# Patient Record
Sex: FEMALE | Race: WHITE | NOT HISPANIC OR LATINO | URBAN - METROPOLITAN AREA
[De-identification: names, ages, dates, MRNs, and addresses within clinical notes are randomized per-mention and may not be internally consistent; named-entity substitution may affect disease eponyms.]

---

## 2018-01-07 ENCOUNTER — EMERGENCY (EMERGENCY)
Facility: HOSPITAL | Age: 43
LOS: 0 days | Discharge: HOME | End: 2018-01-07

## 2018-01-07 DIAGNOSIS — K08.89 OTHER SPECIFIED DISORDERS OF TEETH AND SUPPORTING STRUCTURES: ICD-10-CM

## 2018-01-07 DIAGNOSIS — F41.9 ANXIETY DISORDER, UNSPECIFIED: ICD-10-CM

## 2018-01-07 DIAGNOSIS — F32.9 MAJOR DEPRESSIVE DISORDER, SINGLE EPISODE, UNSPECIFIED: ICD-10-CM

## 2018-01-07 DIAGNOSIS — J18.9 PNEUMONIA, UNSPECIFIED ORGANISM: ICD-10-CM

## 2018-01-07 DIAGNOSIS — Z79.899 OTHER LONG TERM (CURRENT) DRUG THERAPY: ICD-10-CM

## 2018-01-07 DIAGNOSIS — K80.50 CALCULUS OF BILE DUCT WITHOUT CHOLANGITIS OR CHOLECYSTITIS WITHOUT OBSTRUCTION: ICD-10-CM

## 2018-01-07 DIAGNOSIS — K04.01 REVERSIBLE PULPITIS: ICD-10-CM

## 2018-06-26 ENCOUNTER — INPATIENT (INPATIENT)
Facility: HOSPITAL | Age: 43
LOS: 10 days | Discharge: HOME | End: 2018-07-07
Attending: INTERNAL MEDICINE | Admitting: INTERNAL MEDICINE
Payer: SUBSIDIZED

## 2018-06-26 VITALS
RESPIRATION RATE: 20 BRPM | HEART RATE: 82 BPM | SYSTOLIC BLOOD PRESSURE: 119 MMHG | TEMPERATURE: 98 F | HEIGHT: 65 IN | WEIGHT: 190.04 LBS | OXYGEN SATURATION: 98 % | DIASTOLIC BLOOD PRESSURE: 76 MMHG

## 2018-06-26 DIAGNOSIS — Z90.49 ACQUIRED ABSENCE OF OTHER SPECIFIED PARTS OF DIGESTIVE TRACT: Chronic | ICD-10-CM

## 2018-06-26 LAB
ALBUMIN SERPL ELPH-MCNC: 3.9 G/DL — SIGNIFICANT CHANGE UP (ref 3.5–5.2)
ALP SERPL-CCNC: 155 U/L — HIGH (ref 30–115)
ALT FLD-CCNC: 14 U/L — SIGNIFICANT CHANGE UP (ref 0–41)
ANION GAP SERPL CALC-SCNC: 15 MMOL/L — HIGH (ref 7–14)
APPEARANCE UR: CLEAR — SIGNIFICANT CHANGE UP
APTT BLD: 30.9 SEC — SIGNIFICANT CHANGE UP (ref 27–39.2)
AST SERPL-CCNC: 16 U/L — SIGNIFICANT CHANGE UP (ref 0–41)
BACTERIA # UR AUTO: (no result)
BASOPHILS # BLD AUTO: 0.03 K/UL — SIGNIFICANT CHANGE UP (ref 0–0.2)
BASOPHILS NFR BLD AUTO: 0.3 % — SIGNIFICANT CHANGE UP (ref 0–1)
BILIRUB SERPL-MCNC: <0.2 MG/DL — SIGNIFICANT CHANGE UP (ref 0.2–1.2)
BILIRUB UR-MCNC: NEGATIVE — SIGNIFICANT CHANGE UP
BUN SERPL-MCNC: 12 MG/DL — SIGNIFICANT CHANGE UP (ref 10–20)
CALCIUM SERPL-MCNC: 9.3 MG/DL — SIGNIFICANT CHANGE UP (ref 8.5–10.1)
CHLORIDE SERPL-SCNC: 96 MMOL/L — LOW (ref 98–110)
CO2 SERPL-SCNC: 26 MMOL/L — SIGNIFICANT CHANGE UP (ref 17–32)
COD CRY URNS QL: NEGATIVE — SIGNIFICANT CHANGE UP
COLOR SPEC: YELLOW — SIGNIFICANT CHANGE UP
CREAT SERPL-MCNC: 0.7 MG/DL — SIGNIFICANT CHANGE UP (ref 0.7–1.5)
DIFF PNL FLD: NEGATIVE — SIGNIFICANT CHANGE UP
EOSINOPHIL # BLD AUTO: 0.25 K/UL — SIGNIFICANT CHANGE UP (ref 0–0.7)
EOSINOPHIL NFR BLD AUTO: 2.6 % — SIGNIFICANT CHANGE UP (ref 0–8)
EPI CELLS # UR: (no result) /HPF
GLUCOSE SERPL-MCNC: 81 MG/DL — SIGNIFICANT CHANGE UP (ref 70–99)
GLUCOSE UR QL: NEGATIVE MG/DL — SIGNIFICANT CHANGE UP
GRAN CASTS # UR COMP ASSIST: NEGATIVE — SIGNIFICANT CHANGE UP
HCT VFR BLD CALC: 36.2 % — LOW (ref 37–47)
HGB BLD-MCNC: 11.5 G/DL — LOW (ref 12–16)
HYALINE CASTS # UR AUTO: NEGATIVE — SIGNIFICANT CHANGE UP
IMM GRANULOCYTES NFR BLD AUTO: 0.4 % — HIGH (ref 0.1–0.3)
INR BLD: 1 RATIO — SIGNIFICANT CHANGE UP (ref 0.65–1.3)
KETONES UR-MCNC: NEGATIVE — SIGNIFICANT CHANGE UP
LEUKOCYTE ESTERASE UR-ACNC: (no result)
LYMPHOCYTES # BLD AUTO: 3.66 K/UL — HIGH (ref 1.2–3.4)
LYMPHOCYTES # BLD AUTO: 38.4 % — SIGNIFICANT CHANGE UP (ref 20.5–51.1)
MCHC RBC-ENTMCNC: 25.9 PG — LOW (ref 27–31)
MCHC RBC-ENTMCNC: 31.8 G/DL — LOW (ref 32–37)
MCV RBC AUTO: 81.5 FL — SIGNIFICANT CHANGE UP (ref 81–99)
MONOCYTES # BLD AUTO: 0.67 K/UL — HIGH (ref 0.1–0.6)
MONOCYTES NFR BLD AUTO: 7 % — SIGNIFICANT CHANGE UP (ref 1.7–9.3)
NEUTROPHILS # BLD AUTO: 4.88 K/UL — SIGNIFICANT CHANGE UP (ref 1.4–6.5)
NEUTROPHILS NFR BLD AUTO: 51.3 % — SIGNIFICANT CHANGE UP (ref 42.2–75.2)
NITRITE UR-MCNC: NEGATIVE — SIGNIFICANT CHANGE UP
NRBC # BLD: 0 /100 WBCS — SIGNIFICANT CHANGE UP (ref 0–0)
PH UR: 6 — SIGNIFICANT CHANGE UP (ref 5–8)
PLATELET # BLD AUTO: 341 K/UL — SIGNIFICANT CHANGE UP (ref 130–400)
POTASSIUM SERPL-MCNC: 4.5 MMOL/L — SIGNIFICANT CHANGE UP (ref 3.5–5)
POTASSIUM SERPL-SCNC: 4.5 MMOL/L — SIGNIFICANT CHANGE UP (ref 3.5–5)
PROT SERPL-MCNC: 7.2 G/DL — SIGNIFICANT CHANGE UP (ref 6–8)
PROT UR-MCNC: NEGATIVE MG/DL — SIGNIFICANT CHANGE UP
PROTHROM AB SERPL-ACNC: 10.8 SEC — SIGNIFICANT CHANGE UP (ref 9.95–12.87)
RBC # BLD: 4.44 M/UL — SIGNIFICANT CHANGE UP (ref 4.2–5.4)
RBC # FLD: 13.5 % — SIGNIFICANT CHANGE UP (ref 11.5–14.5)
RBC CASTS # UR COMP ASSIST: NEGATIVE — SIGNIFICANT CHANGE UP
SODIUM SERPL-SCNC: 137 MMOL/L — SIGNIFICANT CHANGE UP (ref 135–146)
SP GR SPEC: <=1.005 — SIGNIFICANT CHANGE UP (ref 1.01–1.03)
TRI-PHOS CRY UR QL COMP ASSIST: NEGATIVE — SIGNIFICANT CHANGE UP
URATE CRY FLD QL MICRO: NEGATIVE — SIGNIFICANT CHANGE UP
UROBILINOGEN FLD QL: 0.2 MG/DL — SIGNIFICANT CHANGE UP (ref 0.2–0.2)
WBC # BLD: 9.53 K/UL — SIGNIFICANT CHANGE UP (ref 4.8–10.8)
WBC # FLD AUTO: 9.53 K/UL — SIGNIFICANT CHANGE UP (ref 4.8–10.8)
WBC UR QL: (no result) /HPF

## 2018-06-26 RX ORDER — ESCITALOPRAM OXALATE 10 MG/1
20 TABLET, FILM COATED ORAL AT BEDTIME
Qty: 0 | Refills: 0 | Status: DISCONTINUED | OUTPATIENT
Start: 2018-06-26 | End: 2018-06-27

## 2018-06-26 RX ORDER — ESCITALOPRAM OXALATE 10 MG/1
20 TABLET, FILM COATED ORAL ONCE
Qty: 0 | Refills: 0 | Status: COMPLETED | OUTPATIENT
Start: 2018-06-26 | End: 2018-06-26

## 2018-06-26 RX ORDER — ESCITALOPRAM OXALATE 10 MG/1
20 TABLET, FILM COATED ORAL DAILY
Qty: 0 | Refills: 0 | Status: DISCONTINUED | OUTPATIENT
Start: 2018-06-26 | End: 2018-06-26

## 2018-06-26 RX ORDER — BUPROPION HYDROCHLORIDE 150 MG/1
300 TABLET, EXTENDED RELEASE ORAL DAILY
Qty: 0 | Refills: 0 | Status: DISCONTINUED | OUTPATIENT
Start: 2018-06-26 | End: 2018-06-26

## 2018-06-26 RX ORDER — BUPROPION HYDROCHLORIDE 150 MG/1
300 TABLET, EXTENDED RELEASE ORAL ONCE
Qty: 0 | Refills: 0 | Status: COMPLETED | OUTPATIENT
Start: 2018-06-26 | End: 2018-06-26

## 2018-06-26 RX ORDER — BUPROPION HYDROCHLORIDE 150 MG/1
300 TABLET, EXTENDED RELEASE ORAL AT BEDTIME
Qty: 0 | Refills: 0 | Status: DISCONTINUED | OUTPATIENT
Start: 2018-06-26 | End: 2018-07-07

## 2018-06-26 RX ADMIN — ESCITALOPRAM OXALATE 20 MILLIGRAM(S): 10 TABLET, FILM COATED ORAL at 23:24

## 2018-06-26 RX ADMIN — BUPROPION HYDROCHLORIDE 300 MILLIGRAM(S): 150 TABLET, EXTENDED RELEASE ORAL at 23:25

## 2018-06-26 NOTE — ED PROVIDER NOTE - ATTENDING CONTRIBUTION TO CARE
I personally evaluated the patient. I reviewed the Resident’s or Physician Assistant’s note (as assigned above), and agree with the findings and plan except as documented in my note.  43y female former smoker with cough, PE as above, has central mass likely malignant with metastases, d/w Dr Galvan oncology, will admit [facility] for expedited malignancy wrokup

## 2018-06-26 NOTE — ED PROVIDER NOTE - OBJECTIVE STATEMENT
43 y f pmh anxiety, depression pw cough. Nonproductive cough for the past 3 months. Associated with nasal congestion. No exacerbating or alleviating factors. While coughing, pulled a muscle in the R inner thigh and up towards buttocks 2 days ago. Worsens with flexion of the hip, but still able to ambulate well. Takes allegra for allergies with no relief. Also c/o cloudy urine for the past 1 year. Denies dysuria, hematuria, flank pain, fever, chills, sore throat, neck pain, ear pain, leg swelling, leg erythema.

## 2018-06-26 NOTE — ED PROVIDER NOTE - PHYSICAL EXAMINATION
CONSTITUTIONAL: Well-developed; well-nourished; in no acute distress.   SKIN: warm, dry  HEAD: Normocephalic; atraumatic.  EYES: normal sclera and conjunctiva   ENT: No nasal discharge; airway clear. No tonsillar exudates, postnasal drip, erythema.   NECK: Supple; non tender.  CARD: S1, S2 normal; no murmurs, gallops, or rubs. Regular rate and rhythm.   RESP: No wheezes, rales or rhonchi.  ABD: soft ntnd; no cva ttp.   EXT: Normal ROM.  No clubbing, cyanosis or edema. Mild ttp over inner thigh. Able to ambulate with normal gait. No calf swelling or posterior calf ttp.   LYMPH: No acute cervical adenopathy.  NEURO: Alert, oriented, grossly unremarkable  PSYCH: Cooperative, appropriate.

## 2018-06-26 NOTE — ED ADULT TRIAGE NOTE - CHIEF COMPLAINT QUOTE
Patient states "chronic cough for a few months and last night deana I coughed I had a pinch in my right leg, and I couldn't walk".

## 2018-06-26 NOTE — ED PROVIDER NOTE - NS ED ROS FT
Eyes:  No visual changes, eye pain or discharge.  ENMT:  No hearing changes, pain, discharge or infections. No neck pain or stiffness.  Cardiac:  No chest pain, SOB or edema.   Respiratory:  Cough. No respiratory distress.   GI:  No nausea, vomiting, diarrhea or abdominal pain.  :  Cloudy urine. No dysuria, frequency or burning.  MS:  R inner thigh pain. No myalgia, muscle weakness, joint pain or back pain.  Neuro:  No headache or weakness.  No LOC.  Skin:  No skin rash.   Endocrine: No history of thyroid disease or diabetes.

## 2018-06-27 LAB
ALBUMIN SERPL ELPH-MCNC: 4 G/DL — SIGNIFICANT CHANGE UP (ref 3.5–5.2)
ALP SERPL-CCNC: 159 U/L — HIGH (ref 30–115)
ALT FLD-CCNC: 14 U/L — SIGNIFICANT CHANGE UP (ref 0–41)
ANION GAP SERPL CALC-SCNC: 17 MMOL/L — HIGH (ref 7–14)
AST SERPL-CCNC: 15 U/L — SIGNIFICANT CHANGE UP (ref 0–41)
BASOPHILS # BLD AUTO: 0.03 K/UL — SIGNIFICANT CHANGE UP (ref 0–0.2)
BASOPHILS NFR BLD AUTO: 0.4 % — SIGNIFICANT CHANGE UP (ref 0–1)
BILIRUB SERPL-MCNC: 0.2 MG/DL — SIGNIFICANT CHANGE UP (ref 0.2–1.2)
BUN SERPL-MCNC: 8 MG/DL — LOW (ref 10–20)
CALCIUM SERPL-MCNC: 9.4 MG/DL — SIGNIFICANT CHANGE UP (ref 8.5–10.1)
CHLORIDE SERPL-SCNC: 95 MMOL/L — LOW (ref 98–110)
CO2 SERPL-SCNC: 28 MMOL/L — SIGNIFICANT CHANGE UP (ref 17–32)
CREAT SERPL-MCNC: 0.6 MG/DL — LOW (ref 0.7–1.5)
EOSINOPHIL # BLD AUTO: 0.22 K/UL — SIGNIFICANT CHANGE UP (ref 0–0.7)
EOSINOPHIL NFR BLD AUTO: 2.7 % — SIGNIFICANT CHANGE UP (ref 0–8)
GGT SERPL-CCNC: 65 U/L — HIGH (ref 1–40)
GLUCOSE SERPL-MCNC: 82 MG/DL — SIGNIFICANT CHANGE UP (ref 70–99)
HCG UR QL: NEGATIVE — SIGNIFICANT CHANGE UP
HCT VFR BLD CALC: 37.8 % — SIGNIFICANT CHANGE UP (ref 37–47)
HGB BLD-MCNC: 11.9 G/DL — LOW (ref 12–16)
IMM GRANULOCYTES NFR BLD AUTO: 0.4 % — HIGH (ref 0.1–0.3)
LYMPHOCYTES # BLD AUTO: 2.44 K/UL — SIGNIFICANT CHANGE UP (ref 1.2–3.4)
LYMPHOCYTES # BLD AUTO: 30.2 % — SIGNIFICANT CHANGE UP (ref 20.5–51.1)
MAGNESIUM SERPL-MCNC: 2.4 MG/DL — SIGNIFICANT CHANGE UP (ref 1.8–2.4)
MCHC RBC-ENTMCNC: 25.5 PG — LOW (ref 27–31)
MCHC RBC-ENTMCNC: 31.5 G/DL — LOW (ref 32–37)
MCV RBC AUTO: 81.1 FL — SIGNIFICANT CHANGE UP (ref 81–99)
MONOCYTES # BLD AUTO: 0.5 K/UL — SIGNIFICANT CHANGE UP (ref 0.1–0.6)
MONOCYTES NFR BLD AUTO: 6.2 % — SIGNIFICANT CHANGE UP (ref 1.7–9.3)
NEUTROPHILS # BLD AUTO: 4.85 K/UL — SIGNIFICANT CHANGE UP (ref 1.4–6.5)
NEUTROPHILS NFR BLD AUTO: 60.1 % — SIGNIFICANT CHANGE UP (ref 42.2–75.2)
PLATELET # BLD AUTO: 357 K/UL — SIGNIFICANT CHANGE UP (ref 130–400)
POTASSIUM SERPL-MCNC: 4.6 MMOL/L — SIGNIFICANT CHANGE UP (ref 3.5–5)
POTASSIUM SERPL-SCNC: 4.6 MMOL/L — SIGNIFICANT CHANGE UP (ref 3.5–5)
PROT SERPL-MCNC: 7.2 G/DL — SIGNIFICANT CHANGE UP (ref 6–8)
RBC # BLD: 4.66 M/UL — SIGNIFICANT CHANGE UP (ref 4.2–5.4)
RBC # FLD: 13.7 % — SIGNIFICANT CHANGE UP (ref 11.5–14.5)
SODIUM SERPL-SCNC: 140 MMOL/L — SIGNIFICANT CHANGE UP (ref 135–146)
WBC # BLD: 8.07 K/UL — SIGNIFICANT CHANGE UP (ref 4.8–10.8)
WBC # FLD AUTO: 8.07 K/UL — SIGNIFICANT CHANGE UP (ref 4.8–10.8)

## 2018-06-27 PROCEDURE — 93970 EXTREMITY STUDY: CPT | Mod: 26

## 2018-06-27 RX ORDER — OXYCODONE HYDROCHLORIDE 5 MG/1
5 TABLET ORAL
Qty: 0 | Refills: 0 | Status: DISCONTINUED | OUTPATIENT
Start: 2018-06-27 | End: 2018-07-04

## 2018-06-27 RX ORDER — ESCITALOPRAM OXALATE 10 MG/1
40 TABLET, FILM COATED ORAL
Qty: 0 | Refills: 0 | COMMUNITY

## 2018-06-27 RX ORDER — IOHEXOL 300 MG/ML
30 INJECTION, SOLUTION INTRAVENOUS ONCE
Qty: 0 | Refills: 0 | Status: COMPLETED | OUTPATIENT
Start: 2018-06-27 | End: 2018-06-27

## 2018-06-27 RX ORDER — ESCITALOPRAM OXALATE 10 MG/1
40 TABLET, FILM COATED ORAL AT BEDTIME
Qty: 0 | Refills: 0 | Status: DISCONTINUED | OUTPATIENT
Start: 2018-06-27 | End: 2018-07-07

## 2018-06-27 RX ORDER — ESCITALOPRAM OXALATE 10 MG/1
40 TABLET, FILM COATED ORAL DAILY
Qty: 0 | Refills: 0 | Status: DISCONTINUED | OUTPATIENT
Start: 2018-06-27 | End: 2018-06-27

## 2018-06-27 RX ORDER — BUPROPION HYDROCHLORIDE 150 MG/1
0 TABLET, EXTENDED RELEASE ORAL
Qty: 0 | Refills: 0 | COMMUNITY

## 2018-06-27 RX ORDER — ENOXAPARIN SODIUM 100 MG/ML
40 INJECTION SUBCUTANEOUS EVERY 24 HOURS
Qty: 0 | Refills: 0 | Status: DISCONTINUED | OUTPATIENT
Start: 2018-06-27 | End: 2018-07-07

## 2018-06-27 RX ADMIN — ENOXAPARIN SODIUM 40 MILLIGRAM(S): 100 INJECTION SUBCUTANEOUS at 11:54

## 2018-06-27 RX ADMIN — ESCITALOPRAM OXALATE 40 MILLIGRAM(S): 10 TABLET, FILM COATED ORAL at 11:54

## 2018-06-27 RX ADMIN — BUPROPION HYDROCHLORIDE 300 MILLIGRAM(S): 150 TABLET, EXTENDED RELEASE ORAL at 22:21

## 2018-06-27 RX ADMIN — ESCITALOPRAM OXALATE 40 MILLIGRAM(S): 10 TABLET, FILM COATED ORAL at 22:21

## 2018-06-27 RX ADMIN — IOHEXOL 30 MILLILITER(S): 300 INJECTION, SOLUTION INTRAVENOUS at 13:31

## 2018-06-27 NOTE — CONSULT NOTE ADULT - CONSULT REASON
New large L upper lobe mass, pulmonary nodule and hypodense hepatic lesions concerning for malignancy

## 2018-06-27 NOTE — CONSULT NOTE ADULT - SUBJECTIVE AND OBJECTIVE BOX
INTERVENTIONAL RADIOLOGY CONSULT:     Procedure Requested: image guided liver biopsy    HPI:    43 y.o female patient with PMH of anxiety and depression presents to the ED for a 3 months history of dry cough. History goes back to March 2018 when, after getting the flu, patient started complaining of a dry cough that persisted. [facility] for right thigh pain that started 2 days ago. She reports that the pain started while she was coughing and wasn't able to walk. Pain has improved as well as ambulation. Patient has chest pain during cough. Patient underwent a CT of the chest which showed multiple left hepatic lobe lesions. Onocology was consulted for further managment; oncology is requesting liver biopsy as part of their oncology work up.     PAST MEDICAL & SURGICAL HISTORY:  Anxiety and depression  History of cholecystectomy    MEDICATIONS  (STANDING):  buPROPion XL . 300 milliGRAM(s) Oral at bedtime  enoxaparin Injectable 40 milliGRAM(s) SubCutaneous every 24 hours  escitalopram 40 milliGRAM(s) Oral daily    MEDICATIONS  (PRN):  oxyCODONE    IR 5 milliGRAM(s) Oral every 3 hours PRN Severe Pain (7 - 10)    Allergies  dust (Sneezing; Rhinorrhea)  Erythromycin Base (Other)    Social History:   Smoking: Yes [ ]  No [ ]   ______pk yrs  ETOH  Yes [ ]  No [ ]  Social [ ]  DRUGS:  Yes [ ]  No [ ]  if so what______________    FAMILY HISTORY:  Family history of MI (myocardial infarction) (Father, Mother)  Family history of prostate cancer in father (Father)    Physical Exam:   Vital Signs Last 24 Hrs  T(C): 36.2 (27 Jun 2018 05:49), Max: 36.6 (26 Jun 2018 13:05)  T(F): 97.2 (27 Jun 2018 05:49), Max: 97.9 (26 Jun 2018 13:05)  HR: 69 (27 Jun 2018 05:49) (69 - 97)  BP: 109/61 (27 Jun 2018 05:49) (109/61 - 119/76)  BP(mean): --  RR: 18 (27 Jun 2018 05:49) (18 - 20)  SpO2: 98% (27 Jun 2018 08:09) (98% - 98%)    Gen: awake, NAD  Psych: affect and mood appropriate. Responds to questions appropriately  Neuro: no facial droop  Neck: no JVD  CV: normal AP diameter  Resp: non-labored breathing  Abd: non distended  Ex: moving upper extremities spontaneously  Skin: no large lesions or rashes on the face or neck    Labs:                         11.9   8.07  )-----------( 357      ( 27 Jun 2018 07:30 )             37.8     06-27    140  |  95<L>  |  8<L>  ----------------------------<  82  4.6   |  28  |  0.6<L>    Ca    9.4      27 Jun 2018 07:30  Mg     2.4     06-27    TPro  7.2  /  Alb  4.0  /  TBili  0.2  /  DBili  x   /  AST  15  /  ALT  14  /  AlkPhos  159<H>  06-27    PT/INR - ( 26 Jun 2018 14:22 )   PT: 10.80 sec;   INR: 1.00 ratio         PTT - ( 26 Jun 2018 14:22 )  PTT:30.9 sec      Radiology & Additional Studies:     6/26 - CT chest    A/P - 44 y/o F w/cough, liver lesions seen on CT chest; IR consulted for image guided biopsy of liver lesions    1. Liver mass - Plan for image guided (CT vs ultrasound) liver biopsy on Thursday vs Friday.     Thank you for the courtesy of this consult, please call [phone] with any further questions. INTERVENTIONAL RADIOLOGY CONSULT:     Procedure Requested: image guided liver biopsy    HPI:    43 y.o female patient with PMH of anxiety and depression presents to the ED for a 3 months history of dry cough. History goes back to March 2018 when, after getting the flu, patient started complaining of a dry cough that persisted. She presented to the ED at the [facility] today for right thigh pain that started 2 days ago. She reports that the pain started while she was coughing and wasn't able to walk. Pain has improved as well as ambulation. Patient has chest pain during cough. Patient underwent a CT of the chest which showed multiple left hepatic lobe lesions. Onocology was consulted for further managment; oncology is requesting liver biopsy as part of their oncology work up.     PAST MEDICAL & SURGICAL HISTORY:  Anxiety and depression  History of cholecystectomy    MEDICATIONS  (STANDING):  buPROPion XL . 300 milliGRAM(s) Oral at bedtime  enoxaparin Injectable 40 milliGRAM(s) SubCutaneous every 24 hours  escitalopram 40 milliGRAM(s) Oral daily    MEDICATIONS  (PRN):  oxyCODONE    IR 5 milliGRAM(s) Oral every 3 hours PRN Severe Pain (7 - 10)    Allergies  dust (Sneezing; Rhinorrhea)  Erythromycin Base (Other)    Social History:   Smoking: Yes [ ]  No [ ]   ______pk yrs  ETOH  Yes [ ]  No [ ]  Social [ ]  DRUGS:  Yes [ ]  No [ ]  if so what______________    FAMILY HISTORY:  Family history of MI (myocardial infarction) (Father, Mother)  Family history of prostate cancer in father (Father)    Physical Exam:   Vital Signs Last 24 Hrs  T(C): 36.2 (27 Jun 2018 05:49), Max: 36.6 (26 Jun 2018 13:05)  T(F): 97.2 (27 Jun 2018 05:49), Max: 97.9 (26 Jun 2018 13:05)  HR: 69 (27 Jun 2018 05:49) (69 - 97)  BP: 109/61 (27 Jun 2018 05:49) (109/61 - 119/76)  BP(mean): --  RR: 18 (27 Jun 2018 05:49) (18 - 20)  SpO2: 98% (27 Jun 2018 08:09) (98% - 98%)    Gen: awake, NAD  Psych: affect and mood appropriate. Responds to questions appropriately  Neuro: no facial droop  Neck: no JVD  CV: normal AP diameter  Resp: non-labored breathing  Abd: non distended  Ex: moving upper extremities spontaneously  Skin: no large lesions or rashes on the face or neck    Labs:                         11.9   8.07  )-----------( 357      ( 27 Jun 2018 07:30 )             37.8     06-27    140  |  95<L>  |  8<L>  ----------------------------<  82  4.6   |  28  |  0.6<L>    Ca    9.4      27 Jun 2018 07:30  Mg     2.4     06-27    TPro  7.2  /  Alb  4.0  /  TBili  0.2  /  DBili  x   /  AST  15  /  ALT  14  /  AlkPhos  159<H>  06-27    PT/INR - ( 26 Jun 2018 14:22 )   PT: 10.80 sec;   INR: 1.00 ratio         PTT - ( 26 Jun 2018 14:22 )  PTT:30.9 sec      Radiology & Additional Studies:     6/26 - CT chest    A/P - 44 y/o F w/cough, liver lesions seen on CT chest; IR consulted for image guided biopsy of liver lesions    1. Liver mass - Plan for image guided (CT vs ultrasound) liver biopsy on Thursday vs Friday. Explained to the patient that the CT scanner will be down for scheduled maintenance from at least 8 to 12 PM on Thursday. We will attempt to do procedure with ultrasound, however explained to the patient that the lesion in the liver may not be adequately visualized with ultrasound and necessitate CT. If the CT scanner is unavailable to prolonged maintenance, the biopsy may have to be done on Friday.      Thank you for the courtesy of this consult, please call [phone] with any further questions. INTERVENTIONAL RADIOLOGY CONSULT:     Procedure Requested: image guided liver biopsy    HPI:    43 y.o female patient with PMH of anxiety and depression presents to the ED for a 3 months history of dry cough. History goes back to March 2018 when, after getting the flu, patient started complaining of a dry cough that persisted. She presented to the ED at the [facility] today for right thigh pain that started 2 days ago. She reports that the pain started while she was coughing and wasn't able to walk. Pain has improved as well as ambulation. Patient has chest pain during cough. Patient underwent a CT of the chest which showed multiple left hepatic lobe lesions. Onocology was consulted for further managment; oncology is requesting liver biopsy as part of their oncology work up.     PAST MEDICAL & SURGICAL HISTORY:  Anxiety and depression  History of cholecystectomy    MEDICATIONS  (STANDING):  buPROPion XL . 300 milliGRAM(s) Oral at bedtime  enoxaparin Injectable 40 milliGRAM(s) SubCutaneous every 24 hours  escitalopram 40 milliGRAM(s) Oral daily    MEDICATIONS  (PRN):  oxyCODONE    IR 5 milliGRAM(s) Oral every 3 hours PRN Severe Pain (7 - 10)    Allergies  dust (Sneezing; Rhinorrhea)  Erythromycin Base (Other)    Social History:   Smoking: Yes [ ]  No [ ]   ______pk yrs  ETOH  Yes [ ]  No [ ]  Social [ ]  DRUGS:  Yes [ ]  No [ ]  if so what______________    FAMILY HISTORY:  Family history of MI (myocardial infarction) (Father, Mother)  Family history of prostate cancer in father (Father)    Physical Exam:   Vital Signs Last 24 Hrs  T(C): 36.2 (27 Jun 2018 05:49), Max: 36.6 (26 Jun 2018 13:05)  T(F): 97.2 (27 Jun 2018 05:49), Max: 97.9 (26 Jun 2018 13:05)  HR: 69 (27 Jun 2018 05:49) (69 - 97)  BP: 109/61 (27 Jun 2018 05:49) (109/61 - 119/76)  BP(mean): --  RR: 18 (27 Jun 2018 05:49) (18 - 20)  SpO2: 98% (27 Jun 2018 08:09) (98% - 98%)    Gen: awake, NAD  Psych: affect and mood appropriate. Responds to questions appropriately  Neuro: no facial droop  Neck: no JVD  CV: normal AP diameter  Resp: non-labored breathing  Abd: non distended  Ex: moving upper extremities spontaneously  Skin: no large lesions or rashes on the face or neck    Labs:                         11.9   8.07  )-----------( 357      ( 27 Jun 2018 07:30 )             37.8     06-27    140  |  95<L>  |  8<L>  ----------------------------<  82  4.6   |  28  |  0.6<L>    Ca    9.4      27 Jun 2018 07:30  Mg     2.4     06-27    TPro  7.2  /  Alb  4.0  /  TBili  0.2  /  DBili  x   /  AST  15  /  ALT  14  /  AlkPhos  159<H>  06-27    PT/INR - ( 26 Jun 2018 14:22 )   PT: 10.80 sec;   INR: 1.00 ratio         PTT - ( 26 Jun 2018 14:22 )  PTT:30.9 sec      Radiology & Additional Studies:     6/26 - CT chest    A/P - 44 y/o F w/cough, liver lesions seen on CT chest; IR consulted for image guided biopsy of liver lesions    1. Liver mass - Plan for image guided (CT vs ultrasound) liver biopsy on Thursday vs Friday. Explained to the patient that the CT scanner will be down for scheduled maintenance from at least 8 to 12 PM on Thursday. We may attempt to do procedure with ultrasound, however explained to the patient that the lesion in the liver may not be adequately visualized with ultrasound and necessitate CT. If the CT scanner is unavailable to prolonged maintenance, the biopsy may have to be done on Friday.      Thank you for the courtesy of this consult, please call [phone] with any further questions.

## 2018-06-27 NOTE — CONSULT NOTE ADULT - SUBJECTIVE AND OBJECTIVE BOX
REMEDIOS DENTON, female, 43y ([birth year]),   MRN-[id number]  Admit Date: 06-26-18 (1d)    Chief Complaint  43 y.o female patient with PMH of anxiety and depression presents to the ED for a 3 months history of dry cough. Since March 2018, after getting the flu, patient started complaining of a dry cough that persisted. She presented to the ED at the [facility] today for right thigh pain that started 2 days ago. She reports that the pain started while she was coughing and wasn't able to walk. Pain has improved as well as ambulation. In the ED pt was found to have a new large L upper lobe mass and L upper lobe lesion with hepatic hypodense lesions concerning for metastatic disease. She quit smoking 3 yrs ago and has a 25py smoking history. Patient also has mild anemia and elevated ALP and GGT.     Past Medical and Surgical History  PAST MEDICAL & SURGICAL HISTORY:  Anxiety and depression  History of cholecystectomy      Current Medications:  MEDICATIONS  (STANDING):  buPROPion XL . 300 milliGRAM(s) Oral at bedtime  enoxaparin Injectable 40 milliGRAM(s) SubCutaneous every 24 hours  escitalopram 40 milliGRAM(s) Oral daily    MEDICATIONS  (PRN):  oxyCODONE    IR 5 milliGRAM(s) Oral every 3 hours PRN Severe Pain (7 - 10)      Interval History:  No acute events overnight. No complaints at this time.    Vital Signs:  T(F): 97.2 (06-27-18 @ 05:49), Max: 97.9 (06-26-18 @ 13:05)  HR: 69 (06-27-18 @ 05:49) (69 - 97)  BP: 109/61 (06-27-18 @ 05:49) (109/61 - 119/76)  RR: 18 (06-27-18 @ 05:49) (18 - 20)  SpO2: 98% (06-27-18 @ 08:09) (98% - 98%)  CAPILLARY BLOOD GLUCOSE          Physical Exam:  General: Not in distress.   HEENT: Moist mucus membranes. PERRLA.  Cardio: Regular rate and rhythm, S1, S2, no murmur, rub, or gallop.  Pulm: Clear to auscultation bilaterally. No wheezing, rales, or rhonchi  Abdomen: Soft, non-tender, non-distended. Normoactive bowel sounds  Extremities: No cyanosis or edema bilaterally. No calf tenderness to palpation  Neuro: A&O x3. No focal deficits. CN II - XII grossly intact.    Labs and Imaging:  CBC Full  -  ( 27 Jun 2018 07:30 )  WBC Count : 8.07 K/uL  Hemoglobin : 11.9 g/dL  Hematocrit : 37.8 %  Platelet Count - Automated : 357 K/uL  Mean Cell Volume : 81.1 fL  Mean Cell Hemoglobin : 25.5 pg  Mean Cell Hemoglobin Concentration : 31.5 g/dL  Auto Neutrophil # : 4.85 K/uL  Auto Lymphocyte # : 2.44 K/uL  Auto Monocyte # : 0.50 K/uL  Auto Eosinophil # : 0.22 K/uL  Auto Basophil # : 0.03 K/uL  Auto Neutrophil % : 60.1 %  Auto Lymphocyte % : 30.2 %  Auto Monocyte % : 6.2 %  Auto Eosinophil % : 2.7 %  Auto Basophil % : 0.4 %    RDW: 13.7  13.5    PT/INR/PTT: 06-26-18 @ 14:22  10.80 | 30.9        ^      1.00    BMP: 06-27-18 @ 07:30  140 | 95 | 8    -----------------< 82  4.6  | 28 | 0.6  eGFR(AA): 129, eGFR (non-AA): 112  Ca 9.4, Mg 2.4, P --  BMP: 06-26-18 @ 14:22  137 | 96 | 12   -----------------< 81  4.5  | 26 | 0.7  eGFR(AA): 123, eGFR (non-AA): 106  Ca 9.3, Mg --, P --    LFTs: 06-27-18 @ 07:30  TP  7.2  | 4.0 Alb   ---------------  TB  0.2  | --  DB   ---------------  ALT 14  | 15  AST            ^          159 ALK  LFTs: 06-26-18 @ 14:22  TP  7.2  | 3.9 Alb   ---------------  TB  <0.2  | --  DB   ---------------  ALT 14  | 16  AST            ^          155 ALK      LFTs: 06-27-18 @ 07:30  Ca  9.4  | 15 AST   -----------------  TP  7.2  | 14 ALT  -----------------  Alb 4.0  | 159 ALK          ^        0.2         TB  LFTs: 06-26-18 @ 14:22  Ca  9.3  | 16 AST   -----------------  TP  7.2  | 14 ALT  -----------------  Alb 3.9  | 155 ALK          ^        <0.2         TB          Urinalysis:  Urinalysis Basic - ( 26 Jun 2018 14:10 )    Color: Yellow / Appearance: Clear / SG: <=1.005 / pH: x  Gluc: x / Ketone: Negative  / Bili: Negative / Urobili: 0.2 mg/dL   Blood: x / Protein: Negative mg/dL / Nitrite: Negative   Leuk Esterase: Small / RBC: Negative / WBC 10-25 /HPF   Sq Epi: x / Non Sq Epi: Few /HPF / Bacteria: Moderate    < from: CT Chest w/ IV Cont (06.26.18 @ 15:19) >  No pulmonary embolus.    Since November 24, 2015:    1.  There is a new large left upper lobe mass measuring 6.8 x 5.7 x 7 cm   extending to mediastinum with multiple mediastinal lymph nodes as   described above. Additionally, there are multiple new hypodense hepatic   metastatic lesions.  2.  4 mm left upper lobe pulmonary nodule, metastatic etiology.      < end of copied text >      Home Medications:  Home Medications:  Lexapro: 40 milligram(s) orally once a day (27 Jun 2018 01:26)  Wellbutrin  mg/24 hours oral tablet, extended release: 1 tab(s) orally once a day (27 Jun 2018 01:26) REMEDIOS DENTON, female, 43y ([birth year]),   MRN-[id number]  Admit Date: 06-26-18 (1d)    Chief Complaint  43 y.o female patient with PMH of anxiety and depression presents to the ED for a 3 months history of dry cough. Since March 2018, after getting the flu, patient started complaining of a dry cough that persisted. She presented to the ED at the [facility] today for right thigh pain that started 2 days ago. She reports that the pain started while she was coughing and wasn't able to walk. Pain has improved as well as ambulation. In the ED pt was found to have a new large L upper lobe mass and L upper lobe lesion with hepatic hypodense lesions concerning for metastatic disease. She quit smoking 3 yrs ago and has a 25py smoking history. Patient also has mild anemia and elevated ALP and GGT. She denies any weight loss or fatigue. As per patient she felt feverish last month but did not take her temperature and the symptoms resolved in a day. She denies abdominal pain, hemoptysis, sob or other bone pain. Family history is positive for prostate cancer in father and lymphoma in brother.       Past Medical and Surgical History  PAST MEDICAL & SURGICAL HISTORY:  Anxiety and depression  History of cholecystectomy      Current Medications:  MEDICATIONS  (STANDING):  buPROPion XL . 300 milliGRAM(s) Oral at bedtime  enoxaparin Injectable 40 milliGRAM(s) SubCutaneous every 24 hours  escitalopram 40 milliGRAM(s) Oral daily    MEDICATIONS  (PRN):  oxyCODONE    IR 5 milliGRAM(s) Oral every 3 hours PRN Severe Pain (7 - 10)      Interval History:  No acute events overnight. No complaints at this time.    Vital Signs:  T(F): 97.2 (06-27-18 @ 05:49), Max: 97.9 (06-26-18 @ 13:05)  HR: 69 (06-27-18 @ 05:49) (69 - 97)  BP: 109/61 (06-27-18 @ 05:49) (109/61 - 119/76)  RR: 18 (06-27-18 @ 05:49) (18 - 20)  SpO2: 98% (06-27-18 @ 08:09) (98% - 98%)  CAPILLARY BLOOD GLUCOSE          Physical Exam:  General: Not in distress.   HEENT: Moist mucus membranes.   Cardio: Regular rate and rhythm, S1, S2, no murmur, rub, or gallop.  Pulm: Clear to auscultation bilaterally. No wheezing, rales, or rhonchi  Abdomen: Soft, non-tender, non-distended. Normoactive bowel sounds  Extremities: No cyanosis or edema bilaterally.   Neuro: A&O x3. No focal deficits. CN II - XII grossly intact.    Labs and Imaging:  CBC Full  -  ( 27 Jun 2018 07:30 )  WBC Count : 8.07 K/uL  Hemoglobin : 11.9 g/dL  Hematocrit : 37.8 %  Platelet Count - Automated : 357 K/uL  Mean Cell Volume : 81.1 fL  Mean Cell Hemoglobin : 25.5 pg  Mean Cell Hemoglobin Concentration : 31.5 g/dL  Auto Neutrophil # : 4.85 K/uL  Auto Lymphocyte # : 2.44 K/uL  Auto Monocyte # : 0.50 K/uL  Auto Eosinophil # : 0.22 K/uL  Auto Basophil # : 0.03 K/uL  Auto Neutrophil % : 60.1 %  Auto Lymphocyte % : 30.2 %  Auto Monocyte % : 6.2 %  Auto Eosinophil % : 2.7 %  Auto Basophil % : 0.4 %    RDW: 13.7  13.5    PT/INR/PTT: 06-26-18 @ 14:22  10.80 | 30.9        ^      1.00    BMP: 06-27-18 @ 07:30  140 | 95 | 8    -----------------< 82  4.6  | 28 | 0.6  eGFR(AA): 129, eGFR (non-AA): 112  Ca 9.4, Mg 2.4, P --  BMP: 06-26-18 @ 14:22  137 | 96 | 12   -----------------< 81  4.5  | 26 | 0.7  eGFR(AA): 123, eGFR (non-AA): 106  Ca 9.3, Mg --, P --    LFTs: 06-27-18 @ 07:30  TP  7.2  | 4.0 Alb   ---------------  TB  0.2  | --  DB   ---------------  ALT 14  | 15  AST            ^          159 ALK  LFTs: 06-26-18 @ 14:22  TP  7.2  | 3.9 Alb   ---------------  TB  <0.2  | --  DB   ---------------  ALT 14  | 16  AST            ^          155 ALK      LFTs: 06-27-18 @ 07:30  Ca  9.4  | 15 AST   -----------------  TP  7.2  | 14 ALT  -----------------  Alb 4.0  | 159 ALK          ^        0.2         TB  LFTs: 06-26-18 @ 14:22  Ca  9.3  | 16 AST   -----------------  TP  7.2  | 14 ALT  -----------------  Alb 3.9  | 155 ALK          ^        <0.2         TB          Urinalysis:  Urinalysis Basic - ( 26 Jun 2018 14:10 )    Color: Yellow / Appearance: Clear / SG: <=1.005 / pH: x  Gluc: x / Ketone: Negative  / Bili: Negative / Urobili: 0.2 mg/dL   Blood: x / Protein: Negative mg/dL / Nitrite: Negative   Leuk Esterase: Small / RBC: Negative / WBC 10-25 /HPF   Sq Epi: x / Non Sq Epi: Few /HPF / Bacteria: Moderate    < from: CT Chest w/ IV Cont (06.26.18 @ 15:19) >  No pulmonary embolus.    Since November 24, 2015:    1.  There is a new large left upper lobe mass measuring 6.8 x 5.7 x 7 cm   extending to mediastinum with multiple mediastinal lymph nodes as   described above. Additionally, there are multiple new hypodense hepatic   metastatic lesions.  2.  4 mm left upper lobe pulmonary nodule, metastatic etiology.      < end of copied text >      Home Medications:  Home Medications:  Lexapro: 40 milligram(s) orally once a day (27 Jun 2018 01:26)  Wellbutrin  mg/24 hours oral tablet, extended release: 1 tab(s) orally once a day (27 Jun 2018 01:26) REMEDIOS DENTON, female, 43y ([birth year]),   MRN-[id number]  Admit Date: 06-26-18 (1d)    Chief Complaint  43 y.o female patient with PMH of anxiety and depression presents to the ED for a 3 months history of dry cough. Since March 2018, after getting the flu, patient started complaining of a dry cough that persisted. She presented to the ED at the [facility] today for right thigh pain that started 2 days ago. She reports that the pain started while she was coughing and wasn't able to walk. Pain has improved as well as ambulation. In the ED pt was found to have a new large L upper lobe mass and L upper lobe lesion with hepatic hypodense lesions concerning for metastatic disease. She quit smoking 3 yrs ago and has a 25py smoking history. Patient also has mild anemia and elevated ALP and GGT. She denies any weight loss or fatigue. As per patient she felt feverish last month but did not take her temperature and the symptoms resolved in a day. She denies abdominal pain, hemoptysis, sob or other bone pain. Family history is positive for prostate cancer in father and lymphoma in brother. Patient has never had a mammogram or colonoscopy.       Past Medical and Surgical History  PAST MEDICAL & SURGICAL HISTORY:  Anxiety and depression  History of cholecystectomy      Current Medications:  MEDICATIONS  (STANDING):  buPROPion XL . 300 milliGRAM(s) Oral at bedtime  enoxaparin Injectable 40 milliGRAM(s) SubCutaneous every 24 hours  escitalopram 40 milliGRAM(s) Oral daily    MEDICATIONS  (PRN):  oxyCODONE    IR 5 milliGRAM(s) Oral every 3 hours PRN Severe Pain (7 - 10)      Interval History:  No acute events overnight. No complaints at this time.    Vital Signs:  T(F): 97.2 (06-27-18 @ 05:49), Max: 97.9 (06-26-18 @ 13:05)  HR: 69 (06-27-18 @ 05:49) (69 - 97)  BP: 109/61 (06-27-18 @ 05:49) (109/61 - 119/76)  RR: 18 (06-27-18 @ 05:49) (18 - 20)  SpO2: 98% (06-27-18 @ 08:09) (98% - 98%)  CAPILLARY BLOOD GLUCOSE          Physical Exam:  General: Not in distress.   HEENT: Moist mucus membranes.   Cardio: Regular rate and rhythm, S1, S2, no murmur, rub, or gallop.  Pulm: Clear to auscultation bilaterally. No wheezing, rales, or rhonchi  Abdomen: Soft, non-tender, non-distended. Normoactive bowel sounds  Extremities: No cyanosis or edema bilaterally.   Neuro: A&O x3. No focal deficits. CN II - XII grossly intact.    Labs and Imaging:  CBC Full  -  ( 27 Jun 2018 07:30 )  WBC Count : 8.07 K/uL  Hemoglobin : 11.9 g/dL  Hematocrit : 37.8 %  Platelet Count - Automated : 357 K/uL  Mean Cell Volume : 81.1 fL  Mean Cell Hemoglobin : 25.5 pg  Mean Cell Hemoglobin Concentration : 31.5 g/dL  Auto Neutrophil # : 4.85 K/uL  Auto Lymphocyte # : 2.44 K/uL  Auto Monocyte # : 0.50 K/uL  Auto Eosinophil # : 0.22 K/uL  Auto Basophil # : 0.03 K/uL  Auto Neutrophil % : 60.1 %  Auto Lymphocyte % : 30.2 %  Auto Monocyte % : 6.2 %  Auto Eosinophil % : 2.7 %  Auto Basophil % : 0.4 %    RDW: 13.7  13.5    PT/INR/PTT: 06-26-18 @ 14:22  10.80 | 30.9        ^      1.00    BMP: 06-27-18 @ 07:30  140 | 95 | 8    -----------------< 82  4.6  | 28 | 0.6  eGFR(AA): 129, eGFR (non-AA): 112  Ca 9.4, Mg 2.4, P --  BMP: 06-26-18 @ 14:22  137 | 96 | 12   -----------------< 81  4.5  | 26 | 0.7  eGFR(AA): 123, eGFR (non-AA): 106  Ca 9.3, Mg --, P --    LFTs: 06-27-18 @ 07:30  TP  7.2  | 4.0 Alb   ---------------  TB  0.2  | --  DB   ---------------  ALT 14  | 15  AST            ^          159 ALK  LFTs: 06-26-18 @ 14:22  TP  7.2  | 3.9 Alb   ---------------  TB  <0.2  | --  DB   ---------------  ALT 14  | 16  AST            ^          155 ALK      LFTs: 06-27-18 @ 07:30  Ca  9.4  | 15 AST   -----------------  TP  7.2  | 14 ALT  -----------------  Alb 4.0  | 159 ALK          ^        0.2         TB  LFTs: 06-26-18 @ 14:22  Ca  9.3  | 16 AST   -----------------  TP  7.2  | 14 ALT  -----------------  Alb 3.9  | 155 ALK          ^        <0.2         TB          Urinalysis:  Urinalysis Basic - ( 26 Jun 2018 14:10 )    Color: Yellow / Appearance: Clear / SG: <=1.005 / pH: x  Gluc: x / Ketone: Negative  / Bili: Negative / Urobili: 0.2 mg/dL   Blood: x / Protein: Negative mg/dL / Nitrite: Negative   Leuk Esterase: Small / RBC: Negative / WBC 10-25 /HPF   Sq Epi: x / Non Sq Epi: Few /HPF / Bacteria: Moderate    < from: CT Chest w/ IV Cont (06.26.18 @ 15:19) >  No pulmonary embolus.    Since November 24, 2015:    1.  There is a new large left upper lobe mass measuring 6.8 x 5.7 x 7 cm   extending to mediastinum with multiple mediastinal lymph nodes as   described above. Additionally, there are multiple new hypodense hepatic   metastatic lesions.  2.  4 mm left upper lobe pulmonary nodule, metastatic etiology.      < end of copied text >      Home Medications:  Home Medications:  Lexapro: 40 milligram(s) orally once a day (27 Jun 2018 01:26)  Wellbutrin  mg/24 hours oral tablet, extended release: 1 tab(s) orally once a day (27 Jun 2018 01:26) REMEDIOS DENTON, female, 43y ([birth year]),   MRN-[id number]  Admit Date: 06-26-18 (1d)    Chief Complaint  43 y.o female patient with PMH of anxiety and depression presents to the ED for a 3 months history of dry cough. Since March 2018, after getting the flu, patient started complaining of a dry cough that persisted. She presented to the ED at the [facility] today for right thigh pain that started 2 days ago. She reports that the pain started while she was coughing and wasn't able to walk. Pain has improved as well as ambulation. In the ED pt was found to have a new large L upper lobe mass and L upper lobe lesion with hepatic hypodense lesions concerning for metastatic disease. She quit smoking 3 yrs ago and has a 25py smoking history. Patient also has mild anemia and elevated ALP and GGT. She denies any weight loss or fatigue. As per patient she felt feverish last month but did not take her temperature and the symptoms resolved in a day. She denies abdominal pain, vision changes, headache, hemoptysis, sob or other bone pain. Family history is positive for prostate cancer in father and lymphoma in brother. Patient has never had a mammogram or colonoscopy.       Past Medical and Surgical History  PAST MEDICAL & SURGICAL HISTORY:  Anxiety and depression  History of cholecystectomy      Current Medications:  MEDICATIONS  (STANDING):  buPROPion XL . 300 milliGRAM(s) Oral at bedtime  enoxaparin Injectable 40 milliGRAM(s) SubCutaneous every 24 hours  escitalopram 40 milliGRAM(s) Oral daily    MEDICATIONS  (PRN):  oxyCODONE    IR 5 milliGRAM(s) Oral every 3 hours PRN Severe Pain (7 - 10)      Interval History:  No acute events overnight. No complaints at this time.    Vital Signs:  T(F): 97.2 (06-27-18 @ 05:49), Max: 97.9 (06-26-18 @ 13:05)  HR: 69 (06-27-18 @ 05:49) (69 - 97)  BP: 109/61 (06-27-18 @ 05:49) (109/61 - 119/76)  RR: 18 (06-27-18 @ 05:49) (18 - 20)  SpO2: 98% (06-27-18 @ 08:09) (98% - 98%)  CAPILLARY BLOOD GLUCOSE          Physical Exam:  General: Not in distress.   HEENT: Moist mucus membranes.   Cardio: Regular rate and rhythm, S1, S2, no murmur, rub, or gallop.  Pulm: Clear to auscultation bilaterally. No wheezing, rales, or rhonchi  Abdomen: Soft, non-tender, non-distended. Normoactive bowel sounds  Extremities: No cyanosis or edema bilaterally.   Neuro: A&O x3. No focal deficits. CN II - XII grossly intact.    Labs and Imaging:  CBC Full  -  ( 27 Jun 2018 07:30 )  WBC Count : 8.07 K/uL  Hemoglobin : 11.9 g/dL  Hematocrit : 37.8 %  Platelet Count - Automated : 357 K/uL  Mean Cell Volume : 81.1 fL  Mean Cell Hemoglobin : 25.5 pg  Mean Cell Hemoglobin Concentration : 31.5 g/dL  Auto Neutrophil # : 4.85 K/uL  Auto Lymphocyte # : 2.44 K/uL  Auto Monocyte # : 0.50 K/uL  Auto Eosinophil # : 0.22 K/uL  Auto Basophil # : 0.03 K/uL  Auto Neutrophil % : 60.1 %  Auto Lymphocyte % : 30.2 %  Auto Monocyte % : 6.2 %  Auto Eosinophil % : 2.7 %  Auto Basophil % : 0.4 %    RDW: 13.7  13.5    PT/INR/PTT: 06-26-18 @ 14:22  10.80 | 30.9        ^      1.00    BMP: 06-27-18 @ 07:30  140 | 95 | 8    -----------------< 82  4.6  | 28 | 0.6  eGFR(AA): 129, eGFR (non-AA): 112  Ca 9.4, Mg 2.4, P --  BMP: 06-26-18 @ 14:22  137 | 96 | 12   -----------------< 81  4.5  | 26 | 0.7  eGFR(AA): 123, eGFR (non-AA): 106  Ca 9.3, Mg --, P --    LFTs: 06-27-18 @ 07:30  TP  7.2  | 4.0 Alb   ---------------  TB  0.2  | --  DB   ---------------  ALT 14  | 15  AST            ^          159 ALK  LFTs: 06-26-18 @ 14:22  TP  7.2  | 3.9 Alb   ---------------  TB  <0.2  | --  DB   ---------------  ALT 14  | 16  AST            ^          155 ALK      LFTs: 06-27-18 @ 07:30  Ca  9.4  | 15 AST   -----------------  TP  7.2  | 14 ALT  -----------------  Alb 4.0  | 159 ALK          ^        0.2         TB  LFTs: 06-26-18 @ 14:22  Ca  9.3  | 16 AST   -----------------  TP  7.2  | 14 ALT  -----------------  Alb 3.9  | 155 ALK          ^        <0.2         TB          Urinalysis:  Urinalysis Basic - ( 26 Jun 2018 14:10 )    Color: Yellow / Appearance: Clear / SG: <=1.005 / pH: x  Gluc: x / Ketone: Negative  / Bili: Negative / Urobili: 0.2 mg/dL   Blood: x / Protein: Negative mg/dL / Nitrite: Negative   Leuk Esterase: Small / RBC: Negative / WBC 10-25 /HPF   Sq Epi: x / Non Sq Epi: Few /HPF / Bacteria: Moderate    < from: CT Chest w/ IV Cont (06.26.18 @ 15:19) >  No pulmonary embolus.    Since November 24, 2015:    1.  There is a new large left upper lobe mass measuring 6.8 x 5.7 x 7 cm   extending to mediastinum with multiple mediastinal lymph nodes as   described above. Additionally, there are multiple new hypodense hepatic   metastatic lesions.  2.  4 mm left upper lobe pulmonary nodule, metastatic etiology.      < end of copied text >      Home Medications:  Home Medications:  Lexapro: 40 milligram(s) orally once a day (27 Jun 2018 01:26)  Wellbutrin  mg/24 hours oral tablet, extended release: 1 tab(s) orally once a day (27 Jun 2018 01:26)

## 2018-06-27 NOTE — H&P ADULT - ATTENDING COMMENTS
43 y.o female patient with PMH of anxiety and depression presents to the ED for a 3 months history of dry cough; found to have a new left lung mass    # New left lung mass  - Extending to mediastinum with mediastinal lymphadenopathy (see detailed report above)  - Probable liver metastasis   - Cause of dry cough  - Patient quit smoking 3 years ago (25 P-Y history prior to that)  - Will need biopsy and metastatic work-up   - F/U CT head, abdomen and pelvis as part of metastatic work-up  - F/U pulm consult  - F/U heme/onc consult  - Discussed findings and plan in detail with the patient and she is aware that this might be cancer    # Right inner thigh pain  - Musculo-skeletal  - Improving  - No signs or symptoms of DVT    # Anxiety/depression  - Continue Lexapro and Wellbutrin    # DVT prophylaxis  # Regular diet    Patient is asking for help since she doesn't have insurance. Will place social work consult

## 2018-06-27 NOTE — H&P ADULT - NSHPLABSRESULTS_GEN_ALL_CORE
Labs:                         11.5<L>  9.53    )-----------(   341      ( 26 Jun 2018 14:22 )              36.2<L>    Neutro%  51.3    Lympho%  38.4    Mono%    7.0     Bands    x        06-26    137  |  96<L>  |  12  ----------------------------<  81  4.5   |  26  |  0.7    Ca    9.3      26 Jun 2018 14:22    TPro  7.2  /  Alb  3.9  /  TBili  <0.2  /  DBili  x   /  AST  16  /  ALT  14  /  AlkPhos  155<H>  06-26    PT/INR - ( 26 Jun 2018 14:22 )   PT: 10.80 sec;   INR: 1.00 ratio       PTT - ( 26 Jun 2018 14:22 )  PTT:30.9 sec    LIVER FUNCTIONS - ( 26 Jun 2018 14:22 )  Alb: 3.9 g/dL / Pro: 7.2 g/dL / ALK PHOS: 155 U/L / ALT: 14 U/L / AST: 16 U/L / GGT: x           Urinalysis Basic - ( 26 Jun 2018 14:10 )    Color: Yellow / Appearance: Clear / SG: <=1.005 / pH: x  Gluc: x / Ketone: Negative  / Bili: Negative / Urobili: 0.2 mg/dL   Blood: x / Protein: Negative mg/dL / Nitrite: Negative   Leuk Esterase: Small / RBC: Negative / WBC 10-25 /HPF   Sq Epi: x / Non Sq Epi: Few /HPF / Bacteria: Moderate    < from: CT Chest w/ IV Cont (06.26.18 @ 15:19) >    FINDINGS:    PULMONARY EMBOLUS: No pulmonary embolus.    LUNGS: There is a new 6.8 x 5.7 x 7.0 cm (series 602/76, 601/80)   centrally located left upper lobe mass encasing left upper and proximal   lower pulmonary arteries and pulmonary vein, as well as left upper lobe   bronchus with associated mild to moderate areas of narrowing. The mass   extends to the mediastinum with a small portion of the mass projecting   below the left main pulmonary artery.  There is a pulmonary nodule measuring 4 mm in left upper lobe anteriorly,   image #170 of series 3.  No pneumothorax or pleural effusion. Mild paraseptal emphysema with upper   lobe predominance.    PLEURA/PERICARDIUM: Unremarkable.    MEDIASTINUM/THORACIC NODES: Multiple enlarged mediastinal nodes including   left paratracheal node measuring 1.5 cm (series 3/183), para-aortic node   measuring up to 1.5 cm (series 3/154), subaortic node measuring 1.3 cm   (series 3/143) and 1.5 cm supraclavicular node (series 602/75).    VISUALIZED UPPER ABDOMEN: Visualized upper abdomen demonstrates multiple   new hypoattenuating lesions with peripheral enhancement in both hepatic   lobes measuring up to 3.3 cm in hepatic segment 3 (series 3/1).     BONES/SOFT TISSUES: Mild degenerative changes.    OTHER: Great vessels are normal in caliber.    < end of copied text >

## 2018-06-27 NOTE — CONSULT NOTE ADULT - ASSESSMENT
Impression:  Left upper lobe mass with sub carnila LAD and liver lesions  Ex Smoker    Recommendation:  Will schedule Ebbs of lung mass for tissue biopsy  MRI head  DVT prophylaxis  Out of bed to chair Impression:  Left upper lobe mass with sub carinal LAD and liver lesions  Ex Smoker    Recommendation:  Will schedule Ebus of lung mass for tissue biopsy  MRI head  DVT prophylaxis  Out of bed to chair Impression:  Left upper lobe mass with mediastinal LAD and possible liver metastasis  Ex Smoker    Recommendation:  IR guided liver biopsy for tissue sampling and diagnosis  MRI head  Oncology follow up as OP  DVT prophylaxis  Out of bed to chair Impression:  Left upper lobe mass with mediastinal LAD and possible liver metastasis  Ex Smoker    Recommendation:  IR guided liver biopsy for tissue sampling and diagnosis  MRI head  Oncology follow up as OP  DVT prophylaxis  Out of bed to chair  Prognosis guarded

## 2018-06-27 NOTE — H&P ADULT - FAMILY HISTORY
Father  Still living? Unknown  Family history of prostate cancer in father, Age at diagnosis: Age Unknown  Family history of MI (myocardial infarction), Age at diagnosis: Age Unknown     Mother  Still living? Unknown  Family history of MI (myocardial infarction), Age at diagnosis: Age Unknown

## 2018-06-27 NOTE — CONSULT NOTE ADULT - SUBJECTIVE AND OBJECTIVE BOX
Patient is a 43y old  Female who presents with a chief complaint of Dry cough for 3 months and right thigh pain (27 Jun 2018 01:01)      HPI:  43 y.o female patient with PMH of anxiety and depression presents to the ED for a 3 months history of dry cough    History goes back to March 2018 when, after getting the flu, patient started complaining of a dry cough that persisted. She presented to the ED at the [facility] today for right thigh pain that started 2 days ago. She reports that the pain started while she was coughing and wasn't able to walk. Pain has improved as well as ambulation.  ROS is positive for chest pain that occurs with the cough.  She denies fever, chills, abdominal pain, N/V, diarrhea, weight loss, urinary symptoms, melena, BRBPR or any other significant symptoms.    In the ED, she was found to have a new left lung mass and was admitted for work-up    Of note, patient hasn't followed-up with her primary care physician for a year after losing her insurance (27 Jun 2018 01:01)      PAST MEDICAL & SURGICAL HISTORY:  Anxiety and depression  History of cholecystectomy      SOCIAL HX: Ex  Smoking                             FAMILY HISTORY:  Family history of MI (myocardial infarction) (Father, Mother)  Family history of prostate cancer in father (Father)    Allergies    dust (Sneezing; Rhinorrhea)  Erythromycin Base (Other)    PHYSICAL EXAM  Vital Signs Last 24 Hrs  T(C): 36.2 (27 Jun 2018 05:49), Max: 36.3 (26 Jun 2018 22:00)  T(F): 97.2 (27 Jun 2018 05:49), Max: 97.3 (26 Jun 2018 22:00)  HR: 69 (27 Jun 2018 05:49) (69 - 97)  BP: 109/61 (27 Jun 2018 05:49) (109/61 - 118/75)  BP(mean): --  RR: 18 (27 Jun 2018 05:49) (18 - 20)  SpO2: 98% (27 Jun 2018 08:09) (98% - 98%)    General:    HEENT: AAO x3            Lymph Nodes: No lymphadenopathy  Neck:  Supple  Lungs: Clear bilaterally  Cardiovascular: S1S2  Abdomen: Soft, non tender  Extremities: NO edema or cyanosis  Skin: Intact  Neuro: non focal    LAB:                        11.9   8.07  )-----------( 357      ( 27 Jun 2018 07:30 )             37.8                                               06-27    140  |  95<L>  |  8<L>  ----------------------------<  82  4.6   |  28  |  0.6<L>    Ca    9.4      27 Jun 2018 07:30  Mg     2.4     06-27    TPro  7.2  /  Alb  4.0  /  TBili  0.2  /  DBili  x   /  AST  15  /  ALT  14  /  AlkPhos  159<H>  06-27      PT/INR - ( 26 Jun 2018 14:22 )   PT: 10.80 sec;   INR: 1.00 ratio         PTT - ( 26 Jun 2018 14:22 )  PTT:30.9 sec                                       Urinalysis Basic - ( 26 Jun 2018 14:10 )    Color: Yellow / Appearance: Clear / SG: <=1.005 / pH: x  Gluc: x / Ketone: Negative  / Bili: Negative / Urobili: 0.2 mg/dL   Blood: x / Protein: Negative mg/dL / Nitrite: Negative   Leuk Esterase: Small / RBC: Negative / WBC 10-25 /HPF   Sq Epi: x / Non Sq Epi: Few /HPF / Bacteria: Moderate                                              LIVER FUNCTIONS - ( 27 Jun 2018 07:30 )  Alb: 4.0 g/dL / Pro: 7.2 g/dL / ALK PHOS: 159 U/L / ALT: 14 U/L / AST: 15 U/L / GGT: 65 U/L                                                   MEDICATIONS  (STANDING):  buPROPion XL . 300 milliGRAM(s) Oral at bedtime  enoxaparin Injectable 40 milliGRAM(s) SubCutaneous every 24 hours  escitalopram 40 milliGRAM(s) Oral daily    MEDICATIONS  (PRN):  oxyCODONE    IR 5 milliGRAM(s) Oral every 3 hours PRN Severe Pain (7 - 10)

## 2018-06-27 NOTE — PROGRESS NOTE ADULT - ASSESSMENT
SUBJECTIVE:    Patient is a 43y old Female who presents with a chief complaint of Dry cough for 3 months and right thigh pain (27 Jun 2018 01:01)    Currently admitted to medicine with the primary diagnosis of Lung mass     Today is hospital day 1d. This morning she is resting comfortably in bed and reports no new issues or overnight events.     PAST MEDICAL & SURGICAL HISTORY  Anxiety and depression  History of cholecystectomy    SOCIAL HISTORY:  Negative for smoking/alcohol/drug use.     ALLERGIES:  dust (Sneezing; Rhinorrhea)  Erythromycin Base (Other)    MEDICATIONS:  STANDING MEDICATIONS  buPROPion XL . 300 milliGRAM(s) Oral at bedtime  enoxaparin Injectable 40 milliGRAM(s) SubCutaneous every 24 hours  escitalopram 40 milliGRAM(s) Oral daily    PRN MEDICATIONS    VITALS:   T(F): 97.2  HR: 69  BP: 109/61  RR: 18  SpO2: 98%    LABS:                        11.5   9.53  )-----------( 341      ( 26 Jun 2018 14:22 )             36.2     06-26    137  |  96<L>  |  12  ----------------------------<  81  4.5   |  26  |  0.7    Ca    9.3      26 Jun 2018 14:22    TPro  7.2  /  Alb  3.9  /  TBili  <0.2  /  DBili  x   /  AST  16  /  ALT  14  /  AlkPhos  155<H>  06-26    PT/INR - ( 26 Jun 2018 14:22 )   PT: 10.80 sec;   INR: 1.00 ratio         PTT - ( 26 Jun 2018 14:22 )  PTT:30.9 sec  Urinalysis Basic - ( 26 Jun 2018 14:10 )    Color: Yellow / Appearance: Clear / SG: <=1.005 / pH: x  Gluc: x / Ketone: Negative  / Bili: Negative / Urobili: 0.2 mg/dL   Blood: x / Protein: Negative mg/dL / Nitrite: Negative   Leuk Esterase: Small / RBC: Negative / WBC 10-25 /HPF   Sq Epi: x / Non Sq Epi: Few /HPF / Bacteria: Moderate                RADIOLOGY:    PHYSICAL EXAM:  GEN: No acute distress  LUNGS: Clear to auscultation bilaterally   HEART: Regular  ABD: Soft, non-tender, non-distended.  EXT: NC/NC/NE/2+PP/SCALES/Skin Intact.   NEURO: AAOX3    Intravenous access:   NG tube:   Meyer Catheter:     43 y.o female patient with PMH of anxiety and depression presents to the ED for a 3 months history of dry cough; found to have a new left lung mass    # New left lung mass  - Extending to mediastinum with mediastinal lymphadenopathy (see detailed report above)  - Probable liver metastasis   - Cause of dry cough  - Patient quit smoking 3 years ago (25 P-Y history prior to that)  - Will need biopsy and metastatic work-up   - F/U CT head, abdomen and pelvis as part of metastatic work-up  - F/U pulm consult  - F/U heme/onc consult  - Discussed findings and plan in detail with the patient and she is aware that this might be cancer    # Right inner thigh pain  - Musculo-skeletal  - Improving  - No signs or symptoms of DVT    # Anxiety/depression  - Continue Lexapro and Wellbutrin    # DVT prophylaxis  # Regular diet    Patient is asking for help since she doesn't have insurance. Will place social work consult SUBJECTIVE:    Patient is a 43y old Female who presents with a chief complaint of Dry cough for 3 months and right thigh pain (27 Jun 2018 01:01)    Currently admitted to medicine with the primary diagnosis of Lung mass     Today is hospital day 1d. This morning she is resting comfortably in bed and reports no new issues or overnight events. Patient continues to have cough, however she notes it feels improved compared to days prior. Musculoskeletal pain is also improved. Denies fevers, nausea, vomiting, dizziness.      PAST MEDICAL & SURGICAL HISTORY  Anxiety and depression  History of cholecystectomy    SOCIAL HISTORY:  Negative for smoking/alcohol/drug use.     ALLERGIES:  dust (Sneezing; Rhinorrhea)  Erythromycin Base (Other)    MEDICATIONS:  STANDING MEDICATIONS  buPROPion XL . 300 milliGRAM(s) Oral at bedtime  enoxaparin Injectable 40 milliGRAM(s) SubCutaneous every 24 hours  escitalopram 40 milliGRAM(s) Oral daily    PRN MEDICATIONS    VITALS:   T(F): 97.2  HR: 69  BP: 109/61  RR: 18  SpO2: 98%    LABS:                        11.5   9.53  )-----------( 341      ( 26 Jun 2018 14:22 )             36.2     06-26    137  |  96<L>  |  12  ----------------------------<  81  4.5   |  26  |  0.7    Ca    9.3      26 Jun 2018 14:22    TPro  7.2  /  Alb  3.9  /  TBili  <0.2  /  DBili  x   /  AST  16  /  ALT  14  /  AlkPhos  155<H>  06-26    PT/INR - ( 26 Jun 2018 14:22 )   PT: 10.80 sec;   INR: 1.00 ratio         PTT - ( 26 Jun 2018 14:22 )  PTT:30.9 sec  Urinalysis Basic - ( 26 Jun 2018 14:10 )    Color: Yellow / Appearance: Clear / SG: <=1.005 / pH: x  Gluc: x / Ketone: Negative  / Bili: Negative / Urobili: 0.2 mg/dL   Blood: x / Protein: Negative mg/dL / Nitrite: Negative   Leuk Esterase: Small / RBC: Negative / WBC 10-25 /HPF   Sq Epi: x / Non Sq Epi: Few /HPF / Bacteria: Moderate                RADIOLOGY:  f/u bone scan, MRI head, CT abd pelvis, Venous duplex.   PHYSICAL EXAM:  GEN: No acute distress  LUNGS: Clear to auscultation bilaterally, no increased work of breathing.   HEART: Regularrate and rhythm,   ABD: Soft, non-tender, non-distended.  EXT: dP 2+ bilaterally. spontaneous movement bilaterally.   NEURO: AAOX3    43 y.o female patient with PMH of anxiety and depression presents to the ED for a 3 months history of dry cough; found to have a new left lung mass, metastatic workup.     # New left lung mass  -Liver Biopsy tomorrow with IR. US vs CT   - Extending to mediastinum with mediastinal lymphadenopathy   - Probable liver metastasis   - Cause of dry cough  - Patient quit smoking 3 years ago (25 P-Y history prior to that)   - F/U CT head, abdomen and pelvis as part of metastatic work-up  -patient will likely be able to go home after biopsy.   # Right inner thigh pain  - Musculo-skeletal  - Improving  - No signs or symptoms of DVT    # Anxiety/depression  - Continue Lexapro and Wellbutrin    # DVT prophylaxis  # Regular diet    Patient is asking for help since she doesn't have insurance. Will place social work consult

## 2018-06-27 NOTE — H&P ADULT - HISTORY OF PRESENT ILLNESS
43 y.o female patient with PMH of anxiety and depression presents to the ED for a 3 months history of dry cough    History goes back to March 2018 when, after getting the flu, patient started complaining of a dry cough that persisted. She presented to the ED at the [facility] today for right thigh pain that started 2 days ago. She reports that the pain started while she was coughing and wasn't able to walk. Pain has improved as well as ambulation.  ROS is positive for chest pain that occurs with the cough.  She denies fever, chills, abdominal pain, N/V, diarrhea, weight loss, urinary symptoms, melena, BRBPR or any other significant symptoms.    In the ED, she was found to have a new left lung mass and was admitted for work-up    Of note, patient hasn't followed-up with her primary care physician for a year after losing her insurance

## 2018-06-27 NOTE — H&P ADULT - RS GEN PE MLT RESP DETAILS PC
clear to auscultation bilaterally/respirations non-labored/airway patent/breath sounds equal/no rhonchi/good air movement/no wheezes

## 2018-06-27 NOTE — H&P ADULT - ASSESSMENT
43 y.o female patient with PMH of anxiety and depression presents to the ED for a 3 months history of dry cough; found to have a new left lung mass    # New left lung mass  - Extending to mediastinum with mediastinal lymphadenopathy (see detailed report above)  - Probable liver metastasis   - Cause of dry cough  - Will need biopsy and metastatic work-up   - F/U CT head, abdomen and pelvis as part of metastatic work-up  - F/U pulm consult  - F/U heme/onc consult  - Discussed findings and plan in detail with the patient and she is aware that this might be cancer    # Right inner thigh pain  - Musculo-skeletal  - Improving  - No signs or symptoms of DVT    # Anxiety/depression  - Continue Lexapro and Wellbutrin    # DVT prophylaxis 43 y.o female patient with PMH of anxiety and depression presents to the ED for a 3 months history of dry cough; found to have a new left lung mass    # New left lung mass  - Extending to mediastinum with mediastinal lymphadenopathy (see detailed report above)  - Probable liver metastasis   - Cause of dry cough  - Patient quit smoking 3 years ago (25 P-Y history prior to that)  - Will need biopsy and metastatic work-up   - F/U CT head, abdomen and pelvis as part of metastatic work-up  - F/U pulm consult  - F/U heme/onc consult  - Discussed findings and plan in detail with the patient and she is aware that this might be cancer    # Right inner thigh pain  - Musculo-skeletal  - Improving  - No signs or symptoms of DVT    # Anxiety/depression  - Continue Lexapro and Wellbutrin    # DVT prophylaxis 43 y.o female patient with PMH of anxiety and depression presents to the ED for a 3 months history of dry cough; found to have a new left lung mass    # New left lung mass  - Extending to mediastinum with mediastinal lymphadenopathy (see detailed report above)  - Probable liver metastasis   - Cause of dry cough  - Patient quit smoking 3 years ago (25 P-Y history prior to that)  - Will need biopsy and metastatic work-up   - F/U CT head, abdomen and pelvis as part of metastatic work-up  - F/U pulm consult  - F/U heme/onc consult  - Discussed findings and plan in detail with the patient and she is aware that this might be cancer    # Right inner thigh pain  - Musculo-skeletal  - Improving  - No signs or symptoms of DVT    # Anxiety/depression  - Continue Lexapro and Wellbutrin    # DVT prophylaxis  # Regular diet    Patient is asking for help since she doesn't have insurance. Will place social work consult 43 y.o female patient with PMH of anxiety and depression presents to the ED for a 3 months history of dry cough; found to have a new left lung mass.    Denies CP, SOB, N/V/D/C/AP, F, chills, dizziness, new focal weakness, HA, vision changes, dysuria, or urinary symptoms, blood in stool, wt loss.    ROS: all systems unremarkable except as above.     Gen: NAD, AA0x3  HEENT: PERRLA, EOM, no LAD  CV: nl S1 S2  Resp: decreased BS b/l  GI: NT/ND/S +BS  MS: no c/c/e  Neuro: nonfocal    # New left lung mass  - Extending to mediastinum with mediastinal lymphadenopathy (see detailed report above)  - Probable liver metastasis   - Cause of dry cough  - Patient quit smoking 3 years ago (25 P-Y history prior to that)  - Will need biopsy and metastatic work-up   - F/U CT head, abdomen and pelvis as part of metastatic work-up, MRI brain  - Bx by IR or pulm    # Right inner thigh pain  - Musculo-skeletal  - Improving  - No signs or symptoms of DVT    # Anxiety/depression  - Continue Lexapro and Wellbutrin    # DVT prophylaxis  # Regular diet

## 2018-06-27 NOTE — CONSULT NOTE ADULT - ASSESSMENT
43 y.o female patient with PMH of anxiety and depression presents to the ED for a 3 months history of dry cough. Found to have new lung mass concerning for malignancy with metastatic disease.     - 43 y.o female patient with PMH of anxiety and depression presents to the ED for a 3 months history of dry cough. Found to have new lung mass concerning for malignancy with metastatic disease.     1) large left upper lobe mass measuring 6.8 x 5.7 x 7 cm concerning for lung primary malignancy   -F/U abdomen/pelvis and head ct  -Will need tissue biopsy probably of the liver for diagnosis.   -Further recommendation will be made after tissue diagnosis. 43 y.o female patient with PMH of anxiety and depression presents to the ED for a 3 months history of dry cough. Found to have new lung mass concerning for malignancy with metastatic disease.     1) large left upper lobe mass measuring 6.8 x 5.7 x 7 cm concerning for lung primary malignancy   -F/U abdomen/pelvis ct  -Recommended to get MRI head with contrast, bone scan and lower extremity bilateral venous duplex  -Will need tissue biopsy probably of the liver for diagnosis. IR has scheduled it for tomorrow. Keep NPO after 7AM  -Further recommendation will be made after tissue diagnosis.

## 2018-06-27 NOTE — CONSULT NOTE ADULT - ATTENDING COMMENTS
44 yo former smoker admitted with 3 month h/o non-productive cough and acute onset leg pain.  Noted to have a large lung mass with associated adenopathy as well as multiple liver lesions.  Patient was informed of a possibility of cancer diagnosis.  Case was discussed with IR, she will undergo liver biopsy tomorrow.  Agree with CT A/P with contrast, bone scan and MRI or brain with and without contrast.  Will follow.

## 2018-06-28 ENCOUNTER — RESULT REVIEW (OUTPATIENT)
Age: 43
End: 2018-06-28

## 2018-06-28 LAB
-  AMIKACIN: SIGNIFICANT CHANGE UP
-  AMOXICILLIN/CLAVULANIC ACID: SIGNIFICANT CHANGE UP
-  AMPICILLIN/SULBACTAM: SIGNIFICANT CHANGE UP
-  AMPICILLIN: SIGNIFICANT CHANGE UP
-  AZTREONAM: SIGNIFICANT CHANGE UP
-  CEFAZOLIN: SIGNIFICANT CHANGE UP
-  CEFEPIME: SIGNIFICANT CHANGE UP
-  CEFOXITIN: SIGNIFICANT CHANGE UP
-  CEFTRIAXONE: SIGNIFICANT CHANGE UP
-  CIPROFLOXACIN: SIGNIFICANT CHANGE UP
-  ERTAPENEM: SIGNIFICANT CHANGE UP
-  GENTAMICIN: SIGNIFICANT CHANGE UP
-  IMIPENEM: SIGNIFICANT CHANGE UP
-  LEVOFLOXACIN: SIGNIFICANT CHANGE UP
-  MEROPENEM: SIGNIFICANT CHANGE UP
-  NITROFURANTOIN: SIGNIFICANT CHANGE UP
-  PIPERACILLIN/TAZOBACTAM: SIGNIFICANT CHANGE UP
-  TIGECYCLINE: SIGNIFICANT CHANGE UP
-  TOBRAMYCIN: SIGNIFICANT CHANGE UP
-  TRIMETHOPRIM/SULFAMETHOXAZOLE: SIGNIFICANT CHANGE UP
ANION GAP SERPL CALC-SCNC: 16 MMOL/L — HIGH (ref 7–14)
APTT BLD: 37.9 SEC — SIGNIFICANT CHANGE UP (ref 27–39.2)
BUN SERPL-MCNC: 10 MG/DL — SIGNIFICANT CHANGE UP (ref 10–20)
CALCIUM SERPL-MCNC: 9.9 MG/DL — SIGNIFICANT CHANGE UP (ref 8.5–10.1)
CHLORIDE SERPL-SCNC: 91 MMOL/L — LOW (ref 98–110)
CO2 SERPL-SCNC: 30 MMOL/L — SIGNIFICANT CHANGE UP (ref 17–32)
CREAT SERPL-MCNC: 0.8 MG/DL — SIGNIFICANT CHANGE UP (ref 0.7–1.5)
CULTURE RESULTS: SIGNIFICANT CHANGE UP
GLUCOSE SERPL-MCNC: 116 MG/DL — HIGH (ref 70–99)
HCT VFR BLD CALC: 38.8 % — SIGNIFICANT CHANGE UP (ref 37–47)
HGB BLD-MCNC: 12.1 G/DL — SIGNIFICANT CHANGE UP (ref 12–16)
INR BLD: 1.07 RATIO — SIGNIFICANT CHANGE UP (ref 0.65–1.3)
MCHC RBC-ENTMCNC: 25.3 PG — LOW (ref 27–31)
MCHC RBC-ENTMCNC: 31.2 G/DL — LOW (ref 32–37)
MCV RBC AUTO: 81.2 FL — SIGNIFICANT CHANGE UP (ref 81–99)
METHOD TYPE: SIGNIFICANT CHANGE UP
NRBC # BLD: 0 /100 WBCS — SIGNIFICANT CHANGE UP (ref 0–0)
ORGANISM # SPEC MICROSCOPIC CNT: SIGNIFICANT CHANGE UP
ORGANISM # SPEC MICROSCOPIC CNT: SIGNIFICANT CHANGE UP
PLATELET # BLD AUTO: 372 K/UL — SIGNIFICANT CHANGE UP (ref 130–400)
POTASSIUM SERPL-MCNC: 5.7 MMOL/L — HIGH (ref 3.5–5)
POTASSIUM SERPL-SCNC: 5.7 MMOL/L — HIGH (ref 3.5–5)
PROTHROM AB SERPL-ACNC: 11.6 SEC — SIGNIFICANT CHANGE UP (ref 9.95–12.87)
RBC # BLD: 4.78 M/UL — SIGNIFICANT CHANGE UP (ref 4.2–5.4)
RBC # FLD: 13.8 % — SIGNIFICANT CHANGE UP (ref 11.5–14.5)
SODIUM SERPL-SCNC: 137 MMOL/L — SIGNIFICANT CHANGE UP (ref 135–146)
SPECIMEN SOURCE: SIGNIFICANT CHANGE UP
WBC # BLD: 8.88 K/UL — SIGNIFICANT CHANGE UP (ref 4.8–10.8)
WBC # FLD AUTO: 8.88 K/UL — SIGNIFICANT CHANGE UP (ref 4.8–10.8)

## 2018-06-28 RX ORDER — LIDOCAINE 4 G/100G
1 CREAM TOPICAL ONCE
Qty: 0 | Refills: 0 | Status: COMPLETED | OUTPATIENT
Start: 2018-06-28 | End: 2018-06-28

## 2018-06-28 RX ORDER — OXYCODONE HYDROCHLORIDE 5 MG/1
20 TABLET ORAL EVERY 12 HOURS
Qty: 0 | Refills: 0 | Status: DISCONTINUED | OUTPATIENT
Start: 2018-06-28 | End: 2018-06-28

## 2018-06-28 RX ORDER — CLONAZEPAM 1 MG
0.25 TABLET ORAL THREE TIMES A DAY
Qty: 0 | Refills: 0 | Status: DISCONTINUED | OUTPATIENT
Start: 2018-06-28 | End: 2018-06-29

## 2018-06-28 RX ORDER — OXYCODONE HYDROCHLORIDE 5 MG/1
20 TABLET ORAL EVERY 12 HOURS
Qty: 0 | Refills: 0 | Status: DISCONTINUED | OUTPATIENT
Start: 2018-06-28 | End: 2018-06-29

## 2018-06-28 RX ADMIN — OXYCODONE HYDROCHLORIDE 5 MILLIGRAM(S): 5 TABLET ORAL at 02:11

## 2018-06-28 RX ADMIN — OXYCODONE HYDROCHLORIDE 5 MILLIGRAM(S): 5 TABLET ORAL at 01:41

## 2018-06-28 RX ADMIN — BUPROPION HYDROCHLORIDE 300 MILLIGRAM(S): 150 TABLET, EXTENDED RELEASE ORAL at 21:41

## 2018-06-28 RX ADMIN — Medication 0.25 MILLIGRAM(S): at 17:51

## 2018-06-28 RX ADMIN — OXYCODONE HYDROCHLORIDE 5 MILLIGRAM(S): 5 TABLET ORAL at 18:16

## 2018-06-28 RX ADMIN — OXYCODONE HYDROCHLORIDE 20 MILLIGRAM(S): 5 TABLET ORAL at 18:14

## 2018-06-28 RX ADMIN — OXYCODONE HYDROCHLORIDE 5 MILLIGRAM(S): 5 TABLET ORAL at 07:39

## 2018-06-28 RX ADMIN — OXYCODONE HYDROCHLORIDE 5 MILLIGRAM(S): 5 TABLET ORAL at 21:39

## 2018-06-28 RX ADMIN — LIDOCAINE 1 PATCH: 4 CREAM TOPICAL at 20:31

## 2018-06-28 RX ADMIN — OXYCODONE HYDROCHLORIDE 20 MILLIGRAM(S): 5 TABLET ORAL at 17:22

## 2018-06-28 RX ADMIN — OXYCODONE HYDROCHLORIDE 5 MILLIGRAM(S): 5 TABLET ORAL at 22:30

## 2018-06-28 RX ADMIN — ESCITALOPRAM OXALATE 40 MILLIGRAM(S): 10 TABLET, FILM COATED ORAL at 21:40

## 2018-06-28 RX ADMIN — OXYCODONE HYDROCHLORIDE 5 MILLIGRAM(S): 5 TABLET ORAL at 18:13

## 2018-06-28 RX ADMIN — OXYCODONE HYDROCHLORIDE 5 MILLIGRAM(S): 5 TABLET ORAL at 07:33

## 2018-06-28 NOTE — PROGRESS NOTE ADULT - ASSESSMENT
REMEDIOS DENTON 43y Female  MRN#: [id number]   CODE STATUS:________      SUBJECTIVE  Patient is a 43y old Female who presents with a chief complaint of Dry cough for 3 months and right thigh pain (27 Jun 2018 01:01)  Currently admitted to medicine with the primary diagnosis of Lung mass  Today is hospital day 2d, and this morning she is sitting in bed, comfortably. Cough is unchanged since yesterday despite the oxycodone. Patient has more leg pain today compared to yesterday. Pain related to hip flexion and palpation. Otherwise, no complaints. No shortness of breath.       OBJECTIVE  PAST MEDICAL & SURGICAL HISTORY  Anxiety and depression  History of cholecystectomy    ALLERGIES:  dust (Sneezing; Rhinorrhea)  Erythromycin Base (Other)    MEDICATIONS:  STANDING MEDICATIONS  buPROPion XL . 300 milliGRAM(s) Oral at bedtime  enoxaparin Injectable 40 milliGRAM(s) SubCutaneous every 24 hours  escitalopram 40 milliGRAM(s) Oral at bedtime  oxyCODONE  ER Tablet 20 milliGRAM(s) Oral every 12 hours    PRN MEDICATIONS  guaiFENesin    Syrup 200 milliGRAM(s) Oral every 6 hours PRN  oxyCODONE    IR 5 milliGRAM(s) Oral every 3 hours PRN      VITAL SIGNS: Last 24 Hours  T(C): 36.6 (28 Jun 2018 05:12), Max: 36.6 (28 Jun 2018 05:12)  T(F): 97.8 (28 Jun 2018 05:12), Max: 97.8 (28 Jun 2018 05:12)  HR: 86 (28 Jun 2018 05:12) (86 - 86)  BP: 114/72 (28 Jun 2018 05:12) (114/72 - 119/71)  BP(mean): --  RR: 18 (28 Jun 2018 05:12) (18 - 20)  SpO2: --    LABS:                        12.1   8.88  )-----------( 372      ( 28 Jun 2018 07:13 )             38.8     06-28    137  |  91<L>  |  10  ----------------------------<  116<H>  5.7<H>   |  30  |  0.8    Ca    9.9      28 Jun 2018 07:13  Mg     2.4     06-27    TPro  7.2  /  Alb  4.0  /  TBili  0.2  /  DBili  x   /  AST  15  /  ALT  14  /  AlkPhos  159<H>  06-27    PT/INR - ( 28 Jun 2018 07:13 )   PT: 11.60 sec;   INR: 1.07 ratio         PTT - ( 28 Jun 2018 07:13 )  PTT:37.9 sec          Culture - Urine (collected 26 Jun 2018 14:10)  Source: .Urine Clean Catch (Midstream)  Preliminary Report (27 Jun 2018 18:32):    >100,000 CFU/ml Klebsiella pneumoniae          RADIOLOGY:  < from: CT Abdomen and Pelvis w/ Oral Cont and w/ IV Cont (06.27.18 @ 17:06) >    LOWER CHEST: Unremarkable.    HEPATOBILIARY: Multifocal, bilobed hepatic masses consistent with   metastatic disease, the largest in the left lobe measures 3.3 x 3.1 cm.   Status post cholecystectomy. No biliary ductal dilation. Patent portal   vein.    SPLEEN: Ill-defined 0.7 cm hypodensity within the spleen, suspicious for   metastatic focus.    PANCREAS: Unremarkable.    ADRENAL GLANDS: Unremarkable right adrenal gland. Nodular thickening of   the lateral limb of the left thyroid gland.    KIDNEYS: Normal symmetric renal enhancement bilaterally. Left interpolar   region cortical scarring. Multifocal bilateral renal hypoattenuating   lesions measuring up to 1.1 cm in the right interpolar region. Cannot   exclude metastasis disease within the kidneys. No stones or   hydronephrosis bilaterally.    ABDOMINOPELVIC NODES: Unremarkable.    PELVIC ORGANS: Unremarkable.    PERITONEUM/MESENTERY/BOWEL: Unremarkable. Normal appendix.    BONES/SOFT TISSUES: No aggressive bone lesions.      IMPRESSION:     Multifocal liver masses consistent with metastatic disease.    Ill-defined 0.7 cm splenic hypodensity suspicious for a metastatic focus.    Multifocal bilateral renal hypoattenuating lesions measuring up to 1.1 cm   in the right interpolar region. Cannot exclude metastasis disease within   the kidneys.    < end of copied text >  < from: NM SPECT Bone Scan (06.27.18 @ 21:52) >  Impression:  Multiple definite bony abnormalities which by pattern of distribution are   consistent with metastatic bone disease.    These include bilateral pubic inferior rami, left superior pubic ramus,   bilateral iliac bones and left 4th costovertebral junction.    Abnormal uptake corresponding to left lung index tumor.    < end of copied text >  < from: [facility] Duplex Lower Ext Vein Scan, Bilat (06.27.18 @ 19:01) >    No evidence of deep venous thrombosis or superficial thrombophlebitis in   the bilateral lower extremities.    < end of copied text >  -f/u brain MRI  -f/u biopsy    PHYSICAL EXAM:    GENERAL: NAD, well-developed, AAOx3  HEENT:  Atraumatic, Normocephalic. EOMI, PERRLA, conjunctiva and sclera clear, No JVD  PULMONARY: Clear to auscultation bilaterally; No wheeze  CARDIOVASCULAR: Regular rate and rhythm; No murmurs, rubs, or gallops  GASTROINTESTINAL: Soft, Nontender, Nondistended; Bowel sounds present  MUSCULOSKELETAL:  2+ Peripheral Pulses, No clubbing, cyanosis, or edema  NEUROLOGY: non-focal  SKIN: No rashes or lesions      ADMISSION SUMMARY  Patient is a 43y old Female who presents with a chief complaint of Dry cough for 3 months and right thigh pain (27 Jun 2018 01:01)  Currently admitted to medicine with the primary diagnosis of Lung mass  Hospital course has been complicated by _______.       ASSESSMENT & PLAN    1. LUNG MASS COUGH, likely metastatic disease.   -f/u biopsy  -f/u brain MRI  -f/u heme onc note for staging/ recs. Likely discharge tomorrow for f/u as outpatient.   -CT abd pelvis findings as above.   -bone scan shows multiple bony abnormalities consistent with metastatic disease.   -f/u heme onc note   2.anxiety/depression  -continue lexapro and wellbutrin  3. cough  -c/w robutussin PRN  4. pain  20 mg q 12 oxycontin for breakthrough pain.   3. DVT ppx  -heparin 5000 sq   OOB  4. disposition  Home, oncology F/u as OP REMEDIOS DENTON 43y Female  MRN#: [id number]   CODE STATUS:________      SUBJECTIVE  Patient is a 43y old Female who presents with a chief complaint of Dry cough for 3 months and right thigh pain (27 Jun 2018 01:01)  Currently admitted to medicine with the primary diagnosis of Lung mass  Today is hospital day 2d, and this morning she is sitting in bed, comfortably. Cough is unchanged since yesterday despite the oxycodone. Patient has more leg pain today compared to yesterday. Pain related to hip flexion and palpation. Otherwise, no complaints. No shortness of breath.       OBJECTIVE  PAST MEDICAL & SURGICAL HISTORY  Anxiety and depression  History of cholecystectomy    ALLERGIES:  dust (Sneezing; Rhinorrhea)  Erythromycin Base (Other)    MEDICATIONS:  STANDING MEDICATIONS  buPROPion XL . 300 milliGRAM(s) Oral at bedtime  enoxaparin Injectable 40 milliGRAM(s) SubCutaneous every 24 hours  escitalopram 40 milliGRAM(s) Oral at bedtime  oxyCODONE  ER Tablet 20 milliGRAM(s) Oral every 12 hours    PRN MEDICATIONS  guaiFENesin    Syrup 200 milliGRAM(s) Oral every 6 hours PRN  oxyCODONE    IR 5 milliGRAM(s) Oral every 3 hours PRN      VITAL SIGNS: Last 24 Hours  T(C): 36.6 (28 Jun 2018 05:12), Max: 36.6 (28 Jun 2018 05:12)  T(F): 97.8 (28 Jun 2018 05:12), Max: 97.8 (28 Jun 2018 05:12)  HR: 86 (28 Jun 2018 05:12) (86 - 86)  BP: 114/72 (28 Jun 2018 05:12) (114/72 - 119/71)  BP(mean): --  RR: 18 (28 Jun 2018 05:12) (18 - 20)  SpO2: --    LABS:                        12.1   8.88  )-----------( 372      ( 28 Jun 2018 07:13 )             38.8     06-28    137  |  91<L>  |  10  ----------------------------<  116<H>  5.7<H>   |  30  |  0.8    Ca    9.9      28 Jun 2018 07:13  Mg     2.4     06-27    TPro  7.2  /  Alb  4.0  /  TBili  0.2  /  DBili  x   /  AST  15  /  ALT  14  /  AlkPhos  159<H>  06-27    PT/INR - ( 28 Jun 2018 07:13 )   PT: 11.60 sec;   INR: 1.07 ratio         PTT - ( 28 Jun 2018 07:13 )  PTT:37.9 sec          Culture - Urine (collected 26 Jun 2018 14:10)  Source: .Urine Clean Catch (Midstream)  Preliminary Report (27 Jun 2018 18:32):    >100,000 CFU/ml Klebsiella pneumoniae          RADIOLOGY:  < from: CT Abdomen and Pelvis w/ Oral Cont and w/ IV Cont (06.27.18 @ 17:06) >    LOWER CHEST: Unremarkable.    HEPATOBILIARY: Multifocal, bilobed hepatic masses consistent with   metastatic disease, the largest in the left lobe measures 3.3 x 3.1 cm.   Status post cholecystectomy. No biliary ductal dilation. Patent portal   vein.    SPLEEN: Ill-defined 0.7 cm hypodensity within the spleen, suspicious for   metastatic focus.    PANCREAS: Unremarkable.    ADRENAL GLANDS: Unremarkable right adrenal gland. Nodular thickening of   the lateral limb of the left thyroid gland.    KIDNEYS: Normal symmetric renal enhancement bilaterally. Left interpolar   region cortical scarring. Multifocal bilateral renal hypoattenuating   lesions measuring up to 1.1 cm in the right interpolar region. Cannot   exclude metastasis disease within the kidneys. No stones or   hydronephrosis bilaterally.    ABDOMINOPELVIC NODES: Unremarkable.    PELVIC ORGANS: Unremarkable.    PERITONEUM/MESENTERY/BOWEL: Unremarkable. Normal appendix.    BONES/SOFT TISSUES: No aggressive bone lesions.      IMPRESSION:     Multifocal liver masses consistent with metastatic disease.    Ill-defined 0.7 cm splenic hypodensity suspicious for a metastatic focus.    Multifocal bilateral renal hypoattenuating lesions measuring up to 1.1 cm   in the right interpolar region. Cannot exclude metastasis disease within   the kidneys.    < end of copied text >  < from: NM SPECT Bone Scan (06.27.18 @ 21:52) >  Impression:  Multiple definite bony abnormalities which by pattern of distribution are   consistent with metastatic bone disease.    These include bilateral pubic inferior rami, left superior pubic ramus,   bilateral iliac bones and left 4th costovertebral junction.    Abnormal uptake corresponding to left lung index tumor.    < end of copied text >  < from: [facility] Duplex Lower Ext Vein Scan, Bilat (06.27.18 @ 19:01) >    No evidence of deep venous thrombosis or superficial thrombophlebitis in   the bilateral lower extremities.    < end of copied text >  -f/u brain MRI  -f/u biopsy    PHYSICAL EXAM:    GENERAL: NAD, well-developed, AAOx3  HEENT:  Atraumatic, Normocephalic. EOMI, PERRLA, conjunctiva and sclera clear, No JVD  PULMONARY: Clear to auscultation bilaterally; No wheeze  CARDIOVASCULAR: Regular rate and rhythm; No murmurs, rubs, or gallops  GASTROINTESTINAL: Soft, Nontender, Nondistended; Bowel sounds present  MUSCULOSKELETAL:  2+ Peripheral Pulses, No clubbing, cyanosis, or edema  NEUROLOGY: non-focal  SKIN: No rashes or lesions      ADMISSION SUMMARY  Patient is a 43y old Female who presents with a chief complaint of Dry cough for 3 months and right thigh pain (27 Jun 2018 01:01)  Currently admitted to medicine with the primary diagnosis of Lung mass  Hospital course has been complicated by _______.       ASSESSMENT & PLAN    1. LUNG MASS COUGH, likely metastatic disease.   -f/u biopsy  -f/u brain MRI  -f/u heme onc note for staging/ recs. Likely discharge tomorrow for f/u as outpatient.   -CT abd pelvis findings as above.   -bone scan shows multiple bony abnormalities consistent with metastatic disease.   -f/u heme onc note   2.anxiety/depression  -continue lexapro and wellbutrin  3. cough  -c/w robutussin PRN  4. pain  20 mg q 12 oxycontin for breakthrough pain.   3. DVT ppx  -lovenox 40 mg  OOB  4. disposition  Home, oncology F/u as OP REMEDIOS DENTON 43y Female  MRN#: [id number]   CODE STATUS:________      SUBJECTIVE  Patient is a 43y old Female who presents with a chief complaint of Dry cough for 3 months and right thigh pain (27 Jun 2018 01:01)  Currently admitted to medicine with the primary diagnosis of Lung mass  Today is hospital day 2d, and this morning she is sitting in bed, comfortably. Cough is unchanged since yesterday despite the oxycodone. Patient has more leg pain today compared to yesterday. Pain related to hip flexion and palpation. Otherwise, no complaints. No shortness of breath.       OBJECTIVE  PAST MEDICAL & SURGICAL HISTORY  Anxiety and depression  History of cholecystectomy    ALLERGIES:  dust (Sneezing; Rhinorrhea)  Erythromycin Base (Other)    MEDICATIONS:  STANDING MEDICATIONS  buPROPion XL . 300 milliGRAM(s) Oral at bedtime  enoxaparin Injectable 40 milliGRAM(s) SubCutaneous every 24 hours  escitalopram 40 milliGRAM(s) Oral at bedtime  oxyCODONE  ER Tablet 20 milliGRAM(s) Oral every 12 hours    PRN MEDICATIONS  guaiFENesin    Syrup 200 milliGRAM(s) Oral every 6 hours PRN  oxyCODONE    IR 5 milliGRAM(s) Oral every 3 hours PRN      VITAL SIGNS: Last 24 Hours  T(C): 36.6 (28 Jun 2018 05:12), Max: 36.6 (28 Jun 2018 05:12)  T(F): 97.8 (28 Jun 2018 05:12), Max: 97.8 (28 Jun 2018 05:12)  HR: 86 (28 Jun 2018 05:12) (86 - 86)  BP: 114/72 (28 Jun 2018 05:12) (114/72 - 119/71)  BP(mean): --  RR: 18 (28 Jun 2018 05:12) (18 - 20)  SpO2: --    LABS:                        12.1   8.88  )-----------( 372      ( 28 Jun 2018 07:13 )             38.8     06-28    137  |  91<L>  |  10  ----------------------------<  116<H>  5.7<H>   |  30  |  0.8    Ca    9.9      28 Jun 2018 07:13  Mg     2.4     06-27    TPro  7.2  /  Alb  4.0  /  TBili  0.2  /  DBili  x   /  AST  15  /  ALT  14  /  AlkPhos  159<H>  06-27    PT/INR - ( 28 Jun 2018 07:13 )   PT: 11.60 sec;   INR: 1.07 ratio         PTT - ( 28 Jun 2018 07:13 )  PTT:37.9 sec          Culture - Urine (collected 26 Jun 2018 14:10)  Source: .Urine Clean Catch (Midstream)  Preliminary Report (27 Jun 2018 18:32):    >100,000 CFU/ml Klebsiella pneumoniae          RADIOLOGY:  < from: CT Abdomen and Pelvis w/ Oral Cont and w/ IV Cont (06.27.18 @ 17:06) >    LOWER CHEST: Unremarkable.    HEPATOBILIARY: Multifocal, bilobed hepatic masses consistent with   metastatic disease, the largest in the left lobe measures 3.3 x 3.1 cm.   Status post cholecystectomy. No biliary ductal dilation. Patent portal   vein.    SPLEEN: Ill-defined 0.7 cm hypodensity within the spleen, suspicious for   metastatic focus.    PANCREAS: Unremarkable.    ADRENAL GLANDS: Unremarkable right adrenal gland. Nodular thickening of   the lateral limb of the left thyroid gland.    KIDNEYS: Normal symmetric renal enhancement bilaterally. Left interpolar   region cortical scarring. Multifocal bilateral renal hypoattenuating   lesions measuring up to 1.1 cm in the right interpolar region. Cannot   exclude metastasis disease within the kidneys. No stones or   hydronephrosis bilaterally.    ABDOMINOPELVIC NODES: Unremarkable.    PELVIC ORGANS: Unremarkable.    PERITONEUM/MESENTERY/BOWEL: Unremarkable. Normal appendix.    BONES/SOFT TISSUES: No aggressive bone lesions.      IMPRESSION:     Multifocal liver masses consistent with metastatic disease.    Ill-defined 0.7 cm splenic hypodensity suspicious for a metastatic focus.    Multifocal bilateral renal hypoattenuating lesions measuring up to 1.1 cm   in the right interpolar region. Cannot exclude metastasis disease within   the kidneys.    < end of copied text >  < from: NM SPECT Bone Scan (06.27.18 @ 21:52) >  Impression:  Multiple definite bony abnormalities which by pattern of distribution are   consistent with metastatic bone disease.    These include bilateral pubic inferior rami, left superior pubic ramus,   bilateral iliac bones and left 4th costovertebral junction.    Abnormal uptake corresponding to left lung index tumor.    < end of copied text >  < from: [facility] Duplex Lower Ext Vein Scan, Bilat (06.27.18 @ 19:01) >    No evidence of deep venous thrombosis or superficial thrombophlebitis in   the bilateral lower extremities.    < end of copied text >  -f/u brain MRI  -f/u biopsy    PHYSICAL EXAM:    GENERAL: NAD, well-developed, AAOx3  HEENT:  Atraumatic, Normocephalic. EOMI, PERRLA, conjunctiva and sclera clear, No JVD  PULMONARY: Clear to auscultation bilaterally; No wheeze  CARDIOVASCULAR: Regular rate and rhythm; No murmurs, rubs, or gallops  GASTROINTESTINAL: Soft, Nontender, Nondistended; Bowel sounds present  MUSCULOSKELETAL:  2+ Peripheral Pulses, No clubbing, cyanosis, or edema  NEUROLOGY: non-focal  SKIN: No rashes or lesions      ADMISSION SUMMARY  Patient is a 43y old Female who presents with a chief complaint of Dry cough for 3 months and right thigh pain (27 Jun 2018 01:01)  Currently admitted to medicine with the primary diagnosis of Lung mass  Hospital course has been complicated by _______.       ASSESSMENT & PLAN    1. LUNG MASS COUGH, likely metastatic disease.   -f/u biopsy  -brain MRI positivew for 1 focal nodule concerning for brain metastasis. 5mm in diameter in R occipital lobe, near dural space. Repeat scan with changes in mental status.   -f/u heme onc note for staging/ recs. Likely discharge tomorrow for f/u as outpatient.   -CT abd pelvis findings as above.   -bone scan shows multiple bony abnormalities consistent with metastatic disease.   -f/u heme onc note   2.anxiety/depression  -continue lexapro and wellbutrin  3. cough  -c/w robutussin PRN  4. pain  20 mg q 12 oxycontin for breakthrough pain.   3. DVT ppx  -lovenox 40 mg  OOB  4. disposition  Home, oncology F/u as OP REMEDIOS DENTON 43y Female  MRN#: [id number]   CODE STATUS:____full____      SUBJECTIVE  Patient is a 43y old Female who presents with a chief complaint of Dry cough for 3 months and right thigh pain (27 Jun 2018 01:01)  Currently admitted to medicine with the primary diagnosis of Lung mass  Today is hospital day 2d, and this morning she is sitting in bed, comfortably. Cough is unchanged since yesterday despite the oxycodone. Patient has more leg pain today compared to yesterday. Pain related to hip flexion and palpation. Otherwise, no complaints. No shortness of breath.       OBJECTIVE  PAST MEDICAL & SURGICAL HISTORY  Anxiety and depression  History of cholecystectomy    ALLERGIES:  dust (Sneezing; Rhinorrhea)  Erythromycin Base (Other)    MEDICATIONS:  STANDING MEDICATIONS  buPROPion XL . 300 milliGRAM(s) Oral at bedtime  enoxaparin Injectable 40 milliGRAM(s) SubCutaneous every 24 hours  escitalopram 40 milliGRAM(s) Oral at bedtime  oxyCODONE  ER Tablet 20 milliGRAM(s) Oral every 12 hours    PRN MEDICATIONS  guaiFENesin    Syrup 200 milliGRAM(s) Oral every 6 hours PRN  oxyCODONE    IR 5 milliGRAM(s) Oral every 3 hours PRN      VITAL SIGNS: Last 24 Hours  T(C): 36.6 (28 Jun 2018 05:12), Max: 36.6 (28 Jun 2018 05:12)  T(F): 97.8 (28 Jun 2018 05:12), Max: 97.8 (28 Jun 2018 05:12)  HR: 86 (28 Jun 2018 05:12) (86 - 86)  BP: 114/72 (28 Jun 2018 05:12) (114/72 - 119/71)  BP(mean): --  RR: 18 (28 Jun 2018 05:12) (18 - 20)  SpO2: --    LABS:                        12.1   8.88  )-----------( 372      ( 28 Jun 2018 07:13 )             38.8     06-28    137  |  91<L>  |  10  ----------------------------<  116<H>  5.7<H>   |  30  |  0.8    Ca    9.9      28 Jun 2018 07:13  Mg     2.4     06-27    TPro  7.2  /  Alb  4.0  /  TBili  0.2  /  DBili  x   /  AST  15  /  ALT  14  /  AlkPhos  159<H>  06-27    PT/INR - ( 28 Jun 2018 07:13 )   PT: 11.60 sec;   INR: 1.07 ratio         PTT - ( 28 Jun 2018 07:13 )  PTT:37.9 sec          Culture - Urine (collected 26 Jun 2018 14:10)  Source: .Urine Clean Catch (Midstream)  Preliminary Report (27 Jun 2018 18:32):    >100,000 CFU/ml Klebsiella pneumoniae          RADIOLOGY:  < from: CT Abdomen and Pelvis w/ Oral Cont and w/ IV Cont (06.27.18 @ 17:06) >    LOWER CHEST: Unremarkable.    HEPATOBILIARY: Multifocal, bilobed hepatic masses consistent with   metastatic disease, the largest in the left lobe measures 3.3 x 3.1 cm.   Status post cholecystectomy. No biliary ductal dilation. Patent portal   vein.    SPLEEN: Ill-defined 0.7 cm hypodensity within the spleen, suspicious for   metastatic focus.    PANCREAS: Unremarkable.    ADRENAL GLANDS: Unremarkable right adrenal gland. Nodular thickening of   the lateral limb of the left thyroid gland.    KIDNEYS: Normal symmetric renal enhancement bilaterally. Left interpolar   region cortical scarring. Multifocal bilateral renal hypoattenuating   lesions measuring up to 1.1 cm in the right interpolar region. Cannot   exclude metastasis disease within the kidneys. No stones or   hydronephrosis bilaterally.    ABDOMINOPELVIC NODES: Unremarkable.    PELVIC ORGANS: Unremarkable.    PERITONEUM/MESENTERY/BOWEL: Unremarkable. Normal appendix.    BONES/SOFT TISSUES: No aggressive bone lesions.      IMPRESSION:     Multifocal liver masses consistent with metastatic disease.    Ill-defined 0.7 cm splenic hypodensity suspicious for a metastatic focus.    Multifocal bilateral renal hypoattenuating lesions measuring up to 1.1 cm   in the right interpolar region. Cannot exclude metastasis disease within   the kidneys.    < end of copied text >  < from: NM SPECT Bone Scan (06.27.18 @ 21:52) >  Impression:  Multiple definite bony abnormalities which by pattern of distribution are   consistent with metastatic bone disease.    These include bilateral pubic inferior rami, left superior pubic ramus,   bilateral iliac bones and left 4th costovertebral junction.    Abnormal uptake corresponding to left lung index tumor.    < end of copied text >  < from: [facility] Duplex Lower Ext Vein Scan, Bilat (06.27.18 @ 19:01) >    No evidence of deep venous thrombosis or superficial thrombophlebitis in   the bilateral lower extremities.    < end of copied text >  -f/u brain MRI  -f/u biopsy    PHYSICAL EXAM:    GENERAL: NAD, well-developed, AAOx3  HEENT:  Atraumatic, Normocephalic. EOMI, PERRLA, conjunctiva and sclera clear, No JVD  PULMONARY: Clear to auscultation bilaterally; No wheeze  CARDIOVASCULAR: Regular rate and rhythm; No murmurs, rubs, or gallops  GASTROINTESTINAL: Soft, Nontender, Nondistended; Bowel sounds present  MUSCULOSKELETAL:  2+ Peripheral Pulses, No clubbing, cyanosis, or edema  NEUROLOGY: non-focal  SKIN: No rashes or lesions      ASSESSMENT & PLAN    1. LUNG MASS COUGH, likely metastatic disease.   -f/u biopsy  -brain MRI positivew for 1 focal nodule concerning for brain metastasis. 5mm in diameter in R occipital lobe, near dural space. Repeat scan with changes in mental status.   -f/u heme onc note for staging/ recs. Likely discharge tomorrow for f/u as outpatient.   -CT abd pelvis findings as above.   -bone scan shows multiple bony abnormalities consistent with metastatic disease.   -f/u heme onc note   2.anxiety/depression  -continue lexapro and wellbutrin  3. cough  -c/w robutussin PRN  4. pain  20 mg q 12 oxycontin for breakthrough pain.   3. DVT ppx  -lovenox 40 mg  OOB  4. disposition  Home, oncology F/u as OP

## 2018-06-28 NOTE — PROGRESS NOTE ADULT - SUBJECTIVE AND OBJECTIVE BOX
REMEDIOS DENTON  43y  Female  ***My note supercedes ALL resident notes that I sign.  My corrections for their notes are in my note.***    I can be reached directly on [email or web address]. My office number is [phone]. My personal cell number is [phone].    INTERVAL EVENTS: Pt stopped smoking over 3 years ago and never had a large smoking hx (25 py). Pt s/p liver bx and having pain. We discussed bone scan results and pt cried a lot. I consoled her as much as I could. Pt says she is anxious and wanted klonopin. Pt wanted to eat dinner.    T(F): 97.8 (06-28-18 @ 05:12), Max: 97.8 (06-28-18 @ 05:12)  HR: 86 (06-28-18 @ 05:12) (86 - 86)  BP: 114/72 (06-28-18 @ 05:12) (114/72 - 119/71)  RR: 18 (06-28-18 @ 05:12) (18 - 20)  SpO2: --    PHYSICAL EXAM:  GENERAL: no acute resp distress; mild pain; anxious and upset  HEENT:  EOMI, PERRLA, conjunctiva and sclera clear  Neck: no nodes; No JVD  PULMONARY: Clear to auscultation bilaterally; No wheeze  CARDIOVASCULAR: Regular rate and rhythm; No murmurs, rubs, or gallops  GASTROINTESTINAL: Soft, Nontender, Nondistended; Bowel sounds present  MUSCULOSKELETAL:  No clubbing, cyanosis, or edema  NEUROLOGY: non-focal    LABS:                        12.1    (    81.2   8.88  )-----------( ---------      372      ( 28 Jun 2018 07:13 )             38.8    (    13.8     137   (   91   (   116      06-28-18 @ 07:13  ----------------------               5.7   (   30   (   10                             -----                        0.8  Ca  9.9   Mg  --    P   --       PT/INR - ( 28 Jun 2018 07:13 )   PT: 11.60 sec;   INR: 1.07 ratio    PTT - ( 28 Jun 2018 07:13 )  PTT:37.9 sec    RADIOLOGY & ADDITIONAL TESTS:  < from: NM SPECT Bone Scan (06.27.18 @ 21:52) >  Impression:  Multiple definite bony abnormalities which by pattern of distribution are   consistent with metastatic bone disease.    These include bilateral pubic inferior rami, left superior pubic ramus,   bilateral iliac bones and left 4th costovertebral junction.    Abnormal uptake corresponding to left lung index tumor.    < end of copied text >    < from: [facility] Duplex Lower Ext Vein Scan, Bilat (06.27.18 @ 19:01) >  Impression:    No evidence of deep venous thrombosis or superficial thrombophlebitis in   the bilateral lower extremities.    < end of copied text >    < from: MR Head w/ IV Cont (06.27.18 @ 18:25) >  IMPRESSION:     Single enhancing nodule/lesion within a sulcus adjacent to the right   occipital lobe measuring 4 x 4 x 5 mm. Findings highly suspicious for   metastatic disease. Follow-up to 6 most may be helpful for further   evaluation if clinically indicated.    < end of copied text >    < from: CT Abdomen and Pelvis w/ Oral Cont and w/ IV Cont (06.27.18 @ 17:06) >  IMPRESSION:     Multifocal liver masses consistent with metastatic disease.    Ill-defined 0.7 cm splenic hypodensity suspicious for a metastatic focus.    Multifocal bilateral renal hypoattenuating lesions measuring up to 1.1 cm   in the right interpolar region. Cannot exclude metastasis disease within   the kidneys. (Dr Mariscal: these could be cysts    < end of copied text >    < from: CT Chest w/ IV Cont (06.26.18 @ 15:19) >  IMPRESSION:     No pulmonary embolus.    Since November 24, 2015:    1.  There is a new large left upper lobe mass measuring 6.8 x 5.7 x 7 cm   extending to mediastinum with multiple mediastinal lymph nodes as   described above. Additionally, there are multiple new hypodense hepatic   metastatic lesions.  2.  4 mm left upper lobe pulmonary nodule, metastatic etiology.    < end of copied text >    MEDICATIONS:    buPROPion XL . 300 milliGRAM(s) Oral at bedtime  clonazePAM Tablet 0.25 milliGRAM(s) Oral three times a day PRN  enoxaparin Injectable 40 milliGRAM(s) SubCutaneous every 24 hours  escitalopram 40 milliGRAM(s) Oral at bedtime  guaiFENesin    Syrup 200 milliGRAM(s) Oral every 6 hours PRN  oxyCODONE    IR 5 milliGRAM(s) Oral every 3 hours PRN  oxyCODONE  ER Tablet 20 milliGRAM(s) Oral every 12 hours PRN

## 2018-06-28 NOTE — PROGRESS NOTE ADULT - SUBJECTIVE AND OBJECTIVE BOX
Interventional Radiology Brief- Operative Note    Procedure:  ct guided liver mass biopsy    Pre-Op Diagnosis: lung, liver mass    Post-Op Diagnosis: same    Attending: Mc    Resident:  Teo    Anesthesia (type):  [ ] General Anesthesia  [x ] Sedation  [ ] Spinal Anesthesia  [ ] Local/Regional    Contrast: none    Estimated Blood Loss:<10ml    Condition:   [ ] Critical  [ ] Serious  [ ] Fair   [ x] Good    Findings/Follow up Plan of Care:  -F/U results in five day  -Gelfoam injected into biopsy bed  Specimens Removed:  5 cores  Implants:none    Complications:  none  Condition/Disposition:  DC to floor    Please call Interventional Radiology [phone] with any questions, concerns, or issues.

## 2018-06-28 NOTE — PROGRESS NOTE ADULT - ASSESSMENT
Please advise   # new 7cm left lung mass w/ mets to lung, mediastinum, liver, spleen, poss b/l kidney (these could be cysts), bone and prob brain  former smoker, 25 pack-yrs; quit 3 yrs ago  cough - seems stable: robitussin and opiates  s/p liver bx - can f/u as outpt  will need h/o outpt f/u for tx  decadron and keppra NOT needed for small brain focus  pt can be referred to rad/onc and NSx as outpt once bx confirms dx    # neopl related pain  oxycontin 20mg q12  oxycodone 5mg IR po q3 prn pain    # no DVT    # Anxiety/depression  Continue Lexapro and Wellbutrin  add klonopin 0.25mg 3x/day prn    # DVT prophylaxis    Anticipate d/c home chato w/ outpt h/o f/u of bx

## 2018-06-28 NOTE — PROGRESS NOTE ADULT - SUBJECTIVE AND OBJECTIVE BOX
Patient seen and examined  CT abd/pelvis and bone scan reviewed shows diffuse metastasis.   Biopsy by IR today  MRI head pending      Past Medical and Surgical History  PAST MEDICAL & SURGICAL HISTORY:  Anxiety and depression  History of cholecystectomy      Current Medications:  MEDICATIONS  (STANDING):  buPROPion XL . 300 milliGRAM(s) Oral at bedtime  enoxaparin Injectable 40 milliGRAM(s) SubCutaneous every 24 hours  escitalopram 40 milliGRAM(s) Oral daily    MEDICATIONS  (PRN):  oxyCODONE    IR 5 milliGRAM(s) Oral every 3 hours PRN Severe Pain (7 - 10)      Interval History:  No acute events overnight. No complaints at this time.    Vital Signs Last 24 Hrs  T(C): 36.6 (28 Jun 2018 05:12), Max: 36.8 (27 Jun 2018 14:30)  T(F): 97.8 (28 Jun 2018 05:12), Max: 98.3 (27 Jun 2018 14:30)  HR: 86 (28 Jun 2018 05:12) (80 - 86)  BP: 114/72 (28 Jun 2018 05:12) (114/72 - 121/67)  BP(mean): --  RR: 18 (28 Jun 2018 05:12) (18 - 20)  SpO2: --      Physical Exam:  General: Not in distress.   HEENT: Moist mucus membranes.   Cardio: Regular rate and rhythm, S1, S2, no murmur, rub, or gallop.  Pulm: Clear to auscultation bilaterally. No wheezing, rales, or rhonchi  Abdomen: Soft, non-tender, non-distended. Normoactive bowel sounds  Extremities: No cyanosis or edema bilaterally.   Neuro: A&O x3. No focal deficits. CN II - XII grossly intact.    Labs and Imaging:  CBC Full  -  ( 27 Jun 2018 07:30 )  WBC Count : 8.07 K/uL  Hemoglobin : 11.9 g/dL  Hematocrit : 37.8 %  Platelet Count - Automated : 357 K/uL  Mean Cell Volume : 81.1 fL  Mean Cell Hemoglobin : 25.5 pg  Mean Cell Hemoglobin Concentration : 31.5 g/dL  Auto Neutrophil # : 4.85 K/uL  Auto Lymphocyte # : 2.44 K/uL  Auto Monocyte # : 0.50 K/uL  Auto Eosinophil # : 0.22 K/uL  Auto Basophil # : 0.03 K/uL  Auto Neutrophil % : 60.1 %  Auto Lymphocyte % : 30.2 %  Auto Monocyte % : 6.2 %  Auto Eosinophil % : 2.7 %  Auto Basophil % : 0.4 %    RDW: 13.7  13.5    PT/INR/PTT: 06-26-18 @ 14:22  10.80 | 30.9        ^      1.00    BMP: 06-27-18 @ 07:30  140 | 95 | 8    -----------------< 82  4.6  | 28 | 0.6  eGFR(AA): 129, eGFR (non-AA): 112  Ca 9.4, Mg 2.4, P --        Urinalysis:  Urinalysis Basic - ( 26 Jun 2018 14:10 )    Color: Yellow / Appearance: Clear / SG: <=1.005 / pH: x  Gluc: x / Ketone: Negative  / Bili: Negative / Urobili: 0.2 mg/dL   Blood: x / Protein: Negative mg/dL / Nitrite: Negative   Leuk Esterase: Small / RBC: Negative / WBC 10-25 /HPF   Sq Epi: x / Non Sq Epi: Few /HPF / Bacteria: Moderate    < from: CT Chest w/ IV Cont (06.26.18 @ 15:19) >  No pulmonary embolus.    Since November 24, 2015:    1.  There is a new large left upper lobe mass measuring 6.8 x 5.7 x 7 cm   extending to mediastinum with multiple mediastinal lymph nodes as   described above. Additionally, there are multiple new hypodense hepatic   metastatic lesions.  2.  4 mm left upper lobe pulmonary nodule, metastatic etiology.      < end of copied text >      Home Medications:  Home Medications:  Lexapro: 40 milligram(s) orally once a day (27 Jun 2018 01:26)  Wellbutrin  mg/24 hours oral tablet, extended release: 1 tab(s) orally once a day (27 Jun 2018 01:26) Patient seen and examined  CT abd/pelvis and bone scan reviewed shows diffuse metastasis.   s/p CT guided liver biopsy  MRI head solitary mets 4 mm. No surrounding edema.       Past Medical and Surgical History  PAST MEDICAL & SURGICAL HISTORY:  Anxiety and depression  History of cholecystectomy      Current Medications:  MEDICATIONS  (STANDING):  buPROPion XL . 300 milliGRAM(s) Oral at bedtime  enoxaparin Injectable 40 milliGRAM(s) SubCutaneous every 24 hours  escitalopram 40 milliGRAM(s) Oral daily    MEDICATIONS  (PRN):  oxyCODONE    IR 5 milliGRAM(s) Oral every 3 hours PRN Severe Pain (7 - 10)      Interval History:  No acute events overnight. No complaints at this time.    	  Vital Signs Last 24 Hrs  T(C): 37.1 (29 Jun 2018 05:12), Max: 37.1 (29 Jun 2018 05:12)  T(F): 98.7 (29 Jun 2018 05:12), Max: 98.7 (29 Jun 2018 05:12)  HR: 85 (29 Jun 2018 05:12) (85 - 87)  BP: 117/78 (29 Jun 2018 05:12) (117/78 - 119/76)  BP(mean): --  RR: 19 (29 Jun 2018 05:12) (19 - 19)  SpO2: --    Physical Exam:  General: Not in distress.   HEENT: Moist mucus membranes.   Cardio: Regular rate and rhythm, S1, S2, no murmur, rub, or gallop.  Pulm: Clear to auscultation bilaterally. No wheezing, rales, or rhonchi  Abdomen: Soft, non-tender, non-distended. Normoactive bowel sounds  Extremities: No cyanosis or edema bilaterally.   Neuro: A&O x3. No focal deficits. CN II - XII grossly intact.    Labs and Imaging:  CBC Full  -  ( 27 Jun 2018 07:30 )  WBC Count : 8.07 K/uL  Hemoglobin : 11.9 g/dL  Hematocrit : 37.8 %  Platelet Count - Automated : 357 K/uL  Mean Cell Volume : 81.1 fL  Mean Cell Hemoglobin : 25.5 pg  Mean Cell Hemoglobin Concentration : 31.5 g/dL  Auto Neutrophil # : 4.85 K/uL  Auto Lymphocyte # : 2.44 K/uL  Auto Monocyte # : 0.50 K/uL  Auto Eosinophil # : 0.22 K/uL  Auto Basophil # : 0.03 K/uL  Auto Neutrophil % : 60.1 %  Auto Lymphocyte % : 30.2 %  Auto Monocyte % : 6.2 %  Auto Eosinophil % : 2.7 %  Auto Basophil % : 0.4 %    RDW: 13.7  13.5    PT/INR/PTT: 06-26-18 @ 14:22  10.80 | 30.9        ^      1.00    BMP: 06-27-18 @ 07:30  140 | 95 | 8    -----------------< 82  4.6  | 28 | 0.6  eGFR(AA): 129, eGFR (non-AA): 112  Ca 9.4, Mg 2.4, P --        Urinalysis:  Urinalysis Basic - ( 26 Jun 2018 14:10 )    Color: Yellow / Appearance: Clear / SG: <=1.005 / pH: x  Gluc: x / Ketone: Negative  / Bili: Negative / Urobili: 0.2 mg/dL   Blood: x / Protein: Negative mg/dL / Nitrite: Negative   Leuk Esterase: Small / RBC: Negative / WBC 10-25 /HPF   Sq Epi: x / Non Sq Epi: Few /HPF / Bacteria: Moderate    < from: CT Chest w/ IV Cont (06.26.18 @ 15:19) >  No pulmonary embolus.    Since November 24, 2015:    1.  There is a new large left upper lobe mass measuring 6.8 x 5.7 x 7 cm   extending to mediastinum with multiple mediastinal lymph nodes as   described above. Additionally, there are multiple new hypodense hepatic   metastatic lesions.  2.  4 mm left upper lobe pulmonary nodule, metastatic etiology.      < end of copied text >      Home Medications:  Home Medications:  Lexapro: 40 milligram(s) orally once a day (27 Jun 2018 01:26)  Wellbutrin  mg/24 hours oral tablet, extended release: 1 tab(s) orally once a day (27 Jun 2018 01:26) Patient seen and examined  CT abd/pelvis and bone scan reviewed shows diffuse metastasis.   s/p CT guided liver biopsy  MRI head solitary mets 4 mm. No surrounding edema.       Past Medical and Surgical History  PAST MEDICAL & SURGICAL HISTORY:  Anxiety and depression  History of cholecystectomy      Current Medications:  MEDICATIONS  (STANDING):  buPROPion XL . 300 milliGRAM(s) Oral at bedtime  enoxaparin Injectable 40 milliGRAM(s) SubCutaneous every 24 hours  escitalopram 40 milliGRAM(s) Oral daily    MEDICATIONS  (PRN):  oxyCODONE    IR 5 milliGRAM(s) Oral every 3 hours PRN Severe Pain (7 - 10)      Interval History:  No acute events overnight. No complaints at this time.    	  Vital Signs Last 24 Hrs  T(C): 37.1 (2018 05:12), Max: 37.1 (2018 05:12)  T(F): 98.7 (2018 05:12), Max: 98.7 (2018 05:12)  HR: 85 (2018 05:12) (85 - 87)  BP: 117/78 (2018 05:12) (117/78 - 119/76)  BP(mean): --  RR: 19 (2018 05:12) (19 - 19)  SpO2: --    Physical Exam:  General: Not in distress.   HEENT: Moist mucus membranes.   Cardio: Regular rate and rhythm, S1, S2, no murmur, rub, or gallop.  Pulm: Clear to auscultation bilaterally. No wheezing, rales, or rhonchi  Abdomen: Soft, non-tender, non-distended. Normoactive bowel sounds  Extremities: No cyanosis or edema bilaterally.   Neuro: A&O x3. No focal deficits. CN II - XII grossly intact.    Labs and Imagin.8   8.45  )-----------( 364      ( 2018 06:25 )             38.0     CBC Full  -  ( 2018 07:30 )  WBC Count : 8.07 K/uL  Hemoglobin : 11.9 g/dL  Hematocrit : 37.8 %  Platelet Count - Automated : 357 K/uL  -    134<L>  |  90<L>  |  13  ----------------------------<  95  5.5<H>   |  28  |  0.9    Ca    9.8      2018 06:25  Phos  5.0         Mean Cell Volume : 81.1 fL  Mean Cell Hemoglobin : 25.5 pg  Mean Cell Hemoglobin Concentration : 31.5 g/dL  Auto Neutrophil # : 4.85 K/uL  Auto Lymphocyte # : 2.44 K/uL  Auto Monocyte # : 0.50 K/uL  Auto Eosinophil # : 0.22 K/uL  Auto Basophil # : 0.03 K/uL  Auto Neutrophil % : 60.1 %  Auto Lymphocyte % : 30.2 %  Auto Monocyte % : 6.2 %  Auto Eosinophil % : 2.7 %  Auto Basophil % : 0.4 %    RDW: 13.7  13.5    PT/INR/PTT: 18 @ 14:22  10.80 | 30.9        ^      1.00    BMP: 18 @ 07:30  140 | 95 | 8    -----------------< 82  4.6  | 28 | 0.6  eGFR(AA): 129, eGFR (non-AA): 112  Ca 9.4, Mg 2.4, P --        Urinalysis:  Urinalysis Basic - ( 2018 14:10 )    Color: Yellow / Appearance: Clear / SG: <=1.005 / pH: x  Gluc: x / Ketone: Negative  / Bili: Negative / Urobili: 0.2 mg/dL   Blood: x / Protein: Negative mg/dL / Nitrite: Negative   Leuk Esterase: Small / RBC: Negative / WBC 10-25 /HPF   Sq Epi: x / Non Sq Epi: Few /HPF / Bacteria: Moderate    < from: CT Chest w/ IV Cont (18 @ 15:19) >  No pulmonary embolus.    Since 2015:    1.  There is a new large left upper lobe mass measuring 6.8 x 5.7 x 7 cm   extending to mediastinum with multiple mediastinal lymph nodes as   described above. Additionally, there are multiple new hypodense hepatic   metastatic lesions.  2.  4 mm left upper lobe pulmonary nodule, metastatic etiology.      < end of copied text >      Home Medications:  Home Medications:  Lexapro: 40 milligram(s) orally once a day (2018 01:26)  Wellbutrin  mg/24 hours oral tablet, extended release: 1 tab(s) orally once a day (2018 01:26) Patient seen and examined  CT abd/pelvis and bone scan reviewed shows diffuse metastasis.   s/p CT guided liver biopsy. complaining of pain at liver biopsy site  MRI head solitary mets 4 mm. No surrounding edema.       Past Medical and Surgical History  PAST MEDICAL & SURGICAL HISTORY:  Anxiety and depression  History of cholecystectomy      MEDICATIONS  (STANDING):  ALBUTerol    0.083%. 2.5 milliGRAM(s) Nebulizer once  buPROPion XL . 300 milliGRAM(s) Oral at bedtime  enoxaparin Injectable 40 milliGRAM(s) SubCutaneous every 24 hours  escitalopram 40 milliGRAM(s) Oral at bedtime  naproxen 500 milliGRAM(s) Oral two times a day  oxyCODONE  ER Tablet 20 milliGRAM(s) Oral every 12 hours  pantoprazole    Tablet 40 milliGRAM(s) Oral before breakfast    MEDICATIONS  (PRN):  clonazePAM Tablet 0.25 milliGRAM(s) Oral three times a day PRN anxiety  guaiFENesin    Syrup 200 milliGRAM(s) Oral every 6 hours PRN Cough  oxyCODONE    IR 5 milliGRAM(s) Oral every 3 hours PRN Severe Pain (7 - 10)        Interval History:  No acute events overnight. No complaints at this time.    	  Vital Signs Last 24 Hrs  T(C): 37.1 (2018 05:12), Max: 37.1 (2018 05:12)  T(F): 98.7 (2018 05:12), Max: 98.7 (2018 05:12)  HR: 85 (2018 05:12) (85 - 87)  BP: 117/78 (2018 05:12) (117/78 - 119/76)  BP(mean): --  RR: 19 (2018 05:12) (19 - 19)  SpO2: --    Physical Exam:  General: Not in distress.   HEENT: Moist mucus membranes.   Cardio: Regular rate and rhythm, S1, S2, no murmur, rub, or gallop.  Pulm: Clear to auscultation bilaterally. No wheezing, rales, or rhonchi  Abdomen: Soft, non-tender, non-distended. Normoactive bowel sounds  Extremities: No cyanosis or edema bilaterally.   Neuro: A&O x3. No focal deficits. CN II - XII grossly intact.    Labs and Imagin.8   8.45  )-----------( 364      ( 2018 06:25 )             38.0     CBC Full  -  ( 2018 07:30 )  WBC Count : 8.07 K/uL  Hemoglobin : 11.9 g/dL  Hematocrit : 37.8 %  Platelet Count - Automated : 357 K/uL      134<L>  |  90<L>  |  13  ----------------------------<  95  5.5<H>   |  28  |  0.9    Ca    9.8      2018 06:25  Phos  5.0         Mean Cell Volume : 81.1 fL  Mean Cell Hemoglobin : 25.5 pg  Mean Cell Hemoglobin Concentration : 31.5 g/dL  Auto Neutrophil # : 4.85 K/uL  Auto Lymphocyte # : 2.44 K/uL  Auto Monocyte # : 0.50 K/uL  Auto Eosinophil # : 0.22 K/uL  Auto Basophil # : 0.03 K/uL  Auto Neutrophil % : 60.1 %  Auto Lymphocyte % : 30.2 %  Auto Monocyte % : 6.2 %  Auto Eosinophil % : 2.7 %  Auto Basophil % : 0.4 %    RDW: 13.7  13.5    PT/INR/PTT: 18 @ 14:22  10.80 | 30.9        ^      1.00    BMP: 18 @ 07:30  140 | 95 | 8    -----------------< 82  4.6  | 28 | 0.6  eGFR(AA): 129, eGFR (non-AA): 112  Ca 9.4, Mg 2.4, P --        Urinalysis:  Urinalysis Basic - ( 2018 14:10 )    Color: Yellow / Appearance: Clear / SG: <=1.005 / pH: x  Gluc: x / Ketone: Negative  / Bili: Negative / Urobili: 0.2 mg/dL   Blood: x / Protein: Negative mg/dL / Nitrite: Negative   Leuk Esterase: Small / RBC: Negative / WBC 10-25 /HPF   Sq Epi: x / Non Sq Epi: Few /HPF / Bacteria: Moderate    < from: CT Chest w/ IV Cont (18 @ 15:19) >  No pulmonary embolus.    Since 2015:    1.  There is a new large left upper lobe mass measuring 6.8 x 5.7 x 7 cm   extending to mediastinum with multiple mediastinal lymph nodes as   described above. Additionally, there are multiple new hypodense hepatic   metastatic lesions.  2.  4 mm left upper lobe pulmonary nodule, metastatic etiology.      < end of copied text >      Home Medications:  Home Medications:  Lexapro: 40 milligram(s) orally once a day (2018 01:26)  Wellbutrin  mg/24 hours oral tablet, extended release: 1 tab(s) orally once a day (2018 01:26)

## 2018-06-28 NOTE — PROGRESS NOTE ADULT - ASSESSMENT
43 y.o female patient with PMH of anxiety and depression presents to the ED for a 3 months history of dry cough. Found to have new lung mass concerning for malignancy with metastatic disease.     1. Metastatic disease: Likely lung primary     MRI head pending to complete staging.      Biopsy today     Further recommendation will be made after tissue diagnosis 1. squamous cell carcinoma of lung.      Preliminary results from liver biopsy. However as Dr. hammond, Tissue sample was limited and hse does not hace enough to run PDl1 markers.      Case discussed with Dr. Bishop. He will do rebiopsy probably the liver but not the same lesion, if not he will do lung.      He cannot do it today, so will schedule for monday as they dont take val care.     This was discussed with patient and she agreed to stay    2. isolated solitary brain metastasis. it is very small. No surrounding edema. Will not start decadron. Out pt Rad/onc eval for SRS.     3. pain control

## 2018-06-29 LAB
ANION GAP SERPL CALC-SCNC: 15 MMOL/L — HIGH (ref 7–14)
ANION GAP SERPL CALC-SCNC: 16 MMOL/L — HIGH (ref 7–14)
ANION GAP SERPL CALC-SCNC: 18 MMOL/L — HIGH (ref 7–14)
BUN SERPL-MCNC: 11 MG/DL — SIGNIFICANT CHANGE UP (ref 10–20)
BUN SERPL-MCNC: 13 MG/DL — SIGNIFICANT CHANGE UP (ref 10–20)
BUN SERPL-MCNC: 15 MG/DL — SIGNIFICANT CHANGE UP (ref 10–20)
CALCIUM SERPL-MCNC: 10 MG/DL — SIGNIFICANT CHANGE UP (ref 8.5–10.1)
CALCIUM SERPL-MCNC: 10 MG/DL — SIGNIFICANT CHANGE UP (ref 8.5–10.1)
CALCIUM SERPL-MCNC: 9.8 MG/DL — SIGNIFICANT CHANGE UP (ref 8.5–10.1)
CHLORIDE SERPL-SCNC: 90 MMOL/L — LOW (ref 98–110)
CHLORIDE SERPL-SCNC: 91 MMOL/L — LOW (ref 98–110)
CHLORIDE SERPL-SCNC: 92 MMOL/L — LOW (ref 98–110)
CO2 SERPL-SCNC: 26 MMOL/L — SIGNIFICANT CHANGE UP (ref 17–32)
CO2 SERPL-SCNC: 28 MMOL/L — SIGNIFICANT CHANGE UP (ref 17–32)
CO2 SERPL-SCNC: 31 MMOL/L — SIGNIFICANT CHANGE UP (ref 17–32)
CREAT SERPL-MCNC: 0.7 MG/DL — SIGNIFICANT CHANGE UP (ref 0.7–1.5)
CREAT SERPL-MCNC: 0.7 MG/DL — SIGNIFICANT CHANGE UP (ref 0.7–1.5)
CREAT SERPL-MCNC: 0.9 MG/DL — SIGNIFICANT CHANGE UP (ref 0.7–1.5)
GLUCOSE SERPL-MCNC: 104 MG/DL — HIGH (ref 70–99)
GLUCOSE SERPL-MCNC: 83 MG/DL — SIGNIFICANT CHANGE UP (ref 70–99)
GLUCOSE SERPL-MCNC: 95 MG/DL — SIGNIFICANT CHANGE UP (ref 70–99)
HCT VFR BLD CALC: 38 % — SIGNIFICANT CHANGE UP (ref 37–47)
HGB BLD-MCNC: 11.8 G/DL — LOW (ref 12–16)
MCHC RBC-ENTMCNC: 25.2 PG — LOW (ref 27–31)
MCHC RBC-ENTMCNC: 31.1 G/DL — LOW (ref 32–37)
MCV RBC AUTO: 81.2 FL — SIGNIFICANT CHANGE UP (ref 81–99)
NRBC # BLD: 0 /100 WBCS — SIGNIFICANT CHANGE UP (ref 0–0)
PHOSPHATE SERPL-MCNC: 5 MG/DL — HIGH (ref 2.1–4.9)
PLATELET # BLD AUTO: 364 K/UL — SIGNIFICANT CHANGE UP (ref 130–400)
POTASSIUM SERPL-MCNC: 4.5 MMOL/L — SIGNIFICANT CHANGE UP (ref 3.5–5)
POTASSIUM SERPL-MCNC: 5.5 MMOL/L — HIGH (ref 3.5–5)
POTASSIUM SERPL-MCNC: 5.5 MMOL/L — HIGH (ref 3.5–5)
POTASSIUM SERPL-SCNC: 4.5 MMOL/L — SIGNIFICANT CHANGE UP (ref 3.5–5)
POTASSIUM SERPL-SCNC: 5.5 MMOL/L — HIGH (ref 3.5–5)
POTASSIUM SERPL-SCNC: 5.5 MMOL/L — HIGH (ref 3.5–5)
RBC # BLD: 4.68 M/UL — SIGNIFICANT CHANGE UP (ref 4.2–5.4)
RBC # FLD: 13.5 % — SIGNIFICANT CHANGE UP (ref 11.5–14.5)
SODIUM SERPL-SCNC: 134 MMOL/L — LOW (ref 135–146)
SODIUM SERPL-SCNC: 135 MMOL/L — SIGNIFICANT CHANGE UP (ref 135–146)
SODIUM SERPL-SCNC: 138 MMOL/L — SIGNIFICANT CHANGE UP (ref 135–146)
URATE SERPL-MCNC: 6 MG/DL — SIGNIFICANT CHANGE UP (ref 2.5–7)
WBC # BLD: 8.45 K/UL — SIGNIFICANT CHANGE UP (ref 4.8–10.8)
WBC # FLD AUTO: 8.45 K/UL — SIGNIFICANT CHANGE UP (ref 4.8–10.8)

## 2018-06-29 RX ORDER — OXYCODONE HYDROCHLORIDE 5 MG/1
20 TABLET ORAL EVERY 12 HOURS
Qty: 0 | Refills: 0 | Status: DISCONTINUED | OUTPATIENT
Start: 2018-06-29 | End: 2018-07-04

## 2018-06-29 RX ORDER — ALBUTEROL 90 UG/1
2.5 AEROSOL, METERED ORAL ONCE
Qty: 0 | Refills: 0 | Status: COMPLETED | OUTPATIENT
Start: 2018-06-29 | End: 2018-06-29

## 2018-06-29 RX ORDER — DEXTROSE 50 % IN WATER 50 %
50 SYRINGE (ML) INTRAVENOUS ONCE
Qty: 0 | Refills: 0 | Status: DISCONTINUED | OUTPATIENT
Start: 2018-06-29 | End: 2018-06-29

## 2018-06-29 RX ORDER — SODIUM POLYSTYRENE SULFONATE 4.1 MEQ/G
15 POWDER, FOR SUSPENSION ORAL ONCE
Qty: 0 | Refills: 0 | Status: COMPLETED | OUTPATIENT
Start: 2018-06-29 | End: 2018-06-29

## 2018-06-29 RX ORDER — INSULIN HUMAN 100 [IU]/ML
10 INJECTION, SOLUTION SUBCUTANEOUS ONCE
Qty: 0 | Refills: 0 | Status: DISCONTINUED | OUTPATIENT
Start: 2018-06-29 | End: 2018-06-29

## 2018-06-29 RX ORDER — DEXTROSE 50 % IN WATER 50 %
50 SYRINGE (ML) INTRAVENOUS ONCE
Qty: 0 | Refills: 0 | Status: COMPLETED | OUTPATIENT
Start: 2018-06-29 | End: 2018-06-29

## 2018-06-29 RX ORDER — PANTOPRAZOLE SODIUM 20 MG/1
40 TABLET, DELAYED RELEASE ORAL
Qty: 0 | Refills: 0 | Status: DISCONTINUED | OUTPATIENT
Start: 2018-06-29 | End: 2018-07-07

## 2018-06-29 RX ORDER — SENNA PLUS 8.6 MG/1
1 TABLET ORAL AT BEDTIME
Qty: 0 | Refills: 0 | Status: DISCONTINUED | OUTPATIENT
Start: 2018-06-29 | End: 2018-07-07

## 2018-06-29 RX ORDER — DEXTROSE 10 % IN WATER 10 %
1000 INTRAVENOUS SOLUTION INTRAVENOUS
Qty: 0 | Refills: 0 | Status: DISCONTINUED | OUTPATIENT
Start: 2018-06-29 | End: 2018-06-29

## 2018-06-29 RX ORDER — DOCUSATE SODIUM 100 MG
100 CAPSULE ORAL THREE TIMES A DAY
Qty: 0 | Refills: 0 | Status: DISCONTINUED | OUTPATIENT
Start: 2018-06-29 | End: 2018-07-07

## 2018-06-29 RX ORDER — INSULIN HUMAN 100 [IU]/ML
10 INJECTION, SOLUTION SUBCUTANEOUS ONCE
Qty: 0 | Refills: 0 | Status: COMPLETED | OUTPATIENT
Start: 2018-06-29 | End: 2018-06-29

## 2018-06-29 RX ORDER — DEXTROSE 50 % IN WATER 50 %
1 SYRINGE (ML) INTRAVENOUS ONCE
Qty: 0 | Refills: 0 | Status: COMPLETED | OUTPATIENT
Start: 2018-06-29 | End: 2018-06-29

## 2018-06-29 RX ADMIN — OXYCODONE HYDROCHLORIDE 5 MILLIGRAM(S): 5 TABLET ORAL at 07:55

## 2018-06-29 RX ADMIN — Medication 500 MILLIGRAM(S): at 11:49

## 2018-06-29 RX ADMIN — OXYCODONE HYDROCHLORIDE 20 MILLIGRAM(S): 5 TABLET ORAL at 10:16

## 2018-06-29 RX ADMIN — Medication 500 MILLIGRAM(S): at 12:31

## 2018-06-29 RX ADMIN — OXYCODONE HYDROCHLORIDE 5 MILLIGRAM(S): 5 TABLET ORAL at 21:35

## 2018-06-29 RX ADMIN — BUPROPION HYDROCHLORIDE 300 MILLIGRAM(S): 150 TABLET, EXTENDED RELEASE ORAL at 21:03

## 2018-06-29 RX ADMIN — OXYCODONE HYDROCHLORIDE 20 MILLIGRAM(S): 5 TABLET ORAL at 12:31

## 2018-06-29 RX ADMIN — OXYCODONE HYDROCHLORIDE 5 MILLIGRAM(S): 5 TABLET ORAL at 02:14

## 2018-06-29 RX ADMIN — Medication 0.25 MILLIGRAM(S): at 02:15

## 2018-06-29 RX ADMIN — SODIUM POLYSTYRENE SULFONATE 15 GRAM(S): 4.1 POWDER, FOR SUSPENSION ORAL at 10:15

## 2018-06-29 RX ADMIN — OXYCODONE HYDROCHLORIDE 5 MILLIGRAM(S): 5 TABLET ORAL at 21:01

## 2018-06-29 RX ADMIN — Medication 500 MILLIGRAM(S): at 17:17

## 2018-06-29 RX ADMIN — ENOXAPARIN SODIUM 40 MILLIGRAM(S): 100 INJECTION SUBCUTANEOUS at 11:51

## 2018-06-29 RX ADMIN — OXYCODONE HYDROCHLORIDE 5 MILLIGRAM(S): 5 TABLET ORAL at 07:50

## 2018-06-29 RX ADMIN — SENNA PLUS 1 TABLET(S): 8.6 TABLET ORAL at 21:03

## 2018-06-29 RX ADMIN — ESCITALOPRAM OXALATE 40 MILLIGRAM(S): 10 TABLET, FILM COATED ORAL at 21:03

## 2018-06-29 RX ADMIN — Medication 500 MILLIGRAM(S): at 17:16

## 2018-06-29 RX ADMIN — LIDOCAINE 1 PATCH: 4 CREAM TOPICAL at 10:07

## 2018-06-29 RX ADMIN — INSULIN HUMAN 10 UNIT(S): 100 INJECTION, SOLUTION SUBCUTANEOUS at 11:47

## 2018-06-29 RX ADMIN — OXYCODONE HYDROCHLORIDE 5 MILLIGRAM(S): 5 TABLET ORAL at 03:00

## 2018-06-29 RX ADMIN — Medication 50 MILLILITER(S): at 11:49

## 2018-06-29 RX ADMIN — Medication 0.25 MILLIGRAM(S): at 10:14

## 2018-06-29 RX ADMIN — Medication 100 MILLIGRAM(S): at 21:03

## 2018-06-29 NOTE — PROGRESS NOTE ADULT - ASSESSMENT
SUBJECTIVE:    Patient is a 43y old Female who presents with a chief complaint of Dry cough for 3 months and right thigh pain (27 Jun 2018 01:01)    Currently admitted to medicine with the primary diagnosis of Lung mass     Today is hospital day 3d. This morning she notes continued pain in her R gluteal region that is not controlled with Oxycontin 20 mg q12 and oxycodone 5 mg q3. Patient is eager to leave hospital and return to work. Cough is stable. Patient denies fever, chills, dyspnea.     PAST MEDICAL & SURGICAL HISTORY  Anxiety and depression  History of cholecystectomy    SOCIAL HISTORY:  Negative for smoking/alcohol/drug use.     ALLERGIES:  dust (Sneezing; Rhinorrhea)  Erythromycin Base (Other)    MEDICATIONS:  STANDING MEDICATIONS  ALBUTerol    0.083%. 2.5 milliGRAM(s) Nebulizer once  buPROPion XL . 300 milliGRAM(s) Oral at bedtime  docusate sodium 100 milliGRAM(s) Oral three times a day  enoxaparin Injectable 40 milliGRAM(s) SubCutaneous every 24 hours  escitalopram 40 milliGRAM(s) Oral at bedtime  naproxen 500 milliGRAM(s) Oral two times a day  oxyCODONE  ER Tablet 20 milliGRAM(s) Oral every 12 hours  pantoprazole    Tablet 40 milliGRAM(s) Oral before breakfast    PRN MEDICATIONS  clonazePAM Tablet 0.25 milliGRAM(s) Oral three times a day PRN  guaiFENesin    Syrup 200 milliGRAM(s) Oral every 6 hours PRN  oxyCODONE    IR 5 milliGRAM(s) Oral every 3 hours PRN    VITALS:   T(F): 97  HR: 87  BP: 124/73  RR: 18  SpO2: 97%    LABS:                        11.8   8.45  )-----------( 364      ( 29 Jun 2018 06:25 )             38.0     06-29    135  |  91<L>  |  11  ----------------------------<  83  4.5   |  26  |  0.7    Ca    10.0      29 Jun 2018 13:48  Phos  5.0     06-29      PT/INR - ( 28 Jun 2018 07:13 )   PT: 11.60 sec;   INR: 1.07 ratio         PTT - ( 28 Jun 2018 07:13 )  PTT:37.9 sec              RADIOLOGY:  < from: MR Head w/ IV Cont (06.27.18 @ 18:25) >  Single enhancing nodule/lesion within a sulcus adjacent to the right   occipital lobe measuring 4 x 4 x 5 mm. Findings highly suspicious for   metastatic disease. Follow-up to 6 most may be helpful for further   evaluation if clinically indicated.      < end of copied text >  < from: NM SPECT Bone Scan (06.27.18 @ 21:52) >  Multiple definite bony abnormalities which by pattern of distribution are   consistent with metastatic bone disease.    These include bilateral pubic inferior rami, left superior pubic ramus,   bilateral iliac bones and left 4th costovertebral junction.    Abnormal uptake corresponding to left lung index tumor.    < end of copied text >  < from: CT Abdomen and Pelvis w/ Oral Cont and w/ IV Cont (06.27.18 @ 17:06) >    Multifocal liver masses consistent with metastatic disease.    Ill-defined 0.7 cm splenic hypodensity suspicious for a metastatic focus.    Multifocal bilateral renal hypoattenuating lesions measuring up to 1.1 cm   in the right interpolar region. Cannot exclude metastasis disease within   the kidneys.    < end of copied text >  < from: CT Chest w/ IV Cont (06.26.18 @ 15:19) >  o pulmonary embolus.    Since November 24, 2015:    1.  There is a new large left upper lobe mass measuring 6.8 x 5.7 x 7 cm   extending to mediastinum with multiple mediastinal lymph nodes as   described above. Additionally, there are multiple new hypodense hepatic   metastatic lesions.  2.  4 mm left upper lobe pulmonary nodule, metastatic etiology.    < end of copied text >  < from: NM SPECT Bone Scan (06.27.18 @ 21:52) >  Impression:  Multiple definite bony abnormalities which by pattern of distribution are   consistent with metastatic bone disease.    These include bilateral pubic inferior rami, left superior pubic ramus,   bilateral iliac bones and left 4th costovertebral junction.    Abnormal uptake corresponding to left lung index tumor.    < end of copied text >    PHYSICAL EXAM:  GEN: No acute distress, sitting in bed comfortably.   LUNGS: Clear to auscultation bilaterally   HEART: Regular  ABD: Soft, non-tender, non-distended.  EXT: DP 2+ bilaterally.   NEURO: AAOX3  1. LUNG MASS COUGH, likely metastatic disease.   -biopsy suggestive of SCC, not enough tissue, will repeat biopsy of liver by IR on monday.   -brain MRI positivew for 1 focal nodule concerning for brain metastasis. 5mm in diameter in R occipital lobe, near dural space. Repeat scan with changes in mental status.   -f/u heme onc note for staging/ recs. Likely discharge tomorrow for f/u as outpatient.   -CT abd pelvis findings as above.   -bone scan shows multiple bony abnormalities consistent with metastatic disease.   -f/u heme onc note   2.anxiety/depression  -continue lexapro and wellbutrin  3. cough  -c/w robutussin PRN  4. pain  20 mg q 12 oxycontin for breakthrough pain.   3. DVT ppx  -lovenox 40 mg  OOB  4. disposition  Home, oncology F/u as OP SUBJECTIVE:    Patient is a 43y old Female who presents with a chief complaint of Dry cough for 3 months and right thigh pain (27 Jun 2018 01:01)    Currently admitted to medicine with the primary diagnosis of Lung mass     Today is hospital day 3d. This morning she notes continued pain in her R gluteal region that is not controlled with Oxycontin 20 mg q12 and oxycodone 5 mg q3. Patient is eager to leave hospital and return to work. Cough is stable. Patient denies fever, chills, dyspnea.     PAST MEDICAL & SURGICAL HISTORY  Anxiety and depression  History of cholecystectomy    SOCIAL HISTORY:  Negative for smoking/alcohol/drug use.     ALLERGIES:  dust (Sneezing; Rhinorrhea)  Erythromycin Base (Other)    MEDICATIONS:  STANDING MEDICATIONS  ALBUTerol    0.083%. 2.5 milliGRAM(s) Nebulizer once  buPROPion XL . 300 milliGRAM(s) Oral at bedtime  docusate sodium 100 milliGRAM(s) Oral three times a day  enoxaparin Injectable 40 milliGRAM(s) SubCutaneous every 24 hours  escitalopram 40 milliGRAM(s) Oral at bedtime  naproxen 500 milliGRAM(s) Oral two times a day  oxyCODONE  ER Tablet 20 milliGRAM(s) Oral every 12 hours  pantoprazole    Tablet 40 milliGRAM(s) Oral before breakfast    PRN MEDICATIONS  clonazePAM Tablet 0.25 milliGRAM(s) Oral three times a day PRN  guaiFENesin    Syrup 200 milliGRAM(s) Oral every 6 hours PRN  oxyCODONE    IR 5 milliGRAM(s) Oral every 3 hours PRN    VITALS:   T(F): 97  HR: 87  BP: 124/73  RR: 18  SpO2: 97%    LABS:                        11.8   8.45  )-----------( 364      ( 29 Jun 2018 06:25 )             38.0     06-29    135  |  91<L>  |  11  ----------------------------<  83  4.5   |  26  |  0.7    Ca    10.0      29 Jun 2018 13:48  Phos  5.0     06-29      PT/INR - ( 28 Jun 2018 07:13 )   PT: 11.60 sec;   INR: 1.07 ratio         PTT - ( 28 Jun 2018 07:13 )  PTT:37.9 sec              RADIOLOGY:  < from: MR Head w/ IV Cont (06.27.18 @ 18:25) >  Single enhancing nodule/lesion within a sulcus adjacent to the right   occipital lobe measuring 4 x 4 x 5 mm. Findings highly suspicious for   metastatic disease. Follow-up to 6 most may be helpful for further   evaluation if clinically indicated.      < end of copied text >  < from: NM SPECT Bone Scan (06.27.18 @ 21:52) >  Multiple definite bony abnormalities which by pattern of distribution are   consistent with metastatic bone disease.    These include bilateral pubic inferior rami, left superior pubic ramus,   bilateral iliac bones and left 4th costovertebral junction.    Abnormal uptake corresponding to left lung index tumor.    < end of copied text >  < from: CT Abdomen and Pelvis w/ Oral Cont and w/ IV Cont (06.27.18 @ 17:06) >    Multifocal liver masses consistent with metastatic disease.    Ill-defined 0.7 cm splenic hypodensity suspicious for a metastatic focus.    Multifocal bilateral renal hypoattenuating lesions measuring up to 1.1 cm   in the right interpolar region. Cannot exclude metastasis disease within   the kidneys.    < end of copied text >  < from: CT Chest w/ IV Cont (06.26.18 @ 15:19) >  o pulmonary embolus.    Since November 24, 2015:    1.  There is a new large left upper lobe mass measuring 6.8 x 5.7 x 7 cm   extending to mediastinum with multiple mediastinal lymph nodes as   described above. Additionally, there are multiple new hypodense hepatic   metastatic lesions.  2.  4 mm left upper lobe pulmonary nodule, metastatic etiology.    < end of copied text >  < from: NM SPECT Bone Scan (06.27.18 @ 21:52) >  Impression:  Multiple definite bony abnormalities which by pattern of distribution are   consistent with metastatic bone disease.    These include bilateral pubic inferior rami, left superior pubic ramus,   bilateral iliac bones and left 4th costovertebral junction.    Abnormal uptake corresponding to left lung index tumor.    < end of copied text >    PHYSICAL EXAM:  GEN: No acute distress, sitting in bed comfortably.   LUNGS: Clear to auscultation bilaterally   HEART: Regular  ABD: Soft, non-tender, non-distended.  EXT: DP 2+ bilaterally.   NEURO: AAOX3  44 yo female with multiple lesions likely secondary to metastatic lung cancer.   1. LUNG MASS COUGH, likely metastatic disease.   -biopsy suggestive of SCC, not enough tissue, will repeat biopsy of liver by IR on monday.   -brain MRI positive w for 1 focal nodule concerning for brain metastasis. 5mm in diameter in R occipital lobe, near dural space. Repeat scan with changes in mental status.   -lesions in lung, liver kidney, as above.   -bone scan shows multiple bony abnormalities consistent with metastatic disease.   -cough is stable, continue with robitussin and opiates  -follow up with heme onc for treatment as outpatient. Rad onc, and neurosurgery can be consulted once biopsy returns, as outpatient.    2.anxiety/depression  -continue lexapro wellbutrin and klonipin  3. Pain  -continue with oxycontin, oxycodone, and naprosyn  -c/w senna, colace  4. DVT ppx  -lovenox 40 mg  OOB  5. Home, oncology F/u as OP

## 2018-06-29 NOTE — PROGRESS NOTE ADULT - SUBJECTIVE AND OBJECTIVE BOX
REMEDIOS DENTON  43y  Female  ***My note supercedes ALL resident notes that I sign.  My corrections for their notes are in my note.***    I can be reached directly on [employer] [phone]. My office number is [phone]. My personal cell number is [phone].    INTERVAL EVENTS: Pt doing better today. In much better spirits, despite knowing her dx and extent of dz. Fiance in room and providing excellent support. Pt had no needs, except a cane on d/c.    T(F): 97 (06-29-18 @ 13:42), Max: 98.7 (06-29-18 @ 05:12)  HR: 87 (06-29-18 @ 13:42) (85 - 87)  BP: 124/73 (06-29-18 @ 13:42) (117/78 - 153/81)  RR: 18 (06-29-18 @ 13:42) (18 - 19)  SpO2: 97% (06-29-18 @ 12:56) (97% - 97%)    PHYSICAL EXAM:  GENERAL: no acute resp distress; no pain; less anxious  HEENT:  EOMI, PERRLA, conjunctiva and sclera clear  Neck: no nodes; No JVD  PULMONARY: Clear to auscultation bilaterally; No wheeze  CARDIOVASCULAR: Regular rate and rhythm; No murmurs, rubs, or gallops  GASTROINTESTINAL: Soft, Nontender, Nondistended; Bowel sounds present  MUSCULOSKELETAL:  No clubbing, cyanosis, or edema  NEUROLOGY: non-focal    LABS:                        11.8    (    81.2   8.45  )-----------( ---------      364      ( 29 Jun 2018 06:25 )             38.0    (    13.5     135   (   91   (   83      06-29-18 @ 13:48  ----------------------               4.5   (   26   (   11                             -----                        0.7  Ca  10.0   Mg  --    P   --   --   (   --   (   --      06-29-18 @ 09:43  ----------------------               --   (   --   (   --                             -----                        --  Ca  --   Mg  --    P   5.0     134   (   90   (   95      06-29-18 @ 06:25  ----------------------               5.5   (   28   (   13                             -----                        0.9  Ca  9.8   Mg  --    P   --     LFT  7.2  (  0.2  (  15       06-27-18 @ 07:30  -------------------------  4.0  (  159  (  14  LFT  7.2  (  <0.2  (  16       06-26-18 @ 14:22  -------------------------  3.9  (  155  (  14    PT/INR - ( 28 Jun 2018 07:13 )   PT: 11.60 sec;   INR: 1.07 ratio    PTT - ( 28 Jun 2018 07:13 )  PTT:37.9 sec    Prelim Path - Sq Cell of lung, but need more tissue    RADIOLOGY & ADDITIONAL TESTS:      MEDICATIONS:    ALBUTerol    0.083%. 2.5 milliGRAM(s) Nebulizer once  buPROPion XL . 300 milliGRAM(s) Oral at bedtime  clonazePAM Tablet 0.25 milliGRAM(s) Oral three times a day PRN  enoxaparin Injectable 40 milliGRAM(s) SubCutaneous every 24 hours  escitalopram 40 milliGRAM(s) Oral at bedtime  guaiFENesin    Syrup 200 milliGRAM(s) Oral every 6 hours PRN  naproxen 500 milliGRAM(s) Oral two times a day  oxyCODONE    IR 5 milliGRAM(s) Oral every 3 hours PRN  oxyCODONE  ER Tablet 20 milliGRAM(s) Oral every 12 hours  pantoprazole    Tablet 40 milliGRAM(s) Oral before breakfast

## 2018-06-29 NOTE — PROGRESS NOTE ADULT - SUBJECTIVE AND OBJECTIVE BOX
Interventional Radiology Follow- Up Note    43y Female s/p CT guided liver biopsy on 6/28 in IR w/Dr. Bentley        S - Having incisional pain at biopsy site, improved with oxycodone. No other abdominal complaints. Anxious to have biopsy results.    Vitals: T(F): 98.7 (06-29-18 @ 05:12), Max: 98.7 (06-29-18 @ 05:12)  HR: 85 (06-29-18 @ 05:12) (85 - 87)  BP: 117/78 (06-29-18 @ 05:12) (117/78 - 119/76)  RR: 19 (06-29-18 @ 05:12) (19 - 19)  SpO2: --  Wt(kg): --    PHYSICAL EXAM:  General: sleeping, easily arousable, NAD  Abd: no peritonitis, non distended. Appropriately tender at biopsy site  Skin: biopsy dressing dry    A/P - 42 y/o F s/p CT guided liver biopsy POD 1    1. Liver mass - Oxycodone IR PRN. Patient aware surgical pathology may take up to 5 business days for final results. Patient may shower, can discard dressing at any time.     Please call Interventional Radiology [phone] with any questions, concerns, or issues regarding above. Interventional Radiology Follow- Up Note    43y Female s/p CT guided liver biopsy on 6/28 in IR w/Dr. Bentley        S - Having incisional pain at biopsy site, improved with oxycodone. No other abdominal complaints. Anxious to have biopsy results.    Vitals: T(F): 98.7 (06-29-18 @ 05:12), Max: 98.7 (06-29-18 @ 05:12)  HR: 85 (06-29-18 @ 05:12) (85 - 87)  BP: 117/78 (06-29-18 @ 05:12) (117/78 - 119/76)  RR: 19 (06-29-18 @ 05:12) (19 - 19)    PHYSICAL EXAM:  General: sleeping, easily arousable, NAD  Abd: no peritonitis, non distended. Appropriately tender at biopsy site  Skin: biopsy dressing dry    A/P - 44 y/o F s/p CT guided liver biopsy POD 1    1. Liver mass - Oxycodone IR PRN. Patient aware surgical pathology may take up to 5 business days for final results. Patient may shower, can discard dressing at any time.     Please call Interventional Radiology [phone] with any questions, concerns, or issues regarding above. Interventional Radiology Follow- Up Note    43y Female s/p CT guided liver biopsy on 6/28 in IR w/Dr. Bentley        S - Having incisional pain at biopsy site, improved with oxycodone. No other abdominal complaints. Anxious to have biopsy results.    Vitals: T(F): 98.7 (06-29-18 @ 05:12), Max: 98.7 (06-29-18 @ 05:12)  HR: 85 (06-29-18 @ 05:12) (85 - 87)  BP: 117/78 (06-29-18 @ 05:12) (117/78 - 119/76)  RR: 19 (06-29-18 @ 05:12) (19 - 19)    PHYSICAL EXAM:  General: sleeping, easily arousable, NAD  Abd: no peritonitis, non distended. Appropriately tender at biopsy site  Skin: biopsy dressing dry    A/P - 42 y/o F s/p CT guided liver biopsy POD 1    1. Liver mass - non-narcotic pain medsPRN. Patient aware surgical pathology may take up to 5 business days for final results. Patient may shower, can discard dressing after two days     Please call Interventional Radiology [phone] with any questions, concerns, or issues regarding above.

## 2018-06-29 NOTE — PROVIDER CONTACT NOTE (OTHER) - SITUATION
pt stated does not feel well, stated "feel very hot", visible sweat note. VS assessed, dr made aware, dr at bedside assessing pt. will continue to monitor

## 2018-06-29 NOTE — PROGRESS NOTE ADULT - ASSESSMENT
# new 7cm left lung mass w/ mets to lung, mediastinum, liver, spleen, poss b/l kidney (these could be cysts), bone and prob brain (5mm rt occ lobe)  former smoker, 25 pack-yrs; quit 3 yrs ago  cough - seems stable: robitussin and opiates  prelim path is SCC - Ca++ is upper limit of nl w/ nl albumin  need more tissue - IR liver bx #2 set up for Mon  will need h/o outpt f/u for tx  decadron and keppra NOT needed for small brain focus  pt can be referred to rad/onc and NSx as outpt once bx confirms dx    # neopl related pain  oxycontin 20mg q12  oxycodone 5mg IR po q3 prn pain  naprosyn 500mg q12 + PPI for bone mets is working well    # bowel regimen  add colace 100mg q8  senna 1 tab qhs    # no DVT    # Anxiety/depression  Continue Lexapro and Wellbutrin  cont klonopin 0.25mg 3x/day prn    # DVT prophylaxis LMWH

## 2018-06-30 LAB
ALBUMIN SERPL ELPH-MCNC: 4.1 G/DL — SIGNIFICANT CHANGE UP (ref 3.5–5.2)
ALP SERPL-CCNC: 182 U/L — HIGH (ref 30–115)
ALT FLD-CCNC: 22 U/L — SIGNIFICANT CHANGE UP (ref 0–41)
ANION GAP SERPL CALC-SCNC: 12 MMOL/L — SIGNIFICANT CHANGE UP (ref 7–14)
AST SERPL-CCNC: 20 U/L — SIGNIFICANT CHANGE UP (ref 0–41)
BILIRUB SERPL-MCNC: <0.2 MG/DL — SIGNIFICANT CHANGE UP (ref 0.2–1.2)
BUN SERPL-MCNC: 14 MG/DL — SIGNIFICANT CHANGE UP (ref 10–20)
CALCIUM SERPL-MCNC: 9.1 MG/DL — SIGNIFICANT CHANGE UP (ref 8.5–10.1)
CHLORIDE SERPL-SCNC: 94 MMOL/L — LOW (ref 98–110)
CO2 SERPL-SCNC: 32 MMOL/L — SIGNIFICANT CHANGE UP (ref 17–32)
CREAT SERPL-MCNC: 0.7 MG/DL — SIGNIFICANT CHANGE UP (ref 0.7–1.5)
GLUCOSE SERPL-MCNC: 89 MG/DL — SIGNIFICANT CHANGE UP (ref 70–99)
POTASSIUM SERPL-MCNC: 4.9 MMOL/L — SIGNIFICANT CHANGE UP (ref 3.5–5)
POTASSIUM SERPL-SCNC: 4.9 MMOL/L — SIGNIFICANT CHANGE UP (ref 3.5–5)
PROT SERPL-MCNC: 7.1 G/DL — SIGNIFICANT CHANGE UP (ref 6–8)
SODIUM SERPL-SCNC: 138 MMOL/L — SIGNIFICANT CHANGE UP (ref 135–146)

## 2018-06-30 RX ADMIN — ESCITALOPRAM OXALATE 40 MILLIGRAM(S): 10 TABLET, FILM COATED ORAL at 21:32

## 2018-06-30 RX ADMIN — OXYCODONE HYDROCHLORIDE 20 MILLIGRAM(S): 5 TABLET ORAL at 17:49

## 2018-06-30 RX ADMIN — Medication 100 MILLIGRAM(S): at 06:17

## 2018-06-30 RX ADMIN — OXYCODONE HYDROCHLORIDE 20 MILLIGRAM(S): 5 TABLET ORAL at 06:54

## 2018-06-30 RX ADMIN — BUPROPION HYDROCHLORIDE 300 MILLIGRAM(S): 150 TABLET, EXTENDED RELEASE ORAL at 21:32

## 2018-06-30 RX ADMIN — Medication 500 MILLIGRAM(S): at 17:49

## 2018-06-30 RX ADMIN — Medication 500 MILLIGRAM(S): at 06:24

## 2018-06-30 RX ADMIN — OXYCODONE HYDROCHLORIDE 5 MILLIGRAM(S): 5 TABLET ORAL at 13:48

## 2018-06-30 RX ADMIN — Medication 500 MILLIGRAM(S): at 06:54

## 2018-06-30 RX ADMIN — OXYCODONE HYDROCHLORIDE 5 MILLIGRAM(S): 5 TABLET ORAL at 04:00

## 2018-06-30 RX ADMIN — OXYCODONE HYDROCHLORIDE 5 MILLIGRAM(S): 5 TABLET ORAL at 12:59

## 2018-06-30 RX ADMIN — OXYCODONE HYDROCHLORIDE 5 MILLIGRAM(S): 5 TABLET ORAL at 04:30

## 2018-06-30 RX ADMIN — PANTOPRAZOLE SODIUM 40 MILLIGRAM(S): 20 TABLET, DELAYED RELEASE ORAL at 06:18

## 2018-06-30 RX ADMIN — OXYCODONE HYDROCHLORIDE 20 MILLIGRAM(S): 5 TABLET ORAL at 17:19

## 2018-06-30 RX ADMIN — ENOXAPARIN SODIUM 40 MILLIGRAM(S): 100 INJECTION SUBCUTANEOUS at 11:18

## 2018-06-30 RX ADMIN — OXYCODONE HYDROCHLORIDE 20 MILLIGRAM(S): 5 TABLET ORAL at 06:24

## 2018-06-30 RX ADMIN — Medication 500 MILLIGRAM(S): at 17:19

## 2018-06-30 NOTE — PROGRESS NOTE ADULT - SUBJECTIVE AND OBJECTIVE BOX
REMEDIOS DENTON  43y  Female  ***My note supercedes ALL resident notes that I sign.  My corrections for their notes are in my note.***    I can be reached directly on [employer] [phone]. My office number is [phone]. My personal cell number is [phone].    INTERVAL EVENTS: Pt wanted to fill out HCP to make Fiance her HCP - RN can help. Pt wants to talk w/ soc wkr re disability. Pt had no medical issues. Feels well.    T(F): 96.5 (06-30-18 @ 04:49), Max: 97.4 (06-29-18 @ 19:56)  HR: 88 (06-30-18 @ 04:49) (80 - 88)  BP: 117/71 (06-30-18 @ 04:49) (112/65 - 153/81)  RR: 18 (06-30-18 @ 04:49) (16 - 18)  SpO2: 97% (06-29-18 @ 12:56) (97% - 97%)    PHYSICAL EXAM:  GENERAL: no acute resp distress; no pain; less anxious  HEENT: EOMI, PERRLA, conjunctiva and sclera clear  Neck: no nodes; No JVD  PULMONARY: Clear to auscultation bilaterally; No wheeze  CARDIOVASCULAR: Regular rate and rhythm; No murmurs, rubs, or gallops  GASTROINTESTINAL: Soft, Nontender, Nondistended; Bowel sounds present  MUSCULOSKELETAL:  No clubbing, cyanosis, or edema  NEUROLOGY: non-focal    LABS:                        11.8    (    81.2   8.45  )-----------( ---------      364      ( 29 Jun 2018 06:25 )             38.0    (    13.5     138   (   94   (   89      06-30-18 @ 08:09  ----------------------               4.9   (   32   (   14                             -----                        0.7  Ca  9.1   Mg  --    P   --     138   (   92   (   104      06-29-18 @ 17:54  ----------------------               5.5   (   31   (   15                             -----                        0.7  Ca  10.0   Mg  --    P   --     135   (   91   (   83      06-29-18 @ 13:48  ----------------------               4.5   (   26   (   11                             -----                        0.7  Ca  10.0   Mg  --    P   --     LFT  7.1  (  <0.2  (  20       06-30-18 @ 08:09  -------------------------  4.1  (  182  (  22    RADIOLOGY & ADDITIONAL TESTS:      MEDICATIONS:    ALBUTerol    0.083%. 2.5 milliGRAM(s) Nebulizer once  buPROPion XL . 300 milliGRAM(s) Oral at bedtime  clonazePAM Tablet 0.25 milliGRAM(s) Oral three times a day PRN  docusate sodium 100 milliGRAM(s) Oral three times a day  enoxaparin Injectable 40 milliGRAM(s) SubCutaneous every 24 hours  escitalopram 40 milliGRAM(s) Oral at bedtime  guaiFENesin    Syrup 200 milliGRAM(s) Oral every 6 hours PRN  naproxen 500 milliGRAM(s) Oral two times a day  oxyCODONE    IR 5 milliGRAM(s) Oral every 3 hours PRN  oxyCODONE  ER Tablet 20 milliGRAM(s) Oral every 12 hours  pantoprazole    Tablet 40 milliGRAM(s) Oral before breakfast  senna 1 Tablet(s) Oral at bedtime

## 2018-06-30 NOTE — PROGRESS NOTE ADULT - ASSESSMENT
SUBJECTIVE:    Patient is a 43y old Female who presents with a chief complaint of Dry cough for 3 months and right thigh pain (27 Jun 2018 01:01)    Currently admitted to medicine with the primary diagnosis of Lung mass     Today is hospital day 4d. This morning she is resting comfortably in bed and reports no new issues or overnight events.     PAST MEDICAL & SURGICAL HISTORY  Anxiety and depression  History of cholecystectomy    SOCIAL HISTORY:  Negative for smoking/alcohol/drug use.     ALLERGIES:  dust (Sneezing; Rhinorrhea)  Erythromycin Base (Other)    MEDICATIONS:  STANDING MEDICATIONS  ALBUTerol    0.083%. 2.5 milliGRAM(s) Nebulizer once  buPROPion XL . 300 milliGRAM(s) Oral at bedtime  docusate sodium 100 milliGRAM(s) Oral three times a day  enoxaparin Injectable 40 milliGRAM(s) SubCutaneous every 24 hours  escitalopram 40 milliGRAM(s) Oral at bedtime  naproxen 500 milliGRAM(s) Oral two times a day  oxyCODONE  ER Tablet 20 milliGRAM(s) Oral every 12 hours  pantoprazole    Tablet 40 milliGRAM(s) Oral before breakfast  senna 1 Tablet(s) Oral at bedtime    PRN MEDICATIONS  clonazePAM Tablet 0.25 milliGRAM(s) Oral three times a day PRN  guaiFENesin    Syrup 200 milliGRAM(s) Oral every 6 hours PRN  oxyCODONE    IR 5 milliGRAM(s) Oral every 3 hours PRN    VITALS:   T(F): 96.5  HR: 88  BP: 117/71  RR: 18  SpO2: 97%    LABS:                        11.8   8.45  )-----------( 364      ( 29 Jun 2018 06:25 )             38.0     06-30    138  |  94<L>  |  14  ----------------------------<  89  4.9   |  32  |  0.7    Ca    9.1      30 Jun 2018 08:09  Phos  5.0     06-29    TPro  7.1  /  Alb  4.1  /  TBili  <0.2  /  DBili  x   /  AST  20  /  ALT  22  /  AlkPhos  182<H>  06-30                  RADIOLOGY:    PHYSICAL EXAM:  GEN: No acute distress  LUNGS: Clear to auscultation bilaterally   HEART: Regular  ABD: Soft, non-tender, non-distended.  EXT: NC/NC/NE/2+PP/SCALES/Skin Intact.   NEURO: AAOX3    Intravenous access:   NG tube:   Meyer Catheter:     42 yo female with multiple lesions likely secondary to metastatic lung cancer.   1. LUNG MASS COUGH, likely metastatic disease.   -biopsy suggestive of SCC, not enough tissue, will repeat biopsy of liver by IR on monday.   -brain MRI positive w for 1 focal nodule concerning for brain metastasis. 5mm in diameter in R occipital lobe, near dural space. Repeat scan with changes in mental status.   -lesions in lung, liver kidney, as above.   -bone scan shows multiple bony abnormalities consistent with metastatic disease.   -cough is stable, continue with robitussin and opiates  -follow up with heme onc for treatment as outpatient. Rad onc, and neurosurgery can be consulted once biopsy returns, as outpatient.    2.anxiety/depression  -continue lexapro wellbutrin and klonipin  3. Pain  -continue with oxycontin, oxycodone, and naprosyn  -c/w senna, colace  4. DVT ppx  -lovenox 40 mg  OOB  5. Home, heme/onc F/u as OP, neurosurgery and rad onc as outpatient once biopsy results are back. SUBJECTIVE:    Patient is a 43y old Female who presents with a chief complaint of Dry cough for 3 months and right thigh pain (27 Jun 2018 01:01)    Currently admitted to medicine with the primary diagnosis of Lung mass     Today is hospital day 4d. This morning she is resting comfortably in bed and reports no new issues or overnight events. Patient notes her gluteal pain is controlled with the pain medications. Coughing is improved.     PAST MEDICAL & SURGICAL HISTORY  Anxiety and depression  History of cholecystectomy    SOCIAL HISTORY:  Negative for smoking/alcohol/drug use.     ALLERGIES:  dust (Sneezing; Rhinorrhea)  Erythromycin Base (Other)    MEDICATIONS:  STANDING MEDICATIONS  ALBUTerol    0.083%. 2.5 milliGRAM(s) Nebulizer once  buPROPion XL . 300 milliGRAM(s) Oral at bedtime  docusate sodium 100 milliGRAM(s) Oral three times a day  enoxaparin Injectable 40 milliGRAM(s) SubCutaneous every 24 hours  escitalopram 40 milliGRAM(s) Oral at bedtime  naproxen 500 milliGRAM(s) Oral two times a day  oxyCODONE  ER Tablet 20 milliGRAM(s) Oral every 12 hours  pantoprazole    Tablet 40 milliGRAM(s) Oral before breakfast  senna 1 Tablet(s) Oral at bedtime    PRN MEDICATIONS  clonazePAM Tablet 0.25 milliGRAM(s) Oral three times a day PRN  guaiFENesin    Syrup 200 milliGRAM(s) Oral every 6 hours PRN  oxyCODONE    IR 5 milliGRAM(s) Oral every 3 hours PRN    VITALS:   T(F): 96.5  HR: 88  BP: 117/71  RR: 18  SpO2: 97%    LABS:                        11.8   8.45  )-----------( 364      ( 29 Jun 2018 06:25 )             38.0     06-30    138  |  94<L>  |  14  ----------------------------<  89  4.9   |  32  |  0.7    Ca    9.1      30 Jun 2018 08:09  Phos  5.0     06-29    TPro  7.1  /  Alb  4.1  /  TBili  <0.2  /  DBili  x   /  AST  20  /  ALT  22  /  AlkPhos  182<H>  06-30                  RADIOLOGY:    PHYSICAL EXAM:  GEN: No acute distress, sitting comfortably in bed  LUNGS: Clear to auscultation bilaterally   HEART: Regular  ABD: Soft, non-tender, non-distended.  EXT: DP 2+ bilaterally. Full ROM of legs.    NEURO: AAOX3      44 yo female with multiple lesions likely secondary to metastatic lung cancer.   1. LUNG MASS COUGH, likely metastatic disease.   -biopsy suggestive of SCC, not enough tissue, will repeat biopsy of liver by IR on monday.   -brain MRI positive w for 1 focal nodule concerning for brain metastasis. 5mm in diameter in R occipital lobe, near dural space. Repeat scan with changes in mental status.   -lesions in lung, liver kidney, as above.   -bone scan shows multiple bony abnormalities consistent with metastatic disease.   -cough is stable, continue with robitussin and opiates  -follow up with heme onc for treatment as outpatient. Rad onc, and neurosurgery can be consulted once biopsy returns, as outpatient.    2.anxiety/depression  -continue lexapro wellbutrin and klonipin  3. Pain  -continue with oxycontin, oxycodone, and naprosyn  -c/w senna, colace  4. DVT ppx  -lovenox 40 mg  OOB  5. Home, heme/onc F/u as OP, neurosurgery and rad onc as outpatient once biopsy results are back.

## 2018-06-30 NOTE — PROGRESS NOTE ADULT - ASSESSMENT
# new 7cm left lung mass w/ mets to lung, mediastinum, liver, spleen, poss b/l kidney (these could be cysts), bone and prob brain (5mm rt occ lobe)  former smoker, 25 pack-yrs; quit 3 yrs ago  cough - stable: robitussin and oxycodone prn  prelim path is SCC - Ca++ is upper limit of nl w/ nl albumin  need more tissue - IR liver bx #2 set up for Mon  decadron and keppra NOT needed for small brain focus  pt can be referred to rad/onc and NSx as outpt once bx confirms dx    # neopl related pain  oxycontin 20mg q12  oxycodone 5mg IR po q3 prn pain  naprosyn 500mg q12 + PPI for bone mets is working well    # bowel regimen  cont colace 100mg q8  senna 1 tab qhs    # Anxiety/depression  Continue Lexapro and Wellbutrin  cont klonopin 0.25mg 3x/day prn    # DVT prophylaxis LMWH    Dispo: as per h/o

## 2018-07-01 RX ADMIN — Medication 500 MILLIGRAM(S): at 17:27

## 2018-07-01 RX ADMIN — Medication 100 MILLIGRAM(S): at 13:46

## 2018-07-01 RX ADMIN — Medication 500 MILLIGRAM(S): at 05:58

## 2018-07-01 RX ADMIN — SENNA PLUS 1 TABLET(S): 8.6 TABLET ORAL at 21:31

## 2018-07-01 RX ADMIN — OXYCODONE HYDROCHLORIDE 20 MILLIGRAM(S): 5 TABLET ORAL at 05:27

## 2018-07-01 RX ADMIN — ENOXAPARIN SODIUM 40 MILLIGRAM(S): 100 INJECTION SUBCUTANEOUS at 11:56

## 2018-07-01 RX ADMIN — Medication 500 MILLIGRAM(S): at 05:28

## 2018-07-01 RX ADMIN — Medication 500 MILLIGRAM(S): at 17:57

## 2018-07-01 RX ADMIN — OXYCODONE HYDROCHLORIDE 5 MILLIGRAM(S): 5 TABLET ORAL at 15:55

## 2018-07-01 RX ADMIN — OXYCODONE HYDROCHLORIDE 20 MILLIGRAM(S): 5 TABLET ORAL at 17:57

## 2018-07-01 RX ADMIN — OXYCODONE HYDROCHLORIDE 20 MILLIGRAM(S): 5 TABLET ORAL at 05:57

## 2018-07-01 RX ADMIN — OXYCODONE HYDROCHLORIDE 5 MILLIGRAM(S): 5 TABLET ORAL at 11:56

## 2018-07-01 RX ADMIN — BUPROPION HYDROCHLORIDE 300 MILLIGRAM(S): 150 TABLET, EXTENDED RELEASE ORAL at 21:30

## 2018-07-01 RX ADMIN — PANTOPRAZOLE SODIUM 40 MILLIGRAM(S): 20 TABLET, DELAYED RELEASE ORAL at 05:28

## 2018-07-01 RX ADMIN — OXYCODONE HYDROCHLORIDE 5 MILLIGRAM(S): 5 TABLET ORAL at 01:30

## 2018-07-01 RX ADMIN — OXYCODONE HYDROCHLORIDE 5 MILLIGRAM(S): 5 TABLET ORAL at 15:25

## 2018-07-01 RX ADMIN — OXYCODONE HYDROCHLORIDE 5 MILLIGRAM(S): 5 TABLET ORAL at 12:26

## 2018-07-01 RX ADMIN — Medication 100 MILLIGRAM(S): at 21:30

## 2018-07-01 RX ADMIN — OXYCODONE HYDROCHLORIDE 20 MILLIGRAM(S): 5 TABLET ORAL at 17:27

## 2018-07-01 RX ADMIN — Medication 100 MILLIGRAM(S): at 11:51

## 2018-07-01 RX ADMIN — OXYCODONE HYDROCHLORIDE 5 MILLIGRAM(S): 5 TABLET ORAL at 01:00

## 2018-07-01 RX ADMIN — ESCITALOPRAM OXALATE 40 MILLIGRAM(S): 10 TABLET, FILM COATED ORAL at 21:31

## 2018-07-01 RX ADMIN — OXYCODONE HYDROCHLORIDE 5 MILLIGRAM(S): 5 TABLET ORAL at 22:24

## 2018-07-01 NOTE — PROGRESS NOTE ADULT - ASSESSMENT
# new 7cm left lung mass w/ mets to lung, mediastinum, liver, spleen, poss b/l kidney (these could be cysts), bone and prob brain (5mm rt occ lobe)  former smoker, 25 pack-yrs; quit 3 yrs ago  cough - stable: robitussin and oxycodone prn  prelim path is SCC - Ca++ is upper limit of nl w/ nl albumin  need more tissue - IR liver bx #2 set up for Mon  decadron and keppra NOT needed for small brain focus  pt can be referred to rad/onc and NSx as outpt once bx confirms dx    # neopl related pain - well controlled  oxycontin 20mg q12  oxycodone 5mg IR po q3 prn pain  naprosyn 500mg q12 + PPI for bone mets is working well    # bowel regimen  cont colace 100mg q8  senna 1 tab qhs    # Anxiety/depression  Continue Lexapro and Wellbutrin  cont klonopin 0.25mg 3x/day prn    # DVT prophylaxis LMWH    Dispo: as per h/o

## 2018-07-01 NOTE — PROGRESS NOTE ADULT - SUBJECTIVE AND OBJECTIVE BOX
REMEDIOS DENTON  43y  Female  ***My note supercedes ALL resident notes that I sign.  My corrections for their notes are in my note.***    I can be reached directly on "[employer]. My office number is [phone]. My personal cell number is [phone].    INTERVAL EVENTS: Pt doing well. No complaints.    T(F): 96.3 (07-01-18 @ 05:41), Max: 97.2 (06-30-18 @ 21:16)  HR: 84 (06-30-18 @ 21:16) (84 - 84)  BP: 112/71 (07-01-18 @ 05:41) (112/71 - 120/72)  RR: 18 (07-01-18 @ 05:41) (18 - 18)  SpO2: --    PHYSICAL EXAM:  GENERAL: no acute resp distress; no pain; less anxious  HEENT: EOMI, PERRLA, conjunctiva and sclera clear  Neck: no nodes; No JVD  PULMONARY: Clear to auscultation bilaterally; No wheeze  CARDIOVASCULAR: Regular rate and rhythm; No murmurs, rubs, or gallops  GASTROINTESTINAL: Soft, Nontender, Nondistended; Bowel sounds present  MUSCULOSKELETAL:  No clubbing, cyanosis, or edema  NEUROLOGY: non-focal    LABS:    RADIOLOGY & ADDITIONAL TESTS:      MEDICATIONS:    ALBUTerol    0.083%. 2.5 milliGRAM(s) Nebulizer once  buPROPion XL . 300 milliGRAM(s) Oral at bedtime  clonazePAM Tablet 0.25 milliGRAM(s) Oral three times a day PRN  docusate sodium 100 milliGRAM(s) Oral three times a day  enoxaparin Injectable 40 milliGRAM(s) SubCutaneous every 24 hours  escitalopram 40 milliGRAM(s) Oral at bedtime  guaiFENesin    Syrup 200 milliGRAM(s) Oral every 6 hours PRN  naproxen 500 milliGRAM(s) Oral two times a day  oxyCODONE    IR 5 milliGRAM(s) Oral every 3 hours PRN  oxyCODONE  ER Tablet 20 milliGRAM(s) Oral every 12 hours  pantoprazole    Tablet 40 milliGRAM(s) Oral before breakfast  senna 1 Tablet(s) Oral at bedtime

## 2018-07-02 ENCOUNTER — RESULT REVIEW (OUTPATIENT)
Age: 43
End: 2018-07-02

## 2018-07-02 LAB
APTT BLD: 37.1 SEC — SIGNIFICANT CHANGE UP (ref 27–39.2)
HCT VFR BLD CALC: 38.8 % — SIGNIFICANT CHANGE UP (ref 37–47)
HGB BLD-MCNC: 12 G/DL — SIGNIFICANT CHANGE UP (ref 12–16)
INR BLD: 1 RATIO — SIGNIFICANT CHANGE UP (ref 0.65–1.3)
MCHC RBC-ENTMCNC: 25.3 PG — LOW (ref 27–31)
MCHC RBC-ENTMCNC: 30.9 G/DL — LOW (ref 32–37)
MCV RBC AUTO: 81.7 FL — SIGNIFICANT CHANGE UP (ref 81–99)
NRBC # BLD: 0 /100 WBCS — SIGNIFICANT CHANGE UP (ref 0–0)
PLATELET # BLD AUTO: 344 K/UL — SIGNIFICANT CHANGE UP (ref 130–400)
PROTHROM AB SERPL-ACNC: 10.8 SEC — SIGNIFICANT CHANGE UP (ref 9.95–12.87)
RBC # BLD: 4.75 M/UL — SIGNIFICANT CHANGE UP (ref 4.2–5.4)
RBC # FLD: 13.6 % — SIGNIFICANT CHANGE UP (ref 11.5–14.5)
WBC # BLD: 7.66 K/UL — SIGNIFICANT CHANGE UP (ref 4.8–10.8)
WBC # FLD AUTO: 7.66 K/UL — SIGNIFICANT CHANGE UP (ref 4.8–10.8)

## 2018-07-02 RX ORDER — SODIUM CHLORIDE 9 MG/ML
1000 INJECTION INTRAMUSCULAR; INTRAVENOUS; SUBCUTANEOUS
Qty: 0 | Refills: 0 | Status: DISCONTINUED | OUTPATIENT
Start: 2018-07-02 | End: 2018-07-02

## 2018-07-02 RX ADMIN — OXYCODONE HYDROCHLORIDE 5 MILLIGRAM(S): 5 TABLET ORAL at 06:00

## 2018-07-02 RX ADMIN — Medication 500 MILLIGRAM(S): at 06:00

## 2018-07-02 RX ADMIN — OXYCODONE HYDROCHLORIDE 5 MILLIGRAM(S): 5 TABLET ORAL at 21:38

## 2018-07-02 RX ADMIN — Medication 500 MILLIGRAM(S): at 05:22

## 2018-07-02 RX ADMIN — OXYCODONE HYDROCHLORIDE 20 MILLIGRAM(S): 5 TABLET ORAL at 05:22

## 2018-07-02 RX ADMIN — OXYCODONE HYDROCHLORIDE 5 MILLIGRAM(S): 5 TABLET ORAL at 16:24

## 2018-07-02 RX ADMIN — OXYCODONE HYDROCHLORIDE 20 MILLIGRAM(S): 5 TABLET ORAL at 17:34

## 2018-07-02 RX ADMIN — Medication 500 MILLIGRAM(S): at 17:34

## 2018-07-02 RX ADMIN — SODIUM CHLORIDE 75 MILLILITER(S): 9 INJECTION INTRAMUSCULAR; INTRAVENOUS; SUBCUTANEOUS at 05:24

## 2018-07-02 RX ADMIN — PANTOPRAZOLE SODIUM 40 MILLIGRAM(S): 20 TABLET, DELAYED RELEASE ORAL at 05:22

## 2018-07-02 RX ADMIN — ESCITALOPRAM OXALATE 40 MILLIGRAM(S): 10 TABLET, FILM COATED ORAL at 21:35

## 2018-07-02 RX ADMIN — BUPROPION HYDROCHLORIDE 300 MILLIGRAM(S): 150 TABLET, EXTENDED RELEASE ORAL at 21:34

## 2018-07-02 RX ADMIN — Medication 100 MILLIGRAM(S): at 21:38

## 2018-07-02 RX ADMIN — OXYCODONE HYDROCHLORIDE 5 MILLIGRAM(S): 5 TABLET ORAL at 22:00

## 2018-07-02 RX ADMIN — OXYCODONE HYDROCHLORIDE 20 MILLIGRAM(S): 5 TABLET ORAL at 06:00

## 2018-07-02 RX ADMIN — OXYCODONE HYDROCHLORIDE 5 MILLIGRAM(S): 5 TABLET ORAL at 15:51

## 2018-07-02 NOTE — PROGRESS NOTE ADULT - SUBJECTIVE AND OBJECTIVE BOX
Patient Age: 43y    Patient Gender:Female    Procedure (including site / side if known): CT guided liver biopsy    Diagnosis / Indication: liver mass    Interventional Radiology Attending Physician: Sanjeev Bentley MD    Patient and Family aware:   [x]Y   [  ]N - consent obtained on 7/1; patient currently NPO. Awaiting final rad/onc schedule for CT    Risks, benefits, and alternatives to treatment discussed. All questions answered with understanding.    Please call [phone] with any further questions.

## 2018-07-02 NOTE — PROGRESS NOTE ADULT - SUBJECTIVE AND OBJECTIVE BOX
INTERVENTIONAL RADIOLOGY BRIEF-OPERATIVE NOTE    Procedure: CT guided biopsy of liver lesion    Pre-Op Diagnosis: liver mass    Post-Op Diagnosis: liver mass    Attending: Sanjeev Bentley MD  Resident: Ben Bruce MD    Anesthesia (type):  [ ] General Anesthesia  [x] Sedation about 8 mL 1% lidocaine, 3 versad, 50 mcg fentanyl  [ ] Spinal Anesthesia  [ ] Local/Regional    Contrast: none    Estimated Blood Loss: <5 mL    Condition:   [ ] Critical  [ ] Serious  [x] Fair   [ ] Good    Findings/Follow up Plan of Care: successful biopsy, injection of 3 mL of GELFOAM slurry into biopsy tract    Specimens Removed: 20g, 15 cm Achieve biopsy x 3     Implants: none    Complications: none    Disposition: return to floor    Please call Interventional Radiology [phone] with any questions, concerns, or issues.

## 2018-07-02 NOTE — PROGRESS NOTE ADULT - ASSESSMENT
SUBJECTIVE:    Patient is a 43y old Female who presents with a chief complaint of Dry cough for 3 months and right thigh pain (27 Jun 2018 01:01)  Currently admitted to medicine with the primary diagnosis of Lung mass   Today is hospital day 6d. This morning she is resting comfortably in bed and reports no new issues or overnight events. Patient is s/p liver biopsy by IR. Biopsy is sufficient, patient tolerated procedure well.     PAST MEDICAL & SURGICAL HISTORY  Anxiety and depression  History of cholecystectomy    SOCIAL HISTORY:  Negative for smoking/alcohol/drug use.     ALLERGIES:  dust (Sneezing; Rhinorrhea)  Erythromycin Base (Other)    MEDICATIONS:  STANDING MEDICATIONS  buPROPion XL . 300 milliGRAM(s) Oral at bedtime  docusate sodium 100 milliGRAM(s) Oral three times a day  enoxaparin Injectable 40 milliGRAM(s) SubCutaneous every 24 hours  escitalopram 40 milliGRAM(s) Oral at bedtime  naproxen 500 milliGRAM(s) Oral two times a day  oxyCODONE  ER Tablet 20 milliGRAM(s) Oral every 12 hours  pantoprazole    Tablet 40 milliGRAM(s) Oral before breakfast  senna 1 Tablet(s) Oral at bedtime    PRN MEDICATIONS  clonazePAM Tablet 0.25 milliGRAM(s) Oral three times a day PRN  guaiFENesin    Syrup 200 milliGRAM(s) Oral every 6 hours PRN  oxyCODONE    IR 5 milliGRAM(s) Oral every 3 hours PRN    VITALS:   T(F): 96.1  HR: 77  BP: 119/61  RR: 18  SpO2: --    LABS:                        12.0   7.66  )-----------( 344      ( 02 Jul 2018 07:36 )             38.8         PT/INR - ( 02 Jul 2018 07:36 )   PT: 10.80 sec;   INR: 1.00 ratio    PTT - ( 02 Jul 2018 07:36 )  PTT:37.1 sec              RADIOLOGY:  s/p CT guided biopsy of liver 7/2/18  PHYSICAL EXAM:  GEN: No acute distress, sitting comfortably in bed  LUNGS: Clear to auscultation bilaterally   HEART: Regular  ABD: Soft, non-tender, non-distended.  EXT: Full ROM of all extremities.   NEURO: AAOX3    42 yo female with multiple lesions likely secondary to metastatic lung cancer.   1. LUNG MASS COUGH, likely metastatic disease.   -s/p repeat CT guided liver biopsy  -f/u biopsy pathology.   -f/u heme onc for chemo plan.   -brain MRI positive w for 1 focal nodule concerning for brain metastasis. 5mm in diameter in R occipital lobe, near dural space. Repeat scan with changes in mental status.   -lesions in lung, liver kidney, as above.   -bone scan shows multiple bony abnormalities consistent with metastatic disease.   -cough is stable, continue with robitussin and opiates  -follow up with heme onc for treatment as outpatient. Rad onc, and neurosurgery can be consulted once biopsy returns, as outpatient.    2.anxiety/depression  -continue lexapro wellbutrin and klonipin  3. Pain  -continue with oxycontin, oxycodone, and naprosyn  -c/w senna, colace  4. DVT ppx  -lovenox 40 mg  OOB  5. Home, heme/onc F/u as OP, neurosurgery and rad onc as outpatient once biopsy results are back.

## 2018-07-03 LAB — NON-GYNECOLOGICAL CYTOLOGY STUDY: SIGNIFICANT CHANGE UP

## 2018-07-03 RX ADMIN — OXYCODONE HYDROCHLORIDE 5 MILLIGRAM(S): 5 TABLET ORAL at 14:18

## 2018-07-03 RX ADMIN — OXYCODONE HYDROCHLORIDE 20 MILLIGRAM(S): 5 TABLET ORAL at 05:00

## 2018-07-03 RX ADMIN — OXYCODONE HYDROCHLORIDE 5 MILLIGRAM(S): 5 TABLET ORAL at 22:15

## 2018-07-03 RX ADMIN — OXYCODONE HYDROCHLORIDE 5 MILLIGRAM(S): 5 TABLET ORAL at 01:00

## 2018-07-03 RX ADMIN — OXYCODONE HYDROCHLORIDE 5 MILLIGRAM(S): 5 TABLET ORAL at 10:17

## 2018-07-03 RX ADMIN — PANTOPRAZOLE SODIUM 40 MILLIGRAM(S): 20 TABLET, DELAYED RELEASE ORAL at 05:09

## 2018-07-03 RX ADMIN — OXYCODONE HYDROCHLORIDE 5 MILLIGRAM(S): 5 TABLET ORAL at 05:10

## 2018-07-03 RX ADMIN — OXYCODONE HYDROCHLORIDE 20 MILLIGRAM(S): 5 TABLET ORAL at 17:32

## 2018-07-03 RX ADMIN — Medication 500 MILLIGRAM(S): at 05:09

## 2018-07-03 RX ADMIN — Medication 100 MILLIGRAM(S): at 21:39

## 2018-07-03 RX ADMIN — ENOXAPARIN SODIUM 40 MILLIGRAM(S): 100 INJECTION SUBCUTANEOUS at 10:18

## 2018-07-03 RX ADMIN — OXYCODONE HYDROCHLORIDE 5 MILLIGRAM(S): 5 TABLET ORAL at 14:38

## 2018-07-03 RX ADMIN — OXYCODONE HYDROCHLORIDE 20 MILLIGRAM(S): 5 TABLET ORAL at 05:10

## 2018-07-03 RX ADMIN — OXYCODONE HYDROCHLORIDE 5 MILLIGRAM(S): 5 TABLET ORAL at 01:17

## 2018-07-03 RX ADMIN — Medication 500 MILLIGRAM(S): at 18:10

## 2018-07-03 RX ADMIN — BUPROPION HYDROCHLORIDE 300 MILLIGRAM(S): 150 TABLET, EXTENDED RELEASE ORAL at 21:39

## 2018-07-03 RX ADMIN — OXYCODONE HYDROCHLORIDE 5 MILLIGRAM(S): 5 TABLET ORAL at 21:45

## 2018-07-03 RX ADMIN — Medication 500 MILLIGRAM(S): at 05:00

## 2018-07-03 RX ADMIN — Medication 100 MILLIGRAM(S): at 05:09

## 2018-07-03 RX ADMIN — ESCITALOPRAM OXALATE 40 MILLIGRAM(S): 10 TABLET, FILM COATED ORAL at 21:39

## 2018-07-03 RX ADMIN — SENNA PLUS 1 TABLET(S): 8.6 TABLET ORAL at 21:39

## 2018-07-03 RX ADMIN — OXYCODONE HYDROCHLORIDE 20 MILLIGRAM(S): 5 TABLET ORAL at 17:31

## 2018-07-03 RX ADMIN — OXYCODONE HYDROCHLORIDE 5 MILLIGRAM(S): 5 TABLET ORAL at 10:18

## 2018-07-03 RX ADMIN — Medication 100 MILLIGRAM(S): at 14:19

## 2018-07-03 RX ADMIN — OXYCODONE HYDROCHLORIDE 5 MILLIGRAM(S): 5 TABLET ORAL at 05:00

## 2018-07-03 NOTE — PROGRESS NOTE ADULT - SUBJECTIVE AND OBJECTIVE BOX
Patient is a 43y old  Female former smoker  who presents with a chief complaint of Dry cough for 3 months and right thigh pain (27 Jun 2018 01:01) found to have large lung mass with associated adenopathy as well as multiple liver lesions. s/p biopsy of liver lesions last week , suggestive of squamous cell carcinoma. liver biopsy repeated yesterday for better sampling and to run new markers    Subjective:  Patient had a liver biopsy yesterday . doing well no complaints    Vital Signs Last 24 Hrs  T(C): 36.4 (03 Jul 2018 04:52), Max: 36.4 (03 Jul 2018 04:52)  T(F): 97.5 (03 Jul 2018 04:52), Max: 97.5 (03 Jul 2018 04:52)  HR: 77 (03 Jul 2018 04:52) (77 - 86)  BP: 116/58 (03 Jul 2018 04:52) (116/58 - 127/73)  BP(mean): --  RR: 20 (03 Jul 2018 04:52) (18 - 20)  SpO2: --    PHYSICAL EXAM  General: adult in NAD  HEENT: clear oropharynx, anicteric sclera, pink conjunctiva  Neck: supple  CV: normal S1/S2 with no murmur rubs or gallops  Lungs: decreased air entry left side  Abdomen: soft non-tender non-distended, no hepatosplenomegaly. site of biopsy , bruising , no hematoma  Ext: no clubbing cyanosis or edema  Skin: no rashes and no petechiae  Neuro: alert and oriented X 4, no focal deficits    MEDICATIONS  (STANDING):  buPROPion XL . 300 milliGRAM(s) Oral at bedtime  docusate sodium 100 milliGRAM(s) Oral three times a day  enoxaparin Injectable 40 milliGRAM(s) SubCutaneous every 24 hours  escitalopram 40 milliGRAM(s) Oral at bedtime  naproxen 500 milliGRAM(s) Oral two times a day  oxyCODONE  ER Tablet 20 milliGRAM(s) Oral every 12 hours  pantoprazole    Tablet 40 milliGRAM(s) Oral before breakfast  senna 1 Tablet(s) Oral at bedtime    MEDICATIONS  (PRN):  clonazePAM Tablet 0.25 milliGRAM(s) Oral three times a day PRN anxiety  guaiFENesin    Syrup 200 milliGRAM(s) Oral every 6 hours PRN Cough  oxyCODONE    IR 5 milliGRAM(s) Oral every 3 hours PRN Severe Pain (7 - 10)      LABS:                          12.0   7.66  )-----------( 344      ( 02 Jul 2018 07:36 )             38.8         Mean Cell Volume : 81.7 fL  Mean Cell Hemoglobin : 25.3 pg  Mean Cell Hemoglobin Concentration : 30.9 g/dL  Auto Neutrophil # : x  Auto Lymphocyte # : x  Auto Monocyte # : x  Auto Eosinophil # : x  Auto Basophil # : x  Auto Neutrophil % : x  Auto Lymphocyte % : x  Auto Monocyte % : x  Auto Eosinophil % : x  Auto Basophil % : x      Serial CBC's  07-02 @ 07:36  Hct-38.8 / Hgb-12.0 / Plat-344 / RBC-4.75 / WBC-7.66      PT/INR - ( 02 Jul 2018 07:36 )   PT: 10.80 sec;   INR: 1.00 ratio         PTT - ( 02 Jul 2018 07:36 )  PTT:37.1 sec      RADIOLOGY & ADDITIONAL STUDIES:  < from: NM SPECT Bone Scan (06.27.18 @ 21:52) >  Impression:  Multiple definite bony abnormalities which by pattern of distribution are   consistent with metastatic bone disease.    These include bilateral pubic inferior rami, left superior pubic ramus,   bilateral iliac bones and left 4th costovertebral junction.    Abnormal uptake corresponding to left lung index tumor.      < from: [facility] Duplex Lower Ext Vein Scan, Bilat (06.27.18 @ 19:01) >  Impression:    No evidence of deep venous thrombosis or superficial thrombophlebitis in   the bilateral lower extremities.    ICD-10: M79.89      < from: MR Head w/ IV Cont (06.27.18 @ 18:25) >  IMPRESSION:     Single enhancing nodule/lesion within a sulcus adjacent to the right   occipital lobe measuring 4 x 4 x 5 mm. Findings highly suspicious for   metastatic disease. Follow-up to 6 most may be helpful for further   evaluation if clinically indicated.    < from: CT Abdomen and Pelvis w/ Oral Cont and w/ IV Cont (06.27.18 @ 17:06) >    IMPRESSION:     Multifocal liver masses consistent with metastatic disease.    Ill-defined 0.7 cm splenic hypodensity suspicious for a metastatic focus.    Multifocal bilateral renal hypoattenuating lesions measuring up to 1.1 cm   in the right interpolar region. Cannot exclude metastasis disease within   the kidneys.    < from: CT Chest w/ IV Cont (06.26.18 @ 15:19) >  IMPRESSION:     No pulmonary embolus.    Since November 24, 2015:    1.  There is a new large left upper lobe mass measuring 6.8 x 5.7 x 7 cm   extending to mediastinum with multiple mediastinal lymph nodes as   described above. Additionally, there are multiple new hypodense hepatic   metastatic lesions.  2.  4 mm left upper lobe pulmonary nodule, metastatic etiology.    < end of copied text >

## 2018-07-03 NOTE — DIETITIAN INITIAL EVALUATION ADULT. - ENERGY NEEDS
calorie 1623-1785kcal (MSJ x 1-1.1 AF) for obesity  protein 55-65g (1-1.2g/kg IBW) for obesity  fluid 1ml/kcal

## 2018-07-03 NOTE — PROGRESS NOTE ADULT - SUBJECTIVE AND OBJECTIVE BOX
Interventional Radiology Follow- Up Note    43y Female s/p CT guided liver biopsy on 6/28 and 7/2 in Interventional Radiology with Dr Bentley       S - States pain is very minimal.     Vitals: T(F): 97.5 (07-03-18 @ 04:52), Max: 97.5 (07-03-18 @ 04:52)  HR: 77 (07-03-18 @ 04:52) (77 - 86)  BP: 116/58 (07-03-18 @ 04:52) (116/58 - 127/73)  RR: 20 (07-03-18 @ 04:52) (18 - 20)    Culture:   output :    PHYSICAL EXAM:  General: Nontoxic, in NAD  Abd: soft, NT  Skin: minimal drainage to dressing    A/P - 42 y/o F s/p s/p CT guided liver biopsy on 6/28 and 7/2 in Interventional Radiology with Dr Bentley       1. S/p Liver biopsy - verified with pathology and heme/onc fellow on 7/2 regarding samples. F/up pathology and immunohistochemistry. Okay to shower.     Please call Interventional Radiology [phone] with any questions, concerns, or issues regarding above.

## 2018-07-03 NOTE — DIETITIAN INITIAL EVALUATION ADULT. - OTHER INFO
Initial assessment for LOS p/w: Dry cough for 3 months and right thigh pain. Lung mass: multiple lesions likely secondary to metastatic lung cancer, -s/p repeat CT guided liver biopsy, brain MRI positive w for 1 focal nodule concerning for brain metastasis, -lesions in lung, liver, kidney Heme/onco follow up.

## 2018-07-03 NOTE — CONSULT NOTE ADULT - SUBJECTIVE AND OBJECTIVE BOX
INTERVENTIONAL RADIOLOGY CONSULT:     Procedure Requested: port-a-cath placement     HPI:  43 y.o female patient with PMH of anxiety and depression presents to the ED for a 3 months history of dry cough    History goes back to March 2018 when, after getting the flu, patient started complaining of a dry cough that persisted. She presented to the ED at the [facility] today for right thigh pain that started 2 days ago. She reports that the pain started while she was coughing and wasn't able to walk. Pain has improved as well as ambulation.  ROS is positive for chest pain that occurs with the cough.  She denies fever, chills, abdominal pain, N/V, diarrhea, weight loss, urinary symptoms, melena, BRBPR or any other significant symptoms.    In the ED, she was found to have a new left lung mass and was admitted for work-up    Of note, patient hasn't followed-up with her primary care physician for a year after losing her insurance (27 Jun 2018 01:01)      ASSESSMENT/ PLAN:   - consulted for port-a-cath placement for possible SCC with metastases   - patient well known to IR department - liver biopsy x2 done in IR on 6/28 and 7/2  - port placement not advised as inpatient due to increased risk of infection  - recommend patient schedule procedure as an outpatient to minimize risk   - will give patient IR information for scheduling and follow up   - aware of patients lack of insurance coverage - attending has agreed proceed with port placement      Thank you for the courtesy of this consult, please call [phone] with any further questions. INTERVENTIONAL RADIOLOGY CONSULT:     Procedure Requested: port-a-cath placement     HPI:  43 y.o female patient with PMH of anxiety and depression presents to the ED for a 3 months history of dry cough    History goes back to March 2018 when, after getting the flu, patient started complaining of a dry cough that persisted. She presented to the ED at the [facility] today for right thigh pain that started 2 days ago. She reports that the pain started while she was coughing and wasn't able to walk. Pain has improved as well as ambulation.  ROS is positive for chest pain that occurs with the cough.  She denies fever, chills, abdominal pain, N/V, diarrhea, weight loss, urinary symptoms, melena, BRBPR or any other significant symptoms.    In the ED, she was found to have a new left lung mass and was admitted for work-up    Of note, patient hasn't followed-up with her primary care physician for a year after losing her insurance (27 Jun 2018 01:01)      ASSESSMENT/ PLAN:   - consulted for port-a-cath placement for possible SCC with metastases   - patient well known to IR department - liver biopsy x2 done in IR on 6/28 and 7/2  - port placement not advised as inpatient due to increased risk of infection  - recommend patient schedule procedure as an outpatient to minimize risk   - will give patient IR information for scheduling and follow up   - aware of patients lack of insurance coverage - attending has agreed proceed with port placement      Thank you for the courtesy of this consult, please call [phone] with any further questions.       Agree with above. Discussed case with Patience Eric and Mc. Patient's port-a-cath can be done as an outpatient as a val case.     Ben Bruce MD

## 2018-07-03 NOTE — PROGRESS NOTE ADULT - ASSESSMENT
SUBJECTIVE:    Patient is a 43y old Female who presents with a chief complaint of Dry cough for 3 months and right thigh pain (27 Jun 2018 01:01)    Currently admitted to medicine with the primary diagnosis of Lung mass     Today is hospital day 7d. This morning she is resting comfortably in bed and reports no new issues or overnight events. Patient noted increased gluteal pain this morning, however this pain has improved.     PAST MEDICAL & SURGICAL HISTORY  Anxiety and depression  History of cholecystectomy    SOCIAL HISTORY:  Negative for smoking/alcohol/drug use.     ALLERGIES:  dust (Sneezing; Rhinorrhea)  Erythromycin Base (Other)    MEDICATIONS:  STANDING MEDICATIONS  buPROPion XL . 300 milliGRAM(s) Oral at bedtime  docusate sodium 100 milliGRAM(s) Oral three times a day  enoxaparin Injectable 40 milliGRAM(s) SubCutaneous every 24 hours  escitalopram 40 milliGRAM(s) Oral at bedtime  naproxen 500 milliGRAM(s) Oral two times a day  oxyCODONE  ER Tablet 20 milliGRAM(s) Oral every 12 hours  pantoprazole    Tablet 40 milliGRAM(s) Oral before breakfast  senna 1 Tablet(s) Oral at bedtime    PRN MEDICATIONS  clonazePAM Tablet 0.25 milliGRAM(s) Oral three times a day PRN  guaiFENesin    Syrup 200 milliGRAM(s) Oral every 6 hours PRN  oxyCODONE    IR 5 milliGRAM(s) Oral every 3 hours PRN    VITALS:   T(F): 97.8  HR: 64  BP: 137/75  RR: 20  SpO2: --    LABS:                        12.0   7.66  )-----------( 344      ( 02 Jul 2018 07:36 )             38.8           PT/INR - ( 02 Jul 2018 07:36 )   PT: 10.80 sec;   INR: 1.00 ratio         PTT - ( 02 Jul 2018 07:36 )  PTT:37.1 sec      Cytopathology - Non Gyn Report (07.02.18 @ 14:31)    Cytopathology - Non Gyn Report:   ACCESSION No:  [id number]    [facility] REMEDIOS                  1        Cytopathology Report            Final Diagnosis  LIVER MASS, LEFT LOBE, CT-GUIED BIOPSY:  - POSITIVE FOR MALIGNANT CELLS.  - ADENOCARCINOMA.  - MOLECULAR TESTING PENDING.  - ALSO SEE REPORT OF [id number].    Screened by: Cruz Mcintyre M.D.  Verified by: Cruz Mcintyre M.D.  (Electronic Signature)  Reported on: 07/03/18 17:11 EDT, [address], [place] [address]  Cytology technical processing performed at [facility],  [address] Floor, [place] [address]  _________________________________________________________________      Specimen(s) Submitted  1     LIVER MASS, LEFT LOBE            RADIOLOGY:    PHYSICAL EXAM:  GEN: No acute distress, sitting comfortably in bed.   LUNGS: Clear to auscultation bilaterally   HEART: Regular  ABD: Soft, non-tender, non-distended.  EXT: Full range of motion in bilateral upper and lower extremities.   NEURO: AAOX3     42 yo female with multiple lesions likely secondary to metastatic lung cancer.   1. LUNG MASS COUGH, likely metastatic disease.   -s/p repeat CT guided liver biopsy  - pathology shows adenocarcinoma. f/u molecular testing   -patient will have Port-a-cath placed by IR as an inpatient. Dr. Galvan discussed with IR.   -brain MRI positive w for 1 focal nodule concerning for brain metastasis. 5mm in diameter in R occipital lobe, near dural space. Repeat scan with changes in mental status.   -lesions in lung, liver kidney, as above.   -bone scan shows multiple bony abnormalities consistent with metastatic disease.   -cough is stable, continue with robitussin and opiates  -follow up with heme onc for treatment as outpatient. Rad onc, and neurosurgery can be consulted once biopsy returns, as outpatient.    2.anxiety/depression  -continue lexapro wellbutrin and klonipin  3. Pain  -continue with oxycontin, oxycodone, and naprosyn  -c/w senna, colace  4. DVT ppx  -lovenox 40 mg  OOB  5. Home, heme/onc F/u as OP, neurosurgery and rad onc as outpatient once biopsy results are back. SUBJECTIVE:    Patient is a 43y old Female who presents with a chief complaint of Dry cough for 3 months and right thigh pain (27 Jun 2018 01:01)    Currently admitted to medicine with the primary diagnosis of Lung mass     Today is hospital day 7d. This morning she is resting comfortably in bed and reports no new issues or overnight events. Patient noted increased gluteal pain this morning, however this pain has improved.     PAST MEDICAL & SURGICAL HISTORY  Anxiety and depression  History of cholecystectomy    SOCIAL HISTORY:  Negative for smoking/alcohol/drug use.     ALLERGIES:  dust (Sneezing; Rhinorrhea)  Erythromycin Base (Other)    MEDICATIONS:  STANDING MEDICATIONS  buPROPion XL . 300 milliGRAM(s) Oral at bedtime  docusate sodium 100 milliGRAM(s) Oral three times a day  enoxaparin Injectable 40 milliGRAM(s) SubCutaneous every 24 hours  escitalopram 40 milliGRAM(s) Oral at bedtime  naproxen 500 milliGRAM(s) Oral two times a day  oxyCODONE  ER Tablet 20 milliGRAM(s) Oral every 12 hours  pantoprazole    Tablet 40 milliGRAM(s) Oral before breakfast  senna 1 Tablet(s) Oral at bedtime    PRN MEDICATIONS  clonazePAM Tablet 0.25 milliGRAM(s) Oral three times a day PRN  guaiFENesin    Syrup 200 milliGRAM(s) Oral every 6 hours PRN  oxyCODONE    IR 5 milliGRAM(s) Oral every 3 hours PRN    VITALS:   T(F): 97.8  HR: 64  BP: 137/75  RR: 20  SpO2: --    LABS:                        12.0   7.66  )-----------( 344      ( 02 Jul 2018 07:36 )             38.8           PT/INR - ( 02 Jul 2018 07:36 )   PT: 10.80 sec;   INR: 1.00 ratio         PTT - ( 02 Jul 2018 07:36 )  PTT:37.1 sec      Cytopathology - Non Gyn Report (07.02.18 @ 14:31)    Cytopathology - Non Gyn Report:   ACCESSION No:  [id number]    CORRIE REMEDIOS                  1        Cytopathology Report            Final Diagnosis  LIVER MASS, LEFT LOBE, CT-GUIED BIOPSY:  - POSITIVE FOR MALIGNANT CELLS.  - ADENOCARCINOMA.  - MOLECULAR TESTING PENDING.  - ALSO SEE REPORT OF [id number].    Screened by: Cruz Mcintyre M.D.  Verified by: Cruz Mcintyre M.D.  (Electronic Signature)  Reported on: 07/03/18 17:11 EDT, [address], [place] [address]  Cytology technical processing performed at [facility],  [address] Floor, [place] [address]  _________________________________________________________________      Specimen(s) Submitted  1     LIVER MASS, LEFT LOBE            RADIOLOGY:    PHYSICAL EXAM:  GEN: No acute distress, sitting comfortably in bed.   LUNGS: Clear to auscultation bilaterally   HEART: Regular  ABD: Soft, non-tender, non-distended.  EXT: Full range of motion in bilateral upper and lower extremities.   NEURO: AAOX3     42 yo female with multiple lesions likely secondary to metastatic lung cancer.   1. LUNG MASS COUGH, likely metastatic disease.   -s/p repeat CT guided liver biopsy  - pathology shows adenocarcinoma. f/u molecular testing, likely back Thursday.   -patient will have Port-a-cath placed by IR as an inpatient. Dr. Galvan discussed with IR.   -chemo will be started through peripheral IV first, likely Thursday or Friday.   -brain MRI positive w for 1 focal nodule concerning for brain metastasis. 5mm in diameter in R occipital lobe, near dural space. Repeat scan with changes in mental status.   -lesions in lung, liver kidney, as above.   -bone scan shows multiple bony abnormalities consistent with metastatic disease.   -cough is stable, continue with robitussin and opiates  -follow up with heme onc for treatment as outpatient. Rad onc, and neurosurgery can be consulted once biopsy returns, as outpatient.    2.anxiety/depression  -continue lexapro wellbutrin and klonipin  3. Pain  -Patient is asking for cane to help her walk.   -continue with oxycontin, oxycodone, and naprosyn  -c/w senna, colace  4. DVT ppx  -lovenox 40 mg  OOB  5. Home, heme/onc F/u as OP, neurosurgery and rad onc as outpatient once biopsy results are back.

## 2018-07-03 NOTE — PROGRESS NOTE ADULT - ASSESSMENT
43 yr old ex smoker ( 25 PY, quit 3 yrs ago) is here for worsening cough , found to have:     1/ new 7cm left lung mass w/ mets to lung, mediastinum, liver, spleen, poss b/l kidney (these could be cysts), bone and prob brain (5mm rt occ lobe)  - s/p biopsy of liver lesion last week with  prelim pathology : squamous cell carcinoma  - IR liver bx #2 done yesterday , will call pathology to add more tests  - steroids and AED not needed for small brain focus   - Once diagnosis confirmed , will involve radonc and Neurosurgery    2/ Body/bony pain - related to underlying cancer   - well controlled on current regimen of oxycontin 20mg q12, oxycodone 5mg IR po q3 prn pain, naprosyn 500mg q12    - Bowel regimen    3/ Anxiety/depression on lexapro, wellbutrin and klonapin - patient doing well    4/ DVT prophylaxis LMWH    Plan : will call pathology to add more test           Awaiting prelim results for second biopsy

## 2018-07-04 LAB
ANION GAP SERPL CALC-SCNC: 13 MMOL/L — SIGNIFICANT CHANGE UP (ref 7–14)
BUN SERPL-MCNC: 8 MG/DL — LOW (ref 10–20)
CALCIUM SERPL-MCNC: 9.8 MG/DL — SIGNIFICANT CHANGE UP (ref 8.5–10.1)
CHLORIDE SERPL-SCNC: 94 MMOL/L — LOW (ref 98–110)
CO2 SERPL-SCNC: 32 MMOL/L — SIGNIFICANT CHANGE UP (ref 17–32)
CREAT SERPL-MCNC: 0.7 MG/DL — SIGNIFICANT CHANGE UP (ref 0.7–1.5)
GLUCOSE SERPL-MCNC: 96 MG/DL — SIGNIFICANT CHANGE UP (ref 70–99)
POTASSIUM SERPL-MCNC: 5.5 MMOL/L — HIGH (ref 3.5–5)
POTASSIUM SERPL-SCNC: 5.5 MMOL/L — HIGH (ref 3.5–5)
SODIUM SERPL-SCNC: 139 MMOL/L — SIGNIFICANT CHANGE UP (ref 135–146)

## 2018-07-04 RX ORDER — OXYCODONE HYDROCHLORIDE 5 MG/1
30 TABLET ORAL EVERY 12 HOURS
Qty: 0 | Refills: 0 | Status: DISCONTINUED | OUTPATIENT
Start: 2018-07-04 | End: 2018-07-07

## 2018-07-04 RX ORDER — ZOLPIDEM TARTRATE 10 MG/1
5 TABLET ORAL AT BEDTIME
Qty: 0 | Refills: 0 | Status: DISCONTINUED | OUTPATIENT
Start: 2018-07-04 | End: 2018-07-05

## 2018-07-04 RX ADMIN — OXYCODONE HYDROCHLORIDE 30 MILLIGRAM(S): 5 TABLET ORAL at 17:55

## 2018-07-04 RX ADMIN — OXYCODONE HYDROCHLORIDE 5 MILLIGRAM(S): 5 TABLET ORAL at 03:28

## 2018-07-04 RX ADMIN — BUPROPION HYDROCHLORIDE 300 MILLIGRAM(S): 150 TABLET, EXTENDED RELEASE ORAL at 22:35

## 2018-07-04 RX ADMIN — Medication 100 MILLIGRAM(S): at 22:37

## 2018-07-04 RX ADMIN — SENNA PLUS 1 TABLET(S): 8.6 TABLET ORAL at 22:37

## 2018-07-04 RX ADMIN — Medication 100 MILLIGRAM(S): at 06:08

## 2018-07-04 RX ADMIN — OXYCODONE HYDROCHLORIDE 5 MILLIGRAM(S): 5 TABLET ORAL at 11:55

## 2018-07-04 RX ADMIN — Medication 500 MILLIGRAM(S): at 06:08

## 2018-07-04 RX ADMIN — PANTOPRAZOLE SODIUM 40 MILLIGRAM(S): 20 TABLET, DELAYED RELEASE ORAL at 06:08

## 2018-07-04 RX ADMIN — Medication 500 MILLIGRAM(S): at 17:52

## 2018-07-04 RX ADMIN — ZOLPIDEM TARTRATE 5 MILLIGRAM(S): 10 TABLET ORAL at 23:29

## 2018-07-04 RX ADMIN — OXYCODONE HYDROCHLORIDE 20 MILLIGRAM(S): 5 TABLET ORAL at 06:38

## 2018-07-04 RX ADMIN — ESCITALOPRAM OXALATE 40 MILLIGRAM(S): 10 TABLET, FILM COATED ORAL at 22:35

## 2018-07-04 RX ADMIN — OXYCODONE HYDROCHLORIDE 5 MILLIGRAM(S): 5 TABLET ORAL at 06:38

## 2018-07-04 RX ADMIN — OXYCODONE HYDROCHLORIDE 30 MILLIGRAM(S): 5 TABLET ORAL at 18:25

## 2018-07-04 RX ADMIN — Medication 500 MILLIGRAM(S): at 06:38

## 2018-07-04 RX ADMIN — OXYCODONE HYDROCHLORIDE 5 MILLIGRAM(S): 5 TABLET ORAL at 16:55

## 2018-07-04 RX ADMIN — OXYCODONE HYDROCHLORIDE 5 MILLIGRAM(S): 5 TABLET ORAL at 17:25

## 2018-07-04 RX ADMIN — OXYCODONE HYDROCHLORIDE 5 MILLIGRAM(S): 5 TABLET ORAL at 06:23

## 2018-07-04 RX ADMIN — OXYCODONE HYDROCHLORIDE 5 MILLIGRAM(S): 5 TABLET ORAL at 02:58

## 2018-07-04 RX ADMIN — OXYCODONE HYDROCHLORIDE 20 MILLIGRAM(S): 5 TABLET ORAL at 06:08

## 2018-07-04 RX ADMIN — Medication 100 MILLIGRAM(S): at 14:18

## 2018-07-04 RX ADMIN — ENOXAPARIN SODIUM 40 MILLIGRAM(S): 100 INJECTION SUBCUTANEOUS at 11:54

## 2018-07-04 RX ADMIN — Medication 500 MILLIGRAM(S): at 18:22

## 2018-07-04 NOTE — PROGRESS NOTE ADULT - SUBJECTIVE AND OBJECTIVE BOX
Patient is a 43y old  Female former smoker  who presents with a chief complaint of Dry cough for 3 months and right thigh pain (27 Jun 2018 01:01) found to have large lung mass with associated adenopathy as well as multiple liver lesions. s/p biopsy of liver lesions last week , suggestive of squamous cell carcinoma. liver biopsy repeated yesterday for better sampling and to run new markers    Subjective:  Patient seen and examined. No overnight events. pending biopsy results.    Vital Signs Last 24 Hrs  T(C): 36.1 (04 Jul 2018 05:11), Max: 36.6 (03 Jul 2018 13:08)  T(F): 96.9 (04 Jul 2018 05:11), Max: 97.8 (03 Jul 2018 13:08)  HR: 80 (04 Jul 2018 05:11) (64 - 80)  BP: 124/66 (04 Jul 2018 05:11) (124/66 - 137/75)  BP(mean): --  RR: 18 (04 Jul 2018 05:11) (18 - 18)  SpO2: --    PHYSICAL EXAM  General: adult in NAD  HEENT: clear oropharynx, anicteric sclera, pink conjunctiva  Neck: supple  CV: normal S1/S2 with no murmur rubs or gallops  Lungs: decreased air entry left side  Abdomen: soft non-tender non-distended, no hepatosplenomegaly. site of biopsy , bruising , no hematoma  Ext: no clubbing cyanosis or edema  Skin: no rashes and no petechiae  Neuro: alert and oriented X 3, no focal deficits    MEDICATIONS  (STANDING):  buPROPion XL . 300 milliGRAM(s) Oral at bedtime  docusate sodium 100 milliGRAM(s) Oral three times a day  enoxaparin Injectable 40 milliGRAM(s) SubCutaneous every 24 hours  escitalopram 40 milliGRAM(s) Oral at bedtime  naproxen 500 milliGRAM(s) Oral two times a day  oxyCODONE  ER Tablet 20 milliGRAM(s) Oral every 12 hours  pantoprazole    Tablet 40 milliGRAM(s) Oral before breakfast  senna 1 Tablet(s) Oral at bedtime    MEDICATIONS  (PRN):  clonazePAM Tablet 0.25 milliGRAM(s) Oral three times a day PRN anxiety  guaiFENesin    Syrup 200 milliGRAM(s) Oral every 6 hours PRN Cough  oxyCODONE    IR 5 milliGRAM(s) Oral every 3 hours PRN Severe Pain (7 - 10)      LABS:                              12.0   7.66  )-----------( 344      ( 02 Jul 2018 07:36 )             38.8         Mean Cell Volume : 81.7 fL  Mean Cell Hemoglobin : 25.3 pg  Mean Cell Hemoglobin Concentration : 30.9 g/dL  Auto Neutrophil # : x  Auto Lymphocyte # : x  Auto Monocyte # : x  Auto Eosinophil # : x  Auto Basophil # : x  Auto Neutrophil % : x  Auto Lymphocyte % : x  Auto Monocyte % : x  Auto Eosinophil % : x  Auto Basophil % : x      Serial CBC's  07-02 @ 07:36  Hct-38.8 / Hgb-12.0 / Plat-344 / RBC-4.75 / WBC-7.66      PT/INR - ( 02 Jul 2018 07:36 )   PT: 10.80 sec;   INR: 1.00 ratio         PTT - ( 02 Jul 2018 07:36 )  PTT:37.1 sec      RADIOLOGY & ADDITIONAL STUDIES:  < from: NM SPECT Bone Scan (06.27.18 @ 21:52) >  Impression:  Multiple definite bony abnormalities which by pattern of distribution are   consistent with metastatic bone disease.    These include bilateral pubic inferior rami, left superior pubic ramus,   bilateral iliac bones and left 4th costovertebral junction.    Abnormal uptake corresponding to left lung index tumor.      < from: [facility] Duplex Lower Ext Vein Scan, Bilat (06.27.18 @ 19:01) >  Impression:    No evidence of deep venous thrombosis or superficial thrombophlebitis in   the bilateral lower extremities.    ICD-10: M79.89      < from: MR Head w/ IV Cont (06.27.18 @ 18:25) >  IMPRESSION:     Single enhancing nodule/lesion within a sulcus adjacent to the right   occipital lobe measuring 4 x 4 x 5 mm. Findings highly suspicious for   metastatic disease. Follow-up to 6 most may be helpful for further   evaluation if clinically indicated.    < from: CT Abdomen and Pelvis w/ Oral Cont and w/ IV Cont (06.27.18 @ 17:06) >    IMPRESSION:     Multifocal liver masses consistent with metastatic disease.    Ill-defined 0.7 cm splenic hypodensity suspicious for a metastatic focus.    Multifocal bilateral renal hypoattenuating lesions measuring up to 1.1 cm   in the right interpolar region. Cannot exclude metastasis disease within   the kidneys.    < from: CT Chest w/ IV Cont (06.26.18 @ 15:19) >  IMPRESSION:     No pulmonary embolus.    Since November 24, 2015:    1.  There is a new large left upper lobe mass measuring 6.8 x 5.7 x 7 cm   extending to mediastinum with multiple mediastinal lymph nodes as   described above. Additionally, there are multiple new hypodense hepatic   metastatic lesions.  2.  4 mm left upper lobe pulmonary nodule, metastatic etiology.    < end of copied text >

## 2018-07-04 NOTE — PROGRESS NOTE ADULT - ASSESSMENT
SUBJECTIVE:    Patient is a 43y old Female who presents with a chief complaint of Dry cough for 3 months and right thigh pain (27 Jun 2018 01:01)    Currently admitted to medicine with the primary diagnosis of Lung mass     Today is hospital day 8d. This morning she is resting comfortably in bed and reports no new issues or overnight events.     PAST MEDICAL & SURGICAL HISTORY  Anxiety and depression  History of cholecystectomy    SOCIAL HISTORY:  Negative for smoking/alcohol/drug use.     ALLERGIES:  dust (Sneezing; Rhinorrhea)  Erythromycin Base (Other)    MEDICATIONS:  STANDING MEDICATIONS  buPROPion XL . 300 milliGRAM(s) Oral at bedtime  docusate sodium 100 milliGRAM(s) Oral three times a day  enoxaparin Injectable 40 milliGRAM(s) SubCutaneous every 24 hours  escitalopram 40 milliGRAM(s) Oral at bedtime  naproxen 500 milliGRAM(s) Oral two times a day  oxyCODONE  ER Tablet 30 milliGRAM(s) Oral every 12 hours  pantoprazole    Tablet 40 milliGRAM(s) Oral before breakfast  senna 1 Tablet(s) Oral at bedtime    PRN MEDICATIONS  clonazePAM Tablet 0.25 milliGRAM(s) Oral three times a day PRN  guaiFENesin    Syrup 200 milliGRAM(s) Oral every 6 hours PRN  oxyCODONE    IR 5 milliGRAM(s) Oral every 3 hours PRN    VITALS:   T(F): 96.9  HR: 80  BP: 124/66  RR: 18  SpO2: --    LABS:                        12.0   7.66  )-----------( 344      ( 02 Jul 2018 07:36 )             38.8           PT/INR - ( 02 Jul 2018 07:36 )   PT: 10.80 sec;   INR: 1.00 ratio         PTT - ( 02 Jul 2018 07:36 )  PTT:37.1 sec              RADIOLOGY:    PHYSICAL EXAM:  GEN: No acute distress  LUNGS: Clear to auscultation bilaterally   HEART: Regular  ABD: Soft, non-tender, non-distended.  EXT: NC/NC/NE/2+PP/SCALES/Skin Intact.   NEURO: AAOX3    Intravenous access: R arm     44 yo female with multiple lesions likely secondary to metastatic lung cancer.   1. LUNG MASS COUGH, likely metastatic disease.   -s/p repeat CT guided liver biopsy  - pathology shows adenocarcinoma. f/u molecular testing, likely back Thursday.   -patient will have Port-a-cath placed by IR as an inpatient. Dr. Galvan discussed with IR.   -chemo will be started through peripheral IV first, likely Thursday or Friday.   -brain MRI positive w for 1 focal nodule concerning for brain metastasis. 5mm in diameter in R occipital lobe, near dural space. Repeat scan with changes in mental status.   -lesions in lung, liver kidney, as above.   -bone scan shows multiple bony abnormalities consistent with metastatic disease.   -cough is stable, continue with robitussin and opiates  -follow up with heme onc for treatment as outpatient. Rad onc, and neurosurgery can be consulted once biopsy returns, as outpatient.    2.anxiety/depression  -continue lexapro wellbutrin and klonipin  3. Pain  -Patient is asking for cane to help her walk.   -patient is using increased oxycodone, 6, 5 mg tablets in last 24 hrs. Increased oxycontin to 30 mg q 12.   -continue with oxycontin, oxycodone, and naprosyn  -c/w senna, colace  4. DVT ppx  -lovenox 40 mg  OOB  5. Home, heme/onc F/u as OP, neurosurgery and rad onc as outpatient once biopsy results are back. SUBJECTIVE:    Patient is a 43y old Female who presents with a chief complaint of Dry cough for 3 months and right thigh pain (27 Jun 2018 01:01)    Currently admitted to medicine with the primary diagnosis of Lung mass     Today is hospital day 8d. This morning she is resting comfortably in bed and reports no new issues or overnight events.     PAST MEDICAL & SURGICAL HISTORY  Anxiety and depression  History of cholecystectomy    SOCIAL HISTORY:  Negative for smoking/alcohol/drug use.     ALLERGIES:  dust (Sneezing; Rhinorrhea)  Erythromycin Base (Other)    MEDICATIONS:  STANDING MEDICATIONS  buPROPion XL . 300 milliGRAM(s) Oral at bedtime  docusate sodium 100 milliGRAM(s) Oral three times a day  enoxaparin Injectable 40 milliGRAM(s) SubCutaneous every 24 hours  escitalopram 40 milliGRAM(s) Oral at bedtime  naproxen 500 milliGRAM(s) Oral two times a day  oxyCODONE  ER Tablet 30 milliGRAM(s) Oral every 12 hours  pantoprazole    Tablet 40 milliGRAM(s) Oral before breakfast  senna 1 Tablet(s) Oral at bedtime    PRN MEDICATIONS  clonazePAM Tablet 0.25 milliGRAM(s) Oral three times a day PRN  guaiFENesin    Syrup 200 milliGRAM(s) Oral every 6 hours PRN  oxyCODONE    IR 5 milliGRAM(s) Oral every 3 hours PRN    VITALS:   T(F): 96.9  HR: 80  BP: 124/66  RR: 18  SpO2: --    LABS:                        12.0   7.66  )-----------( 344      ( 02 Jul 2018 07:36 )             38.8           PT/INR - ( 02 Jul 2018 07:36 )   PT: 10.80 sec;   INR: 1.00 ratio         PTT - ( 02 Jul 2018 07:36 )  PTT:37.1 sec              RADIOLOGY:    PHYSICAL EXAM:  GEN: No acute distress  LUNGS: Clear to auscultation bilaterally   HEART: Regular  ABD: Soft, non-tender, non-distended.  EXT: NC/NC/NE/2+PP/SCALES/Skin Intact.   NEURO: AAOX3    Intravenous access: R arm     44 yo female with multiple lesions likely secondary to metastatic lung cancer.   1. LUNG MASS COUGH, likely metastatic disease.   -s/p repeat CT guided liver biopsy  - pathology shows adenocarcinoma. f/u molecular testing, likely back Thursday.  -follow Ca and albumin   -patient will have Port-a-cath placed by IR as an inpatient. Dr. Galvan discussed with IR.   -chemo will be started through peripheral IV first, likely Thursday or Friday.   -brain MRI positive w for 1 focal nodule concerning for brain metastasis. 5mm in diameter in R occipital lobe, near dural space. Repeat scan with changes in mental status.   -lesions in lung, liver kidney, as above.   -bone scan shows multiple bony abnormalities consistent with metastatic disease.   -cough is stable, continue with robitussin and opiates  -follow up with heme onc for treatment as outpatient. Rad onc, and neurosurgery can be consulted once biopsy returns, as outpatient.    2.anxiety/depression  -continue lexapro wellbutrin and klonipin  3. Pain  -Patient is asking for cane to help her walk.   -patient is using increased oxycodone, 6, 5 mg tablets in last 24 hrs. Increased oxycontin to 30 mg q 12.   -continue with oxycontin, oxycodone, and naprosyn  -c/w senna, colace  4. DVT ppx  -lovenox 40 mg  OOB  5. Home, heme/onc F/u as OP, neurosurgery and rad onc as outpatient once biopsy results are back. SUBJECTIVE:    Patient is a 43y old Female who presents with a chief complaint of Dry cough for 3 months and right thigh pain (27 Jun 2018 01:01)  Currently admitted to medicine with the primary diagnosis of Lung mass   Today is hospital day 8d. This morning she is resting comfortably in bed and reports no new issues or overnight events. Cough has resolved. Patient notes gluteal pain is gretly improved from this morning.     PAST MEDICAL & SURGICAL HISTORY  Anxiety and depression  History of cholecystectomy    SOCIAL HISTORY:  Negative for smoking/alcohol/drug use.     ALLERGIES:  dust (Sneezing; Rhinorrhea)  Erythromycin Base (Other)    MEDICATIONS:  STANDING MEDICATIONS  buPROPion XL . 300 milliGRAM(s) Oral at bedtime  docusate sodium 100 milliGRAM(s) Oral three times a day  enoxaparin Injectable 40 milliGRAM(s) SubCutaneous every 24 hours  escitalopram 40 milliGRAM(s) Oral at bedtime  naproxen 500 milliGRAM(s) Oral two times a day  oxyCODONE  ER Tablet 30 milliGRAM(s) Oral every 12 hours  pantoprazole    Tablet 40 milliGRAM(s) Oral before breakfast  senna 1 Tablet(s) Oral at bedtime    PRN MEDICATIONS  clonazePAM Tablet 0.25 milliGRAM(s) Oral three times a day PRN  guaiFENesin    Syrup 200 milliGRAM(s) Oral every 6 hours PRN  oxyCODONE    IR 5 milliGRAM(s) Oral every 3 hours PRN    VITALS:   T(F): 96.9  HR: 80  BP: 124/66  RR: 18  SpO2: --    LABS:                        12.0   7.66  )-----------( 344      ( 02 Jul 2018 07:36 )             38.8           PT/INR - ( 02 Jul 2018 07:36 )   PT: 10.80 sec;   INR: 1.00 ratio         PTT - ( 02 Jul 2018 07:36 )  PTT:37.1 sec              RADIOLOGY:    PHYSICAL EXAM:  GEN: No acute distress  LUNGS: Clear to auscultation bilaterally   HEART: Regular  ABD: Soft, non-tender, non-distended.  EXT: NC/NC/NE/2+PP/SCALES/Skin Intact.   NEURO: AAOX3    Intravenous access: R arm     44 yo female with multiple lesions likely secondary to metastatic lung cancer.   1. LUNG MASS COUGH, likely metastatic disease.   -s/p repeat CT guided liver biopsy  - pathology shows adenocarcinoma. f/u molecular testing, likely back Thursday.  -follow Ca and albumin   -patient will have Port-a-cath placed by IR as an inpatient. Dr. Galvan discussed with IR.   -chemo will be started through peripheral IV first, likely Thursday or Friday.   -brain MRI positive w for 1 focal nodule concerning for brain metastasis. 5mm in diameter in R occipital lobe, near dural space. Repeat scan with changes in mental status.   -lesions in lung, liver kidney, as above.   -bone scan shows multiple bony abnormalities consistent with metastatic disease.   -cough is stable, continue with robitussin and opiates  -follow up with heme onc for treatment as outpatient. Rad onc, and neurosurgery can be consulted once biopsy returns, as outpatient.    2.anxiety/depression  -continue lexapro wellbutrin and klonipin  3. Pain  -Patient is asking for cane to help her walk.   -patient is using increased oxycodone, 6, 5 mg tablets in last 24 hrs. Increased oxycontin to 30 mg q 12.   -continue with oxycontin, oxycodone, and naprosyn  -c/w senna, colace  4. DVT ppx  -lovenox 40 mg  OOB  5. Home, heme/onc F/u as OP, neurosurgery and rad onc as outpatient once biopsy results are back.

## 2018-07-04 NOTE — PROGRESS NOTE ADULT - ASSESSMENT
43 yr old ex smoker ( 25 PY, quit 3 yrs ago) is here for worsening cough , found to have:     1/ new 7cm left lung mass w/ mets to lung, mediastinum, liver, spleen, poss b/l kidney (these could be cysts), bone and prob brain (5mm rt occ lobe)  - s/p biopsy of liver lesion last week with  prelim pathology : squamous cell carcinoma  - s/p rebiopsy  IR ( liver bx #2), Pending final pathology  - steroids and AED not needed for small brain focus   - Once diagnosis confirmed , will involve radonc and Neurosurgery    2/ Body/bony pain - related to underlying cancer   - well controlled on current regimen of oxycontin 20mg q12, oxycodone 5mg IR po q3 prn pain, naprosyn 500mg q12    - Bowel regimen    3/ Anxiety/depression on lexapro, wellbutrin and klonapin - patient doing well    4/ DVT prophylaxis LMWH    Plan : Once pathology is finalized, plan is to start inpatient chemotherapy probably on friday

## 2018-07-05 LAB
ALBUMIN SERPL ELPH-MCNC: 4 G/DL — SIGNIFICANT CHANGE UP (ref 3.5–5.2)
ALP SERPL-CCNC: 168 U/L — HIGH (ref 30–115)
ALT FLD-CCNC: 22 U/L — SIGNIFICANT CHANGE UP (ref 0–41)
ANION GAP SERPL CALC-SCNC: 16 MMOL/L — HIGH (ref 7–14)
AST SERPL-CCNC: 20 U/L — SIGNIFICANT CHANGE UP (ref 0–41)
BILIRUB SERPL-MCNC: <0.2 MG/DL — SIGNIFICANT CHANGE UP (ref 0.2–1.2)
BUN SERPL-MCNC: 10 MG/DL — SIGNIFICANT CHANGE UP (ref 10–20)
CALCIUM SERPL-MCNC: 9.5 MG/DL — SIGNIFICANT CHANGE UP (ref 8.5–10.1)
CHLORIDE SERPL-SCNC: 94 MMOL/L — LOW (ref 98–110)
CO2 SERPL-SCNC: 31 MMOL/L — SIGNIFICANT CHANGE UP (ref 17–32)
CREAT SERPL-MCNC: 0.7 MG/DL — SIGNIFICANT CHANGE UP (ref 0.7–1.5)
GLUCOSE SERPL-MCNC: 97 MG/DL — SIGNIFICANT CHANGE UP (ref 70–99)
POTASSIUM SERPL-MCNC: 4.9 MMOL/L — SIGNIFICANT CHANGE UP (ref 3.5–5)
POTASSIUM SERPL-SCNC: 4.9 MMOL/L — SIGNIFICANT CHANGE UP (ref 3.5–5)
PROT SERPL-MCNC: 7.1 G/DL — SIGNIFICANT CHANGE UP (ref 6–8)
SODIUM SERPL-SCNC: 141 MMOL/L — SIGNIFICANT CHANGE UP (ref 135–146)

## 2018-07-05 RX ORDER — ZOLPIDEM TARTRATE 10 MG/1
5 TABLET ORAL AT BEDTIME
Qty: 0 | Refills: 0 | Status: DISCONTINUED | OUTPATIENT
Start: 2018-07-05 | End: 2018-07-07

## 2018-07-05 RX ORDER — OXYCODONE HYDROCHLORIDE 5 MG/1
5 TABLET ORAL
Qty: 0 | Refills: 0 | Status: DISCONTINUED | OUTPATIENT
Start: 2018-07-05 | End: 2018-07-07

## 2018-07-05 RX ORDER — FOLIC ACID 0.8 MG
1 TABLET ORAL DAILY
Qty: 0 | Refills: 0 | Status: DISCONTINUED | OUTPATIENT
Start: 2018-07-05 | End: 2018-07-07

## 2018-07-05 RX ORDER — PREGABALIN 225 MG/1
1000 CAPSULE ORAL ONCE
Qty: 0 | Refills: 0 | Status: COMPLETED | OUTPATIENT
Start: 2018-07-05 | End: 2018-07-05

## 2018-07-05 RX ADMIN — Medication 500 MILLIGRAM(S): at 06:52

## 2018-07-05 RX ADMIN — OXYCODONE HYDROCHLORIDE 30 MILLIGRAM(S): 5 TABLET ORAL at 17:37

## 2018-07-05 RX ADMIN — PREGABALIN 1000 MICROGRAM(S): 225 CAPSULE ORAL at 11:31

## 2018-07-05 RX ADMIN — OXYCODONE HYDROCHLORIDE 5 MILLIGRAM(S): 5 TABLET ORAL at 18:43

## 2018-07-05 RX ADMIN — Medication 100 MILLIGRAM(S): at 06:22

## 2018-07-05 RX ADMIN — ENOXAPARIN SODIUM 40 MILLIGRAM(S): 100 INJECTION SUBCUTANEOUS at 11:32

## 2018-07-05 RX ADMIN — Medication 500 MILLIGRAM(S): at 17:37

## 2018-07-05 RX ADMIN — OXYCODONE HYDROCHLORIDE 5 MILLIGRAM(S): 5 TABLET ORAL at 22:45

## 2018-07-05 RX ADMIN — OXYCODONE HYDROCHLORIDE 30 MILLIGRAM(S): 5 TABLET ORAL at 06:52

## 2018-07-05 RX ADMIN — BUPROPION HYDROCHLORIDE 300 MILLIGRAM(S): 150 TABLET, EXTENDED RELEASE ORAL at 21:35

## 2018-07-05 RX ADMIN — OXYCODONE HYDROCHLORIDE 5 MILLIGRAM(S): 5 TABLET ORAL at 14:13

## 2018-07-05 RX ADMIN — ZOLPIDEM TARTRATE 5 MILLIGRAM(S): 10 TABLET ORAL at 23:34

## 2018-07-05 RX ADMIN — PANTOPRAZOLE SODIUM 40 MILLIGRAM(S): 20 TABLET, DELAYED RELEASE ORAL at 06:22

## 2018-07-05 RX ADMIN — OXYCODONE HYDROCHLORIDE 5 MILLIGRAM(S): 5 TABLET ORAL at 03:41

## 2018-07-05 RX ADMIN — OXYCODONE HYDROCHLORIDE 5 MILLIGRAM(S): 5 TABLET ORAL at 07:21

## 2018-07-05 RX ADMIN — Medication 500 MILLIGRAM(S): at 06:22

## 2018-07-05 RX ADMIN — SENNA PLUS 1 TABLET(S): 8.6 TABLET ORAL at 21:35

## 2018-07-05 RX ADMIN — OXYCODONE HYDROCHLORIDE 5 MILLIGRAM(S): 5 TABLET ORAL at 06:51

## 2018-07-05 RX ADMIN — OXYCODONE HYDROCHLORIDE 30 MILLIGRAM(S): 5 TABLET ORAL at 06:22

## 2018-07-05 RX ADMIN — ESCITALOPRAM OXALATE 40 MILLIGRAM(S): 10 TABLET, FILM COATED ORAL at 21:35

## 2018-07-05 RX ADMIN — Medication 1 MILLIGRAM(S): at 11:31

## 2018-07-05 RX ADMIN — OXYCODONE HYDROCHLORIDE 5 MILLIGRAM(S): 5 TABLET ORAL at 21:45

## 2018-07-05 RX ADMIN — OXYCODONE HYDROCHLORIDE 5 MILLIGRAM(S): 5 TABLET ORAL at 19:13

## 2018-07-05 RX ADMIN — OXYCODONE HYDROCHLORIDE 30 MILLIGRAM(S): 5 TABLET ORAL at 18:07

## 2018-07-05 RX ADMIN — OXYCODONE HYDROCHLORIDE 5 MILLIGRAM(S): 5 TABLET ORAL at 13:17

## 2018-07-05 RX ADMIN — OXYCODONE HYDROCHLORIDE 5 MILLIGRAM(S): 5 TABLET ORAL at 03:11

## 2018-07-05 RX ADMIN — Medication 100 MILLIGRAM(S): at 21:35

## 2018-07-05 NOTE — PROGRESS NOTE ADULT - SUBJECTIVE AND OBJECTIVE BOX
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, metastatic to liver      Subjective:      Vital Signs Last 24 Hrs  T(C): 35.8 (05 Jul 2018 04:57), Max: 36.4 (04 Jul 2018 14:53)  T(F): 96.4 (05 Jul 2018 04:57), Max: 97.6 (04 Jul 2018 14:53)  HR: 79 (05 Jul 2018 04:57) (79 - 84)  BP: 120/66 (05 Jul 2018 04:57) (120/66 - 137/84)  BP(mean): --  RR: 18 (05 Jul 2018 04:57) (17 - 18)  SpO2: 93% (04 Jul 2018 20:51) (93% - 93%)    PHYSICAL EXAM  General: adult in NAD  HEENT: clear oropharynx, anicteric sclera, pink conjunctiva  Neck: supple  CV: normal S1/S2 with no murmur rubs or gallops  Lungs: positive air movement b/l ant lungs,clear to auscultation, no wheezes, no rales  Abdomen: soft non-tender non-distended, no hepatosplenomegaly  Ext: no clubbing cyanosis or edema  Skin: no rashes and no petechiae  Neuro: alert and oriented X 4, no focal deficits    MEDICATIONS  (STANDING):  buPROPion XL . 300 milliGRAM(s) Oral at bedtime  Cytopathology - Non Gyn Report:   ACCESSION No:  [id number]    REMEDIOS DENTON                  1        Cytopathology Report            Final Diagnosis  LIVER MASS, LEFT LOBE, CT-GUIED BIOPSY:  - POSITIVE FOR MALIGNANT CELLS.  - ADENOCARCINOMA.  - MOLECULAR TESTING PENDING.  - ALSO SEE REPORT OF [id number].    Screened by: Cruz Mcintyre M.D.  Verified by: Cruz Mcintyre M.D.  (Electronic Signature)  Reported on: 07/03/18 17:11 EDT, [address] [place] [id number]  Cytology technical processing performed at [facility],  [address]rd Floor, [place] [address]  _________________________________________________________________          docusate sodium 100 milliGRAM(s) Oral three times a day  enoxaparin Injectable 40 milliGRAM(s) SubCutaneous every 24 hours  escitalopram 40 milliGRAM(s) Oral at bedtime  naproxen 500 milliGRAM(s) Oral two times a day  oxyCODONE  ER Tablet 30 milliGRAM(s) Oral every 12 hours  pantoprazole    Tablet 40 milliGRAM(s) Oral before breakfast  senna 1 Tablet(s) Oral at bedtime    MEDICATIONS  (PRN):  clonazePAM Tablet 0.25 milliGRAM(s) Oral three times a day PRN anxiety  guaiFENesin    Syrup 200 milliGRAM(s) Oral every 6 hours PRN Cough  oxyCODONE    IR 5 milliGRAM(s) Oral every 3 hours PRN Severe Pain (7 - 10)  zolpidem 5 milliGRAM(s) Oral at bedtime PRN Insomnia      LABS:      Serial CBC's  07-02 @ 07:36  Hct-38.8 / Hgb-12.0 / Plat-344 / RBC-4.75 / WBC-7.66      07-04    139  |  94<L>  |  8<L>  ----------------------------<  96  5.5<H>   |  32  |  0.7    Ca    9.8      04 Jul 2018 07:13      RADIOLOGY & ADDITIONAL STUDIES: none 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, metastatic to liver      Subjective: no events overnight      Vital Signs Last 24 Hrs  T(C): 35.8 (05 Jul 2018 04:57), Max: 36.4 (04 Jul 2018 14:53)  T(F): 96.4 (05 Jul 2018 04:57), Max: 97.6 (04 Jul 2018 14:53)  HR: 79 (05 Jul 2018 04:57) (79 - 84)  BP: 120/66 (05 Jul 2018 04:57) (120/66 - 137/84)  BP(mean): --  RR: 18 (05 Jul 2018 04:57) (17 - 18)  SpO2: 93% (04 Jul 2018 20:51) (93% - 93%)    PHYSICAL EXAM  General: adult in NAD  HEENT: clear oropharynx, anicteric sclera, pink conjunctiva  Neck: supple  CV: normal S1/S2 with no murmur rubs or gallops  Lungs: positive air movement b/l ant lungs,clear to auscultation, no wheezes, no rales  Abdomen: soft non-tender non-distended, no hepatosplenomegaly  Ext: no clubbing cyanosis or edema  Skin: no rashes and no petechiae  Neuro: alert and oriented X 4, no focal deficits    MEDICATIONS  (STANDING):  buPROPion XL . 300 milliGRAM(s) Oral at bedtime  Cytopathology - Non Gyn Report:   ACCESSION No:  [id number]    REMEDIOS DENTON                  1        Cytopathology Report  Final Diagnosis  LIVER MASS, LEFT LOBE, CT-GUIED BIOPSY:  - POSITIVE FOR MALIGNANT CELLS.  - ADENOCARCINOMA.  - MOLECULAR TESTING PENDING.  - ALSO SEE REPORT OF [id number].    Screened by: Cruz Mcintyre M.D.  Verified by: Cruz Mcintyre M.D.  (Electronic Signature)  Reported on: 07/03/18 17:11 EDT, [address], [place] [address]  Cytology technical processing performed at [facility],  [address]rd Floor, [place] [address]  _________________________________________________________________          docusate sodium 100 milliGRAM(s) Oral three times a day  enoxaparin Injectable 40 milliGRAM(s) SubCutaneous every 24 hours  escitalopram 40 milliGRAM(s) Oral at bedtime  naproxen 500 milliGRAM(s) Oral two times a day  oxyCODONE  ER Tablet 30 milliGRAM(s) Oral every 12 hours  pantoprazole    Tablet 40 milliGRAM(s) Oral before breakfast  senna 1 Tablet(s) Oral at bedtime    MEDICATIONS  (PRN):  clonazePAM Tablet 0.25 milliGRAM(s) Oral three times a day PRN anxiety  guaiFENesin    Syrup 200 milliGRAM(s) Oral every 6 hours PRN Cough  oxyCODONE    IR 5 milliGRAM(s) Oral every 3 hours PRN Severe Pain (7 - 10)  zolpidem 5 milliGRAM(s) Oral at bedtime PRN Insomnia      LABS:      Serial CBC's  07-02 @ 07:36  Hct-38.8 / Hgb-12.0 / Plat-344 / RBC-4.75 / WBC-7.66      07-04    139  |  94<L>  |  8<L>  ----------------------------<  96  5.5<H>   |  32  |  0.7    Ca    9.8      04 Jul 2018 07:13      RADIOLOGY & ADDITIONAL STUDIES: none 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 requesting a cane to ambulate , and ambien at night      Vital Signs Last 24 Hrs  T(C): 35.8 (05 Jul 2018 04:57), Max: 36.4 (04 Jul 2018 14:53)  T(F): 96.4 (05 Jul 2018 04:57), Max: 97.6 (04 Jul 2018 14:53)  HR: 79 (05 Jul 2018 04:57) (79 - 84)  BP: 120/66 (05 Jul 2018 04:57) (120/66 - 137/84)  BP(mean): --  RR: 18 (05 Jul 2018 04:57) (17 - 18)  SpO2: 93% (04 Jul 2018 20:51) (93% - 93%)    PHYSICAL EXAM  General: adult in NAD  HEENT: clear oropharynx, anicteric sclera, pink conjunctiva  Neck: supple  CV: normal S1/S2 with no murmur rubs or gallops  Lungs: positive air movement b/l ant lungs,clear to auscultation, no wheezes, no rales  Abdomen: soft non-tender non-distended, no hepatosplenomegaly  Ext: no clubbing cyanosis or edema  Skin: no rashes and no petechiae  Neuro: alert and oriented X 4, no focal deficits    MEDICATIONS  (STANDING):  buPROPion XL . 300 milliGRAM(s) Oral at bedtime  Cytopathology - Non Gyn Report:   ACCESSION No:  [id number]    REMEDIOS DENTON                  1        Cytopathology Report  Final Diagnosis  LIVER MASS, LEFT LOBE, CT-GUIED BIOPSY:  - POSITIVE FOR MALIGNANT CELLS.  - ADENOCARCINOMA.  - MOLECULAR TESTING PENDING.  - ALSO SEE REPORT OF [id number].    Screened by: Cruz Mcintyre M.D.  Verified by: Cruz Mcintyre M.D.  (Electronic Signature)  Reported on: 07/03/18 17:11 EDT, [facility] [place] [address]  Cytology technical processing performed at [facility],  [address]rd Floor, [place] [address]  _________________________________________________________________          docusate sodium 100 milliGRAM(s) Oral three times a day  enoxaparin Injectable 40 milliGRAM(s) SubCutaneous every 24 hours  escitalopram 40 milliGRAM(s) Oral at bedtime  naproxen 500 milliGRAM(s) Oral two times a day  oxyCODONE  ER Tablet 30 milliGRAM(s) Oral every 12 hours  pantoprazole    Tablet 40 milliGRAM(s) Oral before breakfast  senna 1 Tablet(s) Oral at bedtime    MEDICATIONS  (PRN):  clonazePAM Tablet 0.25 milliGRAM(s) Oral three times a day PRN anxiety  guaiFENesin    Syrup 200 milliGRAM(s) Oral every 6 hours PRN Cough  oxyCODONE    IR 5 milliGRAM(s) Oral every 3 hours PRN Severe Pain (7 - 10)  zolpidem 5 milliGRAM(s) Oral at bedtime PRN Insomnia      LABS:      Serial CBC's  07-02 @ 07:36  Hct-38.8 / Hgb-12.0 / Plat-344 / RBC-4.75 / WBC-7.66      07-04    139  |  94<L>  |  8<L>  ----------------------------<  96  5.5<H>   |  32  |  0.7    Ca    9.8      04 Jul 2018 07:13      RADIOLOGY & ADDITIONAL STUDIES: none

## 2018-07-05 NOTE — PROGRESS NOTE ADULT - ASSESSMENT
SUBJECTIVE:    Patient is a 43y old Female who presents with a chief complaint of Dry cough for 3 months and right thigh pain (27 Jun 2018 01:01)    Currently admitted to medicine with the primary diagnosis of Lung mass     Today is hospital day 9d. This morning she is resting comfortably in bed and reports no new issues or overnight events. Pain is well tolerated with increased pain medications. Cough is resolved with opiates. Patient is afebrile, denies chills.     PAST MEDICAL & SURGICAL HISTORY  Anxiety and depression  History of cholecystectomy    SOCIAL HISTORY:  Negative for smoking/alcohol/drug use.     ALLERGIES:  dust (Sneezing; Rhinorrhea)  Erythromycin Base (Other)    MEDICATIONS:  STANDING MEDICATIONS  buPROPion XL . 300 milliGRAM(s) Oral at bedtime  docusate sodium 100 milliGRAM(s) Oral three times a day  enoxaparin Injectable 40 milliGRAM(s) SubCutaneous every 24 hours  escitalopram 40 milliGRAM(s) Oral at bedtime  folic acid 1 milliGRAM(s) Oral daily  naproxen 500 milliGRAM(s) Oral two times a day  oxyCODONE  ER Tablet 30 milliGRAM(s) Oral every 12 hours  pantoprazole    Tablet 40 milliGRAM(s) Oral before breakfast  senna 1 Tablet(s) Oral at bedtime    PRN MEDICATIONS  clonazePAM Tablet 0.25 milliGRAM(s) Oral three times a day PRN  guaiFENesin    Syrup 200 milliGRAM(s) Oral every 6 hours PRN  oxyCODONE    IR 5 milliGRAM(s) Oral every 3 hours PRN  zolpidem 5 milliGRAM(s) Oral at bedtime PRN  zolpidem 5 milliGRAM(s) Oral at bedtime PRN    VITALS:   T(F): 96.4  HR: 79  BP: 120/66  RR: 18  SpO2: 93%    LABS:    07-04    139  |  94<L>  |  8<L>  ----------------------------<  96  5.5<H>   |  32  |  0.7    Ca    9.8      04 Jul 2018 07:13                    RADIOLOGY:    PHYSICAL EXAM:  GEN: No acute distress, sitting comfortably in bed, eating breakfast.   LUNGS: Clear to auscultation bilaterally, no increased work of breathing.   HEART: Regular  EXT: No edema, DP 2+ bilaterally.   NEURO: AAOX3     NG tube: None  Meyer Catheter: None     44 yo female with multiple lesions likely secondary to metastatic lung cancer.   1. LUNG MASS COUGH, likely metastatic disease.   -s/p repeat CT guided liver biopsy  - pathology shows adenocarcinoma, +TTF, CK 7+, CK20 (-). f/u molecular testing.  -If + mutation , patient will benefit from tyrosine kinase inhibitor   - will add keytruda later  if PDL1 positive  -Heme onc will start Chemo on Friday as inpatient., through peripheral IV   -patient will have Port-a-cath placed by IR as an inpatient. Dr. Galvan discussed with IR.   -brain MRI positive w for 1 focal nodule concerning for brain metastasis. 5mm in diameter in R occipital lobe, near dural space. Repeat scan with changes in mental status.   -lesions in lung, liver kidney, as above.   -bone scan shows multiple bony abnormalities consistent with metastatic disease.   -cough is stable, continue with robitussin and opiates  -follow up with heme onc for treatment as outpatient. Rad onc, and neurosurgery can be consulted once biopsy returns, as outpatient.    2.anxiety/depression  -continue lexapro wellbutrin and klonipin  3. Pain  -Patient is asking for cane to help her walk, will need to speak with social work.   -increased oxycontin to 30 mg q 12.   -continue with oxycontin, oxycodone, and naprosyn  -c/w senna, colace  4. DVT ppx  -lovenox 40 mg  OOB  5. Home, heme/onc F/u as OP, neurosurgery and rad onc as outpatient once biopsy results are back.

## 2018-07-05 NOTE — PROGRESS NOTE ADULT - ASSESSMENT
43 yr old ex smoker ( 25 PY, quit 3 yrs ago) is here for worsening cough , found to have:     1/ new 7cm left lung mass w/ mets to lung, mediastinum, liver, spleen, poss b/l kidney (these could be cysts), bone and prob brain (5mm rt occ lobe)  - s/p biopsy of liver lesion last week with  prelim pathology : adenocarcinoma of lung ( MARKERS AS ABOVE)  - s/p rebiopsy  IR ( liver bx #2), Pending final pathology and PDL1 TESTING  - steroids and AED not needed for small brain focus   - Once diagnosis confirmed , will involve radonc and Neurosurgery    2/ Body/bony pain - related to underlying cancer   - well controlled on current regimen of oxycontin 20mg q12, oxycodone 5mg IR po q3 prn pain, naprosyn 500mg q12    - Bowel regimen    3/ Anxiety/depression on lexapro, wellbutrin and klonapin - patient doing well    4/ DVT prophylaxis LMWH    Plan : Once pathology is finalized, plan is to start inpatient chemotherapy probably on friday 43 yr old ex smoker ( 25 PY, quit 3 yrs ago) is here for worsening cough , found to have:     1/ new 7cm left lung mass w/ mets to lung, mediastinum, liver, spleen, poss b/l kidney (these could be cysts), bone and prob brain (5mm rt occ lobe)  - s/p biopsy of liver lesion last week with  prelim pathology : adenocarcinoma of lung weakly (+) TTF, CK 7 + and CK20 (-)  - s/p rebiopsy  IR ( liver bx #2) : adenocarcinoma . PDL1 TESTING pending  - steroids and AED not needed for small brain focus   - Once diagnosis confirmed , will involve radonc and Neurosurgery    2/ Body/bony pain - related to underlying cancer   - well controlled on current regimen of oxycontin 20mg q12, oxycodone 5mg IR po q3 prn pain, naprosyn 500mg q12    - Bowel regimen    3/ Anxiety/depression on lexapro, wellbutrin and klonapin - patient doing well    4/ DVT prophylaxis LMWH    Plan : will add EGFR, ALK translocation, Kras, BRAF, ROS to pathology 43 yr old ex smoker ( 25 PY, quit 3 yrs ago) is here for worsening cough , found to have:     1/ new 7cm left lung mass w/ mets to lung, mediastinum, liver, spleen, poss b/l kidney (these could be cysts), bone and prob brain (5mm rt occ lobe)  - s/p biopsy of liver lesion last week with  prelim pathology : adenocarcinoma of lung weakly (+) TTF, CK 7 + and CK20 (-)  - s/p rebiopsy  IR ( liver bx #2) : adenocarcinoma . PDL1 TESTING pending  - steroids and AED not needed for small brain focus  - Once diagnosis confirmed , will involve radonc and Neurosurgery  - will add EGFR, ALK, KRAS, BRAF, ROS mutations to specimen.    If + mutation , patient will benefit from targeted therapy     If unable to obtain pills to financial issues and coverage, will start chemo with carboplatin and ALIMTA as inpatient    will add keytruda if PDL1 positive        2/ Body/bony pain - related to underlying cancer   - well controlled on current regimen of oxycontin 20mg q12, oxycodone 5mg IR po q3 prn pain, naprosyn 500mg q12    - Bowel regimen    3/ Anxiety/depression on lexapro, wellbutrin and klonapin - patient doing well    4/ DVT prophylaxis LMWH    Plan : will add EGFR, ALK translocation, Kras, BRAF, ROS to pathology           puma cuellar regarding plan for chemo 43 yr old ex smoker ( 25 PY, quit 3 yrs ago) is here for worsening cough , found to have:     1/ new 7cm left lung mass w/ mets to lung, mediastinum, liver, spleen, poss b/l kidney (these could be cysts), bone and prob brain (5mm rt occ lobe)  - s/p biopsy of liver lesion last week with  prelim pathology : adenocarcinoma of lung weakly (+) TTF, CK 7 + and CK20 (-)  - s/p rebiopsy  IR ( liver bx #2) : adenocarcinoma . PDL1 TESTING pending  - steroids and AED not needed for small brain focus  - Once diagnosis confirmed , will involve radonc and Neurosurgery  - will add EGFR, ALK, KRAS, BRAF, ROS mutations to specimen.    If + mutation , patient will benefit from targeted therapy     If unable to obtain pills due to financial issues and coverage, will start chemo with carboplatin and ALIMTA as inpatient    will add keytruda if PDL1 positive        2/ Body/bony pain - related to underlying cancer   - well controlled on current regimen of oxycontin 20mg q12, oxycodone 5mg IR po q3 prn pain, naprosyn 500mg q12    - Bowel regimen    3/ Anxiety/depression on lexapro, wellbutrin and klonapin - patient doing well    4/ DVT prophylaxis LMWH    Plan : will add EGFR, ALK translocation, Kras, BRAF, ROS to pathology           wilynda folow up regarding plan for chemo 43 yr old ex smoker ( 25 PY, quit 3 yrs ago) is here for worsening cough , found to have:     1/ new 7cm left lung mass w/ mets to lung, mediastinum, liver, spleen, poss b/l kidney (these could be cysts), bone and prob brain (5mm rt occ lobe)  - s/p biopsy of liver lesion last week with  prelim pathology : adenocarcinoma of lung weakly (+) TTF, CK 7 + and CK20 (-)  - s/p rebiopsy  IR ( liver bx #2) : adenocarcinoma . PDL1 TESTING pending  - steroids and AED not needed for small brain focus  - Once diagnosis confirmed , will involve radonc and Neurosurgery  - will add EGFR, ALK, KRAS, BRAF, ROS mutations to specimen.    If + mutation , patient will benefit from tyrosine kinase inhibitor     If unable to obtain pills due to financial issues and coverage, will start chemo with carboplatin and ALIMTA as inpatient    will add keytruda later  if PDL1 positive        2/ Body/bony pain - related to underlying cancer   - well controlled on current regimen of oxycontin 20mg q12, oxycodone 5mg IR po q3 prn pain, naprosyn 500mg q12    - Bowel regimen    3/ Anxiety/depression on lexapro, wellbutrin and klonapin - patient doing well    4/ DVT prophylaxis LMWH    Plan : will add EGFR, ALK translocation, Kras, BRAF, ROS to pathology           will folow up regarding plan for chemo

## 2018-07-06 ENCOUNTER — TRANSCRIPTION ENCOUNTER (OUTPATIENT)
Age: 43
End: 2018-07-06

## 2018-07-06 PROBLEM — F41.9 ANXIETY DISORDER, UNSPECIFIED: Chronic | Status: ACTIVE | Noted: 2018-06-26

## 2018-07-06 RX ORDER — ESCITALOPRAM OXALATE 10 MG/1
40 TABLET, FILM COATED ORAL
Qty: 0 | Refills: 0 | COMMUNITY

## 2018-07-06 RX ORDER — FOLIC ACID 0.8 MG
1 TABLET ORAL
Qty: 20 | Refills: 0 | OUTPATIENT
Start: 2018-07-06 | End: 2018-07-25

## 2018-07-06 RX ORDER — FOLIC ACID 0.8 MG
1 TABLET ORAL
Qty: 20 | Refills: 0
Start: 2018-07-06 | End: 2018-07-25

## 2018-07-06 RX ORDER — BUPROPION HYDROCHLORIDE 150 MG/1
1 TABLET, EXTENDED RELEASE ORAL
Qty: 30 | Refills: 0 | OUTPATIENT
Start: 2018-07-06

## 2018-07-06 RX ORDER — ZOLPIDEM TARTRATE 10 MG/1
1 TABLET ORAL
Qty: 10 | Refills: 0 | OUTPATIENT
Start: 2018-07-06

## 2018-07-06 RX ORDER — OXYCODONE HYDROCHLORIDE 5 MG/1
1 TABLET ORAL
Qty: 40 | Refills: 0 | OUTPATIENT
Start: 2018-07-06 | End: 2018-07-25

## 2018-07-06 RX ORDER — OXYCODONE HYDROCHLORIDE 5 MG/1
1 TABLET ORAL
Qty: 40 | Refills: 0
Start: 2018-07-06 | End: 2018-07-25

## 2018-07-06 RX ORDER — OXYCODONE HYDROCHLORIDE 5 MG/1
1 TABLET ORAL
Qty: 0 | Refills: 0 | COMMUNITY
Start: 2018-07-06

## 2018-07-06 RX ORDER — ZOLPIDEM TARTRATE 10 MG/1
1 TABLET ORAL
Qty: 0 | Refills: 0 | COMMUNITY
Start: 2018-07-06

## 2018-07-06 RX ORDER — BUPROPION HYDROCHLORIDE 150 MG/1
1 TABLET, EXTENDED RELEASE ORAL
Qty: 0 | Refills: 0 | COMMUNITY

## 2018-07-06 RX ORDER — ESCITALOPRAM OXALATE 10 MG/1
2 TABLET, FILM COATED ORAL
Qty: 0 | Refills: 0 | DISCHARGE
Start: 2018-07-06

## 2018-07-06 RX ORDER — ESCITALOPRAM OXALATE 10 MG/1
1 TABLET, FILM COATED ORAL
Qty: 0 | Refills: 0 | DISCHARGE
Start: 2018-07-06

## 2018-07-06 RX ORDER — DEXAMETHASONE 0.5 MG/5ML
12 ELIXIR ORAL ONCE
Qty: 0 | Refills: 0 | Status: COMPLETED | OUTPATIENT
Start: 2018-07-06 | End: 2018-07-06

## 2018-07-06 RX ORDER — CARBOPLATIN 50 MG
750 VIAL (EA) INTRAVENOUS ONCE
Qty: 0 | Refills: 0 | Status: COMPLETED | OUTPATIENT
Start: 2018-07-06 | End: 2018-07-06

## 2018-07-06 RX ORDER — ONDANSETRON 8 MG/1
4 TABLET, FILM COATED ORAL ONCE
Qty: 0 | Refills: 0 | Status: DISCONTINUED | OUTPATIENT
Start: 2018-07-06 | End: 2018-07-06

## 2018-07-06 RX ORDER — ONDANSETRON 8 MG/1
4 TABLET, FILM COATED ORAL EVERY 8 HOURS
Qty: 0 | Refills: 0 | Status: DISCONTINUED | OUTPATIENT
Start: 2018-07-06 | End: 2018-07-07

## 2018-07-06 RX ORDER — ONDANSETRON 8 MG/1
16 TABLET, FILM COATED ORAL ONCE
Qty: 0 | Refills: 0 | Status: COMPLETED | OUTPATIENT
Start: 2018-07-06 | End: 2018-07-06

## 2018-07-06 RX ORDER — OXYCODONE HYDROCHLORIDE 5 MG/1
1 TABLET ORAL
Qty: 40 | Refills: 0 | OUTPATIENT
Start: 2018-07-06

## 2018-07-06 RX ORDER — ESCITALOPRAM OXALATE 10 MG/1
1 TABLET, FILM COATED ORAL
Qty: 30 | Refills: 0 | OUTPATIENT
Start: 2018-07-06 | End: 2018-08-04

## 2018-07-06 RX ORDER — PROCHLORPERAZINE MALEATE 5 MG
10 TABLET ORAL EVERY 6 HOURS
Qty: 0 | Refills: 0 | Status: DISCONTINUED | OUTPATIENT
Start: 2018-07-06 | End: 2018-07-07

## 2018-07-06 RX ORDER — BUPROPION HYDROCHLORIDE 150 MG/1
1 TABLET, EXTENDED RELEASE ORAL
Qty: 0 | Refills: 0 | DISCHARGE
Start: 2018-07-06

## 2018-07-06 RX ORDER — DEXAMETHASONE 0.5 MG/5ML
4 ELIXIR ORAL
Qty: 0 | Refills: 0 | Status: DISCONTINUED | OUTPATIENT
Start: 2018-07-07 | End: 2018-07-07

## 2018-07-06 RX ORDER — ZOLPIDEM TARTRATE 10 MG/1
1 TABLET ORAL
Qty: 30 | Refills: 0 | OUTPATIENT
Start: 2018-07-06 | End: 2018-08-04

## 2018-07-06 RX ORDER — ONDANSETRON 8 MG/1
8 TABLET, FILM COATED ORAL EVERY 8 HOURS
Qty: 0 | Refills: 0 | Status: DISCONTINUED | OUTPATIENT
Start: 2018-07-06 | End: 2018-07-07

## 2018-07-06 RX ORDER — ZOLPIDEM TARTRATE 10 MG/1
1 TABLET ORAL
Qty: 30 | Refills: 0 | OUTPATIENT
Start: 2018-07-06

## 2018-07-06 RX ORDER — OXYCODONE HYDROCHLORIDE 5 MG/1
1 TABLET ORAL
Qty: 10 | Refills: 0 | OUTPATIENT
Start: 2018-07-06

## 2018-07-06 RX ORDER — DEXAMETHASONE 0.5 MG/5ML
1 ELIXIR ORAL
Qty: 4 | Refills: 0
Start: 2018-07-06 | End: 2018-07-07

## 2018-07-06 RX ORDER — OXYCODONE HYDROCHLORIDE 5 MG/1
1 TABLET ORAL
Qty: 20 | Refills: 0
Start: 2018-07-06

## 2018-07-06 RX ORDER — DEXAMETHASONE 0.5 MG/5ML
1 ELIXIR ORAL
Qty: 4 | Refills: 0 | OUTPATIENT
Start: 2018-07-06 | End: 2018-07-07

## 2018-07-06 RX ORDER — OXYCODONE HYDROCHLORIDE 5 MG/1
1 TABLET ORAL
Qty: 0 | Refills: 0 | COMMUNITY

## 2018-07-06 RX ORDER — PEMETREXED 500 MG/20ML
1000 INJECTION, SOLUTION, CONCENTRATE INTRAVENOUS ONCE
Qty: 0 | Refills: 0 | Status: COMPLETED | OUTPATIENT
Start: 2018-07-06 | End: 2018-07-06

## 2018-07-06 RX ADMIN — Medication 500 MILLIGRAM(S): at 11:11

## 2018-07-06 RX ADMIN — Medication 106 MILLIGRAM(S): at 13:24

## 2018-07-06 RX ADMIN — OXYCODONE HYDROCHLORIDE 5 MILLIGRAM(S): 5 TABLET ORAL at 10:40

## 2018-07-06 RX ADMIN — ESCITALOPRAM OXALATE 40 MILLIGRAM(S): 10 TABLET, FILM COATED ORAL at 21:18

## 2018-07-06 RX ADMIN — Medication 100 MILLIGRAM(S): at 21:18

## 2018-07-06 RX ADMIN — Medication 500 MILLIGRAM(S): at 17:37

## 2018-07-06 RX ADMIN — OXYCODONE HYDROCHLORIDE 30 MILLIGRAM(S): 5 TABLET ORAL at 06:44

## 2018-07-06 RX ADMIN — OXYCODONE HYDROCHLORIDE 30 MILLIGRAM(S): 5 TABLET ORAL at 11:10

## 2018-07-06 RX ADMIN — BUPROPION HYDROCHLORIDE 300 MILLIGRAM(S): 150 TABLET, EXTENDED RELEASE ORAL at 21:22

## 2018-07-06 RX ADMIN — ENOXAPARIN SODIUM 40 MILLIGRAM(S): 100 INJECTION SUBCUTANEOUS at 11:15

## 2018-07-06 RX ADMIN — OXYCODONE HYDROCHLORIDE 30 MILLIGRAM(S): 5 TABLET ORAL at 17:36

## 2018-07-06 RX ADMIN — Medication 100 MILLIGRAM(S): at 13:24

## 2018-07-06 RX ADMIN — OXYCODONE HYDROCHLORIDE 5 MILLIGRAM(S): 5 TABLET ORAL at 13:51

## 2018-07-06 RX ADMIN — Medication 325 MILLIGRAM(S): at 14:25

## 2018-07-06 RX ADMIN — Medication 500 MILLIGRAM(S): at 06:44

## 2018-07-06 RX ADMIN — OXYCODONE HYDROCHLORIDE 5 MILLIGRAM(S): 5 TABLET ORAL at 13:49

## 2018-07-06 RX ADMIN — PANTOPRAZOLE SODIUM 40 MILLIGRAM(S): 20 TABLET, DELAYED RELEASE ORAL at 06:44

## 2018-07-06 RX ADMIN — OXYCODONE HYDROCHLORIDE 30 MILLIGRAM(S): 5 TABLET ORAL at 17:37

## 2018-07-06 RX ADMIN — OXYCODONE HYDROCHLORIDE 5 MILLIGRAM(S): 5 TABLET ORAL at 21:18

## 2018-07-06 RX ADMIN — Medication 100 MILLIGRAM(S): at 06:44

## 2018-07-06 RX ADMIN — Medication 1 MILLIGRAM(S): at 11:15

## 2018-07-06 RX ADMIN — OXYCODONE HYDROCHLORIDE 5 MILLIGRAM(S): 5 TABLET ORAL at 04:26

## 2018-07-06 RX ADMIN — OXYCODONE HYDROCHLORIDE 5 MILLIGRAM(S): 5 TABLET ORAL at 11:05

## 2018-07-06 RX ADMIN — ONDANSETRON 116 MILLIGRAM(S): 8 TABLET, FILM COATED ORAL at 13:24

## 2018-07-06 RX ADMIN — OXYCODONE HYDROCHLORIDE 5 MILLIGRAM(S): 5 TABLET ORAL at 21:48

## 2018-07-06 RX ADMIN — Medication 500 MILLIGRAM(S): at 17:36

## 2018-07-06 RX ADMIN — PEMETREXED 840 MILLIGRAM(S): 500 INJECTION, SOLUTION, CONCENTRATE INTRAVENOUS at 14:24

## 2018-07-06 RX ADMIN — OXYCODONE HYDROCHLORIDE 5 MILLIGRAM(S): 5 TABLET ORAL at 04:56

## 2018-07-06 RX ADMIN — SENNA PLUS 1 TABLET(S): 8.6 TABLET ORAL at 21:18

## 2018-07-06 NOTE — PROGRESS NOTE ADULT - ATTENDING COMMENTS
All biopsy results are currently pending. I have discussed thew possibility of potential discharge with Анна today with plan to continue care as outpatient. she prefers to stay and start on systemic therapy this week inhouse, this is not an unreasonable request given Radha is fully booked for chemo this week. Will plan to administer 1st cycle of chemo inhouse once final path is available. Called path for follow up on biopsy and were unable to obtain any new information today.
S/p Carbo/Alimta, tolerated well, will discharge today, follow up in 2 weeks.
I have discussed the extent of metastatic disease with patient, including findings of the brain MRI.   I will discuss MRI brain with XRT, patient may be a candidate for SRS as outpatient.   Biopsy (verbal, unofficial) is not consistent with small cell, it shows cancer cells, however sample is very scant. This was discussed with patient. She will benefit from additional biopsy, planned for Monday.   May be a candidate for inpatient chemo once pathology is finalized.
Biopsy consistent with adenocarcinoma of the lung, patient is likely to have a  mutation given young age. She is uninsured and is applying for insurance. Will give first cycle of Carbo/Alimta inhouse not to delay care. She received B12 injection and is taking folic acid.

## 2018-07-06 NOTE — DISCHARGE NOTE ADULT - PATIENT PORTAL LINK FT
You can access the [facility], offered by [facility], by registering with the following website: [email or web address]

## 2018-07-06 NOTE — DISCHARGE NOTE ADULT - CARE PROVIDER_API CALL
Em Galvan), HematologyOncology; Internal Medicine; Medical Oncology  [address] [place] [address]  Phone: [phone]  Fax: [phone]

## 2018-07-06 NOTE — DISCHARGE NOTE ADULT - PLAN OF CARE
Chemotherapy Follow up with Dr. Galvan in the [facility] at 7/24 at 10:00. Continue with pain medications, folic acid, and dexamethasone as above.

## 2018-07-06 NOTE — PROGRESS NOTE ADULT - ASSESSMENT
43 yr old ex smoker ( 25 PY, quit 3 yrs ago) is here for worsening cough , found to have:     1/ metastatic adenocarcinoma of the lung:   - s/p biopsy of liver lesion last week with  prelim pathology : adenocarcinoma of lung weakly (+) TTF, CK 7 + and CK20 (-)  - s/p rebiopsy  IR ( liver bx #2) : adenocarcinoma . PDL1 TESTING pending  - steroids and AED not needed for small brain focus  - will add EGFR, ALK, KRAS, BRAF, ROS mutations to specimen ( Dr Mcintyre is out , will be back on Monday)    will start chemo with carboplatin and alimta as inpatient today     vitamin b12 IM once given for alimta toxicity, folic acid daily        2/ Body/bony pain - related to underlying cancer   - well controlled on current regimen of oxycontin 20mg q12, oxycodone 5mg IR po q3 prn pain, naprosyn 500mg q12    - Bowel regimen    3/ Anxiety/depression on lexapro, wellbutrin and klonapin - patient doing well    4/ DVT prophylaxis LMWH    Plan : will add EGFR, ALK translocation, Kras, BRAF, ROS to pathology           possible chemo today 43 yr old ex smoker ( 25 PY, quit 3 yrs ago) is here for worsening cough , found to have:     1/ metastatic adenocarcinoma of the lung:   - s/p biopsy of liver lesion last week with  prelim pathology : adenocarcinoma of lung weakly (+) TTF, CK 7 + and CK20 (-)  - s/p rebiopsy  IR ( liver bx #2) : adenocarcinoma . PDL1 TESTING pending  - steroids and AED not needed for small brain focus  - will add EGFR, ALK, KRAS, BRAF, ROS mutations to specimen ( Dr Mcintyre is out , will be back on Monday)    will start chemo with carboplatin and alimta as inpatient today     vitamin b12 IM once given for alimta toxicity, folic acid daily  - continue decadron 4mg PO bid after chemo for 2 days  - After chemo, patient can be discharged home if comfortable - follow up with Dr Head in 2 weeks at [facility]        2/ Body/bony pain - related to underlying cancer   - well controlled on current regimen of oxycontin 20mg q12, oxycodone 5mg IR po q3 prn pain, naprosyn 500mg q12    - Bowel regimen    3/ Anxiety/depression on lexapro, wellbutrin and klonapin - patient doing well    4/ DVT prophylaxis LMWH    Plan : will add EGFR, ALK translocation, Kras, BRAF, ROS to pathology          chemo today           possible discharge today 43 yr old ex smoker ( 25 PY, quit 3 yrs ago) is here for worsening cough , found to have:     1/ metastatic adenocarcinoma of the lung:   - s/p biopsy of liver lesion last week with  prelim pathology : adenocarcinoma of lung weakly (+) TTF, CK 7 + and CK20 (-)  - s/p rebiopsy  IR ( liver bx #2) : adenocarcinoma . PDL1 TESTING pending  - steroids and AED not needed for small brain focus  - will add EGFR, ALK, KRAS, BRAF, ROS mutations to specimen ( Dr Mcintyre is out , will be back on Monday)    will start chemo with carboplatin and alimta as inpatient today     vitamin b12 IM once given for alimta toxicity, folic acid daily  - continue decadron 4mg PO bid after chemo for 2 days  - After chemo, patient can be discharged home if comfortable - follow up with Dr Head 7/24 10:00am        2/ Body/bony pain - related to underlying cancer   - well controlled on current regimen of oxycontin 20mg q12, oxycodone 5mg IR po q3 prn pain, naprosyn 500mg q12    - Bowel regimen    3/ Anxiety/depression on lexapro, wellbutrin and klonapin - patient doing well    4/ DVT prophylaxis LMWH    Plan : will add EGFR, ALK translocation, Kras, BRAF, ROS to pathology          chemo today           possible discharge today

## 2018-07-06 NOTE — PROGRESS NOTE ADULT - ASSESSMENT
SUBJECTIVE:    Patient is a 43y old Female who presents with a chief complaint of Dry cough for 3 months and right thigh pain (06 Jul 2018 16:07)  Currently admitted to medicine with the primary diagnosis of Lung mass   Today is hospital day 10d. This morning she is resting comfortably in bed and reports no new issues or overnight events.     PAST MEDICAL & SURGICAL HISTORY  Anxiety and depression  History of cholecystectomy    SOCIAL HISTORY:  Negative for smoking/alcohol/drug use.     ALLERGIES:  dust (Sneezing; Rhinorrhea)  Erythromycin Base (Other)    MEDICATIONS:  STANDING MEDICATIONS  buPROPion XL . 300 milliGRAM(s) Oral at bedtime  docusate sodium 100 milliGRAM(s) Oral three times a day  enoxaparin Injectable 40 milliGRAM(s) SubCutaneous every 24 hours  escitalopram 40 milliGRAM(s) Oral at bedtime  folic acid 1 milliGRAM(s) Oral daily  naproxen 500 milliGRAM(s) Oral two times a day  oxyCODONE  ER Tablet 30 milliGRAM(s) Oral every 12 hours  pantoprazole    Tablet 40 milliGRAM(s) Oral before breakfast  senna 1 Tablet(s) Oral at bedtime    PRN MEDICATIONS  guaiFENesin    Syrup 200 milliGRAM(s) Oral every 6 hours PRN  ondansetron Injectable 4 milliGRAM(s) IV Push every 8 hours PRN  oxyCODONE    IR 5 milliGRAM(s) Oral every 3 hours PRN  zolpidem 5 milliGRAM(s) Oral at bedtime PRN  zolpidem 5 milliGRAM(s) Oral at bedtime PRN    VITALS:   T(F): 97.3  HR: 91  BP: 113/58  RR: 18  SpO2: --    LABS:    07-05    141  |  94<L>  |  10  ----------------------------<  97  4.9   |  31  |  0.7    Ca    9.5      05 Jul 2018 19:14    TPro  7.1  /  Alb  4.0  /  TBili  <0.2  /  DBili  x   /  AST  20  /  ALT  22  /  AlkPhos  168<H>  07-05                  RADIOLOGY:    PHYSICAL EXAM:  GEN: No acute distress  LUNGS: Clear to auscultation bilaterally   HEART: Regular  ABD: Soft, non-tender, non-distended.  EXT: NC/NC/NE/2+PP/SCALES/Skin Intact.   NEURO: AAOX3    Intravenous access:   NG tube:   Meyer Catheter: SUBJECTIVE:    Patient is a 43y old Female who presents with a chief complaint of Dry cough for 3 months and right thigh pain (06 Jul 2018 16:07)  Currently admitted to medicine with the primary diagnosis of Lung mass   Today is hospital day 10d. This morning she is resting comfortably in bed and reports no new issues or overnight events. PAtient received 1st round of chemo today. Discharge tomorrow.     PAST MEDICAL & SURGICAL HISTORY  Anxiety and depression  History of cholecystectomy    SOCIAL HISTORY:  Negative for smoking/alcohol/drug use.     ALLERGIES:  dust (Sneezing; Rhinorrhea)  Erythromycin Base (Other)    MEDICATIONS:  STANDING MEDICATIONS  buPROPion XL . 300 milliGRAM(s) Oral at bedtime  docusate sodium 100 milliGRAM(s) Oral three times a day  enoxaparin Injectable 40 milliGRAM(s) SubCutaneous every 24 hours  escitalopram 40 milliGRAM(s) Oral at bedtime  folic acid 1 milliGRAM(s) Oral daily  naproxen 500 milliGRAM(s) Oral two times a day  oxyCODONE  ER Tablet 30 milliGRAM(s) Oral every 12 hours  pantoprazole    Tablet 40 milliGRAM(s) Oral before breakfast  senna 1 Tablet(s) Oral at bedtime    PRN MEDICATIONS  guaiFENesin    Syrup 200 milliGRAM(s) Oral every 6 hours PRN  ondansetron Injectable 4 milliGRAM(s) IV Push every 8 hours PRN  oxyCODONE    IR 5 milliGRAM(s) Oral every 3 hours PRN  zolpidem 5 milliGRAM(s) Oral at bedtime PRN  zolpidem 5 milliGRAM(s) Oral at bedtime PRN    VITALS:   T(F): 97.3  HR: 91  BP: 113/58  RR: 18  SpO2: --    LABS:    07-05    141  |  94<L>  |  10  ----------------------------<  97  4.9   |  31  |  0.7    Ca    9.5      05 Jul 2018 19:14    TPro  7.1  /  Alb  4.0  /  TBili  <0.2  /  DBili  x   /  AST  20  /  ALT  22  /  AlkPhos  168<H>  07-05                  RADIOLOGY:    PHYSICAL EXAM:  GEN: No acute distress, sitting comfortably in bed.   LUNGS: Clear to auscultation bilaterally   HEART: Regular  ABD: Soft, non-tender, non-distended.  EXT: DP 2+ bilaterally.   NEURO: AAOX3    44 yo female with multiple lesions likely secondary to metastatic lung cancer.   1. LUNG MASS COUGH, likely metastatic disease.   -s/p repeat CT guided liver biopsy  - pathology shows adenocarcinoma, +TTF, CK 7+, CK20 (-). f/u molecular testing.  -If + mutation , patient will benefit from tyrosine kinase inhibitor   - will add keytruda later  if PDL1 positive  -Heme onc will start Chemo today as inpatient., through peripheral IV, discharge tomorrow. Follow up at [facility] on 7/24  -brain MRI positive w for 1 focal nodule concerning for brain metastasis. 5mm in diameter in R occipital lobe, near dural space. Repeat scan with changes in mental status.   -lesions in lung, liver kidney, as above.   -bone scan shows multiple bony abnormalities consistent with metastatic disease.   -cough is stable, continue with robitussin and opiates  -follow up with heme onc for treatment as outpatient. Rad onc, and neurosurgery can be consulted once biopsy returns, as outpatient.    2.anxiety/depression  -continue lexapro wellbutrin and klonipin  3. Pain  -continue with oxycontin, oxycodone, and naprosyn  4. DVT ppx  -lovenox 40 mg  OOB  5. Home, heme/onc F/u as OP, neurosurgery and rad onc as outpatient once biopsy results are back.

## 2018-07-06 NOTE — DISCHARGE NOTE ADULT - HOSPITAL COURSE
43 year old female presenting with cough and associated gluteal pain. On CT scan, there is a new 6.8 x 5.7 x 7.0 cm centrally located left upper lobe mass encasing left upper and proximal lower pulmonary arteries and pulmonary vein, as well as left upper lobe bronchus with associated mild to moderate areas of narrowing. The mass extends to the mediastinum with a small portion of the mass projecting below the left main pulmonary artery. Lesions in the liver were found on CT abdomen. Bone scan showed multiple definite bony abnormalities which by pattern of distribution are consistent with metastatic bone disease. These include bilateral pubic inferior rami, left superior pubic ramus, bilateral iliac bones and left 4th costovertebral junction. CT guided biopsy revealed adenocarcinoma. Chemotherapy initiated today. Patient will follow up with [facility] as an outpatient for chemotherapy.

## 2018-07-06 NOTE — DISCHARGE NOTE ADULT - CARE PLAN
Principal Discharge DX:	Lung mass  Goal:	Chemotherapy  Assessment and plan of treatment:	Follow up with Dr. Galvan in the [facility] at 7/24 at 10:00. Continue with pain medications, folic acid, and dexamethasone as above.

## 2018-07-06 NOTE — PROGRESS NOTE ADULT - SUBJECTIVE AND OBJECTIVE BOX
Patient is a 43y old  Female who presents with a chief complaint of Dry cough for 3 months and right thigh pain (27 Jun 2018 01:01)      Subjective:no issue overnight      Vital Signs Last 24 Hrs  T(C): 35.7 (06 Jul 2018 05:15), Max: 36.5 (05 Jul 2018 21:18)  T(F): 96.2 (06 Jul 2018 05:15), Max: 97.7 (05 Jul 2018 21:18)  HR: 88 (06 Jul 2018 05:15) (83 - 88)  BP: 121/63 (06 Jul 2018 05:15) (121/63 - 129/65)  BP(mean): --  RR: 18 (06 Jul 2018 05:15) (18 - 18)  SpO2: --    PHYSICAL EXAM  General: adult in NAD  HEENT: clear oropharynx, anicteric sclera, pink conjunctiva  Neck: supple  CV: normal S1/S2 with no murmur rubs or gallops  Lungs: positive air movement b/l ant lungs,clear to auscultation, no wheezes, no rales  Abdomen: soft non-tender non-distended, no hepatosplenomegaly  Ext: no clubbing cyanosis or edema  Skin: no rashes and no petechiae  Neuro: alert and oriented X 4, no focal deficits    MEDICATIONS  (STANDING):  buPROPion XL . 300 milliGRAM(s) Oral at bedtime  docusate sodium 100 milliGRAM(s) Oral three times a day  enoxaparin Injectable 40 milliGRAM(s) SubCutaneous every 24 hours  escitalopram 40 milliGRAM(s) Oral at bedtime  folic acid 1 milliGRAM(s) Oral daily  naproxen 500 milliGRAM(s) Oral two times a day  oxyCODONE  ER Tablet 30 milliGRAM(s) Oral every 12 hours  pantoprazole    Tablet 40 milliGRAM(s) Oral before breakfast  senna 1 Tablet(s) Oral at bedtime    MEDICATIONS  (PRN):  guaiFENesin    Syrup 200 milliGRAM(s) Oral every 6 hours PRN Cough  oxyCODONE    IR 5 milliGRAM(s) Oral every 3 hours PRN Severe Pain (7 - 10)  zolpidem 5 milliGRAM(s) Oral at bedtime PRN Insomnia  zolpidem 5 milliGRAM(s) Oral at bedtime PRN Insomnia      LABS: none today            Serial CBC's  07-02 @ 07:36  Hct-38.8 / Hgb-12.0 / Plat-344 / RBC-4.75 / WBC-7.66      07-05    141  |  94<L>  |  10  ----------------------------<  97  4.9   |  31  |  0.7    Ca    9.5      05 Jul 2018 19:14    TPro  7.1  /  Alb  4.0  /  TBili  <0.2  /  DBili  x   /  AST  20  /  ALT  22  /  AlkPhos  168<H>  07-05

## 2018-07-07 VITALS
SYSTOLIC BLOOD PRESSURE: 117 MMHG | DIASTOLIC BLOOD PRESSURE: 70 MMHG | HEART RATE: 84 BPM | TEMPERATURE: 96 F | RESPIRATION RATE: 18 BRPM

## 2018-07-07 RX ORDER — PROCHLORPERAZINE MALEATE 5 MG
1 TABLET ORAL
Qty: 80 | Refills: 0
Start: 2018-07-07 | End: 2018-07-26

## 2018-07-07 RX ORDER — ONDANSETRON 8 MG/1
1 TABLET, FILM COATED ORAL
Qty: 60 | Refills: 0
Start: 2018-07-07 | End: 2018-07-26

## 2018-07-07 RX ADMIN — OXYCODONE HYDROCHLORIDE 5 MILLIGRAM(S): 5 TABLET ORAL at 14:54

## 2018-07-07 RX ADMIN — Medication 4 MILLIGRAM(S): at 05:57

## 2018-07-07 RX ADMIN — OXYCODONE HYDROCHLORIDE 5 MILLIGRAM(S): 5 TABLET ORAL at 11:07

## 2018-07-07 RX ADMIN — OXYCODONE HYDROCHLORIDE 30 MILLIGRAM(S): 5 TABLET ORAL at 05:57

## 2018-07-07 RX ADMIN — Medication 1 MILLIGRAM(S): at 13:13

## 2018-07-07 RX ADMIN — OXYCODONE HYDROCHLORIDE 5 MILLIGRAM(S): 5 TABLET ORAL at 01:04

## 2018-07-07 RX ADMIN — ZOLPIDEM TARTRATE 5 MILLIGRAM(S): 10 TABLET ORAL at 01:04

## 2018-07-07 RX ADMIN — OXYCODONE HYDROCHLORIDE 5 MILLIGRAM(S): 5 TABLET ORAL at 01:34

## 2018-07-07 RX ADMIN — Medication 500 MILLIGRAM(S): at 05:57

## 2018-07-07 RX ADMIN — Medication 100 MILLIGRAM(S): at 05:57

## 2018-07-07 RX ADMIN — Medication 100 MILLIGRAM(S): at 13:13

## 2018-07-07 RX ADMIN — PANTOPRAZOLE SODIUM 40 MILLIGRAM(S): 20 TABLET, DELAYED RELEASE ORAL at 05:57

## 2018-07-07 NOTE — PROGRESS NOTE ADULT - SUBJECTIVE AND OBJECTIVE BOX
SUBJECTIVE:    Patient is a 43y old Female who presents with a chief complaint of Dry cough for 3 months and right thigh pain (06 Jul 2018 16:07)    Currently admitted to medicine with the primary diagnosis of Lung mass     Today is hospital day 11d. This morning she is resting comfortably in bed and reports no new issues or overnight events.     PAST MEDICAL & SURGICAL HISTORY  Anxiety and depression  History of cholecystectomy    SOCIAL HISTORY:  Negative for smoking/alcohol/drug use.     ALLERGIES:  dust (Sneezing; Rhinorrhea)  Erythromycin Base (Other)    MEDICATIONS:  STANDING MEDICATIONS  buPROPion XL . 300 milliGRAM(s) Oral at bedtime  dexamethasone     Tablet 4 milliGRAM(s) Oral two times a day  docusate sodium 100 milliGRAM(s) Oral three times a day  enoxaparin Injectable 40 milliGRAM(s) SubCutaneous every 24 hours  escitalopram 40 milliGRAM(s) Oral at bedtime  folic acid 1 milliGRAM(s) Oral daily  naproxen 500 milliGRAM(s) Oral two times a day  ondansetron    Tablet 8 milliGRAM(s) Oral every 8 hours  oxyCODONE  ER Tablet 30 milliGRAM(s) Oral every 12 hours  pantoprazole    Tablet 40 milliGRAM(s) Oral before breakfast  prochlorperazine   Tablet 10 milliGRAM(s) Oral every 6 hours  senna 1 Tablet(s) Oral at bedtime    PRN MEDICATIONS  guaiFENesin    Syrup 200 milliGRAM(s) Oral every 6 hours PRN  ondansetron Injectable 4 milliGRAM(s) IV Push every 8 hours PRN  oxyCODONE    IR 5 milliGRAM(s) Oral every 3 hours PRN  zolpidem 5 milliGRAM(s) Oral at bedtime PRN  zolpidem 5 milliGRAM(s) Oral at bedtime PRN    VITALS:   T(F): 96  HR: 72  BP: 112/57  RR: 18  SpO2: --    LABS:    07-05    141  |  94<L>  |  10  ----------------------------<  97  4.9   |  31  |  0.7    Ca    9.5      05 Jul 2018 19:14    TPro  7.1  /  Alb  4.0  /  TBili  <0.2  /  DBili  x   /  AST  20  /  ALT  22  /  AlkPhos  168<H>  07-05                  RADIOLOGY:    PHYSICAL EXAM:  GEN: No acute distress  LUNGS: Clear to auscultation bilaterally   HEART: S1/S2 present. RRR.   ABD: Soft, non-tender, non-distended. Bowel sounds present  EXT: NC/NC/NE/2+PP/SCALES  NEURO: AAOX3

## 2018-07-07 NOTE — PROGRESS NOTE ADULT - PROVIDER SPECIALTY LIST ADULT
Heme/Onc
Internal Medicine
Intervent Radiology
Heme/Onc
Internal Medicine
Intervent Radiology
Intervent Radiology
Heme/Onc
Internal Medicine

## 2018-07-07 NOTE — PROGRESS NOTE ADULT - ASSESSMENT
43 yr old ex smoker ( 25 PY, quit 3 yrs ago) is here for worsening cough , found to have:     1/ metastatic adenocarcinoma of the lung:   - s/p cycle 1 of carbo/alimta on 7/6/18.  - Continue folic acid daily.   - Oral decadron for one more day.   -D/c home today- follow up with Dr Head 7/24 10:00am        2/ Body/bony pain - related to underlying cancer   - well controlled on current regimen of oxycontin 20mg q12, oxycodone 5mg IR po q3 prn pain, naprosyn 500mg q12    - Bowel regimen    3/ Anxiety/depression on lexapro, wellbutrin and klonapin - patient doing well

## 2018-07-07 NOTE — PROGRESS NOTE ADULT - ASSESSMENT
SUBJECTIVE:    Patient is a 43y old Female who presents with a chief complaint of Dry cough for 3 months and right thigh pain (06 Jul 2018 16:07)  Currently admitted to medicine with the primary diagnosis of Lung mass   Today is hospital day 11d. This morning she is resting comfortably in bed and reports no new issues or overnight events. Chemo day 1 was yesterday.      PAST MEDICAL & SURGICAL HISTORY  Anxiety and depression  History of cholecystectomy    SOCIAL HISTORY:  Negative for smoking/alcohol/drug use.     ALLERGIES:  dust (Sneezing; Rhinorrhea)  Erythromycin Base (Other)    MEDICATIONS:  STANDING MEDICATIONS  buPROPion XL . 300 milliGRAM(s) Oral at bedtime  dexamethasone     Tablet 4 milliGRAM(s) Oral two times a day  docusate sodium 100 milliGRAM(s) Oral three times a day  enoxaparin Injectable 40 milliGRAM(s) SubCutaneous every 24 hours  escitalopram 40 milliGRAM(s) Oral at bedtime  folic acid 1 milliGRAM(s) Oral daily  naproxen 500 milliGRAM(s) Oral two times a day  ondansetron    Tablet 8 milliGRAM(s) Oral every 8 hours  oxyCODONE  ER Tablet 30 milliGRAM(s) Oral every 12 hours  pantoprazole    Tablet 40 milliGRAM(s) Oral before breakfast  prochlorperazine   Tablet 10 milliGRAM(s) Oral every 6 hours  senna 1 Tablet(s) Oral at bedtime    PRN MEDICATIONS  guaiFENesin    Syrup 200 milliGRAM(s) Oral every 6 hours PRN  ondansetron Injectable 4 milliGRAM(s) IV Push every 8 hours PRN  oxyCODONE    IR 5 milliGRAM(s) Oral every 3 hours PRN  zolpidem 5 milliGRAM(s) Oral at bedtime PRN  zolpidem 5 milliGRAM(s) Oral at bedtime PRN    VITALS:   T(F): 97.7  HR: 80  BP: 116/72  RR: 18  SpO2: --    LABS:    07-05    141  |  94<L>  |  10  ----------------------------<  97  4.9   |  31  |  0.7    Ca    9.5      05 Jul 2018 19:14    TPro  7.1  /  Alb  4.0  /  TBili  <0.2  /  DBili  x   /  AST  20  /  ALT  22  /  AlkPhos  168<H>  07-05                  RADIOLOGY:    PHYSICAL EXAM:  GEN: No acute distress  LUNGS: Clear to auscultation bilaterally   HEART: Regular  ABD: Soft, non-tender, non-distended.  EXT: NC/NC/NE/2+PP/SCALES/Skin Intact.   NEURO: AAOX3    Intravenous access:   NG tube:   Meyer Catheter:   42 yo female with multiple lesions likely secondary to metastatic lung cancer.   1. LUNG MASS COUGH, likely metastatic disease.   -s/p repeat CT guided liver biopsy  - pathology shows adenocarcinoma, +TTF, CK 7+, CK20 (-). f/u molecular testing.  -If + mutation , patient will benefit from tyrosine kinase inhibitor   - will add keytruda later  if PDL1 positive  -Heme onc will start Chemo today as inpatient., through peripheral IV, discharge tomorrow. Follow up at [facility] on 7/24  -brain MRI positive w for 1 focal nodule concerning for brain metastasis. 5mm in diameter in R occipital lobe, near dural space. Repeat scan with changes in mental status.   -lesions in lung, liver kidney, as above.   -bone scan shows multiple bony abnormalities consistent with metastatic disease.   -cough is stable, continue with robitussin and opiates  -follow up with heme onc for treatment as outpatient. Rad onc, and neurosurgery can be consulted once biopsy returns, as outpatient.    2.anxiety/depression  -continue lexapro wellbutrin and zolpidem  -c/w compazine, reglan  3. Pain  -continue with oxycontin, oxycodone, and naprosyn  4. DVT ppx  -lovenox 40 mg  OOB  5. Home, heme/onc F/u as OP, neurosurgery and rad onc as outpatient once biopsy results are back. SUBJECTIVE:    Patient is a 43y old Female who presents with a chief complaint of Dry cough for 3 months and right thigh pain (06 Jul 2018 16:07)  Currently admitted to medicine with the primary diagnosis of Lung mass   Today is hospital day 11d. This morning she is resting comfortably in bed and reports no new issues or overnight events. Chemo day 1 was yesterday. Patient reports no complications, no nausea. Last BM yesterday, non bloody. Patient denies vomiting, diarrhea. Gluteal pain is well controlled today. Plan for discharge today. Patient ate breakfast today with no complication.     PAST MEDICAL & SURGICAL HISTORY  Anxiety and depression  History of cholecystectomy    SOCIAL HISTORY:  Negative for smoking/alcohol/drug use.     ALLERGIES:  dust (Sneezing; Rhinorrhea)  Erythromycin Base (Other)    MEDICATIONS:  STANDING MEDICATIONS  buPROPion XL . 300 milliGRAM(s) Oral at bedtime  dexamethasone     Tablet 4 milliGRAM(s) Oral two times a day  docusate sodium 100 milliGRAM(s) Oral three times a day  enoxaparin Injectable 40 milliGRAM(s) SubCutaneous every 24 hours  escitalopram 40 milliGRAM(s) Oral at bedtime  folic acid 1 milliGRAM(s) Oral daily  naproxen 500 milliGRAM(s) Oral two times a day  ondansetron    Tablet 8 milliGRAM(s) Oral every 8 hours  oxyCODONE  ER Tablet 30 milliGRAM(s) Oral every 12 hours  pantoprazole    Tablet 40 milliGRAM(s) Oral before breakfast  prochlorperazine   Tablet 10 milliGRAM(s) Oral every 6 hours  senna 1 Tablet(s) Oral at bedtime    PRN MEDICATIONS  guaiFENesin    Syrup 200 milliGRAM(s) Oral every 6 hours PRN  ondansetron Injectable 4 milliGRAM(s) IV Push every 8 hours PRN  oxyCODONE    IR 5 milliGRAM(s) Oral every 3 hours PRN  zolpidem 5 milliGRAM(s) Oral at bedtime PRN  zolpidem 5 milliGRAM(s) Oral at bedtime PRN    VITALS:   T(F): 97.7  HR: 80  BP: 116/72  RR: 18  SpO2: --    LABS:    07-05    141  |  94<L>  |  10  ----------------------------<  97  4.9   |  31  |  0.7    Ca    9.5      05 Jul 2018 19:14    TPro  7.1  /  Alb  4.0  /  TBili  <0.2  /  DBili  x   /  AST  20  /  ALT  22  /  AlkPhos  168<H>  07-05                  RADIOLOGY:    PHYSICAL EXAM:  GEN: No acute distress, sleeping comfortably in bed.   LUNGS: Clear to auscultation bilaterally, no increased work of breathing.   HEART: Regular  ABD: BS+ Soft, non-tender, non-distended.  EXT: Full range of motion bilaterally.   NEURO: AAOX3    Intravenous access:   NG tube:   Meyer Catheter:   44 yo female with multiple lesions likely secondary to metastatic lung cancer.   1. LUNG MASS COUGH, likely metastatic disease.   -s/p repeat CT guided liver biopsy  - pathology shows adenocarcinoma, +TTF, CK 7+, CK20 (-). f/u molecular testing.  -If + mutation , patient will benefit from tyrosine kinase inhibitor   - will add keytruda later  if PDL1 positive  -Heme onc will start Chemo today as inpatient., through peripheral IV, discharge tomorrow. Follow up at [facility] on 7/24  -brain MRI positive w for 1 focal nodule concerning for brain metastasis. 5mm in diameter in R occipital lobe, near dural space. Repeat scan with changes in mental status.   -lesions in lung, liver kidney, as above.   -bone scan shows multiple bony abnormalities consistent with metastatic disease.   -cough is stable, continue with robitussin and opiates  -follow up with heme onc for treatment as outpatient. Rad onc, and neurosurgery can be consulted once biopsy returns, as outpatient.    2.anxiety/depression  -continue lexapro wellbutrin and zolpidem  -c/w compazine, reglan  3. Pain  -continue with oxycontin, oxycodone, and naprosyn  4. DVT ppx  -lovenox 40 mg  OOB  5. Home, heme/onc F/u as OP, neurosurgery and rad onc as outpatient once biopsy results are back.

## 2018-07-12 DIAGNOSIS — R59.0 LOCALIZED ENLARGED LYMPH NODES: ICD-10-CM

## 2018-07-12 DIAGNOSIS — R91.8 OTHER NONSPECIFIC ABNORMAL FINDING OF LUNG FIELD: ICD-10-CM

## 2018-07-12 DIAGNOSIS — C34.92 MALIGNANT NEOPLASM OF UNSPECIFIED PART OF LEFT BRONCHUS OR LUNG: ICD-10-CM

## 2018-07-12 DIAGNOSIS — C78.7 SECONDARY MALIGNANT NEOPLASM OF LIVER AND INTRAHEPATIC BILE DUCT: ICD-10-CM

## 2018-07-12 DIAGNOSIS — G89.3 NEOPLASM RELATED PAIN (ACUTE) (CHRONIC): ICD-10-CM

## 2018-07-12 DIAGNOSIS — F41.9 ANXIETY DISORDER, UNSPECIFIED: ICD-10-CM

## 2018-07-12 DIAGNOSIS — R05 COUGH: ICD-10-CM

## 2018-07-12 DIAGNOSIS — M79.651 PAIN IN RIGHT THIGH: ICD-10-CM

## 2018-07-12 DIAGNOSIS — F32.9 MAJOR DEPRESSIVE DISORDER, SINGLE EPISODE, UNSPECIFIED: ICD-10-CM

## 2018-07-12 DIAGNOSIS — Z87.891 PERSONAL HISTORY OF NICOTINE DEPENDENCE: ICD-10-CM

## 2018-07-12 DIAGNOSIS — Z88.0 ALLERGY STATUS TO PENICILLIN: ICD-10-CM

## 2018-07-12 DIAGNOSIS — C78.1 SECONDARY MALIGNANT NEOPLASM OF MEDIASTINUM: ICD-10-CM

## 2018-07-12 DIAGNOSIS — N28.1 CYST OF KIDNEY, ACQUIRED: ICD-10-CM

## 2018-07-12 DIAGNOSIS — C79.51 SECONDARY MALIGNANT NEOPLASM OF BONE: ICD-10-CM

## 2018-07-12 DIAGNOSIS — Z90.49 ACQUIRED ABSENCE OF OTHER SPECIFIED PARTS OF DIGESTIVE TRACT: ICD-10-CM

## 2018-07-12 DIAGNOSIS — C79.31 SECONDARY MALIGNANT NEOPLASM OF BRAIN: ICD-10-CM

## 2018-07-16 LAB — NON-GYNECOLOGICAL CYTOLOGY STUDY: SIGNIFICANT CHANGE UP

## 2018-07-20 ENCOUNTER — RX RENEWAL (OUTPATIENT)
Age: 43
End: 2018-07-20

## 2018-07-24 ENCOUNTER — APPOINTMENT (OUTPATIENT)
Dept: HEMATOLOGY ONCOLOGY | Facility: CLINIC | Age: 43
End: 2018-07-24

## 2018-07-24 ENCOUNTER — LABORATORY RESULT (OUTPATIENT)
Age: 43
End: 2018-07-24

## 2018-07-24 VITALS
BODY MASS INDEX: 35.85 KG/M2 | OXYGEN SATURATION: 98 % | SYSTOLIC BLOOD PRESSURE: 130 MMHG | RESPIRATION RATE: 16 BRPM | HEIGHT: 64 IN | TEMPERATURE: 97.8 F | HEART RATE: 85 BPM | WEIGHT: 210 LBS | DIASTOLIC BLOOD PRESSURE: 72 MMHG

## 2018-07-24 DIAGNOSIS — C34.90 MALIGNANT NEOPLASM OF UNSPECIFIED PART OF UNSPECIFIED BRONCHUS OR LUNG: ICD-10-CM

## 2018-07-24 DIAGNOSIS — Z86.59 PERSONAL HISTORY OF OTHER MENTAL AND BEHAVIORAL DISORDERS: ICD-10-CM

## 2018-07-24 DIAGNOSIS — F17.200 NICOTINE DEPENDENCE, UNSPECIFIED, UNCOMPLICATED: ICD-10-CM

## 2018-07-24 DIAGNOSIS — Z56.0 UNEMPLOYMENT, UNSPECIFIED: ICD-10-CM

## 2018-07-24 SDOH — ECONOMIC STABILITY - INCOME SECURITY: UNEMPLOYMENT, UNSPECIFIED: Z56.0

## 2018-07-25 PROBLEM — Z86.59 HISTORY OF ANXIETY: Status: RESOLVED | Noted: 2018-07-25 | Resolved: 2018-07-25

## 2018-07-25 PROBLEM — Z56.0 NOT CURRENTLY WORKING DUE TO DISABLED STATUS: Status: ACTIVE | Noted: 2018-07-25

## 2018-07-25 PROBLEM — Z86.59 HISTORY OF DEPRESSION: Status: RESOLVED | Noted: 2018-07-25 | Resolved: 2018-07-25

## 2018-07-25 PROBLEM — C34.90 PRIMARY MALIGNANT NEOPLASM OF LUNG METASTATIC TO OTHER SITE, UNSPECIFIED LATERALITY: Status: ACTIVE | Noted: 2018-07-25

## 2018-07-25 PROBLEM — F17.200 CURRENT EVERY DAY SMOKER: Status: ACTIVE | Noted: 2018-07-25

## 2018-07-26 LAB
ALBUMIN SERPL ELPH-MCNC: 4 G/DL
ALP BLD-CCNC: 164 U/L
ALT SERPL-CCNC: 28 U/L
ANION GAP SERPL CALC-SCNC: 14 MMOL/L
AST SERPL-CCNC: 18 U/L
BILIRUB SERPL-MCNC: <0.2 MG/DL
BUN SERPL-MCNC: 13 MG/DL
CALCIUM SERPL-MCNC: 9.3 MG/DL
CHLORIDE SERPL-SCNC: 94 MMOL/L
CO2 SERPL-SCNC: 29 MMOL/L
CREAT SERPL-MCNC: 0.7 MG/DL
GLUCOSE SERPL-MCNC: 84 MG/DL
HCT VFR BLD CALC: 36.5 %
HGB BLD-MCNC: 11.4 G/DL
LDH SERPL-CCNC: 241
MCHC RBC-ENTMCNC: 25.6 PG
MCHC RBC-ENTMCNC: 31.2 G/DL
MCV RBC AUTO: 82 FL
PLATELET # BLD AUTO: 454 K/UL
PMV BLD: 9.3 FL
POTASSIUM SERPL-SCNC: 4.7 MMOL/L
PROT SERPL-MCNC: 7.3 G/DL
RBC # BLD: 4.45 M/UL
RBC # FLD: 14.8 %
SODIUM SERPL-SCNC: 137 MMOL/L
TSH SERPL-ACNC: 6.75 UIU/ML
WBC # FLD AUTO: 7.33 K/UL

## 2018-07-27 ENCOUNTER — APPOINTMENT (OUTPATIENT)
Dept: INFUSION THERAPY | Facility: CLINIC | Age: 43
End: 2018-07-27

## 2018-07-27 ENCOUNTER — OUTPATIENT (OUTPATIENT)
Dept: OUTPATIENT SERVICES | Facility: HOSPITAL | Age: 43
LOS: 1 days | Discharge: HOME | End: 2018-07-27

## 2018-07-27 DIAGNOSIS — Z51.11 ENCOUNTER FOR ANTINEOPLASTIC CHEMOTHERAPY: ICD-10-CM

## 2018-07-27 DIAGNOSIS — C34.90 MALIGNANT NEOPLASM OF UNSPECIFIED PART OF UNSPECIFIED BRONCHUS OR LUNG: ICD-10-CM

## 2018-07-27 DIAGNOSIS — Z51.12 ENCOUNTER FOR ANTINEOPLASTIC IMMUNOTHERAPY: ICD-10-CM

## 2018-07-27 DIAGNOSIS — C79.51 SECONDARY MALIGNANT NEOPLASM OF BONE: ICD-10-CM

## 2018-07-27 DIAGNOSIS — C79.31 SECONDARY MALIGNANT NEOPLASM OF BRAIN: ICD-10-CM

## 2018-07-27 DIAGNOSIS — Z90.49 ACQUIRED ABSENCE OF OTHER SPECIFIED PARTS OF DIGESTIVE TRACT: Chronic | ICD-10-CM

## 2018-07-27 DIAGNOSIS — C78.7 SECONDARY MALIGNANT NEOPLASM OF LIVER AND INTRAHEPATIC BILE DUCT: ICD-10-CM

## 2018-07-27 DIAGNOSIS — G89.3 NEOPLASM RELATED PAIN (ACUTE) (CHRONIC): ICD-10-CM

## 2018-07-27 RX ORDER — DEXAMETHASONE 0.5 MG/5ML
12 ELIXIR ORAL ONCE
Qty: 0 | Refills: 0 | Status: COMPLETED | OUTPATIENT
Start: 2018-07-27 | End: 2018-07-27

## 2018-07-27 RX ORDER — PEMETREXED 500 MG/20ML
1000 INJECTION, SOLUTION, CONCENTRATE INTRAVENOUS ONCE
Qty: 0 | Refills: 0 | Status: COMPLETED | OUTPATIENT
Start: 2018-07-27 | End: 2018-07-27

## 2018-07-27 RX ORDER — CARBOPLATIN 50 MG
750 VIAL (EA) INTRAVENOUS ONCE
Qty: 0 | Refills: 0 | Status: COMPLETED | OUTPATIENT
Start: 2018-07-27 | End: 2018-07-27

## 2018-07-27 RX ORDER — PEMBROLIZUMAB 25 MG/ML
200 INJECTION, SOLUTION INTRAVENOUS ONCE
Qty: 0 | Refills: 0 | Status: COMPLETED | OUTPATIENT
Start: 2018-07-27 | End: 2018-07-27

## 2018-07-27 RX ADMIN — Medication 325 MILLIGRAM(S): at 10:42

## 2018-07-27 RX ADMIN — Medication 183 MILLIGRAM(S): at 09:55

## 2018-07-27 RX ADMIN — PEMBROLIZUMAB 216 MILLIGRAM(S): 25 INJECTION, SOLUTION INTRAVENOUS at 10:43

## 2018-07-27 RX ADMIN — PEMETREXED 420 MILLIGRAM(S): 500 INJECTION, SOLUTION, CONCENTRATE INTRAVENOUS at 10:42

## 2018-08-06 ENCOUNTER — MEDICATION RENEWAL (OUTPATIENT)
Age: 43
End: 2018-08-06

## 2018-08-15 ENCOUNTER — APPOINTMENT (OUTPATIENT)
Dept: HEMATOLOGY ONCOLOGY | Facility: CLINIC | Age: 43
End: 2018-08-15

## 2018-08-15 ENCOUNTER — LABORATORY RESULT (OUTPATIENT)
Age: 43
End: 2018-08-15

## 2018-08-15 VITALS
DIASTOLIC BLOOD PRESSURE: 87 MMHG | BODY MASS INDEX: 35.85 KG/M2 | TEMPERATURE: 98 F | SYSTOLIC BLOOD PRESSURE: 110 MMHG | HEART RATE: 101 BPM | RESPIRATION RATE: 16 BRPM | WEIGHT: 210 LBS | HEIGHT: 64 IN

## 2018-08-16 LAB
ALBUMIN SERPL ELPH-MCNC: 4.1 G/DL
ALP BLD-CCNC: 179 U/L
ALT SERPL-CCNC: 33 U/L
ANION GAP SERPL CALC-SCNC: 18 MMOL/L
AST SERPL-CCNC: 20 U/L
BILIRUB SERPL-MCNC: <0.2 MG/DL
BUN SERPL-MCNC: 8 MG/DL
CALCIUM SERPL-MCNC: 9.3 MG/DL
CHLORIDE SERPL-SCNC: 91 MMOL/L
CO2 SERPL-SCNC: 25 MMOL/L
CREAT SERPL-MCNC: 0.7 MG/DL
GLUCOSE SERPL-MCNC: 110 MG/DL
HCT VFR BLD CALC: 34 %
HGB BLD-MCNC: 11 G/DL
MCHC RBC-ENTMCNC: 26 PG
MCHC RBC-ENTMCNC: 32.4 G/DL
MCV RBC AUTO: 80.4 FL
PLATELET # BLD AUTO: 311 K/UL
PMV BLD: 9 FL
POTASSIUM SERPL-SCNC: 4.2 MMOL/L
PROT SERPL-MCNC: 7.3 G/DL
RBC # BLD: 4.23 M/UL
RBC # FLD: 17 %
SODIUM SERPL-SCNC: 134 MMOL/L
T3 SERPL-MCNC: 113 NG/DL
T3FREE SERPL-MCNC: 2.69 PG/ML
T4 SERPL-MCNC: 7.4 UG/DL
TSH SERPL-ACNC: 1.6 UIU/ML
WBC # FLD AUTO: 4.54 K/UL

## 2018-08-17 ENCOUNTER — APPOINTMENT (OUTPATIENT)
Dept: INFUSION THERAPY | Facility: CLINIC | Age: 43
End: 2018-08-17

## 2018-08-17 RX ORDER — PEMBROLIZUMAB 25 MG/ML
200 INJECTION, SOLUTION INTRAVENOUS ONCE
Qty: 0 | Refills: 0 | Status: COMPLETED | OUTPATIENT
Start: 2018-08-17 | End: 2018-08-17

## 2018-08-17 RX ORDER — CARBOPLATIN 50 MG
750 VIAL (EA) INTRAVENOUS ONCE
Qty: 0 | Refills: 0 | Status: COMPLETED | OUTPATIENT
Start: 2018-08-17 | End: 2018-08-17

## 2018-08-17 RX ORDER — PEMETREXED 500 MG/20ML
1000 INJECTION, SOLUTION, CONCENTRATE INTRAVENOUS ONCE
Qty: 0 | Refills: 0 | Status: COMPLETED | OUTPATIENT
Start: 2018-08-17 | End: 2018-08-17

## 2018-08-17 RX ORDER — DEXAMETHASONE 0.5 MG/5ML
12 ELIXIR ORAL ONCE
Qty: 0 | Refills: 0 | Status: COMPLETED | OUTPATIENT
Start: 2018-08-17 | End: 2018-08-17

## 2018-08-17 RX ADMIN — PEMBROLIZUMAB 216 MILLIGRAM(S): 25 INJECTION, SOLUTION INTRAVENOUS at 10:36

## 2018-08-17 RX ADMIN — PEMETREXED 420 MILLIGRAM(S): 500 INJECTION, SOLUTION, CONCENTRATE INTRAVENOUS at 10:34

## 2018-08-17 RX ADMIN — Medication 183 MILLIGRAM(S): at 09:54

## 2018-08-17 RX ADMIN — Medication 325 MILLIGRAM(S): at 10:35

## 2018-08-23 LAB — NON-GYNECOLOGICAL CYTOLOGY STUDY: SIGNIFICANT CHANGE UP

## 2018-08-24 ENCOUNTER — MEDICATION RENEWAL (OUTPATIENT)
Age: 43
End: 2018-08-24

## 2018-08-30 ENCOUNTER — MEDICATION RENEWAL (OUTPATIENT)
Age: 43
End: 2018-08-30

## 2018-09-05 ENCOUNTER — MEDICATION RENEWAL (OUTPATIENT)
Age: 43
End: 2018-09-05

## 2018-09-11 ENCOUNTER — OUTPATIENT (OUTPATIENT)
Dept: OUTPATIENT SERVICES | Facility: HOSPITAL | Age: 43
LOS: 1 days | Discharge: HOME | End: 2018-09-11

## 2018-09-11 DIAGNOSIS — F33.2 MAJOR DEPRESSIVE DISORDER, RECURRENT SEVERE WITHOUT PSYCHOTIC FEATURES: ICD-10-CM

## 2018-09-11 DIAGNOSIS — Z90.49 ACQUIRED ABSENCE OF OTHER SPECIFIED PARTS OF DIGESTIVE TRACT: Chronic | ICD-10-CM

## 2018-09-12 ENCOUNTER — OUTPATIENT (OUTPATIENT)
Dept: OUTPATIENT SERVICES | Facility: HOSPITAL | Age: 43
LOS: 1 days | Discharge: HOME | End: 2018-09-12

## 2018-09-12 ENCOUNTER — LABORATORY RESULT (OUTPATIENT)
Age: 43
End: 2018-09-12

## 2018-09-12 ENCOUNTER — APPOINTMENT (OUTPATIENT)
Dept: HEMATOLOGY ONCOLOGY | Facility: CLINIC | Age: 43
End: 2018-09-12

## 2018-09-12 VITALS
HEART RATE: 97 BPM | HEIGHT: 64 IN | SYSTOLIC BLOOD PRESSURE: 133 MMHG | RESPIRATION RATE: 16 BRPM | DIASTOLIC BLOOD PRESSURE: 79 MMHG | WEIGHT: 210 LBS | TEMPERATURE: 97.8 F | BODY MASS INDEX: 35.85 KG/M2

## 2018-09-12 DIAGNOSIS — Z90.49 ACQUIRED ABSENCE OF OTHER SPECIFIED PARTS OF DIGESTIVE TRACT: Chronic | ICD-10-CM

## 2018-09-12 DIAGNOSIS — Z51.11 ENCOUNTER FOR ANTINEOPLASTIC CHEMOTHERAPY: ICD-10-CM

## 2018-09-12 DIAGNOSIS — Z51.12 ENCOUNTER FOR ANTINEOPLASTIC IMMUNOTHERAPY: ICD-10-CM

## 2018-09-12 DIAGNOSIS — C34.90 MALIGNANT NEOPLASM OF UNSPECIFIED PART OF UNSPECIFIED BRONCHUS OR LUNG: ICD-10-CM

## 2018-09-12 DIAGNOSIS — C79.51 SECONDARY MALIGNANT NEOPLASM OF BONE: ICD-10-CM

## 2018-09-12 RX ORDER — ESCITALOPRAM OXALATE 20 MG/1
20 TABLET ORAL DAILY
Qty: 30 | Refills: 0 | Status: ACTIVE | COMMUNITY
Start: 1900-01-01 | End: 1900-01-01

## 2018-09-12 RX ORDER — BUPROPION HYDROCHLORIDE 300 MG/1
300 TABLET, EXTENDED RELEASE ORAL DAILY
Qty: 30 | Refills: 0 | Status: ACTIVE | COMMUNITY
Start: 1900-01-01 | End: 1900-01-01

## 2018-09-12 RX ORDER — DOCUSATE SODIUM 100 MG/1
100 CAPSULE ORAL
Refills: 0 | Status: ACTIVE | COMMUNITY

## 2018-09-12 RX ORDER — ZOLPIDEM TARTRATE 10 MG/1
10 TABLET, FILM COATED ORAL
Refills: 0 | Status: COMPLETED | COMMUNITY

## 2018-09-12 RX ORDER — NAPROXEN SODIUM 220 MG
TABLET ORAL
Refills: 0 | Status: COMPLETED | COMMUNITY

## 2018-09-13 LAB
ALBUMIN SERPL ELPH-MCNC: 4.5 G/DL
ALP BLD-CCNC: 182 U/L
ALT SERPL-CCNC: 31 U/L
ANION GAP SERPL CALC-SCNC: 23 MMOL/L
AST SERPL-CCNC: 23 U/L
BILIRUB SERPL-MCNC: 0.2 MG/DL
BUN SERPL-MCNC: 4 MG/DL
CALCIUM SERPL-MCNC: 9.6 MG/DL
CHLORIDE SERPL-SCNC: 93 MMOL/L
CO2 SERPL-SCNC: 24 MMOL/L
CREAT SERPL-MCNC: 0.9 MG/DL
GLUCOSE SERPL-MCNC: 85 MG/DL
HCT VFR BLD CALC: 35.4 %
HGB BLD-MCNC: 11.1 G/DL
MCHC RBC-ENTMCNC: 27.1 PG
MCHC RBC-ENTMCNC: 31.4 G/DL
MCV RBC AUTO: 86.3 FL
PLATELET # BLD AUTO: 365 K/UL
PMV BLD: 9.6 FL
POTASSIUM SERPL-SCNC: 4.6 MMOL/L
PROT SERPL-MCNC: 7.6 G/DL
RBC # BLD: 4.1 M/UL
RBC # FLD: 21.2 %
SODIUM SERPL-SCNC: 140 MMOL/L
T3 SERPL-MCNC: 154 NG/DL
T3FREE SERPL-MCNC: 3.98 PG/ML
T4 SERPL-MCNC: 7.2 UG/DL
TSH SERPL-ACNC: 5.04 UIU/ML
WBC # FLD AUTO: 6.21 K/UL

## 2018-09-14 ENCOUNTER — APPOINTMENT (OUTPATIENT)
Dept: INFUSION THERAPY | Facility: CLINIC | Age: 43
End: 2018-09-14

## 2018-09-14 RX ORDER — CARBOPLATIN 50 MG
730 VIAL (EA) INTRAVENOUS ONCE
Qty: 0 | Refills: 0 | Status: COMPLETED | OUTPATIENT
Start: 2018-09-14 | End: 2018-09-14

## 2018-09-14 RX ORDER — PEMBROLIZUMAB 25 MG/ML
200 INJECTION, SOLUTION INTRAVENOUS ONCE
Qty: 0 | Refills: 0 | Status: COMPLETED | OUTPATIENT
Start: 2018-09-14 | End: 2018-09-14

## 2018-09-14 RX ORDER — PREGABALIN 225 MG/1
1000 CAPSULE ORAL ONCE
Qty: 0 | Refills: 0 | Status: COMPLETED | OUTPATIENT
Start: 2018-09-14 | End: 2018-09-14

## 2018-09-14 RX ORDER — DEXAMETHASONE 0.5 MG/5ML
8 ELIXIR ORAL ONCE
Qty: 0 | Refills: 0 | Status: COMPLETED | OUTPATIENT
Start: 2018-09-14 | End: 2018-09-14

## 2018-09-14 RX ORDER — PEMETREXED 500 MG/20ML
1000 INJECTION, SOLUTION, CONCENTRATE INTRAVENOUS ONCE
Qty: 0 | Refills: 0 | Status: COMPLETED | OUTPATIENT
Start: 2018-09-14 | End: 2018-09-14

## 2018-09-14 RX ADMIN — PEMBROLIZUMAB 216 MILLIGRAM(S): 25 INJECTION, SOLUTION INTRAVENOUS at 10:47

## 2018-09-14 RX ADMIN — PEMETREXED 420 MILLIGRAM(S): 500 INJECTION, SOLUTION, CONCENTRATE INTRAVENOUS at 10:50

## 2018-09-14 RX ADMIN — Medication 176.4 MILLIGRAM(S): at 10:30

## 2018-09-14 RX ADMIN — PREGABALIN 1000 MICROGRAM(S): 225 CAPSULE ORAL at 10:47

## 2018-09-14 RX ADMIN — Medication 323 MILLIGRAM(S): at 10:50

## 2018-09-24 ENCOUNTER — RX RENEWAL (OUTPATIENT)
Age: 43
End: 2018-09-24

## 2018-10-01 ENCOUNTER — LABORATORY RESULT (OUTPATIENT)
Age: 43
End: 2018-10-01

## 2018-10-01 ENCOUNTER — APPOINTMENT (OUTPATIENT)
Dept: HEMATOLOGY ONCOLOGY | Facility: CLINIC | Age: 43
End: 2018-10-01

## 2018-10-01 ENCOUNTER — EMERGENCY (EMERGENCY)
Facility: HOSPITAL | Age: 43
LOS: 0 days | Discharge: HOME | End: 2018-10-01
Admitting: PHYSICIAN ASSISTANT

## 2018-10-01 VITALS
SYSTOLIC BLOOD PRESSURE: 124 MMHG | RESPIRATION RATE: 20 BRPM | HEART RATE: 99 BPM | DIASTOLIC BLOOD PRESSURE: 74 MMHG | OXYGEN SATURATION: 96 % | TEMPERATURE: 98 F

## 2018-10-01 DIAGNOSIS — Z79.2 LONG TERM (CURRENT) USE OF ANTIBIOTICS: ICD-10-CM

## 2018-10-01 DIAGNOSIS — Z90.49 ACQUIRED ABSENCE OF OTHER SPECIFIED PARTS OF DIGESTIVE TRACT: Chronic | ICD-10-CM

## 2018-10-01 DIAGNOSIS — Z90.49 ACQUIRED ABSENCE OF OTHER SPECIFIED PARTS OF DIGESTIVE TRACT: ICD-10-CM

## 2018-10-01 DIAGNOSIS — K08.89 OTHER SPECIFIED DISORDERS OF TEETH AND SUPPORTING STRUCTURES: ICD-10-CM

## 2018-10-01 DIAGNOSIS — Z79.891 LONG TERM (CURRENT) USE OF OPIATE ANALGESIC: ICD-10-CM

## 2018-10-01 DIAGNOSIS — Z85.118 PERSONAL HISTORY OF OTHER MALIGNANT NEOPLASM OF BRONCHUS AND LUNG: ICD-10-CM

## 2018-10-01 DIAGNOSIS — Z79.899 OTHER LONG TERM (CURRENT) DRUG THERAPY: ICD-10-CM

## 2018-10-01 DIAGNOSIS — Z88.6 ALLERGY STATUS TO ANALGESIC AGENT: ICD-10-CM

## 2018-10-01 DIAGNOSIS — R35.0 FREQUENCY OF MICTURITION: ICD-10-CM

## 2018-10-01 DIAGNOSIS — Z87.891 PERSONAL HISTORY OF NICOTINE DEPENDENCE: ICD-10-CM

## 2018-10-01 RX ORDER — NITROFURANTOIN MACROCRYSTAL 50 MG
100 CAPSULE ORAL ONCE
Qty: 0 | Refills: 0 | Status: COMPLETED | OUTPATIENT
Start: 2018-10-01 | End: 2018-10-01

## 2018-10-01 RX ORDER — NITROFURANTOIN MACROCRYSTAL 50 MG
1 CAPSULE ORAL
Qty: 10 | Refills: 0
Start: 2018-10-01 | End: 2018-10-05

## 2018-10-01 RX ADMIN — Medication 100 MILLIGRAM(S): at 09:33

## 2018-10-01 NOTE — CONSULT NOTE ADULT - SUBJECTIVE AND OBJECTIVE BOX
Patient is a 43y old  Female who presents with a chief complaint of upper right dental pain lasting 2 days and right sided facial swelling.     HPI: Patient reports that her upper right tooth cracked two weeks ago, however, the pain did not start until 2 days ago. Patient reports that she woke up this morning to the facial swelling and that she had difficulty opening her right eye.      PAST MEDICAL & SURGICAL HISTORY:  Lung cancer  Anxiety and depression  History of cholecystectomy    ( -  ) heart valve replacement  ( -  ) joint replacement  ( -  ) pregnancy    MEDICATIONS      Allergies :   dust (Sneezing; Rhinorrhea)      Intolerances:   Erythromycin Base (Vomit)      FAMILY HISTORY:  Family history of MI (myocardial infarction) (Father, Mother)  Family history of prostate cancer in father (Father)        Vital Signs Last 24 Hrs  T(C): 36.8 (01 Oct 2018 08:19), Max: 36.8 (01 Oct 2018 08:19)  T(F): 98.3 (01 Oct 2018 08:19), Max: 98.3 (01 Oct 2018 08:19)  HR: 99 (01 Oct 2018 08:19) (99 - 99)  BP: 124/74 (01 Oct 2018 08:19) (124/74 - 124/74)  BP(mean): --  RR: 20 (01 Oct 2018 08:19) (20 - 20)  SpO2: 96% (01 Oct 2018 08:19) (96% - 96%)    LABS:            Last Dental Visit: << unknown >>    EOE:  TMJ ( -  ) clicks                     ( -  ) pops                     ( -  ) crepitus             Mandible <<FROM>>             Facial bones and MOM <<grossly intact>>             ( -  ) trismus             ( -  ) lymphadenopathy             ( +  ) swelling             ( +  ) asymmetry             ( +  ) palpation             ( -  ) dyspnea             ( -  ) dysphagia             ( -  ) loss of consciousness    IOE:  <<permanent>> dentition:            <<multiple carious teeth>>             <<multiple missing teeth>>                     Caries << + >>                hard/soft palate: <<No pathology noted>>            tongue/FOM <<No pathology noted>>            labial/buccal mucosa <<No pathology noted>>           ( +  ) palpation           ( +  ) swelling            ( -  ) abscess           ( -  ) sinus tract    *DENTAL RADIOGRAPHS: 2 periapical radiographs (#4, #19)    *ASSESSMENT: Non-restorable teeth #4 and #19 require extraction.     *PLAN: Due to severity of upper right swelling, tooth #4 will be extracted today and tooth #19 will be extracted at a later visit.    PROCEDURE:   Verbal and written consent given. Risks and benefits discussed as per OS sheet dated 7/13/00. Side/Site obtained.  Anesthesia: <<  1 carpule of 4% septocaine with 1:[id number] epinephrine and 1 carpule of 3% mepivicaine used for local infiltrations.  >>   Treatment: << Tooth #4 extracted surgically using surgical handpiece, elevators, and forceps. Suture placed using resorbable 3-0 chromic gut suture material. Hemostasis achieved. Post op instructions reviewed verbally and provided written. Post op radiograph negative. Prescribed amoxicillin 500 mg 1 tab PO Q8H. >>     RECOMMENDATIONS:  1) << Take medcation as prescribed, return to [facility] dental clinic for emergency treatment of tooth #19 >>  2) Dental F/U with outpatient dentist for comprehensive dental care.   3) If any difficulty swallowing/breathing, fever occur, return to ER.     Resident Name, trisha Gallegos, [id number]

## 2018-10-01 NOTE — ED PROVIDER NOTE - PHYSICAL EXAMINATION
GEN: Alert & Oriented x 3, No acute distress. Calm, appropriate.  Head and Neck: Normocephalic, atraumatic. No cervical lymphadenopathy. Trachea midline. No thyromegaly.  ENT: Oral mucosa pink, moist without lesions. Broke tooth #2. No fluctuant mass.   RESP: Lungs clear to auscult bilat. no wheezes, rhonchi or rales. No retractions. Equal air entry.  CARDIO: regular rate and rhythm, no murmurs, rubs or gallops.  SKIN: swelling to right upper maxillary region, no increased warmth or erythema. GEN: Alert & Oriented x 3, No acute distress. Calm, appropriate.  Head and Neck: No cervical lymphadenopathy.   ENT: Oral mucosa pink, moist without lesions. Broke tooth #2. No fluctuant mass.   RESP: Lungs clear to auscult bilat. no wheezes, rhonchi or rales. No retractions. Equal air entry.  CARDIO: regular rate and rhythm, no murmurs, rubs or gallops.  ABD: Soft, nondistended. No tenderness with palpation in all 4 quadrants. No suprapubic tenderness. No CVA tenderness.   SKIN: swelling to right upper maxillary region, no increased warmth or erythema.

## 2018-10-01 NOTE — ED PROVIDER NOTE - NS ED ROS FT
GEN: (-) fever, (-) chills  HEENT: (+) dentalgia (-) HA, (-) sore throat, (-) ear pain  SKIN: (-) rashes, (-) new lesions, (+) facial swelling GEN: (-) fever, (-) chills  HEENT: (+) dentalgia (-) HA, (-) sore throat, (-) ear pain  ABD: (-) abdominal pain, (-) Nausea, (-) Vomiting, (-) Back pain  :  (+) Dysuria, (+) Frequency, (+) Urgency  SKIN: (-) rashes, (-) new lesions, (+) facial swelling

## 2018-10-01 NOTE — ED PROVIDER NOTE - OBJECTIVE STATEMENT
The patient is a 43y female with [facility] stage IV lung cancer, presenting with right upper dental pain and swelling. Patient states about 2 weeks ago she broke part of her tooth. She started developing gum pain 2 days ago and woke up last night with swelling to the right side of her face. She denies any fever/chills or discharge. The patient is a 43y female with [facility] stage IV lung cancer, presenting with right upper dental pain and swelling. Patient states about 2 weeks ago she broke part of her tooth. She started developing gum pain 2 days ago and woke up last night with swelling to the right side of her face. She denies any fever/chills or discharge. The patient is also presenting with dysuria, urgency and frequency x 5 days. She states she is taking OTC Azo, with some relief of the symptoms. Patient denies, fever/chills, abd pain, back pain, and nausea/vomiting.

## 2018-10-03 ENCOUNTER — APPOINTMENT (OUTPATIENT)
Dept: HEMATOLOGY ONCOLOGY | Facility: CLINIC | Age: 43
End: 2018-10-03

## 2018-10-03 ENCOUNTER — OUTPATIENT (OUTPATIENT)
Dept: OUTPATIENT SERVICES | Facility: HOSPITAL | Age: 43
LOS: 1 days | Discharge: HOME | End: 2018-10-03

## 2018-10-03 DIAGNOSIS — F43.22 ADJUSTMENT DISORDER WITH ANXIETY: ICD-10-CM

## 2018-10-03 DIAGNOSIS — Z90.49 ACQUIRED ABSENCE OF OTHER SPECIFIED PARTS OF DIGESTIVE TRACT: Chronic | ICD-10-CM

## 2018-10-03 PROBLEM — C34.90 MALIGNANT NEOPLASM OF UNSPECIFIED PART OF UNSPECIFIED BRONCHUS OR LUNG: Chronic | Status: ACTIVE | Noted: 2018-10-01

## 2018-10-04 ENCOUNTER — OUTPATIENT (OUTPATIENT)
Dept: OUTPATIENT SERVICES | Facility: HOSPITAL | Age: 43
LOS: 1 days | Discharge: HOME | End: 2018-10-04

## 2018-10-04 DIAGNOSIS — C34.90 MALIGNANT NEOPLASM OF UNSPECIFIED PART OF UNSPECIFIED BRONCHUS OR LUNG: ICD-10-CM

## 2018-10-04 DIAGNOSIS — Z90.49 ACQUIRED ABSENCE OF OTHER SPECIFIED PARTS OF DIGESTIVE TRACT: Chronic | ICD-10-CM

## 2018-10-04 LAB — GLUCOSE BLDC GLUCOMTR-MCNC: 91 MG/DL — SIGNIFICANT CHANGE UP (ref 70–99)

## 2018-10-05 ENCOUNTER — RX RENEWAL (OUTPATIENT)
Age: 43
End: 2018-10-05

## 2018-10-05 LAB
ALBUMIN SERPL ELPH-MCNC: 4.1 G/DL
ALP BLD-CCNC: 160 U/L
ALT SERPL-CCNC: 44 U/L
ANION GAP SERPL CALC-SCNC: 17 MMOL/L
AST SERPL-CCNC: 26 U/L
BILIRUB SERPL-MCNC: <0.2 MG/DL
BUN SERPL-MCNC: 8 MG/DL
CALCIUM SERPL-MCNC: 9.6 MG/DL
CHLORIDE SERPL-SCNC: 100 MMOL/L
CO2 SERPL-SCNC: 26 MMOL/L
CREAT SERPL-MCNC: 0.8 MG/DL
GLUCOSE SERPL-MCNC: 101 MG/DL
HCT VFR BLD CALC: 30.5 %
HGB BLD-MCNC: 9.8 G/DL
MCHC RBC-ENTMCNC: 28.4 PG
MCHC RBC-ENTMCNC: 32.1 G/DL
MCV RBC AUTO: 88.4 FL
PLATELET # BLD AUTO: 157 K/UL
PMV BLD: 9.9 FL
POTASSIUM SERPL-SCNC: 4.3 MMOL/L
PROT SERPL-MCNC: 6.7 G/DL
RBC # BLD: 3.45 M/UL
RBC # FLD: 20.2 %
SODIUM SERPL-SCNC: 143 MMOL/L
WBC # FLD AUTO: 3.2 K/UL

## 2018-10-11 ENCOUNTER — OUTPATIENT (OUTPATIENT)
Dept: OUTPATIENT SERVICES | Facility: HOSPITAL | Age: 43
LOS: 1 days | Discharge: HOME | End: 2018-10-11

## 2018-10-11 DIAGNOSIS — Z90.49 ACQUIRED ABSENCE OF OTHER SPECIFIED PARTS OF DIGESTIVE TRACT: Chronic | ICD-10-CM

## 2018-10-17 ENCOUNTER — APPOINTMENT (OUTPATIENT)
Dept: HEMATOLOGY ONCOLOGY | Facility: CLINIC | Age: 43
End: 2018-10-17

## 2018-10-17 ENCOUNTER — LABORATORY RESULT (OUTPATIENT)
Age: 43
End: 2018-10-17

## 2018-10-17 VITALS
RESPIRATION RATE: 16 BRPM | WEIGHT: 200 LBS | TEMPERATURE: 97.1 F | SYSTOLIC BLOOD PRESSURE: 126 MMHG | HEART RATE: 106 BPM | DIASTOLIC BLOOD PRESSURE: 86 MMHG | BODY MASS INDEX: 34.15 KG/M2 | HEIGHT: 64 IN

## 2018-10-18 LAB
ALBUMIN SERPL ELPH-MCNC: 4.4 G/DL
ALP BLD-CCNC: 138 U/L
ALT SERPL-CCNC: 19 U/L
ANION GAP SERPL CALC-SCNC: 16 MMOL/L
AST SERPL-CCNC: 16 U/L
BILIRUB SERPL-MCNC: <0.2 MG/DL
BUN SERPL-MCNC: 10 MG/DL
CALCIUM SERPL-MCNC: 9.6 MG/DL
CHLORIDE SERPL-SCNC: 97 MMOL/L
CO2 SERPL-SCNC: 27 MMOL/L
CREAT SERPL-MCNC: 0.9 MG/DL
GLUCOSE SERPL-MCNC: 104 MG/DL
HCT VFR BLD CALC: 33.8 %
HGB BLD-MCNC: 10.8 G/DL
MCHC RBC-ENTMCNC: 28.8 PG
MCHC RBC-ENTMCNC: 32 G/DL
MCV RBC AUTO: 90.1 FL
PLATELET # BLD AUTO: 207 K/UL
PMV BLD: 9.8 FL
POTASSIUM SERPL-SCNC: 5.1 MMOL/L
PROT SERPL-MCNC: 7.1 G/DL
RBC # BLD: 3.75 M/UL
RBC # FLD: 19.2 %
SODIUM SERPL-SCNC: 140 MMOL/L
TSH SERPL-ACNC: 3.34 UIU/ML
WBC # FLD AUTO: 5.21 K/UL

## 2018-10-19 ENCOUNTER — APPOINTMENT (OUTPATIENT)
Dept: INFUSION THERAPY | Facility: CLINIC | Age: 43
End: 2018-10-19

## 2018-10-19 RX ORDER — PEMBROLIZUMAB 25 MG/ML
200 INJECTION, SOLUTION INTRAVENOUS ONCE
Qty: 0 | Refills: 0 | Status: COMPLETED | OUTPATIENT
Start: 2018-10-19 | End: 2018-10-19

## 2018-10-19 RX ADMIN — PEMBROLIZUMAB 216 MILLIGRAM(S): 25 INJECTION, SOLUTION INTRAVENOUS at 15:57

## 2018-10-25 ENCOUNTER — RX RENEWAL (OUTPATIENT)
Age: 43
End: 2018-10-25

## 2018-10-25 LAB
ESTRADIOL SERPL-MCNC: 23 PG/ML
FSH SERPL-MCNC: 66.2 IU/L
HCG SERPL-MCNC: 9.3 MIU/ML
LH SERPL-ACNC: 63 IU/L

## 2018-11-08 ENCOUNTER — APPOINTMENT (OUTPATIENT)
Dept: INFUSION THERAPY | Facility: CLINIC | Age: 43
End: 2018-11-08

## 2018-11-08 ENCOUNTER — APPOINTMENT (OUTPATIENT)
Dept: HEMATOLOGY ONCOLOGY | Facility: CLINIC | Age: 43
End: 2018-11-08

## 2018-11-14 ENCOUNTER — LABORATORY RESULT (OUTPATIENT)
Age: 43
End: 2018-11-14

## 2018-11-14 ENCOUNTER — APPOINTMENT (OUTPATIENT)
Dept: HEMATOLOGY ONCOLOGY | Facility: CLINIC | Age: 43
End: 2018-11-14

## 2018-11-14 ENCOUNTER — APPOINTMENT (OUTPATIENT)
Dept: INFUSION THERAPY | Facility: CLINIC | Age: 43
End: 2018-11-14

## 2018-11-14 VITALS
BODY MASS INDEX: 34.15 KG/M2 | HEIGHT: 64 IN | HEART RATE: 104 BPM | WEIGHT: 200 LBS | SYSTOLIC BLOOD PRESSURE: 146 MMHG | TEMPERATURE: 99.3 F | RESPIRATION RATE: 16 BRPM | DIASTOLIC BLOOD PRESSURE: 74 MMHG

## 2018-11-14 RX ORDER — PEMBROLIZUMAB 25 MG/ML
200 INJECTION, SOLUTION INTRAVENOUS ONCE
Qty: 0 | Refills: 0 | Status: COMPLETED | OUTPATIENT
Start: 2018-11-14 | End: 2018-11-14

## 2018-11-14 RX ADMIN — PEMBROLIZUMAB 216 MILLIGRAM(S): 25 INJECTION, SOLUTION INTRAVENOUS at 16:07

## 2018-11-14 NOTE — HISTORY OF PRESENT ILLNESS
[de-identified] : Анна is a lewis 26 yo lady with past medical history significant for anxiety and depression, who has significant smoking history who presented to [facility] on 6/26/2018 with CC of leg pain. She also on further questioning revealed that she has had non-productive cough without associated SOB or CP for approximately 3 months prior. She denies any weight loss or neurological symptoms prior to presentation. CXR reveled LUIS opacity, CT scan of the chest was then done. Based on it's results she was transferred to [place], further work up was as follows. \par \par CT chest on 6/26/2018 revealed Since November 24, 2015:  1.  There is a new large left upper lobe mass measuring 6.8 x 5.7 x 7 cm  extending to mediastinum with multiple mediastinal lymph nodes as  described above. Additionally, there are multiple new hypodense hepatic  metastatic lesions. 2.  4 mm left upper lobe pulmonary nodule, metastatic etiology\par \par \par \par CT A/P on 6/27/2018 revealed Multifocal liver masses consistent with metastatic disease.  Ill-defined 0.7 cm splenic hypodensity suspicious for a metastatic focus.  Multifocal bilateral renal hypoattenuating lesions measuring up to 1.1 cm  in the right interpolar region. Cannot exclude metastasis disease within  the kidneys.\par \par MRI Brain on 6/27/2018 reveled Single enhancing nodule/lesion within a sulcus adjacent to the right  occipital lobe measuring 4 x 4 x 5 mm. Findings highly suspicious for  metastatic disease. Follow-up to 6 most may be helpful for further  evaluation if clinically indicated.\par \par Bone scan on 6/28/2018 revealed Multiple definite bony abnormalities which by pattern of distribution are  consistent with metastatic bone disease.  These include bilateral pubic inferior rami, left superior pubic ramus,  bilateral iliac bones and left 4th costovertebral junction.  Abnormal uptake corresponding to left lung index tumor.\par \par On 6/28/2018 she underwent liver biopsy, not enough tissue for extended molecular testing was obtained, it revealed adenocarcinoma of the lung with 5% PDL1 expression. \par Repeat liver biopsy on 7/2/2018 confirmed the diagnosis of lung adenocarcinoma with negative PDL1 expression, negative EGFR, Alk, BRAF and KRAS. \par \par She received first dose of Carbo/Alimta in the hospital on 7/6/2018 and tolerated it very well.  [de-identified] : 8/15/2018;Анна presents for follow up today. She has tolerated 2nd cycle of Carbo/Alimta with addition of Keytruda without any difficulty. She reports ongoing pain in her R hip, she states narcotics are helpful but she may require a dose increase. She is interested in Rad Onc evaluation. She reports she needs dental appointment and may require some dental work. Will hold off on Keytruda for now. She otherwise offer no complaints today. \par \par 9/12/2018: Анна is doing great, still c/o some back pain, requiring pain meds. Denies SOB, WALKER, headaches, weight loss. She is for cycle 4 of Carbo/Alimta/Keytruda on 9/14/2018. She has met with Dr. Gresham and has her appointment for simulation later today. PET CT was not done yet. \par \par 10/17/2018: Анна presents for follow up today, she is feeling well, has no complaints, she is still requiring pain medication for cancer related pain management. Restaging PET CT on 10/4/2018 reveals  FDG avid left upper lobe hilar 5.6 x 4.1 cm mass, decreased in size  compared to chest CT June 2018 (previously 7.0 x 6.8 cm)., max SUV 33.5.   Left hilar FDG avid lymph node, max SUV 8.9 (image 194).  Suspicious FDG avid 1.4 cm right level 2 cervical lymph node, max SUV  20.3 (image 239). Consider tissue sampling.  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  No current FDG evidence of osseous metastatic disease.\par Images were personally reviewed by myself and discussed with Анна and her boyfriend at length. \par The concerning LN in the R neck was never imaged prior, this is not likely representative of disease progression, overall PET CT is indicative of positive response to therapy. We will continue with single agent Keytruda. Анна reports no side effects of Keytruda to date. \par Анна also reports loss of menstrual periods, will check beta HCG and LH, FSH and estradiol today. She was advised on barrier protection during intercourse. \par \par 11/14/2018: Анна feels well today and reports no complaints, back pain has improved, she was not taking long acting pain meds for 2 weeks. She has missed her Keytruda appointment last week. I have reiterated the importance of compliance with therapy. I have again spoke with Анна craig importance of safe sex. She has the script for brain MRI  but she has not done the study just yet she will schedule.

## 2018-11-14 NOTE — ASSESSMENT
[FreeTextEntry1] : Metastatic adenocarcinoma of the lung, PDL1 5%, EGFT, Alk, BRAF, KRAS, ROS1 negative\par --Recieved cycle 1 of Carbo Alimta in the hospital on 7/6/2018-\par --Recieved cycle 2 on 7/27/2018 with Keytruda, no adverse effects to date\par --Started on B12 7/2018 \par --Taking folic acid daily \par --Reports she may require some dental work, will be in touch with dentist re Xgeva clearance\par --Small brain lesion as hip pain, following with rad onc\par --Follow up MRI brain is due, ordered but not scheduled yet \par --Next generation sequencing revealed no treatable mutations\par --Social work following re transportation and disability \par --Restaging PET CT on 10/9/2018 reveals positive response to therapy \par --Completed 4th cycle of Carbo/Alimta/Keytruda on 9/14/2018\par --Deescalate to single agent Keytruda, she has missed an appointment prior to this and is 1 week delayed for this cycle, importance of compliance was discussed \par \par Cancer related pain \par --Requiring OxyContin 30 BID\par --Oxycodone 10mg for breakthrough\par --Following with Rad Onc \par \par Insomnia\par --Ambien\par \par Follow up in 3 weeks, all questions were answered at length\par I have explained to her that her disease unfortunately will not be cured, all therapeutic effort is palliative in nature\par \par \par

## 2018-11-15 LAB
ESTRADIOL SERPL-MCNC: 18 PG/ML
FSH SERPL-MCNC: 44.6 IU/L
HCG SERPL-MCNC: 2.8 MIU/ML
HCT VFR BLD CALC: 35.7 %
HGB BLD-MCNC: 11.3 G/DL
LH SERPL-ACNC: 43.1 IU/L
MCHC RBC-ENTMCNC: 28.5 PG
MCHC RBC-ENTMCNC: 31.7 G/DL
MCV RBC AUTO: 89.9 FL
PLATELET # BLD AUTO: 241 K/UL
PMV BLD: 9.8 FL
RBC # BLD: 3.97 M/UL
RBC # FLD: 14.5 %
WBC # FLD AUTO: 6.23 K/UL

## 2018-11-21 ENCOUNTER — OUTPATIENT (OUTPATIENT)
Dept: OUTPATIENT SERVICES | Facility: HOSPITAL | Age: 43
LOS: 1 days | Discharge: HOME | End: 2018-11-21

## 2018-11-21 DIAGNOSIS — F43.22 ADJUSTMENT DISORDER WITH ANXIETY: ICD-10-CM

## 2018-11-21 DIAGNOSIS — Z90.49 ACQUIRED ABSENCE OF OTHER SPECIFIED PARTS OF DIGESTIVE TRACT: Chronic | ICD-10-CM

## 2018-12-06 ENCOUNTER — LABORATORY RESULT (OUTPATIENT)
Age: 43
End: 2018-12-06

## 2018-12-06 ENCOUNTER — APPOINTMENT (OUTPATIENT)
Dept: INFUSION THERAPY | Facility: CLINIC | Age: 43
End: 2018-12-06

## 2018-12-06 ENCOUNTER — APPOINTMENT (OUTPATIENT)
Dept: HEMATOLOGY ONCOLOGY | Facility: CLINIC | Age: 43
End: 2018-12-06

## 2018-12-06 VITALS
HEART RATE: 85 BPM | SYSTOLIC BLOOD PRESSURE: 125 MMHG | TEMPERATURE: 98.3 F | RESPIRATION RATE: 16 BRPM | HEIGHT: 64 IN | BODY MASS INDEX: 34.49 KG/M2 | DIASTOLIC BLOOD PRESSURE: 67 MMHG | WEIGHT: 202 LBS

## 2018-12-06 RX ORDER — PEMBROLIZUMAB 25 MG/ML
200 INJECTION, SOLUTION INTRAVENOUS ONCE
Qty: 0 | Refills: 0 | Status: COMPLETED | OUTPATIENT
Start: 2018-12-06 | End: 2018-12-06

## 2018-12-06 RX ADMIN — PEMBROLIZUMAB 216 MILLIGRAM(S): 25 INJECTION, SOLUTION INTRAVENOUS at 16:30

## 2018-12-07 ENCOUNTER — RX RENEWAL (OUTPATIENT)
Age: 43
End: 2018-12-07

## 2018-12-07 LAB
ALBUMIN SERPL ELPH-MCNC: 4 G/DL
ALP BLD-CCNC: 137 U/L
ALT SERPL-CCNC: 17 U/L
ANION GAP SERPL CALC-SCNC: 15 MMOL/L
APTT BLD: 34 SEC
AST SERPL-CCNC: 19 U/L
BILIRUB SERPL-MCNC: 0.3 MG/DL
BUN SERPL-MCNC: 8 MG/DL
CALCIUM SERPL-MCNC: 9.5 MG/DL
CHLORIDE SERPL-SCNC: 93 MMOL/L
CO2 SERPL-SCNC: 29 MMOL/L
CREAT SERPL-MCNC: 0.9 MG/DL
GLUCOSE SERPL-MCNC: 80 MG/DL
HCT VFR BLD CALC: 35.3 %
HGB BLD-MCNC: 11.2 G/DL
INR PPP: 1.04 RATIO
MCHC RBC-ENTMCNC: 27.7 PG
MCHC RBC-ENTMCNC: 31.7 G/DL
MCV RBC AUTO: 87.4 FL
PLATELET # BLD AUTO: 237 K/UL
PMV BLD: 9.4 FL
POTASSIUM SERPL-SCNC: 4.7 MMOL/L
PROT SERPL-MCNC: 7 G/DL
PT BLD: 11.9 SEC
RBC # BLD: 4.04 M/UL
RBC # FLD: 14 %
SODIUM SERPL-SCNC: 137 MMOL/L
WBC # FLD AUTO: 5.68 K/UL

## 2018-12-16 ENCOUNTER — OUTPATIENT (OUTPATIENT)
Dept: OUTPATIENT SERVICES | Facility: HOSPITAL | Age: 43
LOS: 1 days | Discharge: HOME | End: 2018-12-16

## 2018-12-16 DIAGNOSIS — Z90.49 ACQUIRED ABSENCE OF OTHER SPECIFIED PARTS OF DIGESTIVE TRACT: Chronic | ICD-10-CM

## 2018-12-16 DIAGNOSIS — C34.90 MALIGNANT NEOPLASM OF UNSPECIFIED PART OF UNSPECIFIED BRONCHUS OR LUNG: ICD-10-CM

## 2018-12-17 ENCOUNTER — RX RENEWAL (OUTPATIENT)
Age: 43
End: 2018-12-17

## 2018-12-18 ENCOUNTER — RESULT REVIEW (OUTPATIENT)
Age: 43
End: 2018-12-18

## 2018-12-18 ENCOUNTER — OUTPATIENT (OUTPATIENT)
Dept: OUTPATIENT SERVICES | Facility: HOSPITAL | Age: 43
LOS: 1 days | Discharge: HOME | End: 2018-12-18

## 2018-12-18 DIAGNOSIS — Z90.49 ACQUIRED ABSENCE OF OTHER SPECIFIED PARTS OF DIGESTIVE TRACT: Chronic | ICD-10-CM

## 2018-12-18 NOTE — PROGRESS NOTE ADULT - SUBJECTIVE AND OBJECTIVE BOX
INTERVENTIONAL RADIOLOGY BRIEF-OPERATIVE NOTE    Procedure: image guided biopsy of right neck mass    Pre-Op Diagnosis: right neck mass, hx of lung adenoCA    Post-Op Diagnosis: right neck mass, hx of lung adenoCA    Attending: Angel Johnson MD  Resident: Sadaf Bains MD, Ben Bruce MD    Anesthesia (type):  [ ] General Anesthesia  [ ] Sedation  [ ] Spinal Anesthesia  [x] Local/Regional 5 mL 1% lidocaine     Contrast: none    Estimated Blood Loss: < 5mL    Condition:   [ ] Critical  [ ] Serious  [ ] Fair   [x] Good    Findings/Follow up Plan of Care:    Specimens Removed: 22g FNA performed with 22g ACHIEVE biopsy x 2 and 18g ACHIEVE x2    Implants: None    Complications: None    Disposition: Samples to pathology; d/c patient home.    Please call Interventional Radiology [phone] with any questions, concerns, or issues.

## 2018-12-18 NOTE — PROGRESS NOTE ADULT - SUBJECTIVE AND OBJECTIVE BOX
Interventional Radiology Outpatient Documentation    PREOPERATIVE DAY OF PROCEDURE EVALUATION:     I have personally seen and examined this patient. I agree with the history and physical which I have reviewed and noted any changes below:     Plan is for image guided biopsy of right neck     Procedure/ risks/ benefits/ goals/ alternatives were explained. All questions answered. Informed content obtained from patient. Consent placed in chart.

## 2018-12-19 ENCOUNTER — OUTPATIENT (OUTPATIENT)
Dept: OUTPATIENT SERVICES | Facility: HOSPITAL | Age: 43
LOS: 1 days | Discharge: HOME | End: 2018-12-19

## 2018-12-19 ENCOUNTER — MEDICATION RENEWAL (OUTPATIENT)
Age: 43
End: 2018-12-19

## 2018-12-19 DIAGNOSIS — C34.90 MALIGNANT NEOPLASM OF UNSPECIFIED PART OF UNSPECIFIED BRONCHUS OR LUNG: ICD-10-CM

## 2018-12-19 DIAGNOSIS — Z90.49 ACQUIRED ABSENCE OF OTHER SPECIFIED PARTS OF DIGESTIVE TRACT: Chronic | ICD-10-CM

## 2018-12-19 LAB — GLUCOSE BLDC GLUCOMTR-MCNC: 110 MG/DL — HIGH (ref 70–99)

## 2018-12-20 LAB — NON-GYNECOLOGICAL CYTOLOGY STUDY: SIGNIFICANT CHANGE UP

## 2018-12-24 DIAGNOSIS — C34.90 MALIGNANT NEOPLASM OF UNSPECIFIED PART OF UNSPECIFIED BRONCHUS OR LUNG: ICD-10-CM

## 2018-12-24 DIAGNOSIS — R22.1 LOCALIZED SWELLING, MASS AND LUMP, NECK: ICD-10-CM

## 2018-12-24 DIAGNOSIS — C77.0 SECONDARY AND UNSPECIFIED MALIGNANT NEOPLASM OF LYMPH NODES OF HEAD, FACE AND NECK: ICD-10-CM

## 2018-12-24 DIAGNOSIS — Z88.1 ALLERGY STATUS TO OTHER ANTIBIOTIC AGENTS STATUS: ICD-10-CM

## 2018-12-26 ENCOUNTER — APPOINTMENT (OUTPATIENT)
Dept: INFUSION THERAPY | Facility: CLINIC | Age: 43
End: 2018-12-26

## 2018-12-26 ENCOUNTER — LABORATORY RESULT (OUTPATIENT)
Age: 43
End: 2018-12-26

## 2018-12-26 ENCOUNTER — APPOINTMENT (OUTPATIENT)
Dept: HEMATOLOGY ONCOLOGY | Facility: CLINIC | Age: 43
End: 2018-12-26

## 2018-12-26 ENCOUNTER — RX RENEWAL (OUTPATIENT)
Age: 43
End: 2018-12-26

## 2018-12-26 VITALS
HEART RATE: 95 BPM | HEIGHT: 64 IN | SYSTOLIC BLOOD PRESSURE: 135 MMHG | WEIGHT: 203 LBS | TEMPERATURE: 98 F | BODY MASS INDEX: 34.66 KG/M2 | DIASTOLIC BLOOD PRESSURE: 84 MMHG | RESPIRATION RATE: 14 BRPM

## 2018-12-26 LAB
ALBUMIN SERPL ELPH-MCNC: 4.4 G/DL
ALP BLD-CCNC: 135 U/L
ALT SERPL-CCNC: 36 U/L
ANION GAP SERPL CALC-SCNC: 19 MMOL/L
AST SERPL-CCNC: 22 U/L
BILIRUB SERPL-MCNC: <0.2 MG/DL
BUN SERPL-MCNC: 17 MG/DL
CALCIUM SERPL-MCNC: 9.6 MG/DL
CHLORIDE SERPL-SCNC: 92 MMOL/L
CO2 SERPL-SCNC: 29 MMOL/L
CREAT SERPL-MCNC: 0.8 MG/DL
GLUCOSE SERPL-MCNC: 99 MG/DL
HCG UR QL: NEGATIVE — SIGNIFICANT CHANGE UP
HCT VFR BLD CALC: 37.9 %
HGB BLD-MCNC: 12 G/DL
MAGNESIUM SERPL-MCNC: 2.5 MG/DL
MCHC RBC-ENTMCNC: 26.5 PG
MCHC RBC-ENTMCNC: 31.7 G/DL
MCV RBC AUTO: 83.8 FL
PLATELET # BLD AUTO: 295 K/UL
PMV BLD: 9.8 FL
POTASSIUM SERPL-SCNC: 4.1 MMOL/L
PROT SERPL-MCNC: 7.7 G/DL
RBC # BLD: 4.52 M/UL
RBC # FLD: 14.6 %
SODIUM SERPL-SCNC: 140 MMOL/L
WBC # FLD AUTO: 10.33 K/UL

## 2018-12-26 RX ORDER — DEXAMETHASONE 0.5 MG/5ML
12 ELIXIR ORAL ONCE
Qty: 0 | Refills: 0 | Status: COMPLETED | OUTPATIENT
Start: 2018-12-26 | End: 2018-12-26

## 2018-12-26 RX ORDER — DOCETAXEL 20 MG/ML
150 INJECTION, SOLUTION, CONCENTRATE INTRAVENOUS ONCE
Qty: 0 | Refills: 0 | Status: COMPLETED | OUTPATIENT
Start: 2018-12-26 | End: 2018-12-26

## 2018-12-26 RX ORDER — FOSAPREPITANT DIMEGLUMINE 150 MG/5ML
150 INJECTION, POWDER, LYOPHILIZED, FOR SOLUTION INTRAVENOUS ONCE
Qty: 0 | Refills: 0 | Status: COMPLETED | OUTPATIENT
Start: 2018-12-26 | End: 2018-12-26

## 2018-12-26 RX ADMIN — DOCETAXEL 128.75 MILLIGRAM(S): 20 INJECTION, SOLUTION, CONCENTRATE INTRAVENOUS at 16:53

## 2018-12-26 RX ADMIN — Medication 183 MILLIGRAM(S): at 16:18

## 2018-12-26 RX ADMIN — FOSAPREPITANT DIMEGLUMINE 450 MILLIGRAM(S): 150 INJECTION, POWDER, LYOPHILIZED, FOR SOLUTION INTRAVENOUS at 16:18

## 2018-12-26 NOTE — PHYSICAL EXAM
Telephone Encounter by Stevenson Hilliard RN at 05/30/18 04:04 PM     Author:  Stevenson Hilliard RN Service:  (none) Author Type:  Registered Nurse     Filed:  05/30/18 04:05 PM Encounter Date:  5/29/2018 Status:  Signed     :  Stevenson Hilliard RN (Registered Nurse)            Patient scheduled with Hui on 6/4/18 at 9:30am at [facility].  Booked in IDX.[YP1.1M]      Revision History        User Key Date/Time User Provider Type Action    > YP1.1 05/30/18 04:05 PM Stevenson Hilliard, RN Registered Nurse Sign    M - Manual             [Restricted in physically strenuous activity but ambulatory and able to carry out work of a light or sedentary nature] : Status 1- Restricted in physically strenuous activity but ambulatory and able to carry out work of a light or sedentary nature, e.g., light house work, office work [Normal] : affect appropriate

## 2018-12-26 NOTE — HISTORY OF PRESENT ILLNESS
[de-identified] : Анна is a lewis 24 yo lady with past medical history significant for anxiety and depression, who has significant smoking history who presented to [facility] on 6/26/2018 with CC of leg pain. She also on further questioning revealed that she has had non-productive cough without associated SOB or CP for approximately 3 months prior. She denies any weight loss or neurological symptoms prior to presentation. CXR reveled LUIS opacity, CT scan of the chest was then done. Based on it's results she was transferred to [place], further work up was as follows. \par \par CT chest on 6/26/2018 revealed Since November 24, 2015:  1.  There is a new large left upper lobe mass measuring 6.8 x 5.7 x 7 cm  extending to mediastinum with multiple mediastinal lymph nodes as  described above. Additionally, there are multiple new hypodense hepatic  metastatic lesions. 2.  4 mm left upper lobe pulmonary nodule, metastatic etiology\par \par \par \par CT A/P on 6/27/2018 revealed Multifocal liver masses consistent with metastatic disease.  Ill-defined 0.7 cm splenic hypodensity suspicious for a metastatic focus.  Multifocal bilateral renal hypoattenuating lesions measuring up to 1.1 cm  in the right interpolar region. Cannot exclude metastasis disease within  the kidneys.\par \par MRI Brain on 6/27/2018 reveled Single enhancing nodule/lesion within a sulcus adjacent to the right  occipital lobe measuring 4 x 4 x 5 mm. Findings highly suspicious for  metastatic disease. Follow-up to 6 most may be helpful for further  evaluation if clinically indicated.\par \par Bone scan on 6/28/2018 revealed Multiple definite bony abnormalities which by pattern of distribution are  consistent with metastatic bone disease.  These include bilateral pubic inferior rami, left superior pubic ramus,  bilateral iliac bones and left 4th costovertebral junction.  Abnormal uptake corresponding to left lung index tumor.\par \par On 6/28/2018 she underwent liver biopsy, not enough tissue for extended molecular testing was obtained, it revealed adenocarcinoma of the lung with 5% PDL1 expression. \par Repeat liver biopsy on 7/2/2018 confirmed the diagnosis of lung adenocarcinoma with negative PDL1 expression, negative EGFR, Alk, BRAF and KRAS. \par \par She received first dose of Carbo/Alimta in the hospital on 7/6/2018 and tolerated it very well.  [de-identified] : 8/15/2018;Анна presents for follow up today. She has tolerated 2nd cycle of Carbo/Alimta with addition of Keytruda without any difficulty. She reports ongoing pain in her R hip, she states narcotics are helpful but she may require a dose increase. She is interested in Rad Onc evaluation. She reports she needs dental appointment and may require some dental work. Will hold off on Keytruda for now. She otherwise offer no complaints today. \par \par 9/12/2018: Анна is doing great, still c/o some back pain, requiring pain meds. Denies SOB, WALKER, headaches, weight loss. She is for cycle 4 of Carbo/Alimta/Keytruda on 9/14/2018. She has met with Dr. Gresham and has her appointment for simulation later today. PET CT was not done yet. \par \par 10/17/2018: Анна presents for follow up today, she is feeling well, has no complaints, she is still requiring pain medication for cancer related pain management. Restaging PET CT on 10/4/2018 reveals  FDG avid left upper lobe hilar 5.6 x 4.1 cm mass, decreased in size  compared to chest CT June 2018 (previously 7.0 x 6.8 cm)., max SUV 33.5.   Left hilar FDG avid lymph node, max SUV 8.9 (image 194).  Suspicious FDG avid 1.4 cm right level 2 cervical lymph node, max SUV  20.3 (image 239). Consider tissue sampling.  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  No current FDG evidence of osseous metastatic disease.\par Images were personally reviewed by myself and discussed with Анна and her boyfriend at length. \par The concerning LN in the R neck was never imaged prior, this is not likely representative of disease progression, overall PET CT is indicative of positive response to therapy. We will continue with single agent Keytruda. Анна reports no side effects of Keytruda to date. \par Анна also reports loss of menstrual periods, will check beta HCG and LH, FSH and estradiol today. She was advised on barrier protection during intercourse. \par \par 11/14/2018: Анна feels well today and reports no complaints, back pain has improved, she was not taking long acting pain meds for 2 weeks. She has missed her Keytruda appointment last week. I have reiterated the importance of compliance with therapy. I have again spoke with Анна re importance of safe sex. She has the script for brain MRI  but she has not done the study just yet she will schedule.\par \par 12/5/2018: Анна feels well today, he reports no systemic side effects to therapy, she reports that she noticed that the righ side of her neck is swollen and has been swollen for about 2-3 weeks. On exam this is finding is consistent with worsening adenopathy in the area where FDG avid node was described prior. i have expressed to Анна that I am concerned of possibility of progression. I advise that she undergoes prompt restaging including PET CT and MRI of the brain as well as R neck biopsy. She will receive Keytruda today. \par \par 12/26/2018: Анна continues to feel well, the lump in her right neck has become slightly smaller. She has completed full restaging. MRI of the brain done on 12/17/2018 that revealed  In comparison with the prior MRI of the brain dated June 27, 2018:  The previously described lesion in the focus adjacent to the right  occipital lobe is not identified and likely represented artifact.  Interval development of a ring-enhancing lesion (0.7 x 0.8 x 0.7 cm) in  the anterior inferior aspect of the right frontal lobe consistent with  cerebral metastasis. There is a moderate amount of surrounding edema. She remains asymptomatic. She was since started on Decadron 2mg BID daily and was referred back to Dr. Gresham, she was mapped for SRS of the brain lesion this AM. \par PET CT on 12/19/2018 revealed  COMPARISON : 10/4/2018.  4 new sites of pathologic FDG uptake in the abdomen and pelvis consistent  with biologic tumor activity.  Fluctuating SUV values within the head and neck and thorax without any  new sites of disease. Incidentally, the primary mass shows a 21% decrease  in SUV max, now 26.3 but is anatomically increased in size measuring 6.3  x 5.5 cm, previously 4.3 x 3.8 cm. \par R neck biopsy on 12/18/2018 revealed metastatic adenocarcinoma. Next generation sequencing was again requested. \par All images were personally reviewed by myself and discussed with patient. \par She has completed carbo/alimta not even 3 months back, i have explained to her that switch of therapy is warranted in my opinion. I will offer her Taxotere with Cyramza, Cyramza will be administered with cycle 2 2/2 scheduling issues. \par Side effects of therapy were discussed at length, including cytopenias, allergic reactions, skin rashes, neuropathy, VTE, perforation to GI tract, prolinuria, elevated BP. \par She is in agreement to proceed ASAP.

## 2018-12-26 NOTE — REVIEW OF SYSTEMS
[Negative] : Allergic/Immunologic [FreeTextEntry4] : right neck RICHARD, appx 2.5cm in size [FreeTextEntry9] : low back/hip pain has mostly resolved

## 2018-12-26 NOTE — HISTORY OF PRESENT ILLNESS
[de-identified] : Анна is a lewis 26 yo lady with past medical history significant for anxiety and depression, who has significant smoking history who presented to [facility] on 6/26/2018 with CC of leg pain. She also on further questioning revealed that she has had non-productive cough without associated SOB or CP for approximately 3 months prior. She denies any weight loss or neurological symptoms prior to presentation. CXR reveled LUIS opacity, CT scan of the chest was then done. Based on it's results she was transferred to [place], further work up was as follows. \par \par CT chest on 6/26/2018 revealed Since November 24, 2015:  1.  There is a new large left upper lobe mass measuring 6.8 x 5.7 x 7 cm  extending to mediastinum with multiple mediastinal lymph nodes as  described above. Additionally, there are multiple new hypodense hepatic  metastatic lesions. 2.  4 mm left upper lobe pulmonary nodule, metastatic etiology\par \par \par \par CT A/P on 6/27/2018 revealed Multifocal liver masses consistent with metastatic disease.  Ill-defined 0.7 cm splenic hypodensity suspicious for a metastatic focus.  Multifocal bilateral renal hypoattenuating lesions measuring up to 1.1 cm  in the right interpolar region. Cannot exclude metastasis disease within  the kidneys.\par \par MRI Brain on 6/27/2018 reveled Single enhancing nodule/lesion within a sulcus adjacent to the right  occipital lobe measuring 4 x 4 x 5 mm. Findings highly suspicious for  metastatic disease. Follow-up to 6 most may be helpful for further  evaluation if clinically indicated.\par \par Bone scan on 6/28/2018 revealed Multiple definite bony abnormalities which by pattern of distribution are  consistent with metastatic bone disease.  These include bilateral pubic inferior rami, left superior pubic ramus,  bilateral iliac bones and left 4th costovertebral junction.  Abnormal uptake corresponding to left lung index tumor.\par \par On 6/28/2018 she underwent liver biopsy, not enough tissue for extended molecular testing was obtained, it revealed adenocarcinoma of the lung with 5% PDL1 expression. \par Repeat liver biopsy on 7/2/2018 confirmed the diagnosis of lung adenocarcinoma with negative PDL1 expression, negative EGFR, Alk, BRAF and KRAS. \par \par She received first dose of Carbo/Alimta in the hospital on 7/6/2018 and tolerated it very well.  [de-identified] : 8/15/2018;Анна presents for follow up today. She has tolerated 2nd cycle of Carbo/Alimta with addition of Keytruda without any difficulty. She reports ongoing pain in her R hip, she states narcotics are helpful but she may require a dose increase. She is interested in Rad Onc evaluation. She reports she needs dental appointment and may require some dental work. Will hold off on Keytruda for now. She otherwise offer no complaints today. \par \par 9/12/2018: Анна is doing great, still c/o some back pain, requiring pain meds. Denies SOB, WALKER, headaches, weight loss. She is for cycle 4 of Carbo/Alimta/Keytruda on 9/14/2018. She has met with Dr. Gresham and has her appointment for simulation later today. PET CT was not done yet. \par \par 10/17/2018: Анна presents for follow up today, she is feeling well, has no complaints, she is still requiring pain medication for cancer related pain management. Restaging PET CT on 10/4/2018 reveals  FDG avid left upper lobe hilar 5.6 x 4.1 cm mass, decreased in size  compared to chest CT June 2018 (previously 7.0 x 6.8 cm)., max SUV 33.5.   Left hilar FDG avid lymph node, max SUV 8.9 (image 194).  Suspicious FDG avid 1.4 cm right level 2 cervical lymph node, max SUV  20.3 (image 239). Consider tissue sampling.  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  No current FDG evidence of osseous metastatic disease.\par Images were personally reviewed by myself and discussed with Анна and her boyfriend at length. \par The concerning LN in the R neck was never imaged prior, this is not likely representative of disease progression, overall PET CT is indicative of positive response to therapy. We will continue with single agent Keytruda. Анна reports no side effects of Keytruda to date. \par Анна also reports loss of menstrual periods, will check beta HCG and LH, FSH and estradiol today. She was advised on barrier protection during intercourse. \par \par 11/14/2018: Анна feels well today and reports no complaints, back pain has improved, she was not taking long acting pain meds for 2 weeks. She has missed her Keytruda appointment last week. I have reiterated the importance of compliance with therapy. I have again spoke with Анна re importance of safe sex. She has the script for brain MRI  but she has not done the study just yet she will schedule.\par \par 12/5/2018: Анна feels well today, he reports no systemic side effects to therapy, she reports that she noticed that the righ side of her neck is swollen and has been swollen for about 2-3 weeks. On exam this is finding is consistent with worsening adenopathy in the area where FDG avid node was described prior. i have expressed to Анна that I am concerned of possibility of progression. I advise that she undergoes prompt restaging including PET CT and MRI of the brain as well as R neck biopsy. She will receive Keytruda today.

## 2018-12-26 NOTE — ASSESSMENT
[FreeTextEntry1] : Metastatic adenocarcinoma of the lung, PDL1 5%, EGFT, Alk, BRAF, KRAS, ROS1 negative\par --Recieved cycle 1 of Carbo Alimta in the hospital on 7/6/2018-\par --Recieved cycle 2 on 7/27/2018 with Keytruda, no adverse effects to date\par --Started on B12 7/2018 \par --Taking folic acid daily \par --Reports she may require some dental work, will be in touch with dentist re Xgeva clearance\par --Small brain lesion as hip pain, following with rad onc\par --Follow up MRI brain is due, ordered but not scheduled yet, urged to obtain ASAP\par --Next generation sequencing revealed no targetable mutations\par --Restaging PET CT on 10/9/2018 reveals positive response to therapy \par --Completed 4th cycle of Carbo/Alimta/Keytruda on 9/14/2018\par --Deescalated to single agent Keytruda on 10/19/2018, she has missed appointments for few doses\par --PET on 12/19 with evidence of progression, R neck biopsy on 12/18/2018 confirming met adenoca\par --Switch therapy to Taxotere/Cyramza on 12/19/2018, Cyramza to be added to cycle 2, side effects were discussed at length\par --[place] [facility] [place] \par 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

## 2018-12-26 NOTE — ASSESSMENT
[FreeTextEntry1] : Metastatic adenocarcinoma of the lung, PDL1 5%, EGFT, Alk, BRAF, KRAS, ROS1 negative\par --Recieved cycle 1 of Carbo Alimta in the hospital on 7/6/2018-\par --Recieved cycle 2 on 7/27/2018 with Keytruda, no adverse effects to date\par --Started on B12 7/2018 \par --Taking folic acid daily \par --Reports she may require some dental work, will be in touch with dentist re Xgeva clearance\par --Small brain lesion as hip pain, following with rad onc\par --Follow up MRI brain is due, ordered but not scheduled yet, urged to obtain ASAP\par --Next generation sequencing revealed no targetable mutations\par --Restaging PET CT on 10/9/2018 reveals positive response to therapy \par --Completed 4th cycle of Carbo/Alimta/Keytruda on 9/14/2018\par --Deescalated to single agent Keytruda on 10/19/2018, she has missed appointments for few doses\par --R sided cervical adenopathy evident on exam from 12/5/2018 \par --Restaging PET CT ordered \par --R neck biopsy ordered\par \par Cancer related pain \par --Requiring OxyContin 30 BID\par --Oxycodone 10mg for breakthrough\par --Following with Rad Onc \par \par Insomnia\par --Ambien\par \par Follow up in 3 weeks, all questions were answered at length\par I have explained to her that her disease unfortunately will not be cured, all therapeutic effort is palliative in nature\par \par \par

## 2019-01-08 ENCOUNTER — OUTPATIENT (OUTPATIENT)
Dept: OUTPATIENT SERVICES | Facility: HOSPITAL | Age: 44
LOS: 1 days | Discharge: HOME | End: 2019-01-08

## 2019-01-08 DIAGNOSIS — F33.1 MAJOR DEPRESSIVE DISORDER, RECURRENT, MODERATE: ICD-10-CM

## 2019-01-08 DIAGNOSIS — Z90.49 ACQUIRED ABSENCE OF OTHER SPECIFIED PARTS OF DIGESTIVE TRACT: Chronic | ICD-10-CM

## 2019-01-09 ENCOUNTER — RX RENEWAL (OUTPATIENT)
Age: 44
End: 2019-01-09

## 2019-01-10 ENCOUNTER — OUTPATIENT (OUTPATIENT)
Dept: OUTPATIENT SERVICES | Facility: HOSPITAL | Age: 44
LOS: 1 days | Discharge: HOME | End: 2019-01-10

## 2019-01-10 ENCOUNTER — RX RENEWAL (OUTPATIENT)
Age: 44
End: 2019-01-10

## 2019-01-10 DIAGNOSIS — C79.51 SECONDARY MALIGNANT NEOPLASM OF BONE: ICD-10-CM

## 2019-01-10 DIAGNOSIS — C79.31 SECONDARY MALIGNANT NEOPLASM OF BRAIN: ICD-10-CM

## 2019-01-10 DIAGNOSIS — Z90.49 ACQUIRED ABSENCE OF OTHER SPECIFIED PARTS OF DIGESTIVE TRACT: Chronic | ICD-10-CM

## 2019-01-10 LAB
HCT VFR BLD CALC: 39.1 % — SIGNIFICANT CHANGE UP (ref 37–47)
HGB BLD-MCNC: 12.5 G/DL — SIGNIFICANT CHANGE UP (ref 12–16)
MCHC RBC-ENTMCNC: 26 PG — LOW (ref 27–31)
MCHC RBC-ENTMCNC: 32 G/DL — SIGNIFICANT CHANGE UP (ref 32–37)
MCV RBC AUTO: 81.5 FL — SIGNIFICANT CHANGE UP (ref 81–99)
NRBC # BLD: 0 /100 WBCS — SIGNIFICANT CHANGE UP (ref 0–0)
PLATELET # BLD AUTO: 199 K/UL — SIGNIFICANT CHANGE UP (ref 130–400)
RBC # BLD: 4.8 M/UL — SIGNIFICANT CHANGE UP (ref 4.2–5.4)
RBC # FLD: 17 % — HIGH (ref 11.5–14.5)
WBC # BLD: 5.77 K/UL — SIGNIFICANT CHANGE UP (ref 4.8–10.8)
WBC # FLD AUTO: 5.77 K/UL — SIGNIFICANT CHANGE UP (ref 4.8–10.8)

## 2019-01-13 ENCOUNTER — RX RENEWAL (OUTPATIENT)
Age: 44
End: 2019-01-13

## 2019-01-13 RX ORDER — PROCHLORPERAZINE MALEATE 10 MG/1
10 TABLET ORAL EVERY 6 HOURS
Qty: 30 | Refills: 3 | Status: ACTIVE | COMMUNITY
Start: 2018-07-24 | End: 1900-01-01

## 2019-01-13 RX ORDER — ONDANSETRON 8 MG/1
8 TABLET ORAL
Qty: 30 | Refills: 3 | Status: ACTIVE | COMMUNITY
Start: 2018-07-24 | End: 1900-01-01

## 2019-01-14 ENCOUNTER — RX RENEWAL (OUTPATIENT)
Age: 44
End: 2019-01-14

## 2019-01-14 RX ORDER — ONDANSETRON 8 MG/1
8 TABLET, ORALLY DISINTEGRATING ORAL
Qty: 30 | Refills: 3 | Status: ACTIVE | COMMUNITY
Start: 2019-01-14 | End: 1900-01-01

## 2019-01-15 ENCOUNTER — OUTPATIENT (OUTPATIENT)
Dept: OUTPATIENT SERVICES | Facility: HOSPITAL | Age: 44
LOS: 1 days | Discharge: HOME | End: 2019-01-15

## 2019-01-15 DIAGNOSIS — F33.1 MAJOR DEPRESSIVE DISORDER, RECURRENT, MODERATE: ICD-10-CM

## 2019-01-15 DIAGNOSIS — Z90.49 ACQUIRED ABSENCE OF OTHER SPECIFIED PARTS OF DIGESTIVE TRACT: Chronic | ICD-10-CM

## 2019-01-16 ENCOUNTER — LABORATORY RESULT (OUTPATIENT)
Age: 44
End: 2019-01-16

## 2019-01-16 ENCOUNTER — APPOINTMENT (OUTPATIENT)
Dept: HEMATOLOGY ONCOLOGY | Facility: CLINIC | Age: 44
End: 2019-01-16

## 2019-01-16 ENCOUNTER — APPOINTMENT (OUTPATIENT)
Dept: INFUSION THERAPY | Facility: CLINIC | Age: 44
End: 2019-01-16

## 2019-01-16 VITALS
BODY MASS INDEX: 36.54 KG/M2 | HEART RATE: 106 BPM | RESPIRATION RATE: 14 BRPM | WEIGHT: 214 LBS | DIASTOLIC BLOOD PRESSURE: 95 MMHG | SYSTOLIC BLOOD PRESSURE: 117 MMHG | HEIGHT: 64 IN | TEMPERATURE: 98.3 F

## 2019-01-16 RX ORDER — DIPHENHYDRAMINE HCL 50 MG
25 CAPSULE ORAL ONCE
Qty: 0 | Refills: 0 | Status: COMPLETED | OUTPATIENT
Start: 2019-01-16 | End: 2019-01-16

## 2019-01-16 RX ORDER — FOSAPREPITANT DIMEGLUMINE 150 MG/5ML
150 INJECTION, POWDER, LYOPHILIZED, FOR SOLUTION INTRAVENOUS ONCE
Qty: 0 | Refills: 0 | Status: DISCONTINUED | OUTPATIENT
Start: 2019-01-16 | End: 2019-01-16

## 2019-01-16 RX ORDER — DOCETAXEL 20 MG/ML
150 INJECTION, SOLUTION, CONCENTRATE INTRAVENOUS ONCE
Qty: 0 | Refills: 0 | Status: COMPLETED | OUTPATIENT
Start: 2019-01-16 | End: 2019-01-16

## 2019-01-16 RX ORDER — FOSAPREPITANT DIMEGLUMINE 150 MG/5ML
150 INJECTION, POWDER, LYOPHILIZED, FOR SOLUTION INTRAVENOUS ONCE
Qty: 0 | Refills: 0 | Status: COMPLETED | OUTPATIENT
Start: 2019-01-16 | End: 2019-01-16

## 2019-01-16 RX ORDER — ACETAMINOPHEN 500 MG
650 TABLET ORAL ONCE
Qty: 0 | Refills: 0 | Status: COMPLETED | OUTPATIENT
Start: 2019-01-16 | End: 2019-01-16

## 2019-01-16 RX ORDER — RAMUCIRUMAB 10 MG/ML
970 SOLUTION INTRAVENOUS ONCE
Qty: 0 | Refills: 0 | Status: COMPLETED | OUTPATIENT
Start: 2019-01-16 | End: 2019-01-16

## 2019-01-16 RX ORDER — FAMOTIDINE 10 MG/ML
20 INJECTION INTRAVENOUS ONCE
Qty: 0 | Refills: 0 | Status: COMPLETED | OUTPATIENT
Start: 2019-01-16 | End: 2019-01-16

## 2019-01-16 RX ORDER — DEXAMETHASONE 0.5 MG/5ML
12 ELIXIR ORAL ONCE
Qty: 0 | Refills: 0 | Status: COMPLETED | OUTPATIENT
Start: 2019-01-16 | End: 2019-01-16

## 2019-01-16 RX ADMIN — RAMUCIRUMAB 347 MILLIGRAM(S): 10 SOLUTION INTRAVENOUS at 14:09

## 2019-01-16 RX ADMIN — FAMOTIDINE 156 MILLIGRAM(S): 10 INJECTION INTRAVENOUS at 13:37

## 2019-01-16 RX ADMIN — DOCETAXEL 257.5 MILLIGRAM(S): 20 INJECTION, SOLUTION, CONCENTRATE INTRAVENOUS at 14:09

## 2019-01-16 RX ADMIN — Medication 183 MILLIGRAM(S): at 13:37

## 2019-01-16 RX ADMIN — Medication 650 MILLIGRAM(S): at 13:37

## 2019-01-16 RX ADMIN — Medication 650 MILLIGRAM(S): at 13:38

## 2019-01-16 RX ADMIN — FOSAPREPITANT DIMEGLUMINE 450 MILLIGRAM(S): 150 INJECTION, POWDER, LYOPHILIZED, FOR SOLUTION INTRAVENOUS at 13:37

## 2019-01-16 RX ADMIN — Medication 151.5 MILLIGRAM(S): at 13:37

## 2019-01-16 NOTE — REVIEW OF SYSTEMS
[Negative] : Allergic/Immunologic [FreeTextEntry2] : weight gain [FreeTextEntry4] : right neck RICHARD, appx 2.5cm in size, not greatly increased in size, subjectively larger as per Анна [FreeTextEntry9] : low back/hip pain has mostly resolved

## 2019-01-16 NOTE — CONSULT LETTER
[Dear  ___] : Dear  [unfilled], [Consult Letter:] : I had the pleasure of evaluating your patient, [unfilled]. [( Thank you for referring [unfilled] for consultation for _____ )] : Thank you for referring [unfilled] for consultation for [unfilled] [Please see my note below.] : Please see my note below. [Consult Closing:] : Thank you very much for allowing me to participate in the care of this patient.  If you have any questions, please do not hesitate to contact me. [Sincerely,] : Sincerely, [FreeTextEntry3] : Em Galvan MD

## 2019-01-16 NOTE — HISTORY OF PRESENT ILLNESS
[de-identified] : Анна is a lewis 26 yo lady with past medical history significant for anxiety and depression, who has significant smoking history who presented to [facility] on 6/26/2018 with CC of leg pain. She also on further questioning revealed that she has had non-productive cough without associated SOB or CP for approximately 3 months prior. She denies any weight loss or neurological symptoms prior to presentation. CXR reveled LUIS opacity, CT scan of the chest was then done. Based on it's results she was transferred to [place], further work up was as follows. \par \par CT chest on 6/26/2018 revealed Since November 24, 2015:  1.  There is a new large left upper lobe mass measuring 6.8 x 5.7 x 7 cm  extending to mediastinum with multiple mediastinal lymph nodes as  described above. Additionally, there are multiple new hypodense hepatic  metastatic lesions. 2.  4 mm left upper lobe pulmonary nodule, metastatic etiology\par \par \par \par CT A/P on 6/27/2018 revealed Multifocal liver masses consistent with metastatic disease.  Ill-defined 0.7 cm splenic hypodensity suspicious for a metastatic focus.  Multifocal bilateral renal hypoattenuating lesions measuring up to 1.1 cm  in the right interpolar region. Cannot exclude metastasis disease within  the kidneys.\par \par MRI Brain on 6/27/2018 reveled Single enhancing nodule/lesion within a sulcus adjacent to the right  occipital lobe measuring 4 x 4 x 5 mm. Findings highly suspicious for  metastatic disease. Follow-up to 6 most may be helpful for further  evaluation if clinically indicated.\par \par Bone scan on 6/28/2018 revealed Multiple definite bony abnormalities which by pattern of distribution are  consistent with metastatic bone disease.  These include bilateral pubic inferior rami, left superior pubic ramus,  bilateral iliac bones and left 4th costovertebral junction.  Abnormal uptake corresponding to left lung index tumor.\par \par On 6/28/2018 she underwent liver biopsy, not enough tissue for extended molecular testing was obtained, it revealed adenocarcinoma of the lung with 5% PDL1 expression. \par Repeat liver biopsy on 7/2/2018 confirmed the diagnosis of lung adenocarcinoma with negative PDL1 expression, negative EGFR, Alk, BRAF and KRAS. \par \par She received first dose of Carbo/Alimta in the hospital on 7/6/2018 and tolerated it very well.  [de-identified] : 8/15/2018;Анна presents for follow up today. She has tolerated 2nd cycle of Carbo/Alimta with addition of Keytruda without any difficulty. She reports ongoing pain in her R hip, she states narcotics are helpful but she may require a dose increase. She is interested in Rad Onc evaluation. She reports she needs dental appointment and may require some dental work. Will hold off on Keytruda for now. She otherwise offer no complaints today. \par \par 9/12/2018: Анна is doing great, still c/o some back pain, requiring pain meds. Denies SOB, WALKER, headaches, weight loss. She is for cycle 4 of Carbo/Alimta/Keytruda on 9/14/2018. She has met with Dr. Gresham and has her appointment for simulation later today. PET CT was not done yet. \par \par 10/17/2018: Анна presents for follow up today, she is feeling well, has no complaints, she is still requiring pain medication for cancer related pain management. Restaging PET CT on 10/4/2018 reveals  FDG avid left upper lobe hilar 5.6 x 4.1 cm mass, decreased in size  compared to chest CT June 2018 (previously 7.0 x 6.8 cm)., max SUV 33.5.   Left hilar FDG avid lymph node, max SUV 8.9 (image 194).  Suspicious FDG avid 1.4 cm right level 2 cervical lymph node, max SUV  20.3 (image 239). Consider tissue sampling.  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  No current FDG evidence of osseous metastatic disease.\par Images were personally reviewed by myself and discussed with Анна and her boyfriend at length. \par The concerning LN in the R neck was never imaged prior, this is not likely representative of disease progression, overall PET CT is indicative of positive response to therapy. We will continue with single agent Keytruda. Анна reports no side effects of Keytruda to date. \par Анна also reports loss of menstrual periods, will check beta HCG and LH, FSH and estradiol today. She was advised on barrier protection during intercourse. \par \par 11/14/2018: Анна feels well today and reports no complaints, back pain has improved, she was not taking long acting pain meds for 2 weeks. She has missed her Keytruda appointment last week. I have reiterated the importance of compliance with therapy. I have again spoke with Анна re importance of safe sex. She has the script for brain MRI  but she has not done the study just yet she will schedule.\par \par 12/5/2018: Анна feels well today, he reports no systemic side effects to therapy, she reports that she noticed that the righ side of her neck is swollen and has been swollen for about 2-3 weeks. On exam this is finding is consistent with worsening adenopathy in the area where FDG avid node was described prior. i have expressed to Анна that I am concerned of possibility of progression. I advise that she undergoes prompt restaging including PET CT and MRI of the brain as well as R neck biopsy. She will receive Keytruda today. \par \par 12/26/2018: Анна continues to feel well, the lump in her right neck has become slightly smaller. She has completed full restaging. MRI of the brain done on 12/17/2018 that revealed  In comparison with the prior MRI of the brain dated June 27, 2018:  The previously described lesion in the focus adjacent to the right  occipital lobe is not identified and likely represented artifact.  Interval development of a ring-enhancing lesion (0.7 x 0.8 x 0.7 cm) in  the anterior inferior aspect of the right frontal lobe consistent with  cerebral metastasis. There is a moderate amount of surrounding edema. She remains asymptomatic. She was since started on Decadron 2mg BID daily and was referred back to Dr. Gresham, she was mapped for SRS of the brain lesion this AM. \par PET CT on 12/19/2018 revealed  COMPARISON : 10/4/2018.  4 new sites of pathologic FDG uptake in the abdomen and pelvis consistent  with biologic tumor activity.  Fluctuating SUV values within the head and neck and thorax without any  new sites of disease. Incidentally, the primary mass shows a 21% decrease  in SUV max, now 26.3 but is anatomically increased in size measuring 6.3  x 5.5 cm, previously 4.3 x 3.8 cm. \par R neck biopsy on 12/18/2018 revealed metastatic adenocarcinoma. Next generation sequencing was again requested. \par All images were personally reviewed by myself and discussed with patient. \par She has completed carbo/alimta not even 3 months back, i have explained to her that switch of therapy is warranted in my opinion. I will offer her Taxotere with Cyramza, Cyramza will be administered with cycle 2 2/2 scheduling issues. \par Side effects of therapy were discussed at length, including cytopenias, allergic reactions, skin rashes, neuropathy, VTE, perforation to GI tract, prolinuria, elevated BP. \par She is in agreement to proceed ASAP.  \par \par 1/16/2019: Анна started on Taxotere on 12/26/2018, she had a minor infusion reaction and had to run the drug longer, she was able to complete without complications. She also had recent SBRT to the solitary brain lesion. She reports minor headache after procedure and continues on Decadron at 2mg BID. She has gained some weight. She will be receiving 2nd dose of Taxotere along with first dose of Cyramza today. Side effects of Cyramza, including cardiovascular compilations, bleeding, hypertension and proteinuria were discussed.

## 2019-01-16 NOTE — ASSESSMENT
[FreeTextEntry1] : Metastatic adenocarcinoma of the lung, PDL1 5%, EGFT, Alk, BRAF, KRAS, ROS1 negative\par --Recieved cycle 1 of Carbo Alimta in the hospital on 7/6/2018-\par --Recieved cycle 2 on 7/27/2018 with Keytruda, no adverse effects to date\par --Started on B12 7/2018 \par --Taking folic acid daily \par --Reports she may require some dental work, will be in touch with dentist re Xgeva clearance\par --Brain lesion s/p SRS in 1/2019, tapering Decadron \par --Next generation sequencing revealed no targetable mutations\par --Restaging PET CT on 10/9/2018 reveals positive response to therapy \par --Completed 4th cycle of Carbo/Alimta/Keytruda on 9/14/2018\par --Deescalated to single agent Keytruda on 10/19/2018, she has missed appointments for few doses\par --PET on 12/19 with evidence of progression, R neck biopsy on 12/18/2018 confirming met adenoca\par --Switch therapy to Taxotere/Cyramza on 12/19/2018, Cyramza to be added to cycle 2 on 1/16/2019, side effects were discussed at length\par --Encouraged 2nd opinion at [facility] [place]\par \par 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

## 2019-01-17 LAB
ALBUMIN SERPL ELPH-MCNC: 3.9 G/DL
ALP BLD-CCNC: 104 U/L
ALT SERPL-CCNC: 25 U/L
ANION GAP SERPL CALC-SCNC: 16 MMOL/L
APPEARANCE: ABNORMAL
AST SERPL-CCNC: 13 U/L
BILIRUB SERPL-MCNC: <0.2 MG/DL
BILIRUBIN URINE: NEGATIVE
BLOOD URINE: NEGATIVE
BUN SERPL-MCNC: 18 MG/DL
CALCIUM SERPL-MCNC: 8.8 MG/DL
CHLORIDE SERPL-SCNC: 93 MMOL/L
CHOLEST SERPL-MCNC: 287 MG/DL
CHOLEST/HDLC SERPL: 2.2 RATIO
CO2 SERPL-SCNC: 27 MMOL/L
COLOR: YELLOW
CREAT SERPL-MCNC: 0.7 MG/DL
GLUCOSE QUALITATIVE U: NEGATIVE MG/DL
GLUCOSE SERPL-MCNC: 85 MG/DL
HCT VFR BLD CALC: 34.7 %
HDLC SERPL-MCNC: 130 MG/DL
HGB BLD-MCNC: 11.2 G/DL
KETONES URINE: NEGATIVE
LDLC SERPL CALC-MCNC: 154 MG/DL
LEUKOCYTE ESTERASE URINE: NEGATIVE
MAGNESIUM SERPL-MCNC: 2.3 MG/DL
MCHC RBC-ENTMCNC: 26.9 PG
MCHC RBC-ENTMCNC: 32.3 G/DL
MCV RBC AUTO: 83.2 FL
NITRITE URINE: NEGATIVE
PH URINE: 6
PLATELET # BLD AUTO: 257 K/UL
PMV BLD: 9.4 FL
POTASSIUM SERPL-SCNC: 4.2 MMOL/L
PROT SERPL-MCNC: 6.5 G/DL
PROTEIN URINE: NEGATIVE MG/DL
RBC # BLD: 4.17 M/UL
RBC # FLD: 16.6 %
SODIUM SERPL-SCNC: 136 MMOL/L
SPECIFIC GRAVITY URINE: 1.02
TRIGL SERPL-MCNC: 107 MG/DL
TSH SERPL-ACNC: 1.7 UIU/ML
UROBILINOGEN URINE: 0.2 MG/DL (ref 0.2–?)
WBC # FLD AUTO: 9.73 K/UL

## 2019-01-29 ENCOUNTER — RX RENEWAL (OUTPATIENT)
Age: 44
End: 2019-01-29

## 2019-02-06 ENCOUNTER — APPOINTMENT (OUTPATIENT)
Dept: HEMATOLOGY ONCOLOGY | Facility: CLINIC | Age: 44
End: 2019-02-06

## 2019-02-06 ENCOUNTER — APPOINTMENT (OUTPATIENT)
Dept: INFUSION THERAPY | Facility: CLINIC | Age: 44
End: 2019-02-06

## 2019-02-06 ENCOUNTER — LABORATORY RESULT (OUTPATIENT)
Age: 44
End: 2019-02-06

## 2019-02-06 VITALS
TEMPERATURE: 98.2 F | SYSTOLIC BLOOD PRESSURE: 156 MMHG | HEIGHT: 64 IN | DIASTOLIC BLOOD PRESSURE: 99 MMHG | BODY MASS INDEX: 36.88 KG/M2 | WEIGHT: 216 LBS | HEART RATE: 108 BPM

## 2019-02-06 RX ORDER — FOSAPREPITANT DIMEGLUMINE 150 MG/5ML
150 INJECTION, POWDER, LYOPHILIZED, FOR SOLUTION INTRAVENOUS ONCE
Qty: 0 | Refills: 0 | Status: COMPLETED | OUTPATIENT
Start: 2019-02-06 | End: 2019-02-06

## 2019-02-06 RX ORDER — DOCETAXEL 20 MG/ML
150 INJECTION, SOLUTION, CONCENTRATE INTRAVENOUS ONCE
Qty: 0 | Refills: 0 | Status: COMPLETED | OUTPATIENT
Start: 2019-02-06 | End: 2019-02-06

## 2019-02-06 RX ORDER — DEXAMETHASONE 0.5 MG/5ML
12 ELIXIR ORAL ONCE
Qty: 0 | Refills: 0 | Status: COMPLETED | OUTPATIENT
Start: 2019-02-06 | End: 2019-02-06

## 2019-02-06 RX ORDER — AZITHROMYCIN 250 MG/1
250 TABLET, FILM COATED ORAL
Qty: 6 | Refills: 0 | Status: COMPLETED | COMMUNITY
Start: 2018-12-26 | End: 2019-02-06

## 2019-02-06 RX ORDER — ACETAMINOPHEN 500 MG
650 TABLET ORAL ONCE
Qty: 0 | Refills: 0 | Status: COMPLETED | OUTPATIENT
Start: 2019-02-06 | End: 2019-02-06

## 2019-02-06 RX ORDER — RAMUCIRUMAB 10 MG/ML
970 SOLUTION INTRAVENOUS ONCE
Qty: 0 | Refills: 0 | Status: COMPLETED | OUTPATIENT
Start: 2019-02-06 | End: 2019-02-06

## 2019-02-06 RX ORDER — DIPHENHYDRAMINE HCL 50 MG
25 CAPSULE ORAL ONCE
Qty: 0 | Refills: 0 | Status: COMPLETED | OUTPATIENT
Start: 2019-02-06 | End: 2019-02-06

## 2019-02-06 RX ORDER — FAMOTIDINE 10 MG/ML
20 INJECTION INTRAVENOUS ONCE
Qty: 0 | Refills: 0 | Status: COMPLETED | OUTPATIENT
Start: 2019-02-06 | End: 2019-02-06

## 2019-02-06 RX ADMIN — Medication 183 MILLIGRAM(S): at 10:29

## 2019-02-06 RX ADMIN — FAMOTIDINE 156 MILLIGRAM(S): 10 INJECTION INTRAVENOUS at 11:11

## 2019-02-06 RX ADMIN — Medication 151.5 MILLIGRAM(S): at 10:29

## 2019-02-06 RX ADMIN — Medication 650 MILLIGRAM(S): at 10:30

## 2019-02-06 RX ADMIN — RAMUCIRUMAB 347 MILLIGRAM(S): 10 SOLUTION INTRAVENOUS at 11:33

## 2019-02-06 RX ADMIN — FOSAPREPITANT DIMEGLUMINE 450 MILLIGRAM(S): 150 INJECTION, POWDER, LYOPHILIZED, FOR SOLUTION INTRAVENOUS at 10:29

## 2019-02-06 RX ADMIN — Medication 650 MILLIGRAM(S): at 10:29

## 2019-02-06 RX ADMIN — DOCETAXEL 257.5 MILLIGRAM(S): 20 INJECTION, SOLUTION, CONCENTRATE INTRAVENOUS at 11:35

## 2019-02-06 NOTE — HISTORY OF PRESENT ILLNESS
[de-identified] : Анна is a lewis 26 yo lady with past medical history significant for anxiety and depression, who has significant smoking history who presented to [facility] on 6/26/2018 with CC of leg pain. She also on further questioning revealed that she has had non-productive cough without associated SOB or CP for approximately 3 months prior. She denies any weight loss or neurological symptoms prior to presentation. CXR reveled LUIS opacity, CT scan of the chest was then done. Based on it's results she was transferred to [place], further work up was as follows. \par \par CT chest on 6/26/2018 revealed Since November 24, 2015:  1.  There is a new large left upper lobe mass measuring 6.8 x 5.7 x 7 cm  extending to mediastinum with multiple mediastinal lymph nodes as  described above. Additionally, there are multiple new hypodense hepatic  metastatic lesions. 2.  4 mm left upper lobe pulmonary nodule, metastatic etiology\par \par \par \par CT A/P on 6/27/2018 revealed Multifocal liver masses consistent with metastatic disease.  Ill-defined 0.7 cm splenic hypodensity suspicious for a metastatic focus.  Multifocal bilateral renal hypoattenuating lesions measuring up to 1.1 cm  in the right interpolar region. Cannot exclude metastasis disease within  the kidneys.\par \par MRI Brain on 6/27/2018 reveled Single enhancing nodule/lesion within a sulcus adjacent to the right  occipital lobe measuring 4 x 4 x 5 mm. Findings highly suspicious for  metastatic disease. Follow-up to 6 most may be helpful for further  evaluation if clinically indicated.\par \par Bone scan on 6/28/2018 revealed Multiple definite bony abnormalities which by pattern of distribution are  consistent with metastatic bone disease.  These include bilateral pubic inferior rami, left superior pubic ramus,  bilateral iliac bones and left 4th costovertebral junction.  Abnormal uptake corresponding to left lung index tumor.\par \par On 6/28/2018 she underwent liver biopsy, not enough tissue for extended molecular testing was obtained, it revealed adenocarcinoma of the lung with 5% PDL1 expression. \par Repeat liver biopsy on 7/2/2018 confirmed the diagnosis of lung adenocarcinoma with negative PDL1 expression, negative EGFR, Alk, BRAF and KRAS. \par \par She received first dose of Carbo/Alimta in the hospital on 7/6/2018 and tolerated it very well.  [de-identified] : 8/15/2018;Анна presents for follow up today. She has tolerated 2nd cycle of Carbo/Alimta with addition of Keytruda without any difficulty. She reports ongoing pain in her R hip, she states narcotics are helpful but she may require a dose increase. She is interested in Rad Onc evaluation. She reports she needs dental appointment and may require some dental work. Will hold off on Keytruda for now. She otherwise offer no complaints today. \par \par 9/12/2018: Анна is doing great, still c/o some back pain, requiring pain meds. Denies SOB, WALKER, headaches, weight loss. She is for cycle 4 of Carbo/Alimta/Keytruda on 9/14/2018. She has met with Dr. Gresham and has her appointment for simulation later today. PET CT was not done yet. \par \par 10/17/2018: Анна presents for follow up today, she is feeling well, has no complaints, she is still requiring pain medication for cancer related pain management. Restaging PET CT on 10/4/2018 reveals  FDG avid left upper lobe hilar 5.6 x 4.1 cm mass, decreased in size  compared to chest CT June 2018 (previously 7.0 x 6.8 cm)., max SUV 33.5.   Left hilar FDG avid lymph node, max SUV 8.9 (image 194).  Suspicious FDG avid 1.4 cm right level 2 cervical lymph node, max SUV  20.3 (image 239). Consider tissue sampling.  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  No current FDG evidence of osseous metastatic disease.\par Images were personally reviewed by myself and discussed with Анна and her boyfriend at length. \par The concerning LN in the R neck was never imaged prior, this is not likely representative of disease progression, overall PET CT is indicative of positive response to therapy. We will continue with single agent Keytruda. Анна reports no side effects of Keytruda to date. \par Анна also reports loss of menstrual periods, will check beta HCG and LH, FSH and estradiol today. She was advised on barrier protection during intercourse. \par \par 11/14/2018: Анна feels well today and reports no complaints, back pain has improved, she was not taking long acting pain meds for 2 weeks. She has missed her Keytruda appointment last week. I have reiterated the importance of compliance with therapy. I have again spoke with Анна re importance of safe sex. She has the script for brain MRI  but she has not done the study just yet she will schedule.\par \par 12/5/2018: Анна feels well today, he reports no systemic side effects to therapy, she reports that she noticed that the righ side of her neck is swollen and has been swollen for about 2-3 weeks. On exam this is finding is consistent with worsening adenopathy in the area where FDG avid node was described prior. i have expressed to Анна that I am concerned of possibility of progression. I advise that she undergoes prompt restaging including PET CT and MRI of the brain as well as R neck biopsy. She will receive Keytruda today. \par \par 12/26/2018: Анна continues to feel well, the lump in her right neck has become slightly smaller. She has completed full restaging. MRI of the brain done on 12/17/2018 that revealed  In comparison with the prior MRI of the brain dated June 27, 2018:  The previously described lesion in the focus adjacent to the right  occipital lobe is not identified and likely represented artifact.  Interval development of a ring-enhancing lesion (0.7 x 0.8 x 0.7 cm) in  the anterior inferior aspect of the right frontal lobe consistent with  cerebral metastasis. There is a moderate amount of surrounding edema. She remains asymptomatic. She was since started on Decadron 2mg BID daily and was referred back to Dr. Gresham, she was mapped for SRS of the brain lesion this AM. \par PET CT on 12/19/2018 revealed  COMPARISON : 10/4/2018.  4 new sites of pathologic FDG uptake in the abdomen and pelvis consistent  with biologic tumor activity.  Fluctuating SUV values within the head and neck and thorax without any  new sites of disease. Incidentally, the primary mass shows a 21% decrease  in SUV max, now 26.3 but is anatomically increased in size measuring 6.3  x 5.5 cm, previously 4.3 x 3.8 cm. \par R neck biopsy on 12/18/2018 revealed metastatic adenocarcinoma. Next generation sequencing was again requested. \par All images were personally reviewed by myself and discussed with patient. \par She has completed carbo/alimta not even 3 months back, i have explained to her that switch of therapy is warranted in my opinion. I will offer her Taxotere with Cyramza, Cyramza will be administered with cycle 2 2/2 scheduling issues. \par Side effects of therapy were discussed at length, including cytopenias, allergic reactions, skin rashes, neuropathy, VTE, perforation to GI tract, prolinuria, elevated BP. \par She is in agreement to proceed ASAP.  \par \par 1/16/2019: Анна started on Taxotere on 12/26/2018, she had a minor infusion reaction and had to run the drug longer, she was able to complete without complications. She also had recent SBRT to the solitary brain lesion. She reports minor headache after procedure and continues on Decadron at 2mg BID. She has gained some weight. She will be receiving 2nd dose of Taxotere along with first dose of Cyramza today. Side effects of Cyramza, including cardiovascular compilations, bleeding, hypertension and proteinuria were discussed. \par \par 2/6/2019: Анна feels well today. Denies any pulmonary symptoms, denies excessive pain. Denies new neurological symptoms. On exam her right cervical adenopathy is improved. She has gained some weight. She reports occasional nose bleeds that stop spontaneously since she started on Cyramza, she also reports some discomfort in her mouth, no overt mouth sores. Epistaxis is likely related to Cyramza, will observe closely.

## 2019-02-06 NOTE — ASSESSMENT
[FreeTextEntry1] : Metastatic adenocarcinoma of the lung, PDL1 5%, EGFT, Alk, BRAF, KRAS, ROS1 negative\par --Recieved cycle 1 of Carbo Alimta in the hospital on 7/6/2018-\par --Recieved cycle 2 on 7/27/2018 with Keytruda, no adverse effects to date\par --Started on B12 7/2018 \par --Taking folic acid daily \par --Reports she may require some dental work, will be in touch with dentist re Xgeva clearance\par --Brain lesion s/p SRS in 1/2019, tapering Decadron \par --Next generation sequencing revealed no targetable mutations\par --Restaging PET CT on 10/9/2018 reveals positive response to therapy \par --Completed 4th cycle of Carbo/Alimta/Keytruda on 9/14/2018\par --Deescalated to single agent Keytruda on 10/19/2018, she has missed appointments for few doses\par --PET on 12/19 with evidence of progression, R neck biopsy on 12/18/2018 confirming met adenoca\par --Switch therapy to Taxotere/Cyramza on 12/19/2018, Cyramza to be added to cycle 2 on 1/16/2019, tolerated first cycles without major difficulty, reports occasional mild epistaxis \par --[place] [facility] [place]\par \par Metastatic disease to brain, asymptomatic\par --MRI brain on 12/17/2018 reveals new solitary brain met\par --On Decadron 1mg BID, will plan to taper to 1 mg daily on 2/5/2019\par --Recieved SRS 1/2019 \par \par Cancer related pain \par --Requiring OxyContin 30 BID\par --Oxycodone 10mg for breakthrough\par --Following with Rad Onc \par \par Insomnia\par --Ambien\par \par Follow up in 3 weeks, all questions were answered at length\par I have explained to her that her disease unfortunately will not be cured, all therapeutic effort is palliative in nature\par \par \par

## 2019-02-06 NOTE — REVIEW OF SYSTEMS
[Negative] : Allergic/Immunologic [FreeTextEntry2] : weight gain [FreeTextEntry4] : right neck RICHARD, appx 1.5cm in size, feels smaller on exam today, occasional epistaxis, stops spontaneously [FreeTextEntry9] : low back/hip pain has mostly resolved

## 2019-02-08 LAB
ALBUMIN SERPL ELPH-MCNC: 4.5 G/DL
ALP BLD-CCNC: 119 U/L
ALT SERPL-CCNC: 32 U/L
ANION GAP SERPL CALC-SCNC: 21 MMOL/L
AST SERPL-CCNC: 16 U/L
BILIRUB SERPL-MCNC: <0.2 MG/DL
BUN SERPL-MCNC: 15 MG/DL
CALCIUM SERPL-MCNC: 9.3 MG/DL
CHLORIDE SERPL-SCNC: 90 MMOL/L
CO2 SERPL-SCNC: 26 MMOL/L
CREAT SERPL-MCNC: 0.9 MG/DL
GLUCOSE SERPL-MCNC: 92 MG/DL
HCT VFR BLD CALC: 37.6 %
HGB BLD-MCNC: 12.3 G/DL
MAGNESIUM SERPL-MCNC: 2.3 MG/DL
MCHC RBC-ENTMCNC: 26.7 PG
MCHC RBC-ENTMCNC: 32.7 G/DL
MCV RBC AUTO: 81.6 FL
PLATELET # BLD AUTO: 304 K/UL
PMV BLD: 9.5 FL
POTASSIUM SERPL-SCNC: 4 MMOL/L
PROT SERPL-MCNC: 7.2 G/DL
RBC # BLD: 4.61 M/UL
RBC # FLD: 18.6 %
SODIUM SERPL-SCNC: 137 MMOL/L
WBC # FLD AUTO: 11.67 K/UL

## 2019-02-11 ENCOUNTER — RX RENEWAL (OUTPATIENT)
Age: 44
End: 2019-02-11

## 2019-02-13 ENCOUNTER — OUTPATIENT (OUTPATIENT)
Dept: OUTPATIENT SERVICES | Facility: HOSPITAL | Age: 44
LOS: 1 days | Discharge: HOME | End: 2019-02-13

## 2019-02-13 DIAGNOSIS — Z90.49 ACQUIRED ABSENCE OF OTHER SPECIFIED PARTS OF DIGESTIVE TRACT: Chronic | ICD-10-CM

## 2019-02-13 DIAGNOSIS — F33.2 MAJOR DEPRESSIVE DISORDER, RECURRENT SEVERE WITHOUT PSYCHOTIC FEATURES: ICD-10-CM

## 2019-02-28 ENCOUNTER — APPOINTMENT (OUTPATIENT)
Dept: HEMATOLOGY ONCOLOGY | Facility: CLINIC | Age: 44
End: 2019-02-28

## 2019-02-28 ENCOUNTER — APPOINTMENT (OUTPATIENT)
Dept: INFUSION THERAPY | Facility: CLINIC | Age: 44
End: 2019-02-28

## 2019-02-28 ENCOUNTER — LABORATORY RESULT (OUTPATIENT)
Age: 44
End: 2019-02-28

## 2019-02-28 VITALS
HEART RATE: 67 BPM | RESPIRATION RATE: 14 BRPM | HEIGHT: 64 IN | BODY MASS INDEX: 36.54 KG/M2 | DIASTOLIC BLOOD PRESSURE: 94 MMHG | SYSTOLIC BLOOD PRESSURE: 142 MMHG | WEIGHT: 214 LBS | TEMPERATURE: 98.9 F

## 2019-02-28 RX ORDER — DIPHENHYDRAMINE HCL 50 MG
25 CAPSULE ORAL ONCE
Qty: 0 | Refills: 0 | Status: COMPLETED | OUTPATIENT
Start: 2019-02-28 | End: 2019-02-28

## 2019-02-28 RX ORDER — DOCETAXEL 20 MG/ML
150 INJECTION, SOLUTION, CONCENTRATE INTRAVENOUS ONCE
Qty: 0 | Refills: 0 | Status: COMPLETED | OUTPATIENT
Start: 2019-02-28 | End: 2019-02-28

## 2019-02-28 RX ORDER — DEXAMETHASONE 0.5 MG/5ML
8 ELIXIR ORAL ONCE
Qty: 0 | Refills: 0 | Status: COMPLETED | OUTPATIENT
Start: 2019-02-28 | End: 2019-02-28

## 2019-02-28 RX ORDER — FOSAPREPITANT DIMEGLUMINE 150 MG/5ML
150 INJECTION, POWDER, LYOPHILIZED, FOR SOLUTION INTRAVENOUS ONCE
Qty: 0 | Refills: 0 | Status: COMPLETED | OUTPATIENT
Start: 2019-02-28 | End: 2019-02-28

## 2019-02-28 RX ORDER — ACETAMINOPHEN 500 MG
650 TABLET ORAL ONCE
Qty: 0 | Refills: 0 | Status: COMPLETED | OUTPATIENT
Start: 2019-02-28 | End: 2019-02-28

## 2019-02-28 RX ORDER — RAMUCIRUMAB 10 MG/ML
970 SOLUTION INTRAVENOUS ONCE
Qty: 0 | Refills: 0 | Status: COMPLETED | OUTPATIENT
Start: 2019-02-28 | End: 2019-02-28

## 2019-02-28 RX ORDER — FAMOTIDINE 10 MG/ML
20 INJECTION INTRAVENOUS ONCE
Qty: 0 | Refills: 0 | Status: COMPLETED | OUTPATIENT
Start: 2019-02-28 | End: 2019-02-28

## 2019-02-28 RX ORDER — DIPHENHYDRAMINE HCL 50 MG
25 CAPSULE ORAL ONCE
Qty: 0 | Refills: 0 | Status: DISCONTINUED | OUTPATIENT
Start: 2019-02-28 | End: 2019-02-28

## 2019-02-28 RX ADMIN — DOCETAXEL 257.5 MILLIGRAM(S): 20 INJECTION, SOLUTION, CONCENTRATE INTRAVENOUS at 15:28

## 2019-02-28 RX ADMIN — Medication 101 MILLIGRAM(S): at 15:20

## 2019-02-28 RX ADMIN — Medication 8 MILLIGRAM(S): at 14:40

## 2019-02-28 RX ADMIN — Medication 180 MILLIGRAM(S): at 14:19

## 2019-02-28 RX ADMIN — Medication 650 MILLIGRAM(S): at 14:15

## 2019-02-28 RX ADMIN — RAMUCIRUMAB 250 MILLIGRAM(S): 10 SOLUTION INTRAVENOUS at 14:49

## 2019-02-28 RX ADMIN — FAMOTIDINE 20 MILLIGRAM(S): 10 INJECTION INTRAVENOUS at 15:00

## 2019-02-28 RX ADMIN — Medication 650 MILLIGRAM(S): at 14:16

## 2019-02-28 RX ADMIN — DOCETAXEL 150 MILLIGRAM(S): 20 INJECTION, SOLUTION, CONCENTRATE INTRAVENOUS at 16:50

## 2019-02-28 RX ADMIN — FAMOTIDINE 156 MILLIGRAM(S): 10 INJECTION INTRAVENOUS at 14:40

## 2019-02-28 RX ADMIN — FOSAPREPITANT DIMEGLUMINE 450 MILLIGRAM(S): 150 INJECTION, POWDER, LYOPHILIZED, FOR SOLUTION INTRAVENOUS at 15:20

## 2019-02-28 RX ADMIN — RAMUCIRUMAB 970 MILLIGRAM(S): 10 SOLUTION INTRAVENOUS at 17:50

## 2019-02-28 RX ADMIN — Medication 25 MILLIGRAM(S): at 15:40

## 2019-02-28 RX ADMIN — FOSAPREPITANT DIMEGLUMINE 150 MILLIGRAM(S): 150 INJECTION, POWDER, LYOPHILIZED, FOR SOLUTION INTRAVENOUS at 15:40

## 2019-02-28 NOTE — PHYSICAL EXAM
[Restricted in physically strenuous activity but ambulatory and able to carry out work of a light or sedentary nature] : Status 1- Restricted in physically strenuous activity but ambulatory and able to carry out work of a light or sedentary nature, e.g., light house work, office work [Normal] : affect appropriate [de-identified] : palpable R cervical node is smaller on exam today

## 2019-02-28 NOTE — ASSESSMENT
[FreeTextEntry1] : Metastatic adenocarcinoma of the lung, PDL1 5%, EGFT, Alk, BRAF, KRAS, ROS1 negative\par --Recieved cycle 1 of Carbo Alimta in the hospital on 7/6/2018-\par --Recieved cycle 2 on 7/27/2018 with Keytruda, no adverse effects to date\par --Started on B12 7/2018 \par --Taking folic acid daily \par --Reports she may require some dental work, will be in touch with dentist re Xgeva clearance\par --Brain lesion s/p SRS in 1/2019, tapering Decadron \par --Next generation sequencing revealed no targetable mutations\par --Restaging PET CT on 10/9/2018 reveals positive response to therapy \par --Completed 4th cycle of Carbo/Alimta/Keytruda on 9/14/2018\par --Deescalated to single agent Keytruda on 10/19/2018, she has missed appointments for few doses\par --PET on 12/19 with evidence of progression, R neck biopsy on 12/18/2018 confirming met adenoca\par --Switch therapy to Taxotere/Cyramza on 12/19/2018, Cyramza to be added to cycle 2 on 1/16/2019, tolerated first cycles without major difficulty, reports occasional mild epistaxis \par --For cycle 3 on 2/27/2019\par --Restaging PET CT ordered on 2/27/2019 \par --Encouraged 2nd opinion at [facility] [place]\par \par Metastatic disease to brain, asymptomatic\par --MRI brain on 12/17/2018 reveals new solitary brain met\par --On Decadron 1mg BID, will plan to taper to 1 mg daily on 2/5/2019\par --Recieved SRS 1/2019 \par --MRI follow up due in 4/2019, not ordered \par \par Cancer related pain \par --Requiring OxyContin 30 BID\par --Oxycodone 10mg for breakthrough\par --Following with Rad Onc \par \par Insomnia\par --Ambien\par \par Follow up in 3 weeks, all questions were answered at length\par I have explained to her that her disease unfortunately will not be cured, all therapeutic effort is palliative in nature\par \par \par

## 2019-02-28 NOTE — HISTORY OF PRESENT ILLNESS
[de-identified] : Анна is a lewis 26 yo lady with past medical history significant for anxiety and depression, who has significant smoking history who presented to [facility] on 6/26/2018 with CC of leg pain. She also on further questioning revealed that she has had non-productive cough without associated SOB or CP for approximately 3 months prior. She denies any weight loss or neurological symptoms prior to presentation. CXR reveled LUIS opacity, CT scan of the chest was then done. Based on it's results she was transferred to [place], further work up was as follows. \par \par CT chest on 6/26/2018 revealed Since November 24, 2015:  1.  There is a new large left upper lobe mass measuring 6.8 x 5.7 x 7 cm  extending to mediastinum with multiple mediastinal lymph nodes as  described above. Additionally, there are multiple new hypodense hepatic  metastatic lesions. 2.  4 mm left upper lobe pulmonary nodule, metastatic etiology\par \par \par \par CT A/P on 6/27/2018 revealed Multifocal liver masses consistent with metastatic disease.  Ill-defined 0.7 cm splenic hypodensity suspicious for a metastatic focus.  Multifocal bilateral renal hypoattenuating lesions measuring up to 1.1 cm  in the right interpolar region. Cannot exclude metastasis disease within  the kidneys.\par \par MRI Brain on 6/27/2018 reveled Single enhancing nodule/lesion within a sulcus adjacent to the right  occipital lobe measuring 4 x 4 x 5 mm. Findings highly suspicious for  metastatic disease. Follow-up to 6 most may be helpful for further  evaluation if clinically indicated.\par \par Bone scan on 6/28/2018 revealed Multiple definite bony abnormalities which by pattern of distribution are  consistent with metastatic bone disease.  These include bilateral pubic inferior rami, left superior pubic ramus,  bilateral iliac bones and left 4th costovertebral junction.  Abnormal uptake corresponding to left lung index tumor.\par \par On 6/28/2018 she underwent liver biopsy, not enough tissue for extended molecular testing was obtained, it revealed adenocarcinoma of the lung with 5% PDL1 expression. \par Repeat liver biopsy on 7/2/2018 confirmed the diagnosis of lung adenocarcinoma with negative PDL1 expression, negative EGFR, Alk, BRAF and KRAS. \par \par She received first dose of Carbo/Alimta in the hospital on 7/6/2018 and tolerated it very well.  [de-identified] : 8/15/2018;Анна presents for follow up today. She has tolerated 2nd cycle of Carbo/Alimta with addition of Keytruda without any difficulty. She reports ongoing pain in her R hip, she states narcotics are helpful but she may require a dose increase. She is interested in Rad Onc evaluation. She reports she needs dental appointment and may require some dental work. Will hold off on Keytruda for now. She otherwise offer no complaints today. \par \par 9/12/2018: Анна is doing great, still c/o some back pain, requiring pain meds. Denies SOB, WALKER, headaches, weight loss. She is for cycle 4 of Carbo/Alimta/Keytruda on 9/14/2018. She has met with Dr. Gresham and has her appointment for simulation later today. PET CT was not done yet. \par \par 10/17/2018: Анна presents for follow up today, she is feeling well, has no complaints, she is still requiring pain medication for cancer related pain management. Restaging PET CT on 10/4/2018 reveals  FDG avid left upper lobe hilar 5.6 x 4.1 cm mass, decreased in size  compared to chest CT June 2018 (previously 7.0 x 6.8 cm)., max SUV 33.5.   Left hilar FDG avid lymph node, max SUV 8.9 (image 194).  Suspicious FDG avid 1.4 cm right level 2 cervical lymph node, max SUV  20.3 (image 239). Consider tissue sampling.  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  No current FDG evidence of osseous metastatic disease.\par Images were personally reviewed by myself and discussed with Анна and her boyfriend at length. \par The concerning LN in the R neck was never imaged prior, this is not likely representative of disease progression, overall PET CT is indicative of positive response to therapy. We will continue with single agent Keytruda. Анна reports no side effects of Keytruda to date. \par Анна also reports loss of menstrual periods, will check beta HCG and LH, FSH and estradiol today. She was advised on barrier protection during intercourse. \par \par 11/14/2018: Анна feels well today and reports no complaints, back pain has improved, she was not taking long acting pain meds for 2 weeks. She has missed her Keytruda appointment last week. I have reiterated the importance of compliance with therapy. I have again spoke with Анна re importance of safe sex. She has the script for brain MRI  but she has not done the study just yet she will schedule.\par \par 12/5/2018: Анна feels well today, he reports no systemic side effects to therapy, she reports that she noticed that the righ side of her neck is swollen and has been swollen for about 2-3 weeks. On exam this is finding is consistent with worsening adenopathy in the area where FDG avid node was described prior. i have expressed to Анна that I am concerned of possibility of progression. I advise that she undergoes prompt restaging including PET CT and MRI of the brain as well as R neck biopsy. She will receive Keytruda today. \par \par 12/26/2018: Анна continues to feel well, the lump in her right neck has become slightly smaller. She has completed full restaging. MRI of the brain done on 12/17/2018 that revealed  In comparison with the prior MRI of the brain dated June 27, 2018:  The previously described lesion in the focus adjacent to the right  occipital lobe is not identified and likely represented artifact.  Interval development of a ring-enhancing lesion (0.7 x 0.8 x 0.7 cm) in  the anterior inferior aspect of the right frontal lobe consistent with  cerebral metastasis. There is a moderate amount of surrounding edema. She remains asymptomatic. She was since started on Decadron 2mg BID daily and was referred back to Dr. Gresham, she was mapped for SRS of the brain lesion this AM. \par PET CT on 12/19/2018 revealed  COMPARISON : 10/4/2018.  4 new sites of pathologic FDG uptake in the abdomen and pelvis consistent  with biologic tumor activity.  Fluctuating SUV values within the head and neck and thorax without any  new sites of disease. Incidentally, the primary mass shows a 21% decrease  in SUV max, now 26.3 but is anatomically increased in size measuring 6.3  x 5.5 cm, previously 4.3 x 3.8 cm. \par R neck biopsy on 12/18/2018 revealed metastatic adenocarcinoma. Next generation sequencing was again requested. \par All images were personally reviewed by myself and discussed with patient. \par She has completed carbo/alimta not even 3 months back, i have explained to her that switch of therapy is warranted in my opinion. I will offer her Taxotere with Cyramza, Cyramza will be administered with cycle 2 2/2 scheduling issues. \par Side effects of therapy were discussed at length, including cytopenias, allergic reactions, skin rashes, neuropathy, VTE, perforation to GI tract, prolinuria, elevated BP. \par She is in agreement to proceed ASAP.  \par \par 1/16/2019: Анна started on Taxotere on 12/26/2018, she had a minor infusion reaction and had to run the drug longer, she was able to complete without complications. She also had recent SBRT to the solitary brain lesion. She reports minor headache after procedure and continues on Decadron at 2mg BID. She has gained some weight. She will be receiving 2nd dose of Taxotere along with first dose of Cyramza today. Side effects of Cyramza, including cardiovascular compilations, bleeding, hypertension and proteinuria were discussed. \par \par 2/6/2019: Анна feels well today. Denies any pulmonary symptoms, denies excessive pain. Denies new neurological symptoms. On exam her right cervical adenopathy is improved. She has gained some weight. She reports occasional nose bleeds that stop spontaneously since she started on Cyramza, she also reports some discomfort in her mouth, no overt mouth sores. Epistaxis is likely related to Cyramza, will observe closely. \par \par 2/27/2019: Анна's only complaint today is reflux exacerbation, she will double up to Nexium 40mg daily and use TUMs and Maalox. She reports she is emotional when she is taking steroids. She will go down on post chemo Decadron to 2mg BID and will only premedicate with 1 dose of Decadron prior to chemo on the day before. She reports no neuropathy, pain is controlled, no pulmonary or neurological symptoms. Proceed with cycle 3 of Taxotere with Cyramza.

## 2019-03-04 LAB
ALBUMIN SERPL ELPH-MCNC: 4.1 G/DL
ALP BLD-CCNC: 92 U/L
ALT SERPL-CCNC: 16 U/L
ANION GAP SERPL CALC-SCNC: 19 MMOL/L
APPEARANCE: ABNORMAL
AST SERPL-CCNC: 12 U/L
BILIRUB SERPL-MCNC: <0.2 MG/DL
BILIRUBIN URINE: NEGATIVE
BLOOD URINE: NEGATIVE
BUN SERPL-MCNC: 12 MG/DL
CALCIUM SERPL-MCNC: 9.7 MG/DL
CHLORIDE SERPL-SCNC: 96 MMOL/L
CO2 SERPL-SCNC: 23 MMOL/L
COLOR: YELLOW
CREAT SERPL-MCNC: 0.9 MG/DL
GLUCOSE QUALITATIVE U: NEGATIVE MG/DL
GLUCOSE SERPL-MCNC: 152 MG/DL
HCT VFR BLD CALC: 34.1 %
HGB BLD-MCNC: 10.8 G/DL
KETONES URINE: NEGATIVE
LEUKOCYTE ESTERASE URINE: NEGATIVE
MCHC RBC-ENTMCNC: 26.5 PG
MCHC RBC-ENTMCNC: 31.7 G/DL
MCV RBC AUTO: 83.8 FL
NITRITE URINE: NEGATIVE
PH URINE: 6
PLATELET # BLD AUTO: 279 K/UL
PMV BLD: 9.4 FL
POTASSIUM SERPL-SCNC: 3.8 MMOL/L
PROT SERPL-MCNC: 6.6 G/DL
PROTEIN URINE: NEGATIVE MG/DL
RBC # BLD: 4.07 M/UL
RBC # FLD: 19.8 %
SODIUM SERPL-SCNC: 138 MMOL/L
SPECIFIC GRAVITY URINE: 1.01
UROBILINOGEN URINE: 0.2 MG/DL (ref 0.2–?)
WBC # FLD AUTO: 10.7 K/UL

## 2019-03-13 ENCOUNTER — OUTPATIENT (OUTPATIENT)
Dept: OUTPATIENT SERVICES | Facility: HOSPITAL | Age: 44
LOS: 1 days | Discharge: HOME | End: 2019-03-13

## 2019-03-13 DIAGNOSIS — Z90.49 ACQUIRED ABSENCE OF OTHER SPECIFIED PARTS OF DIGESTIVE TRACT: Chronic | ICD-10-CM

## 2019-03-13 DIAGNOSIS — F33.2 MAJOR DEPRESSIVE DISORDER, RECURRENT SEVERE WITHOUT PSYCHOTIC FEATURES: ICD-10-CM

## 2019-03-18 ENCOUNTER — OUTPATIENT (OUTPATIENT)
Dept: OUTPATIENT SERVICES | Facility: HOSPITAL | Age: 44
LOS: 1 days | Discharge: HOME | End: 2019-03-18

## 2019-03-18 DIAGNOSIS — Z90.49 ACQUIRED ABSENCE OF OTHER SPECIFIED PARTS OF DIGESTIVE TRACT: Chronic | ICD-10-CM

## 2019-03-18 DIAGNOSIS — C34.90 MALIGNANT NEOPLASM OF UNSPECIFIED PART OF UNSPECIFIED BRONCHUS OR LUNG: ICD-10-CM

## 2019-03-18 LAB — GLUCOSE BLDC GLUCOMTR-MCNC: 108 MG/DL — HIGH (ref 70–99)

## 2019-03-21 ENCOUNTER — LABORATORY RESULT (OUTPATIENT)
Age: 44
End: 2019-03-21

## 2019-03-21 ENCOUNTER — EMERGENCY (EMERGENCY)
Facility: HOSPITAL | Age: 44
LOS: 0 days | Discharge: LEFT BEFORE DISCHARGE | End: 2019-03-21
Attending: EMERGENCY MEDICINE | Admitting: EMERGENCY MEDICINE
Payer: MEDICAID

## 2019-03-21 ENCOUNTER — RX RENEWAL (OUTPATIENT)
Age: 44
End: 2019-03-21

## 2019-03-21 ENCOUNTER — APPOINTMENT (OUTPATIENT)
Dept: HEMATOLOGY ONCOLOGY | Facility: CLINIC | Age: 44
End: 2019-03-21

## 2019-03-21 ENCOUNTER — APPOINTMENT (OUTPATIENT)
Dept: INFUSION THERAPY | Facility: CLINIC | Age: 44
End: 2019-03-21

## 2019-03-21 VITALS
OXYGEN SATURATION: 96 % | TEMPERATURE: 98 F | SYSTOLIC BLOOD PRESSURE: 133 MMHG | RESPIRATION RATE: 18 BRPM | DIASTOLIC BLOOD PRESSURE: 72 MMHG | HEART RATE: 91 BPM

## 2019-03-21 VITALS
BODY MASS INDEX: 37.56 KG/M2 | RESPIRATION RATE: 16 BRPM | HEIGHT: 64 IN | DIASTOLIC BLOOD PRESSURE: 84 MMHG | TEMPERATURE: 98.3 F | SYSTOLIC BLOOD PRESSURE: 128 MMHG | HEART RATE: 109 BPM | WEIGHT: 220 LBS

## 2019-03-21 VITALS — WEIGHT: 220.02 LBS | HEIGHT: 64 IN

## 2019-03-21 DIAGNOSIS — C34.90 MALIGNANT NEOPLASM OF UNSPECIFIED PART OF UNSPECIFIED BRONCHUS OR LUNG: ICD-10-CM

## 2019-03-21 DIAGNOSIS — Z90.49 ACQUIRED ABSENCE OF OTHER SPECIFIED PARTS OF DIGESTIVE TRACT: Chronic | ICD-10-CM

## 2019-03-21 DIAGNOSIS — R06.02 SHORTNESS OF BREATH: ICD-10-CM

## 2019-03-21 DIAGNOSIS — Z87.891 PERSONAL HISTORY OF NICOTINE DEPENDENCE: ICD-10-CM

## 2019-03-21 DIAGNOSIS — F32.9 MAJOR DEPRESSIVE DISORDER, SINGLE EPISODE, UNSPECIFIED: ICD-10-CM

## 2019-03-21 LAB
ALBUMIN SERPL ELPH-MCNC: 3.7 G/DL — SIGNIFICANT CHANGE UP (ref 3.5–5.2)
ALP SERPL-CCNC: 91 U/L — SIGNIFICANT CHANGE UP (ref 30–115)
ALT FLD-CCNC: 9 U/L — SIGNIFICANT CHANGE UP (ref 0–41)
ANION GAP SERPL CALC-SCNC: 15 MMOL/L — HIGH (ref 7–14)
APTT BLD: 34.1 SEC — SIGNIFICANT CHANGE UP (ref 27–39.2)
AST SERPL-CCNC: 10 U/L — SIGNIFICANT CHANGE UP (ref 0–41)
BASOPHILS # BLD AUTO: 0.02 K/UL — SIGNIFICANT CHANGE UP (ref 0–0.2)
BASOPHILS NFR BLD AUTO: 0.3 % — SIGNIFICANT CHANGE UP (ref 0–1)
BILIRUB SERPL-MCNC: <0.2 MG/DL — SIGNIFICANT CHANGE UP (ref 0.2–1.2)
BUN SERPL-MCNC: 7 MG/DL — LOW (ref 10–20)
CALCIUM SERPL-MCNC: 9.1 MG/DL — SIGNIFICANT CHANGE UP (ref 8.5–10.1)
CHLORIDE SERPL-SCNC: 98 MMOL/L — SIGNIFICANT CHANGE UP (ref 98–110)
CO2 SERPL-SCNC: 28 MMOL/L — SIGNIFICANT CHANGE UP (ref 17–32)
CREAT SERPL-MCNC: 0.6 MG/DL — LOW (ref 0.7–1.5)
EOSINOPHIL # BLD AUTO: 0.01 K/UL — SIGNIFICANT CHANGE UP (ref 0–0.7)
EOSINOPHIL NFR BLD AUTO: 0.2 % — SIGNIFICANT CHANGE UP (ref 0–8)
GLUCOSE SERPL-MCNC: 122 MG/DL — HIGH (ref 70–99)
HCG SERPL QL: NEGATIVE — SIGNIFICANT CHANGE UP
HCT VFR BLD CALC: 31.2 % — LOW (ref 37–47)
HGB BLD-MCNC: 9.7 G/DL — LOW (ref 12–16)
IMM GRANULOCYTES NFR BLD AUTO: 0.3 % — SIGNIFICANT CHANGE UP (ref 0.1–0.3)
INR BLD: 1 RATIO — SIGNIFICANT CHANGE UP (ref 0.65–1.3)
LACTATE SERPL-SCNC: 0.9 MMOL/L — SIGNIFICANT CHANGE UP (ref 0.5–2.2)
LIDOCAIN IGE QN: 16 U/L — SIGNIFICANT CHANGE UP (ref 7–60)
LYMPHOCYTES # BLD AUTO: 2.42 K/UL — SIGNIFICANT CHANGE UP (ref 1.2–3.4)
LYMPHOCYTES # BLD AUTO: 37.4 % — SIGNIFICANT CHANGE UP (ref 20.5–51.1)
MCHC RBC-ENTMCNC: 26.6 PG — LOW (ref 27–31)
MCHC RBC-ENTMCNC: 31.1 G/DL — LOW (ref 32–37)
MCV RBC AUTO: 85.7 FL — SIGNIFICANT CHANGE UP (ref 81–99)
MONOCYTES # BLD AUTO: 0.6 K/UL — SIGNIFICANT CHANGE UP (ref 0.1–0.6)
MONOCYTES NFR BLD AUTO: 9.3 % — SIGNIFICANT CHANGE UP (ref 1.7–9.3)
NEUTROPHILS # BLD AUTO: 3.4 K/UL — SIGNIFICANT CHANGE UP (ref 1.4–6.5)
NEUTROPHILS NFR BLD AUTO: 52.5 % — SIGNIFICANT CHANGE UP (ref 42.2–75.2)
NRBC # BLD: 0 /100 WBCS — SIGNIFICANT CHANGE UP (ref 0–0)
PLATELET # BLD AUTO: 325 K/UL — SIGNIFICANT CHANGE UP (ref 130–400)
POTASSIUM SERPL-MCNC: 4 MMOL/L — SIGNIFICANT CHANGE UP (ref 3.5–5)
POTASSIUM SERPL-SCNC: 4 MMOL/L — SIGNIFICANT CHANGE UP (ref 3.5–5)
PROT SERPL-MCNC: 6.3 G/DL — SIGNIFICANT CHANGE UP (ref 6–8)
PROTHROM AB SERPL-ACNC: 11.5 SEC — SIGNIFICANT CHANGE UP (ref 9.95–12.87)
RBC # BLD: 3.64 M/UL — LOW (ref 4.2–5.4)
RBC # FLD: 19.7 % — HIGH (ref 11.5–14.5)
SODIUM SERPL-SCNC: 141 MMOL/L — SIGNIFICANT CHANGE UP (ref 135–146)
TROPONIN T SERPL-MCNC: <0.01 NG/ML — SIGNIFICANT CHANGE UP
WBC # BLD: 6.47 K/UL — SIGNIFICANT CHANGE UP (ref 4.8–10.8)
WBC # FLD AUTO: 6.47 K/UL — SIGNIFICANT CHANGE UP (ref 4.8–10.8)

## 2019-03-21 PROCEDURE — 93970 EXTREMITY STUDY: CPT | Mod: 26

## 2019-03-21 RX ORDER — MORPHINE SULFATE 50 MG/1
4 CAPSULE, EXTENDED RELEASE ORAL ONCE
Qty: 0 | Refills: 0 | Status: DISCONTINUED | OUTPATIENT
Start: 2019-03-21 | End: 2019-03-21

## 2019-03-21 RX ORDER — SODIUM CHLORIDE 9 MG/ML
1000 INJECTION INTRAMUSCULAR; INTRAVENOUS; SUBCUTANEOUS ONCE
Qty: 0 | Refills: 0 | Status: COMPLETED | OUTPATIENT
Start: 2019-03-21 | End: 2019-03-21

## 2019-03-21 RX ADMIN — MORPHINE SULFATE 4 MILLIGRAM(S): 50 CAPSULE, EXTENDED RELEASE ORAL at 21:35

## 2019-03-21 RX ADMIN — SODIUM CHLORIDE 2000 MILLILITER(S): 9 INJECTION INTRAMUSCULAR; INTRAVENOUS; SUBCUTANEOUS at 18:41

## 2019-03-21 NOTE — ED PROVIDER NOTE - PROGRESS NOTE DETAILS
dvt study negative as per vascular tech I spoke to radiology.  pt with compression of puolmonary artery which is new.  no PE.  I spoke to heme onc fellow who states to admit to dr heath service, no anticoagulaiton at this time indicated The patient wishes to leave against medical advice.  I have discussed the risks, benefits and alternatives (including the possibility of worsening of disease, pain, permanent disability, and/or death) with the patient and his/her family (if available).  The patient voices understanding of these risks, benefits, and alternatives and still wishes to sign out against medical advice.  The patient is awake, alert, oriented  x 3 and has demonstrated capacity to refuse/direct care.  I have advised the patient that they can and should return immediately should they develop any worse/different/additional symptoms, or if they change their mind and want to continue their care. hgb similar to previous

## 2019-03-21 NOTE — ED ADULT NURSE NOTE - CHPI ED NUR DURATION
Discharge Instructions    Discharged to home by car with relative. Discharged via wheelchair, hospital escort: Yes.  Special equipment needed: Not Applicable    Be sure to schedule a follow-up appointment with your primary care doctor or any specialists as instructed.     Discharge Plan:   Influenza Vaccine Indication: Patient Refuses    I understand that a diet low in cholesterol, fat, and sodium is recommended for good health. Unless I have been given specific instructions below for another diet, I accept this instruction as my diet prescription.   Other diet: As tolerated      Special Instructions: None    · Is patient discharged on Warfarin / Coumadin?   No     Depression / Suicide Risk    As you are discharged from this [facility] facility, it is important to learn how to keep safe from harming yourself.    Recognize the warning signs:  · Abrupt changes in personality, positive or negative- including increase in energy   · Giving away possessions  · Change in eating patterns- significant weight changes-  positive or negative  · Change in sleeping patterns- unable to sleep or sleeping all the time   · Unwillingness or inability to communicate  · Depression  · Unusual sadness, discouragement and loneliness  · Talk of wanting to die  · Neglect of personal appearance   · Rebelliousness- reckless behavior  · Withdrawal from people/activities they love  · Confusion- inability to concentrate     If you or a loved one observes any of these behaviors or has concerns about self-harm, here's what you can do:  · Talk about it- your feelings and reasons for harming yourself  · Remove any means that you might use to hurt yourself (examples: pills, rope, extension cords, firearm)  · Get professional help from the community (Mental Health, Substance Abuse, psychological counseling)  · Do not be alone:Call your Safe Contact- someone whom you trust who will be there for you.  · Call your local CRISIS HOTLINE 723-0012 or  [phone]  · Call your local Children's Mobile Crisis Response Team [place] (845) 393-5434 or www.[employer]  · Call the toll free National Suicide Prevention Hotlines   · National Suicide Prevention Lifeline 000-345-LJPJ (5893)  · [employer] Hope Line Network 800-SUICIDE (164-5423)        Discharge Instructions: Laparoscopic Cholecystectomy  ==========================================     1. DIET: Upon discharge from the hospital you may resume your normal preoperative diet. Depending on how you are feeling and whether you have nausea or not, you may wish to stay with a bland diet for the first few days. However, you can advance this as quickly as you feel ready.     2. ACTIVITIES: After discharge from the hospital, you may resume routine activities. However, there should be no heavy lifting (greater than 15 pounds) and no strenuous activities until after your follow-up visit. Otherwise, routine activities of daily living are acceptable.     3. DRIVING: You may drive whenever you are off pain medications and are able to perform the activities needed to drive, i.e. turning, bending, twisting, etc.     4. BATHING: OK to shower starting one day after surgery.  The incisions are covered with skin glue which is waterproof.  It will start to peel off in 5-7 days which is normal.  Avoid submerging the incisions in water (tub, bath, pool) for at least a week.      5. BOWEL FUNCTION: A few patients, after this operation, will develop either frequent or loose stools after meals. This usually corrects itself after a few days to a few weeks. If this occurs, do not worry; it is not unusual and will resolve. Much more common than loose stools, is constipation. The combination of pain medication and decreased activity level can cause constipation in otherwise normal patients. If you feel this is occurring, take a laxative (Milk of Magnesia, Ex-Lax, Senokot, etc.) until the problem has resolved.     6. PAIN MEDICATION:  You will be given a prescription for pain medication at discharge. Please take these as directed. It is important to remember not to take medications on an empty stomach as this may cause nausea.  You can transition from the prescription-strength pain medication to over-the-counter medications as your pain improves, such as tylenol, ibuprofen, or Aleve.     7.CALL IF YOU HAVE: (1) Fevers to more than 101.5 F, (2) Unusual chest or leg pain, (3) Drainage or fluid from incision that may be foul smelling, increased tenderness or soreness at the wound or the wound edges are no longer together, redness or swelling at the incision site. Please do not hesitate to call with any other questions.      8. APPOINTMENT: Contact our office at [phone] to schedule a follow-up appointment as needed if concerns arise following your procedure.     If you have any additional questions, please do not hesitate to call the office and speak to either myself or the physician on call.     Office address:  [address] [place] [id number]  [phone]     James Dill M.D.  [facility]             Laparoscopic Cholecystectomy, Care After  Refer to this sheet in the next few weeks. These instructions provide you with information on caring for yourself after your procedure. Your health care provider may also give you more specific instructions. Your treatment has been planned according to current medical practices, but problems sometimes occur. Call your health care provider if you have any problems or questions after your procedure.  WHAT TO EXPECT AFTER THE PROCEDURE  After your procedure, it is typical to have the following:  · Pain at your incision sites. You will be given pain medicines to control the pain.  · Mild nausea or vomiting. This should improve after the first 24 hours.  · Bloating and possibly shoulder pain from the gas used during the procedure. This will improve after the first 24 hours.  HOME CARE  INSTRUCTIONS   · Change bandages (dressings) as directed by your health care provider.  · Keep the wound dry and clean. You may wash the wound gently with soap and water. Gently blot or dab the area dry.  · Do not take baths or use swimming pools or hot tubs for 2 weeks or until your health care provider approves.  · Only take over-the-counter or prescription medicines as directed by your health care provider.  · Continue your normal diet as directed by your health care provider.  · Do not lift anything heavier than 10 pounds (4.5 kg) until your health care provider approves.  · Do not play contact sports for 1 week or until your health care provider approves.  SEEK MEDICAL CARE IF:   · You have redness, swelling, or increasing pain in the wound.  · You notice yellowish-white fluid (pus) coming from the wound.  · You have drainage from the wound that lasts longer than 1 day.  · You notice a bad smell coming from the wound or dressing.  · Your surgical cuts (incisions) break open.  SEEK IMMEDIATE MEDICAL CARE IF:   · You develop a rash.  · You have difficulty breathing.  · You have chest pain.  · You have a fever.  · You have increasing pain in the shoulders (shoulder strap areas).  · You have dizzy episodes or faint while standing.  · You have severe abdominal pain.  · You feel sick to your stomach (nauseous) or throw up (vomit) and this lasts for more than 1 day.     This information is not intended to replace advice given to you by your health care provider. Make sure you discuss any questions you have with your health care provider.     Document Released: 12/18/2006 Document Revised: 10/08/2014 Document Reviewed: 07/30/2014  [employer] Interactive Patient Education ©2016 [employer] Inc.                [facility]     Work Excuse after Surgery  ____________________________     3/13/2018        To Whom it May Concern:      Joshua Mckeon underwent surgery at [facility] on 3/13/2018.     He is cleared to return to  work 4 days after his operation.     He is restricted to light activity for 3 weeks from the date of surgery.  During that time, he cannot lift over 15lbs, and must avoid strenuous activity, running, repetitive bending or twisting, or similar activities.  After this initial recovery period, he can return to full range of activities including heavy lifting.     If he can not return to work on light-duty restrictions, then I will need to keep him off of work for the entire 3 weeks to avoid tearing his incisions.     If there are any questions or concerns, please contact my office at [phone].     Thank you.        Jmaes Dill MD  General and Vascular Surgery  [facility]  [phone]        How can pain medicine affect me?  You were prescribed pain medicine. This medicine may:  · Make you tired or sleepy.  · Make you feel dizzy.  · Affect how well you can:  ¨ Drive  ¨ Do certain activities.  Pain medicine may not make all of your pain go away. You should be comfortable enough to:  · Move.  · Breathe.  · Take care of yourself.  How often should I take pain medicine and how much should I take?  · Take pain medicine only as told by your doctor and only as needed for pain.  · You do not need to take pain medicine if you are not having pain, unless your doctor tells you to do that.  · You can take less than the prescribed dose if you find that less medicine helps your pain.  · If you have very bad (severe) pain, call your doctor. Do not take more pills than told by your doctor. Do not take pills more often than told by your doctor.  What should I avoid while I am taking pain medicine?  Follow these instructions after you start taking pain medicine, while you are taking the medicine, and for 8 hours after you stop taking the medicine:  · Do not drive.  · Do not use machinery.  · Do not use power tools.  · Do not sign legal documents.  · Do not drink alcohol.  · Do not take sleeping pills.  · Do not take  care of children by yourself.  · Do not do any activities that involve climbing or being in high places.  · Do not go into any body of water unless there is an adult nearby who can watch and help you. This includes:  ¨ Lakes.  ¨ Rivers.  ¨ Oceans.  ¨ Spas.  ¨ Swimming pools.  How can I keep others safe while I am taking pain medicine?  · Store your pain medicine as told by your doctor. Make sure that you keep it where children and pets cannot reach it.  · Do not share your pain medicine with anyone.  · Do not save any leftover pills. If you have any leftover pain medicine, get rid of it or destroy it as told by your doctor.  What else do I need to know about taking pain medicine?  · Use a poop (stool) softener if you have trouble pooping (constipation) because of your pain medicine. Eating more fruits and vegetables also helps with constipation.  · Write down the times when you take your pain medicine. Look at the times before you take your next dose of medicine.  · Your pain medicine might have acetaminophen in it. Do not take any other acetaminophen while you are taking this medicine. An overdose of acetaminophen can do very bad damage to your liver. If you are taking any medicines in addition to your pain medicine, check the active ingredients on those medicines to see if acetaminophen is listed.  When should I call my doctor?  · Your medicine is not helping the pain.  · You do either of these soon after you take the medicine:  ¨ Throw up (vomit).  ¨ Have watery poop (diarrhea).  · You have new pain in areas that did not hurt before.  · You have an allergic reaction to your medicine. This may include:  ¨ Feeling itchy.  ¨ Swelling.  ¨ Feeling dizzy.  ¨ Getting a new rash.  · You cannot put up with feeling:  ¨ Dizzy.  ¨ Sick to your stomach (nauseous).  When should I call 911 or go to the emergency room?  · You pass out (faint).  · You feel very confused.  · You throw up again and again.  · Your skin or lips turn  pale or bluish in color.  · You are:  ¨ Short of breath.  ¨ Breathing much more slowly than usual.  · You have a very bad allergic reaction to your medicine. This includes:  ¨ Developing a swollen tongue.  ¨ Having trouble breathing.  This information is not intended to replace advice given to you by your health care provider. Make sure you discuss any questions you have with your health care provider.  Document Released: 06/05/2009 Document Revised: 08/24/2017 Document Reviewed: 10/22/2015  © 2017 [employer]        Incision Care, Adult  An incision is a surgical cut that is made through your skin. Most incisions are closed after surgery. Your incision may be closed with stitches (sutures), staples, skin glue, or adhesive strips. You may need to return to your health care provider to have sutures or staples removed. This may occur several days to several weeks after your surgery. The incision needs to be cared for properly to prevent infection.  How to care for your incision  Incision care   · Follow instructions from your health care provider about how to take care of your incision. Make sure you:  ¨ Wash your hands with soap and water before you change the bandage (dressing). If soap and water are not available, use hand .  ¨ Change your dressing as told by your health care provider.  ¨ Leave sutures, skin glue, or adhesive strips in place. These skin closures may need to stay in place for 2 weeks or longer. If adhesive strip edges start to loosen and curl up, you may trim the loose edges. Do not remove adhesive strips completely unless your health care provider tells you to do that.  · Check your incision area every day for signs of infection. Check for:  ¨ More redness, swelling, or pain.  ¨ More fluid or blood.  ¨ Warmth.  ¨ Pus or a bad smell.  · Ask your health care provider how to clean the incision. This may include:  ¨ Using mild soap and water.  ¨ Using a clean towel to pat the incision dry after  cleaning it.  ¨ Applying a cream or ointment. Do this only as told by your health care provider.  ¨ Covering the incision with a clean dressing.  · Ask your health care provider when you can leave the incision uncovered.  · Do not take baths, swim, or use a hot tub until your health care provider approves. Ask your health care provider if you can take showers. You may only be allowed to take sponge baths for bathing.  Medicines  · If you were prescribed an antibiotic medicine, cream, or ointment, take or apply the antibiotic as told by your health care provider. Do not stop taking or applying the antibiotic even if your condition improves.  · Take over-the-counter and prescription medicines only as told by your health care provider.  General instructions  · Limit movement around your incision to improve healing.  ¨ Avoid straining, lifting, or exercise for the first month, or for as long as told by your health care provider.  ¨ Follow instructions from your health care provider about returning to your normal activities.  ¨ Ask your health care provider what activities are safe.  · Protect your incision from the sun when you are outside for the first 6 months, or for as long as told by your health care provider. Apply sunscreen around the scar or cover it up.  · Keep all follow-up visits as told by your health care provider. This is important.  Contact a health care provider if:  · Your have more redness, swelling, or pain around the incision.  · You have more fluid or blood coming from the incision.  · Your incision feels warm to the touch.  · You have pus or a bad smell coming from the incision.  · You have a fever or shaking chills.  · You are nauseous or you vomit.  · You are dizzy.  · Your sutures or staples come undone.  Get help right away if:  · You have a red streak coming from your incision.  · Your incision bleeds through the dressing and the bleeding does not stop with gentle pressure.  · The edges of  your incision open up and separate.  · You have severe pain.  · You have a rash.  · You are confused.  · You faint.  · You have trouble breathing and a fast heartbeat.  This information is not intended to replace advice given to you by your health care provider. Make sure you discuss any questions you have with your health care provider.  Document Released: 07/07/2006 Document Revised: 08/25/2017 Document Reviewed: 07/05/2017  [employer] Interactive Patient Education © 2017 Elsevier Inc.      Laparoscopic Cholecystectomy, Care After  Refer to this sheet in the next few weeks. These instructions provide you with information on caring for yourself after your procedure. Your health care provider may also give you more specific instructions. Your treatment has been planned according to current medical practices, but problems sometimes occur. Call your health care provider if you have any problems or questions after your procedure.  WHAT TO EXPECT AFTER THE PROCEDURE  After your procedure, it is typical to have the following:  · Pain at your incision sites. You will be given pain medicines to control the pain.  · Mild nausea or vomiting. This should improve after the first 24 hours.  · Bloating and possibly shoulder pain from the gas used during the procedure. This will improve after the first 24 hours.  HOME CARE INSTRUCTIONS   · Change bandages (dressings) as directed by your health care provider.  · Keep the wound dry and clean. You may wash the wound gently with soap and water. Gently blot or dab the area dry.  · Do not take baths or use swimming pools or hot tubs for 2 weeks or until your health care provider approves.  · Only take over-the-counter or prescription medicines as directed by your health care provider.  · Continue your normal diet as directed by your health care provider.  · Do not lift anything heavier than 10 pounds (4.5 kg) until your health care provider approves.  · Do not play contact sports for 1  week or until your health care provider approves.  SEEK MEDICAL CARE IF:   · You have redness, swelling, or increasing pain in the wound.  · You notice yellowish-white fluid (pus) coming from the wound.  · You have drainage from the wound that lasts longer than 1 day.  · You notice a bad smell coming from the wound or dressing.  · Your surgical cuts (incisions) break open.  SEEK IMMEDIATE MEDICAL CARE IF:   · You develop a rash.  · You have difficulty breathing.  · You have chest pain.  · You have a fever.  · You have increasing pain in the shoulders (shoulder strap areas).  · You have dizzy episodes or faint while standing.  · You have severe abdominal pain.  · You feel sick to your stomach (nauseous) or throw up (vomit) and this lasts for more than 1 day.     This information is not intended to replace advice given to you by your health care provider. Make sure you discuss any questions you have with your health care provider.     Document Released: 12/18/2006 Document Revised: 10/08/2014 Document Reviewed: 07/30/2014  [employer] Interactive Patient Education ©2016 Elsevier Inc.      Discharge Instructions    Discharged to home by car with relative. Discharged via wheelchair, hospital escort: Yes.  Special equipment needed: Not Applicable    Be sure to schedule a follow-up appointment with your primary care doctor or any specialists as instructed.     Discharge Plan:   Diet Plan: Discussed  Activity Level: Discussed  Confirmed Follow up Appointment: Patient to Call and Schedule Appointment  Confirmed Symptoms Management: Discussed  Medication Reconciliation Updated: Yes  Influenza Vaccine Indication: Patient Refuses    I understand that a diet low in cholesterol, fat, and sodium is recommended for good health. Unless I have been given specific instructions below for another diet, I accept this instruction as my diet prescription.   Other diet: resume home diet as tolerated    Special Instructions: None    · Is  patient discharged on Warfarin / Coumadin?   No     Depression / Suicide Risk    As you are discharged from this [facility] [place] [facility] facility, it is important to learn how to keep safe from harming yourself.    Recognize the warning signs:  · Abrupt changes in personality, positive or negative- including increase in energy   · Giving away possessions  · Change in eating patterns- significant weight changes-  positive or negative  · Change in sleeping patterns- unable to sleep or sleeping all the time   · Unwillingness or inability to communicate  · Depression  · Unusual sadness, discouragement and loneliness  · Talk of wanting to die  · Neglect of personal appearance   · Rebelliousness- reckless behavior  · Withdrawal from people/activities they love  · Confusion- inability to concentrate     If you or a loved one observes any of these behaviors or has concerns about self-harm, here's what you can do:  · Talk about it- your feelings and reasons for harming yourself  · Remove any means that you might use to hurt yourself (examples: pills, rope, extension cords, firearm)  · Get professional help from the community (Mental Health, Substance Abuse, psychological counseling)  · Do not be alone:Call your Safe Contact- someone whom you trust who will be there for you.  · Call your local CRISIS HOTLINE 438-1692 or 819-711-8415  · Call your local Children's Mobile Crisis Response Team [place] (360) 446-4912 or www.[employer]  · Call the toll free National Suicide Prevention Hotlines   · National Suicide Prevention Lifeline 075-109-XYUM (7035)  · National Hope Line Network 800-SUICIDE (028-5263)         2/week(s)

## 2019-03-21 NOTE — ED PROVIDER NOTE - PHYSICAL EXAMINATION
CONSTITUTIONAL: Well-developed; well-nourished; in no acute distress, nontoxic appearing  SKIN: skin exam is warm and dry,  HEAD: Normocephalic; atraumatic.  EYES: conjunctiva and sclera clear.  ENT: MMM, no nasal congestion  NECK: Supple; non tender.  ROM intact.  CARD: S1, S2 normal, no murmur  RESP: No wheezes, rales or rhonchi. Good air movement bilaterally  ABD: soft; non-distended; non-tender. No Rebound, No guarding  EXT: Normal ROM. No cyanosis   NEURO: awake, alert, following commands, oriented, grossly unremarkable. No Focal deficits. GCS 15.   PSYCH: Cooperative, appropriate.

## 2019-03-21 NOTE — ED PROVIDER NOTE - NS ED ROS FT
Constitutional:  no fevers, no chills, no malaise  ENMT: No neck pain or stiffness, no nasal congestion, no ear pain, no throat pain  Cardiac:  no chest pain  Respiratory:  see hpi  GI:  No nausea, vomiting, diarrhea or abdominal pain.  MS:  No back pain, no joint pain.  Neuro:  No headache, no dizziness, no change in mental status  Skin:  No skin rash  Except as documented in the HPI,  all other systems are negative

## 2019-03-21 NOTE — ED PROVIDER NOTE - CLINICAL SUMMARY MEDICAL DECISION MAKING FREE TEXT BOX
pt sent in by [facility] onc for dvt study and cta to rule out pe.  pt has had sob recently.  no fevers, no chills, no abd pain.  no back pain.  pt is currently on chemo.  pt with stage 4 lung CA.  CT negative for PE.  pt did have no occlusion of pulmonary artery from lung mass.  I spoke to pt at length.  I spoke to heme/onc fellow who recommended admission to [facility] onc.  I spoke to pt about this.  pt understood.  pt signed out ama.  The patient wishes to leave against medical advice.  I have discussed the risks, benefits and alternatives (including the possibility of worsening of disease, pain, permanent disability, and/or death) with the patient and his/her family (if available).  The patient voices understanding of these risks, benefits, and alternatives and still wishes to sign out against medical advice.  The patient is awake, alert, oriented  x 3 and has demonstrated capacity to refuse/direct care.  I have advised the patient that they can and should return immediately should they develop any worse/different/additional symptoms, or if they change their mind and want to continue their care.   pt adamently refused admission.  pt understood risks including death.  pt will follow up with her oncologist tomorrow.  pt understands importance of follow up.

## 2019-03-21 NOTE — ED PROVIDER NOTE - CARE PROVIDER_API CALL
Em Galvan)  HematologyOncology; Internal Medicine; Medical Oncology  [address] [place] [address]  Phone: [phone]  Fax: [phone]  Follow Up Time: 1-3 Days

## 2019-03-21 NOTE — ED ADULT NURSE NOTE - OBJECTIVE STATEMENT
Per patient she has had a few weeks of worsening sob was seen today by her oncologist and was told to come to ed to be evaluated for pe/dvt. Hx of stage 4 lung cancer

## 2019-03-22 ENCOUNTER — INBOUND DOCUMENT (OUTPATIENT)
Age: 44
End: 2019-03-22

## 2019-03-22 LAB
APPEARANCE: ABNORMAL
BILIRUBIN URINE: NEGATIVE
BLOOD URINE: NEGATIVE
COLOR: YELLOW
GLUCOSE QUALITATIVE U: NEGATIVE MG/DL
HCT VFR BLD CALC: 31.9 %
HGB BLD-MCNC: 10.1 G/DL
KETONES URINE: NEGATIVE
LEUKOCYTE ESTERASE URINE: NEGATIVE
MCHC RBC-ENTMCNC: 26.8 PG
MCHC RBC-ENTMCNC: 31.7 G/DL
MCV RBC AUTO: 84.6 FL
NITRITE URINE: NEGATIVE
PH URINE: 8
PLATELET # BLD AUTO: 367 K/UL
PMV BLD: 9.6 FL
PROTEIN URINE: NEGATIVE MG/DL
RBC # BLD: 3.77 M/UL
RBC # FLD: 19.7 %
SPECIFIC GRAVITY URINE: 1.02
UROBILINOGEN URINE: 1 MG/DL (ref 0.2–?)
WBC # FLD AUTO: 7.01 K/UL

## 2019-04-03 ENCOUNTER — OUTPATIENT (OUTPATIENT)
Dept: OUTPATIENT SERVICES | Facility: HOSPITAL | Age: 44
LOS: 1 days | Discharge: HOME | End: 2019-04-03

## 2019-04-03 DIAGNOSIS — Z90.49 ACQUIRED ABSENCE OF OTHER SPECIFIED PARTS OF DIGESTIVE TRACT: Chronic | ICD-10-CM

## 2019-04-03 DIAGNOSIS — F33.2 MAJOR DEPRESSIVE DISORDER, RECURRENT SEVERE WITHOUT PSYCHOTIC FEATURES: ICD-10-CM

## 2019-04-11 ENCOUNTER — OUTPATIENT (OUTPATIENT)
Dept: OUTPATIENT SERVICES | Facility: HOSPITAL | Age: 44
LOS: 1 days | Discharge: HOME | End: 2019-04-11

## 2019-04-11 DIAGNOSIS — Z90.49 ACQUIRED ABSENCE OF OTHER SPECIFIED PARTS OF DIGESTIVE TRACT: Chronic | ICD-10-CM

## 2019-04-11 DIAGNOSIS — C79.51 SECONDARY MALIGNANT NEOPLASM OF BONE: ICD-10-CM

## 2019-04-11 DIAGNOSIS — C79.31 SECONDARY MALIGNANT NEOPLASM OF BRAIN: ICD-10-CM

## 2019-04-16 ENCOUNTER — RX RENEWAL (OUTPATIENT)
Age: 44
End: 2019-04-16

## 2019-04-16 RX ORDER — ZOLPIDEM TARTRATE 10 MG/1
10 TABLET ORAL
Qty: 30 | Refills: 0 | Status: ACTIVE | COMMUNITY
Start: 2018-07-24 | End: 1900-01-01

## 2019-04-16 RX ORDER — BENZONATATE 100 MG/1
100 CAPSULE ORAL
Qty: 42 | Refills: 0 | Status: ACTIVE | COMMUNITY
Start: 2018-12-26 | End: 1900-01-01

## 2019-04-17 ENCOUNTER — LABORATORY RESULT (OUTPATIENT)
Age: 44
End: 2019-04-17

## 2019-04-17 ENCOUNTER — APPOINTMENT (OUTPATIENT)
Dept: INFUSION THERAPY | Facility: CLINIC | Age: 44
End: 2019-04-17

## 2019-04-17 ENCOUNTER — OTHER (OUTPATIENT)
Age: 44
End: 2019-04-17

## 2019-04-17 ENCOUNTER — APPOINTMENT (OUTPATIENT)
Dept: HEMATOLOGY ONCOLOGY | Facility: CLINIC | Age: 44
End: 2019-04-17

## 2019-04-17 VITALS
TEMPERATURE: 98.5 F | SYSTOLIC BLOOD PRESSURE: 132 MMHG | DIASTOLIC BLOOD PRESSURE: 78 MMHG | HEIGHT: 64 IN | HEART RATE: 98 BPM | RESPIRATION RATE: 16 BRPM | BODY MASS INDEX: 36.88 KG/M2 | WEIGHT: 216 LBS

## 2019-04-17 LAB
HCT VFR BLD CALC: 33.8 %
HGB BLD-MCNC: 10.3 G/DL
MCHC RBC-ENTMCNC: 25.9 PG
MCHC RBC-ENTMCNC: 30.5 G/DL
MCV RBC AUTO: 85.1 FL
PLATELET # BLD AUTO: 322 K/UL
PMV BLD: 9.9 FL
RBC # BLD: 3.97 M/UL
RBC # FLD: 17.2 %
WBC # FLD AUTO: 5.02 K/UL

## 2019-04-17 RX ORDER — FOSAPREPITANT DIMEGLUMINE 150 MG/5ML
150 INJECTION, POWDER, LYOPHILIZED, FOR SOLUTION INTRAVENOUS ONCE
Qty: 0 | Refills: 0 | Status: COMPLETED | OUTPATIENT
Start: 2019-04-17 | End: 2019-04-17

## 2019-04-17 RX ORDER — ONDANSETRON 8 MG/1
16 TABLET, FILM COATED ORAL ONCE
Qty: 0 | Refills: 0 | Status: COMPLETED | OUTPATIENT
Start: 2019-04-17 | End: 2019-04-17

## 2019-04-17 RX ORDER — GEMCITABINE 38 MG/ML
2000 INJECTION, SOLUTION INTRAVENOUS ONCE
Qty: 0 | Refills: 0 | Status: COMPLETED | OUTPATIENT
Start: 2019-04-17 | End: 2019-04-17

## 2019-04-17 RX ADMIN — FOSAPREPITANT DIMEGLUMINE 150 MILLIGRAM(S): 150 INJECTION, POWDER, LYOPHILIZED, FOR SOLUTION INTRAVENOUS at 15:20

## 2019-04-17 RX ADMIN — GEMCITABINE 403.51 MILLIGRAM(S): 38 INJECTION, SOLUTION INTRAVENOUS at 15:45

## 2019-04-17 RX ADMIN — ONDANSETRON 16 MILLIGRAM(S): 8 TABLET, FILM COATED ORAL at 14:45

## 2019-04-17 RX ADMIN — ONDANSETRON 174 MILLIGRAM(S): 8 TABLET, FILM COATED ORAL at 14:30

## 2019-04-17 RX ADMIN — GEMCITABINE 2000 MILLIGRAM(S): 38 INJECTION, SOLUTION INTRAVENOUS at 16:30

## 2019-04-17 RX ADMIN — FOSAPREPITANT DIMEGLUMINE 450 MILLIGRAM(S): 150 INJECTION, POWDER, LYOPHILIZED, FOR SOLUTION INTRAVENOUS at 15:00

## 2019-04-17 NOTE — ASSESSMENT
[FreeTextEntry1] : Metastatic adenocarcinoma of the lung, PDL1 5%, EGFT, Alk, BRAF, KRAS, ROS1 negative\par --Recieved cycle 1 of Carbo Alimta in the hospital on 7/6/2018-\par --Recieved cycle 2 on 7/27/2018 with Keytruda, no adverse effects to date\par --Started on B12 7/2018 \par --Taking folic acid daily \par --Reports she may require some dental work, will be in touch with dentist re Xgeva clearance\par --Brain lesion s/p SRS in 1/2019, tapering Decadron \par --Next generation sequencing revealed no targetable mutations\par --Restaging PET CT on 10/9/2018 reveals positive response to therapy \par --Completed 4th cycle of Carbo/Alimta/Keytruda on 9/14/2018\par --Deescalated to single agent Keytruda on 10/19/2018, she has missed appointments for few doses\par --PET on 12/19 with evidence of progression, R neck biopsy on 12/18/2018 confirming met adenoca\par --Switch therapy to Taxotere/Cyramza on 12/19/2018, Cyramza to be added to cycle 2 on 1/16/2019, tolerated first cycles without major difficulty, reports occasional mild epistaxis \par --S/P cycle 3 on 2/27/2019, cycle 4 was delayed 2/2 emotional stress and pending ED evaluation to r/o DVT/PE\par --Restaging PET CT 3/18/2019 is mostly stable some SUVs appear higher (? delayed immunotherapy effect) \par --Encouraged 2nd opinion at [facility]\par \par Metastatic disease to brain, asymptomatic\par --MRI brain on 12/17/2018 reveals new solitary brain met\par --Off Decadron \par --Recieved SRS 1/2019 \par --MRI follow up due in 4/2019, not ordered \par \par Cancer related pain \par --Requiring OxyContin 30 BID, will increase dose \par --Oxycodone 10mg for breakthrough\par --Following with Rad Onc \par \par Insomnia\par --Ambien\par \par Follow up in 3 weeks, all questions were answered at length\par I have explained to her that her disease unfortunately will not be cured, all therapeutic effort is palliative in nature\par \par \par

## 2019-04-17 NOTE — HISTORY OF PRESENT ILLNESS
[de-identified] : Анна is a lewis 26 yo lady with past medical history significant for anxiety and depression, who has significant smoking history who presented to [facility] on 6/26/2018 with CC of leg pain. She also on further questioning revealed that she has had non-productive cough without associated SOB or CP for approximately 3 months prior. She denies any weight loss or neurological symptoms prior to presentation. CXR reveled LUIS opacity, CT scan of the chest was then done. Based on it's results she was transferred to [facility], further work up was as follows. \par \par CT chest on 6/26/2018 revealed Since November 24, 2015:  1.  There is a new large left upper lobe mass measuring 6.8 x 5.7 x 7 cm  extending to mediastinum with multiple mediastinal lymph nodes as  described above. Additionally, there are multiple new hypodense hepatic  metastatic lesions. 2.  4 mm left upper lobe pulmonary nodule, metastatic etiology\par \par CT A/P on 6/27/2018 revealed Multifocal liver masses consistent with metastatic disease.  Ill-defined 0.7 cm splenic hypodensity suspicious for a metastatic focus.  Multifocal bilateral renal hypoattenuating lesions measuring up to 1.1 cm  in the right interpolar region. Cannot exclude metastasis disease within  the kidneys.\par \par MRI Brain on 6/27/2018 reveled Single enhancing nodule/lesion within a sulcus adjacent to the right  occipital lobe measuring 4 x 4 x 5 mm. Findings highly suspicious for  metastatic disease. Follow-up to 6 most may be helpful for further  evaluation if clinically indicated.\par \par Bone scan on 6/28/2018 revealed Multiple definite bony abnormalities which by pattern of distribution are  consistent with metastatic bone disease.  These include bilateral pubic inferior rami, left superior pubic ramus,  bilateral iliac bones and left 4th costovertebral junction.  Abnormal uptake corresponding to left lung index tumor.\par \par On 6/28/2018 she underwent liver biopsy, not enough tissue for extended molecular testing was obtained, it revealed adenocarcinoma of the lung with 5% PDL1 expression. \par Repeat liver biopsy on 7/2/2018 confirmed the diagnosis of lung adenocarcinoma with negative PDL1 expression, negative EGFR, Alk, BRAF and KRAS. \par \par She received first dose of Carbo/Alimta in the hospital on 7/6/2018 and tolerated it very well.  [de-identified] : 8/15/2018;Анна presents for follow up today. She has tolerated 2nd cycle of Carbo/Alimta with addition of Keytruda without any difficulty. She reports ongoing pain in her R hip, she states narcotics are helpful but she may require a dose increase. She is interested in Rad Onc evaluation. She reports she needs dental appointment and may require some dental work. Will hold off on Keytruda for now. She otherwise offer no complaints today. \par \par 9/12/2018: Анна is doing great, still c/o some back pain, requiring pain meds. Denies SOB, WALKER, headaches, weight loss. She is for cycle 4 of Carbo/Alimta/Keytruda on 9/14/2018. She has met with Dr. Gresham and has her appointment for simulation later today. PET CT was not done yet. \par \par 10/17/2018: Анна presents for follow up today, she is feeling well, has no complaints, she is still requiring pain medication for cancer related pain management. Restaging PET CT on 10/4/2018 reveals  FDG avid left upper lobe hilar 5.6 x 4.1 cm mass, decreased in size  compared to chest CT June 2018 (previously 7.0 x 6.8 cm)., max SUV 33.5.   Left hilar FDG avid lymph node, max SUV 8.9 (image 194).  Suspicious FDG avid 1.4 cm right level 2 cervical lymph node, max SUV  20.3 (image 239). Consider tissue sampling.  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  No current FDG evidence of osseous metastatic disease.\par Images were personally reviewed by myself and discussed with Анна and her boyfriend at length. \par The concerning LN in the R neck was never imaged prior, this is not likely representative of disease progression, overall PET CT is indicative of positive response to therapy. We will continue with single agent Keytruda. Анна reports no side effects of Keytruda to date. \par Анна also reports loss of menstrual periods, will check beta HCG and LH, FSH and estradiol today. She was advised on barrier protection during intercourse. \par \par 11/14/2018: Анна feels well today and reports no complaints, back pain has improved, she was not taking long acting pain meds for 2 weeks. She has missed her Keytruda appointment last week. I have reiterated the importance of compliance with therapy. I have again spoke with Анна re importance of safe sex. She has the script for brain MRI  but she has not done the study just yet she will schedule.\par \par 12/5/2018: Анна feels well today, he reports no systemic side effects to therapy, she reports that she noticed that the righ side of her neck is swollen and has been swollen for about 2-3 weeks. On exam this is finding is consistent with worsening adenopathy in the area where FDG avid node was described prior. i have expressed to Анна that I am concerned of possibility of progression. I advise that she undergoes prompt restaging including PET CT and MRI of the brain as well as R neck biopsy. She will receive Keytruda today. \par \par 12/26/2018: Анна continues to feel well, the lump in her right neck has become slightly smaller. She has completed full restaging. MRI of the brain done on 12/17/2018 that revealed  In comparison with the prior MRI of the brain dated June 27, 2018:  The previously described lesion in the focus adjacent to the right  occipital lobe is not identified and likely represented artifact.  Interval development of a ring-enhancing lesion (0.7 x 0.8 x 0.7 cm) in  the anterior inferior aspect of the right frontal lobe consistent with  cerebral metastasis. There is a moderate amount of surrounding edema. She remains asymptomatic. She was since started on Decadron 2mg BID daily and was referred back to Dr. Gresham, she was mapped for SRS of the brain lesion this AM. \par PET CT on 12/19/2018 revealed  COMPARISON : 10/4/2018.  4 new sites of pathologic FDG uptake in the abdomen and pelvis consistent  with biologic tumor activity.  Fluctuating SUV values within the head and neck and thorax without any  new sites of disease. Incidentally, the primary mass shows a 21% decrease  in SUV max, now 26.3 but is anatomically increased in size measuring 6.3  x 5.5 cm, previously 4.3 x 3.8 cm. \par R neck biopsy on 12/18/2018 revealed metastatic adenocarcinoma. Next generation sequencing was again requested. \par All images were personally reviewed by myself and discussed with patient. \par She has completed carbo/alimta not even 3 months back, i have explained to her that switch of therapy is warranted in my opinion. I will offer her Taxotere with Cyramza, Cyramza will be administered with cycle 2 2/2 scheduling issues. \par Side effects of therapy were discussed at length, including cytopenias, allergic reactions, skin rashes, neuropathy, VTE, perforation to GI tract, prolinuria, elevated BP. \par She is in agreement to proceed ASAP.  \par \par 1/16/2019: Анна started on Taxotere on 12/26/2018, she had a minor infusion reaction and had to run the drug longer, she was able to complete without complications. She also had recent SBRT to the solitary brain lesion. She reports minor headache after procedure and continues on Decadron at 2mg BID. She has gained some weight. She will be receiving 2nd dose of Taxotere along with first dose of Cyramza today. Side effects of Cyramza, including cardiovascular compilations, bleeding, hypertension and proteinuria were discussed. \par \par 2/6/2019: Анна feels well today. Denies any pulmonary symptoms, denies excessive pain. Denies new neurological symptoms. On exam her right cervical adenopathy is improved. She has gained some weight. She reports occasional nose bleeds that stop spontaneously since she started on Cyramza, she also reports some discomfort in her mouth, no overt mouth sores. Epistaxis is likely related to Cyramza, will observe closely. \par \par 2/27/2019: Анна's only complaint today is reflux exacerbation, she will double up to Nexium 40mg daily and use TUMs and Maalox. She reports she is emotional when she is taking steroids. She will go down on post chemo Decadron to 2mg BID and will only premedicate with 1 dose of Decadron prior to chemo on the day before. She reports no neuropathy, pain is controlled, no pulmonary or neurological symptoms. Proceed with cycle 3 of Taxotere with Cyramza. \par \par 3/21/2019: Анна presents today for follow up, she is very emotional, tearful She has not been feeling right, reports discomfort in her chest, her legs have been swollen, she is taking intermittent Lasix. She is contemplating potentially moving to [place], [place], where her myrna has family. She is also [place] [facility] [place] name was provided for her. I am concerned that she has legs swelling and chest discomfort and suggested ED evaluation to R/O DVT/PE, she is agreeable to go. Will hold Taxotere/Cyramza today. \par PET CT on 3/18/2019 revealed Since December 19, 2018:\par \par 1. No new sites of pathologic FDG uptake.\par \par 2. FDG avid left upper lobe mass currently measuring 7.6 x 5.6 cm with \par max SUV 29.4, previously 8.4 x 7.6 cm with prior max SUV 26.3 (12% \par increase).\par \par 3. FDG avid right cervical level II lymph node currently measure 2.4 x \par 1.9 cm with max SUV 23.8, previously measuring 3 x 2.2 cm with prior max \par SUV 20.2 (18% increase).\par \par 4. Previously described left hilar FDG avid lymph node no longer clearly \par delineated; possibly contiguous with FDG avid lung mass or resolved.\par \par 5. Increased size and FDG uptake within peripancreatic 1.5 cm short axis \par lymph node with max SUV 25.2, previously 1 cm short axis with max SUV \par 16.3 (55% increase).  Additional previously described intra-abdominal FDG \par avid lymphadenopathy have otherwise decreased in FDG uptake, catalogued \par above.\par Images were personally reviewed by myself and discussed with Анна. \par \par 4/17/2019: Анна was evaluated for PE/DVT in the hospital. CTA of the chest on 3/21/2019 revealed Interval occlusion of the left upper lobe anterior pulmonary artery \par branch related to the lung mass. Other pulmonary arteries appear widely patent. No evidence of right heart strain.\par Doppler on 3/21/2019 revealed no DVT in B/L LE. \par She was urgently referred for ad Onc evaluation and has completed radiation to the chest. She reports that chest discomfort that she was experiencing prior has significantly improved, she reports mild cough and occasional nausea. Her R neck lump is bigger on exam now, Анна reports that it is not as big or painful as last week. There is no overt progression on prior images and neck lump may be showing fluctuation in size 2/2 prior immunotherapy exposure. Анна wishes to hold off on restaging PET CT for now. She has been experiencing a significant amount of swelling since starting on Taxotere, therefore we will plan to switch her over to Gemzar at this time. Side effects including, but not limited to, occasional pulmonary and liver toxicity were discussed.

## 2019-04-17 NOTE — PHYSICAL EXAM
[Restricted in physically strenuous activity but ambulatory and able to carry out work of a light or sedentary nature] : Status 1- Restricted in physically strenuous activity but ambulatory and able to carry out work of a light or sedentary nature, e.g., light house work, office work [Normal] : affect appropriate [de-identified] : cushingoid  [de-identified] : B/L lower extremity edema [de-identified] : palpable R cervical node is larger on exam today, slightly tender to palpation

## 2019-04-17 NOTE — HISTORY OF PRESENT ILLNESS
[de-identified] : Анна is a lewis 26 yo lady with past medical history significant for anxiety and depression, who has significant smoking history who presented to [facility] on 6/26/2018 with CC of leg pain. She also on further questioning revealed that she has had non-productive cough without associated SOB or CP for approximately 3 months prior. She denies any weight loss or neurological symptoms prior to presentation. CXR reveled LUIS opacity, CT scan of the chest was then done. Based on it's results she was transferred to [place], further work up was as follows. \par \par CT chest on 6/26/2018 revealed Since November 24, 2015:  1.  There is a new large left upper lobe mass measuring 6.8 x 5.7 x 7 cm  extending to mediastinum with multiple mediastinal lymph nodes as  described above. Additionally, there are multiple new hypodense hepatic  metastatic lesions. 2.  4 mm left upper lobe pulmonary nodule, metastatic etiology\par \par CT A/P on 6/27/2018 revealed Multifocal liver masses consistent with metastatic disease.  Ill-defined 0.7 cm splenic hypodensity suspicious for a metastatic focus.  Multifocal bilateral renal hypoattenuating lesions measuring up to 1.1 cm  in the right interpolar region. Cannot exclude metastasis disease within  the kidneys.\par \par MRI Brain on 6/27/2018 reveled Single enhancing nodule/lesion within a sulcus adjacent to the right  occipital lobe measuring 4 x 4 x 5 mm. Findings highly suspicious for  metastatic disease. Follow-up to 6 most may be helpful for further  evaluation if clinically indicated.\par \par Bone scan on 6/28/2018 revealed Multiple definite bony abnormalities which by pattern of distribution are  consistent with metastatic bone disease.  These include bilateral pubic inferior rami, left superior pubic ramus,  bilateral iliac bones and left 4th costovertebral junction.  Abnormal uptake corresponding to left lung index tumor.\par \par On 6/28/2018 she underwent liver biopsy, not enough tissue for extended molecular testing was obtained, it revealed adenocarcinoma of the lung with 5% PDL1 expression. \par Repeat liver biopsy on 7/2/2018 confirmed the diagnosis of lung adenocarcinoma with negative PDL1 expression, negative EGFR, Alk, BRAF and KRAS. \par \par She received first dose of Carbo/Alimta in the hospital on 7/6/2018 and tolerated it very well.  [de-identified] : 8/15/2018;Анна presents for follow up today. She has tolerated 2nd cycle of Carbo/Alimta with addition of Keytruda without any difficulty. She reports ongoing pain in her R hip, she states narcotics are helpful but she may require a dose increase. She is interested in Rad Onc evaluation. She reports she needs dental appointment and may require some dental work. Will hold off on Keytruda for now. She otherwise offer no complaints today. \par \par 9/12/2018: Анна is doing great, still c/o some back pain, requiring pain meds. Denies SOB, WALKER, headaches, weight loss. She is for cycle 4 of Carbo/Alimta/Keytruda on 9/14/2018. She has met with Dr. Gresham and has her appointment for simulation later today. PET CT was not done yet. \par \par 10/17/2018: Анна presents for follow up today, she is feeling well, has no complaints, she is still requiring pain medication for cancer related pain management. Restaging PET CT on 10/4/2018 reveals  FDG avid left upper lobe hilar 5.6 x 4.1 cm mass, decreased in size  compared to chest CT June 2018 (previously 7.0 x 6.8 cm)., max SUV 33.5.   Left hilar FDG avid lymph node, max SUV 8.9 (image 194).  Suspicious FDG avid 1.4 cm right level 2 cervical lymph node, max SUV  20.3 (image 239). Consider tissue sampling.  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  No current FDG evidence of osseous metastatic disease.\par Images were personally reviewed by myself and discussed with Анна and her boyfriend at length. \par The concerning LN in the R neck was never imaged prior, this is not likely representative of disease progression, overall PET CT is indicative of positive response to therapy. We will continue with single agent Keytruda. Анна reports no side effects of Keytruda to date. \par Анна also reports loss of menstrual periods, will check beta HCG and LH, FSH and estradiol today. She was advised on barrier protection during intercourse. \par \par 11/14/2018: Анна feels well today and reports no complaints, back pain has improved, she was not taking long acting pain meds for 2 weeks. She has missed her Keytruda appointment last week. I have reiterated the importance of compliance with therapy. I have again spoke with Анна re importance of safe sex. She has the script for brain MRI  but she has not done the study just yet she will schedule.\par \par 12/5/2018: Анна feels well today, he reports no systemic side effects to therapy, she reports that she noticed that the righ side of her neck is swollen and has been swollen for about 2-3 weeks. On exam this is finding is consistent with worsening adenopathy in the area where FDG avid node was described prior. i have expressed to Анна that I am concerned of possibility of progression. I advise that she undergoes prompt restaging including PET CT and MRI of the brain as well as R neck biopsy. She will receive Keytruda today. \par \par 12/26/2018: Анна continues to feel well, the lump in her right neck has become slightly smaller. She has completed full restaging. MRI of the brain done on 12/17/2018 that revealed  In comparison with the prior MRI of the brain dated June 27, 2018:  The previously described lesion in the focus adjacent to the right  occipital lobe is not identified and likely represented artifact.  Interval development of a ring-enhancing lesion (0.7 x 0.8 x 0.7 cm) in  the anterior inferior aspect of the right frontal lobe consistent with  cerebral metastasis. There is a moderate amount of surrounding edema. She remains asymptomatic. She was since started on Decadron 2mg BID daily and was referred back to Dr. Gresham, she was mapped for SRS of the brain lesion this AM. \par PET CT on 12/19/2018 revealed  COMPARISON : 10/4/2018.  4 new sites of pathologic FDG uptake in the abdomen and pelvis consistent  with biologic tumor activity.  Fluctuating SUV values within the head and neck and thorax without any  new sites of disease. Incidentally, the primary mass shows a 21% decrease  in SUV max, now 26.3 but is anatomically increased in size measuring 6.3  x 5.5 cm, previously 4.3 x 3.8 cm. \par R neck biopsy on 12/18/2018 revealed metastatic adenocarcinoma. Next generation sequencing was again requested. \par All images were personally reviewed by myself and discussed with patient. \par She has completed carbo/alimta not even 3 months back, i have explained to her that switch of therapy is warranted in my opinion. I will offer her Taxotere with Cyramza, Cyramza will be administered with cycle 2 2/2 scheduling issues. \par Side effects of therapy were discussed at length, including cytopenias, allergic reactions, skin rashes, neuropathy, VTE, perforation to GI tract, prolinuria, elevated BP. \par She is in agreement to proceed ASAP.  \par \par 1/16/2019: Анна started on Taxotere on 12/26/2018, she had a minor infusion reaction and had to run the drug longer, she was able to complete without complications. She also had recent SBRT to the solitary brain lesion. She reports minor headache after procedure and continues on Decadron at 2mg BID. She has gained some weight. She will be receiving 2nd dose of Taxotere along with first dose of Cyramza today. Side effects of Cyramza, including cardiovascular compilations, bleeding, hypertension and proteinuria were discussed. \par \par 2/6/2019: Анна feels well today. Denies any pulmonary symptoms, denies excessive pain. Denies new neurological symptoms. On exam her right cervical adenopathy is improved. She has gained some weight. She reports occasional nose bleeds that stop spontaneously since she started on Cyramza, she also reports some discomfort in her mouth, no overt mouth sores. Epistaxis is likely related to Cyramza, will observe closely. \par \par 2/27/2019: Анна's only complaint today is reflux exacerbation, she will double up to Nexium 40mg daily and use TUMs and Maalox. She reports she is emotional when she is taking steroids. She will go down on post chemo Decadron to 2mg BID and will only premedicate with 1 dose of Decadron prior to chemo on the day before. She reports no neuropathy, pain is controlled, no pulmonary or neurological symptoms. Proceed with cycle 3 of Taxotere with Cyramza. \par \par 3/21/2019: Анна presents today for follow up, she is very emotional, tearful She has not been feeling right, reports discomfort in her chest, her legs have been swollen, she is taking intermittent Lasix. She is contemplating potentially moving to [place], [place], where her myrna has family. She is also interested in potentially obtaining second opinion at [facility]. Lung cancer specialist name was provided for her. I am concerned that she has legs swelling and chest discomfort and suggested ED evaluation to R/O DVT/PE, she is agreeable to go. Will hold Taxotere/Cyramza today. \par PET CT on 3/18/2019 revealed Since December 19, 2018:\par \par 1. No new sites of pathologic FDG uptake.\par \par 2. FDG avid left upper lobe mass currently measuring 7.6 x 5.6 cm with \par max SUV 29.4, previously 8.4 x 7.6 cm with prior max SUV 26.3 (12% \par increase).\par \par 3. FDG avid right cervical level II lymph node currently measure 2.4 x \par 1.9 cm with max SUV 23.8, previously measuring 3 x 2.2 cm with prior max \par SUV 20.2 (18% increase).\par \par 4. Previously described left hilar FDG avid lymph node no longer clearly \par delineated; possibly contiguous with FDG avid lung mass or resolved.\par \par 5. Increased size and FDG uptake within peripancreatic 1.5 cm short axis \par lymph node with max SUV 25.2, previously 1 cm short axis with max SUV \par 16.3 (55% increase).  Additional previously described intra-abdominal FDG \par avid lymphadenopathy have otherwise decreased in FDG uptake, catalogued \par above.\par Images were personally reviewed by myself and discussed with Анна.

## 2019-04-17 NOTE — ASSESSMENT
[FreeTextEntry1] : Metastatic adenocarcinoma of the lung, PDL1 5%, EGFT, Alk, BRAF, KRAS, ROS1 negative\par --Recieved cycle 1 of Carbo Alimta in the hospital on 7/6/2018-\par --Recieved cycle 2 on 7/27/2018 with Keytruda, no adverse effects \par --Started on B12 7/2018--\par --Taking folic acid daily--\par --Reports she may require some dental work, will be in touch with dentist re Xgeva clearance\par --Brain lesion s/p SRS in 1/2019, tapering Decadron \par --Next generation sequencing revealed no targetable mutations\par --Restaging PET CT on 10/9/2018 reveals positive response to therapy \par --Completed 4th cycle of Carbo/Alimta/Keytruda on 9/14/2018\par --Deescalated to single agent Keytruda on 10/19/2018, she has missed appointments for few doses\par --PET on 12/19 with evidence of progression, R neck biopsy on 12/18/2018 confirming met adenoca, Next generation sequencing repeat revealed no targetable mutations \par --Switched therapy to Taxotere/Cyramza on 12/19/2018, Cyramza was added to cycle 2 on 1/16/2019, tolerated first cycles without major difficulty, reports occasional mild epistaxis \par --S/P cycle 3 on 2/27/2019, cycle 4 was delayed 2/2 emotional stress and pending ED evaluation to r/o DVT/PE\par --Restaging PET CT 3/18/2019 is mostly stable some SUVs appear higher (? 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\par --Completed palliative radiation to the chest 4/2019 \par --No overt progression, but with suspecion of progression in mind Анна was switched over to Gemzar 1000mg/m2 3 weeks on 1 off on 4/17/2019--\par --Restaging MRI of the brain was ordered on 4/17/2019\par --Echo heart ordered on 4/17/2019 \par --Encouraged 2nd opinion at [facility]\par \par Metastatic disease to brain, asymptomatic\par --MRI brain on 12/17/2018 reveals new solitary brain met\par --Off Decadron \par --Recieved SRS 1/2019 \par --MRI follow up due in 4/2019, ordered 4/17/2019 \par \par Cancer related pain \par --Requiring OxyContin 40 BID\par --Oxycodone 10mg for breakthrough\par --Following with Rad Onc \par \par Insomnia\par --Ambien\par \par Follow up in 4 weeks, all questions were answered at length\par I have explained to her that her disease unfortunately will not be cured, all therapeutic effort is palliative in nature\par \par \par

## 2019-04-17 NOTE — CONSULT LETTER
[( Thank you for referring [unfilled] for consultation for _____ )] : Thank you for referring [unfilled] for consultation for [unfilled] [Consult Letter:] : I had the pleasure of evaluating your patient, [unfilled]. [Dear  ___] : Dear  [unfilled], [Consult Closing:] : Thank you very much for allowing me to participate in the care of this patient.  If you have any questions, please do not hesitate to contact me. [Please see my note below.] : Please see my note below. [Sincerely,] : Sincerely, [FreeTextEntry3] : Em Galvan MD

## 2019-04-17 NOTE — PHYSICAL EXAM
[Restricted in physically strenuous activity but ambulatory and able to carry out work of a light or sedentary nature] : Status 1- Restricted in physically strenuous activity but ambulatory and able to carry out work of a light or sedentary nature, e.g., light house work, office work [Normal] : grossly intact [de-identified] : B/L lower extremity edema [de-identified] : cushingoid  [de-identified] : palpable R cervical node is stable

## 2019-04-17 NOTE — REVIEW OF SYSTEMS
[Lower Ext Edema] : lower extremity edema [Negative] : Allergic/Immunologic [Chest Pain] : no chest pain [Palpitations] : no palpitations [Leg Claudication] : no intermittent leg claudication [FreeTextEntry2] : weight gain [FreeTextEntry4] : right neck RICHARD, appx 1.5cm in size, feels smaller on exam today, occasional epistaxis, stops spontaneously [FreeTextEntry5] : some chest discomfort  [FreeTextEntry9] : low back/hip pain has mostly resolved, increased discomfort in lower legs

## 2019-04-18 LAB
ALBUMIN SERPL ELPH-MCNC: 3.8 G/DL
ALP BLD-CCNC: 99 U/L
ALT SERPL-CCNC: 9 U/L
ANION GAP SERPL CALC-SCNC: 18 MMOL/L
AST SERPL-CCNC: 15 U/L
BILIRUB SERPL-MCNC: <0.2 MG/DL
BUN SERPL-MCNC: 4 MG/DL
CALCIUM SERPL-MCNC: 9.2 MG/DL
CHLORIDE SERPL-SCNC: 91 MMOL/L
CO2 SERPL-SCNC: 30 MMOL/L
CREAT SERPL-MCNC: 0.6 MG/DL
GLUCOSE SERPL-MCNC: 81 MG/DL
LDH SERPL-CCNC: 223
POTASSIUM SERPL-SCNC: 3.7 MMOL/L
PROT SERPL-MCNC: 6.5 G/DL
SODIUM SERPL-SCNC: 139 MMOL/L

## 2019-04-22 ENCOUNTER — MEDICATION RENEWAL (OUTPATIENT)
Age: 44
End: 2019-04-22

## 2019-04-23 ENCOUNTER — OUTPATIENT (OUTPATIENT)
Dept: OUTPATIENT SERVICES | Facility: HOSPITAL | Age: 44
LOS: 1 days | Discharge: HOME | End: 2019-04-23

## 2019-04-23 DIAGNOSIS — F33.2 MAJOR DEPRESSIVE DISORDER, RECURRENT SEVERE WITHOUT PSYCHOTIC FEATURES: ICD-10-CM

## 2019-04-23 DIAGNOSIS — Z90.49 ACQUIRED ABSENCE OF OTHER SPECIFIED PARTS OF DIGESTIVE TRACT: Chronic | ICD-10-CM

## 2019-04-24 ENCOUNTER — LABORATORY RESULT (OUTPATIENT)
Age: 44
End: 2019-04-24

## 2019-04-24 ENCOUNTER — APPOINTMENT (OUTPATIENT)
Dept: INFUSION THERAPY | Facility: CLINIC | Age: 44
End: 2019-04-24

## 2019-04-24 LAB
HCT VFR BLD CALC: 30.9 %
HGB BLD-MCNC: 9.9 G/DL
MCHC RBC-ENTMCNC: 26.3 PG
MCHC RBC-ENTMCNC: 32 G/DL
MCV RBC AUTO: 82 FL
PLATELET # BLD AUTO: 162 K/UL
PMV BLD: 9.5 FL
RBC # BLD: 3.77 M/UL
RBC # FLD: 16.9 %
WBC # FLD AUTO: 4.11 K/UL

## 2019-04-24 RX ORDER — CEPHALEXIN 500 MG
500 CAPSULE ORAL ONCE
Qty: 0 | Refills: 0 | Status: COMPLETED | OUTPATIENT
Start: 2019-04-24 | End: 2019-04-24

## 2019-04-24 RX ORDER — FOSAPREPITANT DIMEGLUMINE 150 MG/5ML
150 INJECTION, POWDER, LYOPHILIZED, FOR SOLUTION INTRAVENOUS ONCE
Qty: 0 | Refills: 0 | Status: COMPLETED | OUTPATIENT
Start: 2019-04-24 | End: 2019-04-24

## 2019-04-24 RX ORDER — ONDANSETRON 8 MG/1
16 TABLET, FILM COATED ORAL ONCE
Qty: 0 | Refills: 0 | Status: COMPLETED | OUTPATIENT
Start: 2019-04-24 | End: 2019-04-24

## 2019-04-24 RX ORDER — CEPHALEXIN 500 MG/1
500 CAPSULE ORAL 3 TIMES DAILY
Qty: 21 | Refills: 0 | Status: COMPLETED | COMMUNITY
Start: 2019-04-24 | End: 2019-05-01

## 2019-04-24 RX ORDER — GEMCITABINE 38 MG/ML
2000 INJECTION, SOLUTION INTRAVENOUS ONCE
Qty: 0 | Refills: 0 | Status: COMPLETED | OUTPATIENT
Start: 2019-04-24 | End: 2019-04-24

## 2019-04-24 RX ADMIN — FOSAPREPITANT DIMEGLUMINE 150 MILLIGRAM(S): 150 INJECTION, POWDER, LYOPHILIZED, FOR SOLUTION INTRAVENOUS at 11:00

## 2019-04-24 RX ADMIN — GEMCITABINE 403.51 MILLIGRAM(S): 38 INJECTION, SOLUTION INTRAVENOUS at 11:20

## 2019-04-24 RX ADMIN — ONDANSETRON 16 MILLIGRAM(S): 8 TABLET, FILM COATED ORAL at 11:20

## 2019-04-24 RX ADMIN — GEMCITABINE 2000 MILLIGRAM(S): 38 INJECTION, SOLUTION INTRAVENOUS at 12:10

## 2019-04-24 RX ADMIN — ONDANSETRON 174 MILLIGRAM(S): 8 TABLET, FILM COATED ORAL at 11:00

## 2019-04-24 RX ADMIN — Medication 500 MILLIGRAM(S): at 11:28

## 2019-04-24 RX ADMIN — FOSAPREPITANT DIMEGLUMINE 450 MILLIGRAM(S): 150 INJECTION, POWDER, LYOPHILIZED, FOR SOLUTION INTRAVENOUS at 10:27

## 2019-04-25 ENCOUNTER — OTHER (OUTPATIENT)
Age: 44
End: 2019-04-25

## 2019-04-29 ENCOUNTER — RX RENEWAL (OUTPATIENT)
Age: 44
End: 2019-04-29

## 2019-05-01 ENCOUNTER — LABORATORY RESULT (OUTPATIENT)
Age: 44
End: 2019-05-01

## 2019-05-01 ENCOUNTER — APPOINTMENT (OUTPATIENT)
Dept: INFUSION THERAPY | Facility: CLINIC | Age: 44
End: 2019-05-01

## 2019-05-01 ENCOUNTER — OUTPATIENT (OUTPATIENT)
Dept: OUTPATIENT SERVICES | Facility: HOSPITAL | Age: 44
LOS: 1 days | Discharge: HOME | End: 2019-05-01
Payer: MEDICAID

## 2019-05-01 DIAGNOSIS — I42.9 CARDIOMYOPATHY, UNSPECIFIED: ICD-10-CM

## 2019-05-01 DIAGNOSIS — C34.90 MALIGNANT NEOPLASM OF UNSPECIFIED PART OF UNSPECIFIED BRONCHUS OR LUNG: ICD-10-CM

## 2019-05-01 DIAGNOSIS — Z90.49 ACQUIRED ABSENCE OF OTHER SPECIFIED PARTS OF DIGESTIVE TRACT: Chronic | ICD-10-CM

## 2019-05-01 PROCEDURE — 93306 TTE W/DOPPLER COMPLETE: CPT | Mod: 26

## 2019-05-02 ENCOUNTER — EMERGENCY (EMERGENCY)
Facility: HOSPITAL | Age: 44
LOS: 0 days | Discharge: HOME | End: 2019-05-02
Attending: EMERGENCY MEDICINE | Admitting: EMERGENCY MEDICINE
Payer: MEDICAID

## 2019-05-02 VITALS
HEART RATE: 71 BPM | OXYGEN SATURATION: 99 % | SYSTOLIC BLOOD PRESSURE: 106 MMHG | TEMPERATURE: 97 F | RESPIRATION RATE: 18 BRPM | DIASTOLIC BLOOD PRESSURE: 58 MMHG

## 2019-05-02 DIAGNOSIS — Z79.891 LONG TERM (CURRENT) USE OF OPIATE ANALGESIC: ICD-10-CM

## 2019-05-02 DIAGNOSIS — Z91.09 OTHER ALLERGY STATUS, OTHER THAN TO DRUGS AND BIOLOGICAL SUBSTANCES: ICD-10-CM

## 2019-05-02 DIAGNOSIS — R53.1 WEAKNESS: ICD-10-CM

## 2019-05-02 DIAGNOSIS — Z88.1 ALLERGY STATUS TO OTHER ANTIBIOTIC AGENTS STATUS: ICD-10-CM

## 2019-05-02 DIAGNOSIS — Z79.899 OTHER LONG TERM (CURRENT) DRUG THERAPY: ICD-10-CM

## 2019-05-02 DIAGNOSIS — Z90.49 ACQUIRED ABSENCE OF OTHER SPECIFIED PARTS OF DIGESTIVE TRACT: Chronic | ICD-10-CM

## 2019-05-02 DIAGNOSIS — R55 SYNCOPE AND COLLAPSE: ICD-10-CM

## 2019-05-02 DIAGNOSIS — R42 DIZZINESS AND GIDDINESS: ICD-10-CM

## 2019-05-02 LAB
ALBUMIN SERPL ELPH-MCNC: 3.5 G/DL — SIGNIFICANT CHANGE UP (ref 3.5–5.2)
ALP SERPL-CCNC: 130 U/L — HIGH (ref 30–115)
ALT FLD-CCNC: 76 U/L — HIGH (ref 0–41)
ANION GAP SERPL CALC-SCNC: 14 MMOL/L — SIGNIFICANT CHANGE UP (ref 7–14)
APPEARANCE UR: CLEAR — SIGNIFICANT CHANGE UP
AST SERPL-CCNC: 118 U/L — HIGH (ref 0–41)
BASOPHILS # BLD AUTO: 0.01 K/UL — SIGNIFICANT CHANGE UP (ref 0–0.2)
BASOPHILS NFR BLD AUTO: 0.3 % — SIGNIFICANT CHANGE UP (ref 0–1)
BILIRUB SERPL-MCNC: <0.2 MG/DL — SIGNIFICANT CHANGE UP (ref 0.2–1.2)
BILIRUB UR-MCNC: NEGATIVE — SIGNIFICANT CHANGE UP
BUN SERPL-MCNC: 6 MG/DL — LOW (ref 10–20)
CALCIUM SERPL-MCNC: 8.8 MG/DL — SIGNIFICANT CHANGE UP (ref 8.5–10.1)
CHLORIDE SERPL-SCNC: 92 MMOL/L — LOW (ref 98–110)
CO2 SERPL-SCNC: 33 MMOL/L — HIGH (ref 17–32)
COLOR SPEC: YELLOW — SIGNIFICANT CHANGE UP
CREAT SERPL-MCNC: 0.7 MG/DL — SIGNIFICANT CHANGE UP (ref 0.7–1.5)
DIFF PNL FLD: NEGATIVE — SIGNIFICANT CHANGE UP
EOSINOPHIL # BLD AUTO: 0.01 K/UL — SIGNIFICANT CHANGE UP (ref 0–0.7)
EOSINOPHIL NFR BLD AUTO: 0.3 % — SIGNIFICANT CHANGE UP (ref 0–8)
GLUCOSE SERPL-MCNC: 87 MG/DL — SIGNIFICANT CHANGE UP (ref 70–99)
GLUCOSE UR QL: NEGATIVE MG/DL — SIGNIFICANT CHANGE UP
HCT VFR BLD CALC: 28.1 % — LOW (ref 37–47)
HCT VFR BLD CALC: 29.1 %
HGB BLD-MCNC: 8.5 G/DL — LOW (ref 12–16)
HGB BLD-MCNC: 9.2 G/DL
IMM GRANULOCYTES NFR BLD AUTO: 1.5 % — HIGH (ref 0.1–0.3)
KETONES UR-MCNC: NEGATIVE — SIGNIFICANT CHANGE UP
LACTATE SERPL-SCNC: 1.1 MMOL/L — SIGNIFICANT CHANGE UP (ref 0.5–2.2)
LEUKOCYTE ESTERASE UR-ACNC: NEGATIVE — SIGNIFICANT CHANGE UP
LYMPHOCYTES # BLD AUTO: 1.11 K/UL — LOW (ref 1.2–3.4)
LYMPHOCYTES # BLD AUTO: 27.8 % — SIGNIFICANT CHANGE UP (ref 20.5–51.1)
MAGNESIUM SERPL-MCNC: 2.2 MG/DL — SIGNIFICANT CHANGE UP (ref 1.8–2.4)
MCHC RBC-ENTMCNC: 25.7 PG — LOW (ref 27–31)
MCHC RBC-ENTMCNC: 26 PG
MCHC RBC-ENTMCNC: 30.2 G/DL — LOW (ref 32–37)
MCHC RBC-ENTMCNC: 31.6 G/DL
MCV RBC AUTO: 82.2 FL
MCV RBC AUTO: 84.9 FL — SIGNIFICANT CHANGE UP (ref 81–99)
MONOCYTES # BLD AUTO: 0.44 K/UL — SIGNIFICANT CHANGE UP (ref 0.1–0.6)
MONOCYTES NFR BLD AUTO: 11 % — HIGH (ref 1.7–9.3)
NEUTROPHILS # BLD AUTO: 2.36 K/UL — SIGNIFICANT CHANGE UP (ref 1.4–6.5)
NEUTROPHILS NFR BLD AUTO: 59.1 % — SIGNIFICANT CHANGE UP (ref 42.2–75.2)
NITRITE UR-MCNC: NEGATIVE — SIGNIFICANT CHANGE UP
NRBC # BLD: 0 /100 WBCS — SIGNIFICANT CHANGE UP (ref 0–0)
PH UR: 7.5 — SIGNIFICANT CHANGE UP (ref 5–8)
PLATELET # BLD AUTO: 47 K/UL
PLATELET # BLD AUTO: 96 K/UL — LOW (ref 130–400)
PMV BLD: 11.6 FL
POTASSIUM SERPL-MCNC: 3.1 MMOL/L — LOW (ref 3.5–5)
POTASSIUM SERPL-SCNC: 3.1 MMOL/L — LOW (ref 3.5–5)
PROT SERPL-MCNC: 6.2 G/DL — SIGNIFICANT CHANGE UP (ref 6–8)
PROT UR-MCNC: NEGATIVE MG/DL — SIGNIFICANT CHANGE UP
RBC # BLD: 3.31 M/UL — LOW (ref 4.2–5.4)
RBC # BLD: 3.54 M/UL
RBC # FLD: 17.2 %
RBC # FLD: 17.4 % — HIGH (ref 11.5–14.5)
SODIUM SERPL-SCNC: 139 MMOL/L — SIGNIFICANT CHANGE UP (ref 135–146)
SP GR SPEC: <=1.005 — SIGNIFICANT CHANGE UP (ref 1.01–1.03)
UROBILINOGEN FLD QL: 0.2 MG/DL — SIGNIFICANT CHANGE UP (ref 0.2–0.2)
WBC # BLD: 3.99 K/UL — LOW (ref 4.8–10.8)
WBC # FLD AUTO: 3.98 K/UL
WBC # FLD AUTO: 3.99 K/UL — LOW (ref 4.8–10.8)

## 2019-05-02 PROCEDURE — 99284 EMERGENCY DEPT VISIT MOD MDM: CPT

## 2019-05-02 PROCEDURE — 93010 ELECTROCARDIOGRAM REPORT: CPT

## 2019-05-02 PROCEDURE — 71046 X-RAY EXAM CHEST 2 VIEWS: CPT | Mod: 26

## 2019-05-02 RX ORDER — SODIUM CHLORIDE 9 MG/ML
1000 INJECTION, SOLUTION INTRAVENOUS ONCE
Qty: 0 | Refills: 0 | Status: COMPLETED | OUTPATIENT
Start: 2019-05-02 | End: 2019-05-02

## 2019-05-02 RX ORDER — POTASSIUM CHLORIDE 20 MEQ
40 PACKET (EA) ORAL ONCE
Qty: 0 | Refills: 0 | Status: COMPLETED | OUTPATIENT
Start: 2019-05-02 | End: 2019-05-02

## 2019-05-02 RX ADMIN — SODIUM CHLORIDE 1000 MILLILITER(S): 9 INJECTION, SOLUTION INTRAVENOUS at 17:24

## 2019-05-02 RX ADMIN — Medication 40 MILLIEQUIVALENT(S): at 19:16

## 2019-05-02 NOTE — ED PROVIDER NOTE - PHYSICAL EXAMINATION
VITAL SIGNS: I have reviewed nursing notes and confirm.  CONSTITUTIONAL: Well-developed; well-nourished; in no acute distress. pt comfortable.  SKIN: skin exam is warm and dry, no acute rash.   HEAD: Normocephalic; atraumatic.  EYES:  EOM intact; conjunctiva and sclera clear.  ENT: No nasal discharge; airway clear. moist oral mucosa; +dental carries, uvula at midline. no pharyngeal erythema, edema exudate or vesicles.  TMs with good light reflex b/l.    NECK: Supple; non tender.  CARD: S1, S2 normal; no murmurs, gallops, or rubs. Regular rate and rhythm. posterior tibial and radial pulses 2+  RESP: No wheezes, rales or rhonchi. cta b/l. no use of accessory muscles. no retractions  ABD: Normal bowel sounds; soft; non-distended; non-tender; no rebound. negative psoas, rovsign's and murphys.  EXT: Normal ROM. No  cyanosis or edema.  BACK: No cva tenderness  LYMPH: No acute cervical adenopathy.  NEURO: Alert, oriented, grossly unremarkable.    PSYCH: Cooperative, appropriate.

## 2019-05-02 NOTE — ED PROVIDER NOTE - OBJECTIVE STATEMENT
43y/o F w/ hx of stage 4 lung ca oncologist jaida presents for weakness while waiting npo 30 mins before presenting. pt denies dizziness or lightheadness. pt was given juice and improved. no cp or sob.

## 2019-05-02 NOTE — ED PROVIDER NOTE - ATTENDING CONTRIBUTION TO CARE
44yoF with h/o stage 4 lung ca (Sokoloff) on gemcitabine since April x2 doses, presents with near syncope. States felt lightheadedness, heat, and generalized weakness, lasted x15 minutes, improved after getting juice to drink. Started when in bathroom. Denies CP, SOB, vomiting, diarrhea, black or bloody stool or other source of bleeding. Currently asymptomatic. On exam, afebrile, hemodynamically stable, saturating well, NAD, well appearing, head NCAT, EOMI grossly, anicteric, MMM, RRR, nml S1/S2, no m/r/g, lungs CTAB, no w/r/r, abd soft, NT, ND, nml BS, no rebound or guarding, AAO, CN's 3-12 grossly intact, SCALES spontaneously, no leg cyanosis or edema and no noted erythema, skin warm, well perfused, no rashes or hives. No CP/SOB/u/l leg findings to suggest PE. No ECG arrythmia, ________________ no signs of aortic outflow obstruction. No fever or source of infection, no e/o cellulitis of legs. Pt was fasting today and in bathroom when this happened, and was likely dehydrated. Given fluids and PO with resolution of symptoms. No source of bleeding. D/w hem/onc her anemia, ___. 44yoF with h/o stage 4 lung ca (Sokoloff) on gemcitabine since April x2 doses, presents with near syncope. States felt lightheadedness, heat, and generalized weakness, lasted x15 minutes, improved after getting juice to drink. Started when in bathroom. Denies CP, SOB, vomiting, diarrhea, black or bloody stool or other source of bleeding. Currently asymptomatic. On exam, afebrile, hemodynamically stable, saturating well, NAD, well appearing, head NCAT, EOMI grossly, anicteric, MMM, RRR, nml S1/S2, no m/r/g, lungs CTAB, no w/r/r, abd soft, NT, ND, nml BS, no rebound or guarding, AAO, CN's 3-12 grossly intact, SCALES spontaneously, no leg cyanosis or edema and no noted erythema, skin warm, well perfused, no rashes or hives. No CP/SOB/u/l leg findings to suggest PE. No ECG arrythmia, no signs of aortic outflow obstruction. No fever or source of infection, no e/o cellulitis of legs. Pt was fasting today and in bathroom when this happened, and was likely dehydrated. Given fluids and PO with resolution of symptoms. No source of bleeding. D/w hem/onc her anemia, states this is c/w her gemcitabine and appropriate for outpt f/u. Patient is well appearing, NAD, afebrile, hemodynamically stable. Discharged with instructions in further symptomatic care, return precautions, and need for PMD f/u.

## 2019-05-02 NOTE — ED PROVIDER NOTE - NSFOLLOWUPINSTRUCTIONS_ED_ALL_ED_FT
Near-Syncope  ImageNear-syncope is when you suddenly get weak or dizzy, or you feel like you might pass out (faint). This is due to a lack of blood flow to the brain. During an episode of near-syncope, you may:    Feel dizzy or light-headed.  Feel sick to your stomach (nauseous).  See all white or all black.  Have cold, clammy skin.    If you passed out, get help right away.Call your local emergency services (461 in the [place] Do not drive yourself to the hospital.    Follow these instructions at home:  Pay attention to any changes in your symptoms. Take these actions to help with your condition:    Have someone stay with you until you feel stable.  Do not drive, use machinery, or play sports until your doctor says it is okay.  Keep all follow-up visits as told by your doctor. This is important.  If you start to feel like you might pass out, lie down right away and raise (elevate) your feet above the level of your heart. Breathe deeply and steadily. Wait until all of the symptoms are gone.  Drink enough fluid to keep your pee (urine) clear or pale yellow.  If you are taking blood pressure or heart medicine, get up slowly and spend many minutes getting ready to sit and then stand. This can help with dizziness.  Take over-the-counter and prescription medicines only as told by your doctor.    Get help right away if:  You have a very bad headache.  You have unusual pain in your chest, tummy, or back.  You are bleeding from your mouth or rectum.  You have black or tarry poop (stool).  You have a very fast or uneven heartbeat (palpitations).  You pass out one time or more than once.  You have jerky movements that you cannot control (seizure).  You are confused.  You have trouble walking.  You are very weak.  You have vision problems.  These symptoms may be an emergency. Do not wait to see if the symptoms will go away. Get medical help right away. Call your local emergency services (301 in the [place] Do not drive yourself to the hospital.     This information is not intended to replace advice given to you by your health care provider. Make sure you discuss any questions you have with your health care provider.

## 2019-05-02 NOTE — ED ADULT NURSE NOTE - NSIMPLEMENTINTERV_GEN_ALL_ED
Implemented All Universal Safety Interventions:  [place] to call system. Call bell, personal items and telephone within reach. Instruct patient to call for assistance. Room bathroom lighting operational. Non-slip footwear when patient is off stretcher. Physically safe environment: no spills, clutter or unnecessary equipment. Stretcher in lowest position, wheels locked, appropriate side rails in place.

## 2019-05-02 NOTE — ED ADULT NURSE NOTE - OBJECTIVE STATEMENT
patient reports to the ED with a complaint of dizziness while waiting for MRI today. patient states she was fasting for the MRI. was given juice and some crackers and now reports no longer feeling dizzy. denies any other complaints

## 2019-05-02 NOTE — ED PROVIDER NOTE - CLINICAL SUMMARY MEDICAL DECISION MAKING FREE TEXT BOX
44yoF with h/o stage 4 lung ca (Sokoloff) on gemcitabine since April x2 doses, presents with near syncope. States felt lightheadedness, heat, and generalized weakness, lasted x15 minutes, improved after getting juice to drink. Started when in bathroom. Denies CP, SOB, vomiting, diarrhea, black or bloody stool or other source of bleeding. Currently asymptomatic. On exam, afebrile, hemodynamically stable, saturating well, NAD, well appearing, head NCAT, EOMI grossly, anicteric, MMM, RRR, nml S1/S2, no m/r/g, lungs CTAB, no w/r/r, abd soft, NT, ND, nml BS, no rebound or guarding, AAO, CN's 3-12 grossly intact, SCALES spontaneously, no leg cyanosis or edema and no noted erythema, skin warm, well perfused, no rashes or hives. No CP/SOB/u/l leg findings to suggest PE. No ECG arrythmia, no signs of aortic outflow obstruction. No fever or source of infection, no e/o cellulitis of legs. Pt was fasting today and in bathroom when this happened, and was likely dehydrated. Given fluids and PO with resolution of symptoms. No source of bleeding. D/w hem/onc her anemia, states this is c/w her gemcitabine and appropriate for outpt f/u. Patient is well appearing, NAD, afebrile, hemodynamically stable. Discharged with instructions in further symptomatic care, return precautions, and need for PMD f/u.

## 2019-05-08 ENCOUNTER — LABORATORY RESULT (OUTPATIENT)
Age: 44
End: 2019-05-08

## 2019-05-08 ENCOUNTER — APPOINTMENT (OUTPATIENT)
Dept: INFUSION THERAPY | Facility: CLINIC | Age: 44
End: 2019-05-08

## 2019-05-08 ENCOUNTER — OUTPATIENT (OUTPATIENT)
Dept: OUTPATIENT SERVICES | Facility: HOSPITAL | Age: 44
LOS: 1 days | Discharge: HOME | End: 2019-05-08

## 2019-05-08 DIAGNOSIS — Z51.12 ENCOUNTER FOR ANTINEOPLASTIC IMMUNOTHERAPY: ICD-10-CM

## 2019-05-08 DIAGNOSIS — F33.2 MAJOR DEPRESSIVE DISORDER, RECURRENT SEVERE WITHOUT PSYCHOTIC FEATURES: ICD-10-CM

## 2019-05-08 DIAGNOSIS — C34.90 MALIGNANT NEOPLASM OF UNSPECIFIED PART OF UNSPECIFIED BRONCHUS OR LUNG: ICD-10-CM

## 2019-05-08 DIAGNOSIS — Z51.11 ENCOUNTER FOR ANTINEOPLASTIC CHEMOTHERAPY: ICD-10-CM

## 2019-05-08 DIAGNOSIS — C79.51 SECONDARY MALIGNANT NEOPLASM OF BONE: ICD-10-CM

## 2019-05-08 DIAGNOSIS — Z90.49 ACQUIRED ABSENCE OF OTHER SPECIFIED PARTS OF DIGESTIVE TRACT: Chronic | ICD-10-CM

## 2019-05-08 RX ORDER — FOSAPREPITANT DIMEGLUMINE 150 MG/5ML
150 INJECTION, POWDER, LYOPHILIZED, FOR SOLUTION INTRAVENOUS ONCE
Qty: 0 | Refills: 0 | Status: COMPLETED | OUTPATIENT
Start: 2019-05-08 | End: 2019-05-08

## 2019-05-08 RX ORDER — ONDANSETRON 8 MG/1
16 TABLET, FILM COATED ORAL ONCE
Qty: 0 | Refills: 0 | Status: COMPLETED | OUTPATIENT
Start: 2019-05-08 | End: 2019-05-08

## 2019-05-08 RX ORDER — GEMCITABINE 38 MG/ML
2000 INJECTION, SOLUTION INTRAVENOUS ONCE
Qty: 0 | Refills: 0 | Status: COMPLETED | OUTPATIENT
Start: 2019-05-08 | End: 2019-05-08

## 2019-05-08 RX ADMIN — FOSAPREPITANT DIMEGLUMINE 450 MILLIGRAM(S): 150 INJECTION, POWDER, LYOPHILIZED, FOR SOLUTION INTRAVENOUS at 15:45

## 2019-05-08 RX ADMIN — GEMCITABINE 403.51 MILLIGRAM(S): 38 INJECTION, SOLUTION INTRAVENOUS at 16:05

## 2019-05-08 RX ADMIN — GEMCITABINE 2000 MILLIGRAM(S): 38 INJECTION, SOLUTION INTRAVENOUS at 16:50

## 2019-05-08 RX ADMIN — ONDANSETRON 16 MILLIGRAM(S): 8 TABLET, FILM COATED ORAL at 15:42

## 2019-05-08 RX ADMIN — ONDANSETRON 174 MILLIGRAM(S): 8 TABLET, FILM COATED ORAL at 15:22

## 2019-05-08 RX ADMIN — FOSAPREPITANT DIMEGLUMINE 150 MILLIGRAM(S): 150 INJECTION, POWDER, LYOPHILIZED, FOR SOLUTION INTRAVENOUS at 16:05

## 2019-05-09 LAB
ALBUMIN SERPL ELPH-MCNC: 3.8 G/DL
ALP BLD-CCNC: 185 U/L
ALT SERPL-CCNC: 35 U/L
ANION GAP SERPL CALC-SCNC: 17 MMOL/L
AST SERPL-CCNC: 36 U/L
BILIRUB SERPL-MCNC: <0.2 MG/DL
BUN SERPL-MCNC: 6 MG/DL
CALCIUM SERPL-MCNC: 9.1 MG/DL
CHLORIDE SERPL-SCNC: 95 MMOL/L
CO2 SERPL-SCNC: 27 MMOL/L
CREAT SERPL-MCNC: 0.7 MG/DL
GLUCOSE SERPL-MCNC: 113 MG/DL
HCT VFR BLD CALC: 31.9 %
HGB BLD-MCNC: 9.7 G/DL
MCHC RBC-ENTMCNC: 26.4 PG
MCHC RBC-ENTMCNC: 30.4 G/DL
MCV RBC AUTO: 86.7 FL
PLATELET # BLD AUTO: 285 K/UL
PMV BLD: 10.2 FL
POTASSIUM SERPL-SCNC: 4.5 MMOL/L
PROT SERPL-MCNC: 6.4 G/DL
RBC # BLD: 3.68 M/UL
RBC # FLD: 20 %
SODIUM SERPL-SCNC: 139 MMOL/L
WBC # FLD AUTO: 5.08 K/UL

## 2019-05-14 RX ORDER — FOLIC ACID 1 MG/1
1 TABLET ORAL DAILY
Qty: 30 | Refills: 1 | Status: ACTIVE | COMMUNITY
Start: 1900-01-01 | End: 1900-01-01

## 2019-05-15 ENCOUNTER — LABORATORY RESULT (OUTPATIENT)
Age: 44
End: 2019-05-15

## 2019-05-15 ENCOUNTER — APPOINTMENT (OUTPATIENT)
Dept: INFUSION THERAPY | Facility: CLINIC | Age: 44
End: 2019-05-15

## 2019-05-15 ENCOUNTER — APPOINTMENT (OUTPATIENT)
Dept: HEMATOLOGY ONCOLOGY | Facility: CLINIC | Age: 44
End: 2019-05-15

## 2019-05-15 VITALS
HEART RATE: 105 BPM | RESPIRATION RATE: 14 BRPM | DIASTOLIC BLOOD PRESSURE: 79 MMHG | SYSTOLIC BLOOD PRESSURE: 144 MMHG | BODY MASS INDEX: 36.54 KG/M2 | WEIGHT: 214 LBS | HEIGHT: 64 IN | TEMPERATURE: 98 F

## 2019-05-15 RX ORDER — FOSAPREPITANT DIMEGLUMINE 150 MG/5ML
150 INJECTION, POWDER, LYOPHILIZED, FOR SOLUTION INTRAVENOUS ONCE
Refills: 0 | Status: COMPLETED | OUTPATIENT
Start: 2019-05-15 | End: 2019-05-15

## 2019-05-15 RX ORDER — ONDANSETRON 8 MG/1
16 TABLET, FILM COATED ORAL ONCE
Refills: 0 | Status: COMPLETED | OUTPATIENT
Start: 2019-05-15 | End: 2019-05-15

## 2019-05-15 RX ORDER — GEMCITABINE 38 MG/ML
1800 INJECTION, SOLUTION INTRAVENOUS ONCE
Refills: 0 | Status: COMPLETED | OUTPATIENT
Start: 2019-05-15 | End: 2019-05-15

## 2019-05-15 RX ADMIN — GEMCITABINE 1800 MILLIGRAM(S): 38 INJECTION, SOLUTION INTRAVENOUS at 14:40

## 2019-05-15 RX ADMIN — FOSAPREPITANT DIMEGLUMINE 150 MILLIGRAM(S): 150 INJECTION, POWDER, LYOPHILIZED, FOR SOLUTION INTRAVENOUS at 13:15

## 2019-05-15 RX ADMIN — GEMCITABINE 396.49 MILLIGRAM(S): 38 INJECTION, SOLUTION INTRAVENOUS at 13:40

## 2019-05-15 RX ADMIN — ONDANSETRON 16 MILLIGRAM(S): 8 TABLET, FILM COATED ORAL at 13:35

## 2019-05-15 RX ADMIN — ONDANSETRON 174 MILLIGRAM(S): 8 TABLET, FILM COATED ORAL at 13:20

## 2019-05-15 RX ADMIN — FOSAPREPITANT DIMEGLUMINE 450 MILLIGRAM(S): 150 INJECTION, POWDER, LYOPHILIZED, FOR SOLUTION INTRAVENOUS at 12:55

## 2019-05-16 NOTE — PHYSICAL EXAM
[Restricted in physically strenuous activity but ambulatory and able to carry out work of a light or sedentary nature] : Status 1- Restricted in physically strenuous activity but ambulatory and able to carry out work of a light or sedentary nature, e.g., light house work, office work [Normal] : affect appropriate [de-identified] : cushingoid  [de-identified] : B/L lower extremity edema improving  [de-identified] : palpable R cervical node is larger on exam today, not tender to palpation

## 2019-05-16 NOTE — ASSESSMENT
[FreeTextEntry1] : Metastatic adenocarcinoma of the lung, PDL1 5%, EGFT, Alk, BRAF, KRAS, ROS1 negative\par --Recieved cycle 1 of Carbo Alimta in the hospital on 7/6/2018-\par --Recieved cycle 2 on 7/27/2018 with Keytruda, no adverse effects \par --Started on B12 7/2018--\par --Taking folic acid daily--\par --Reports she may require some dental work, will be in touch with dentist re Xgeva clearance\par --Brain lesion s/p SRS in 1/2019, tapering Decadron \par --Next generation sequencing revealed no targetable mutations\par --Restaging PET CT on 10/9/2018 reveals positive response to therapy \par --Completed 4th cycle of Carbo/Alimta/Keytruda on 9/14/2018\par --Deescalated to single agent Keytruda on 10/19/2018, she has missed appointments for few doses\par --PET on 12/19 with evidence of progression, R neck biopsy on 12/18/2018 confirming met adenoca, Next generation sequencing repeat revealed no targetable mutations \par --Switched therapy to Taxotere/Cyramza on 12/19/2018, Cyramza was added to cycle 2 on 1/16/2019, tolerated first cycles without major difficulty, reports occasional mild epistaxis \par --S/P cycle 3 on 2/27/2019, cycle 4 was delayed 2/2 emotional stress and pending ED evaluation to r/o DVT/PE\par --Restaging PET CT 3/18/2019 is mostly stable some SUVs appear higher (? 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 Irma Nuñez \par \par \par Metastatic disease to brain, asymptomatic\par --MRI brain on 12/17/2018 reveals new solitary brain met\par --Off Decadron \par --Recieved SRS 1/2019 \par --MRI follow up due in 4/2019, ordered 4/17/2019, she will schedule \par \par Cancer related pain \par --Requiring OxyContin 40 BID\par --Oxycodone 10mg for breakthrough\par --Following with Rad Onc \par \par Insomnia\par --Ambien\par \par Follow up in 3 weeks, all questions were answered at length\par I have explained to her that her disease unfortunately will not be cured, all therapeutic effort is palliative in nature\par \par \par

## 2019-05-16 NOTE — CONSULT LETTER
[Dear  ___] : Dear  [unfilled], [Consult Letter:] : I had the pleasure of evaluating your patient, [unfilled]. [( Thank you for referring [unfilled] for consultation for _____ )] : Thank you for referring [unfilled] for consultation for [unfilled] [Please see my note below.] : Please see my note below. [Consult Closing:] : Thank you very much for allowing me to participate in the care of this patient.  If you have any questions, please do not hesitate to contact me. [Sincerely,] : Sincerely, [DrDank  ___] : Dr. GUEVARA [FreeTextEntry3] : Em Galvan MD

## 2019-05-16 NOTE — REVIEW OF SYSTEMS
[Lower Ext Edema] : lower extremity edema [Negative] : Allergic/Immunologic [Chest Pain] : no chest pain [Palpitations] : no palpitations [Leg Claudication] : no intermittent leg claudication [FreeTextEntry2] : weight gain [FreeTextEntry4] : right neck RICHARD, appx 1.5cm in size, stable on exam today [FreeTextEntry5] : some chest discomfort  [FreeTextEntry9] : low back/hip pain has mostly resolved, improved discomfort and swelling in lower legs

## 2019-05-16 NOTE — HISTORY OF PRESENT ILLNESS
[de-identified] : Анна is a lewis 24 yo lady with past medical history significant for anxiety and depression, who has significant smoking history who presented to [facility] on 6/26/2018 with CC of leg pain. She also on further questioning revealed that she has had non-productive cough without associated SOB or CP for approximately 3 months prior. She denies any weight loss or neurological symptoms prior to presentation. CXR reveled LUIS opacity, CT scan of the chest was then done. Based on it's results she was transferred to [facility], further work up was as follows. \par \par CT chest on 6/26/2018 revealed Since November 24, 2015:  1.  There is a new large left upper lobe mass measuring 6.8 x 5.7 x 7 cm  extending to mediastinum with multiple mediastinal lymph nodes as  described above. Additionally, there are multiple new hypodense hepatic  metastatic lesions. 2.  4 mm left upper lobe pulmonary nodule, metastatic etiology\par \par CT A/P on 6/27/2018 revealed Multifocal liver masses consistent with metastatic disease.  Ill-defined 0.7 cm splenic hypodensity suspicious for a metastatic focus.  Multifocal bilateral renal hypoattenuating lesions measuring up to 1.1 cm  in the right interpolar region. Cannot exclude metastasis disease within  the kidneys.\par \par MRI Brain on 6/27/2018 reveled Single enhancing nodule/lesion within a sulcus adjacent to the right  occipital lobe measuring 4 x 4 x 5 mm. Findings highly suspicious for  metastatic disease. Follow-up to 6 most may be helpful for further  evaluation if clinically indicated.\par \par Bone scan on 6/28/2018 revealed Multiple definite bony abnormalities which by pattern of distribution are  consistent with metastatic bone disease.  These include bilateral pubic inferior rami, left superior pubic ramus,  bilateral iliac bones and left 4th costovertebral junction.  Abnormal uptake corresponding to left lung index tumor.\par \par On 6/28/2018 she underwent liver biopsy, not enough tissue for extended molecular testing was obtained, it revealed adenocarcinoma of the lung with 5% PDL1 expression. \par Repeat liver biopsy on 7/2/2018 confirmed the diagnosis of lung adenocarcinoma with negative PDL1 expression, negative EGFR, Alk, BRAF and KRAS. \par \par She received first dose of Carbo/Alimta in the hospital on 7/6/2018 and tolerated it very well.  [de-identified] : 8/15/2018;Анна presents for follow up today. She has tolerated 2nd cycle of Carbo/Alimta with addition of Keytruda without any difficulty. She reports ongoing pain in her R hip, she states narcotics are helpful but she may require a dose increase. She is interested in Rad Onc evaluation. She reports she needs dental appointment and may require some dental work. Will hold off on Keytruda for now. She otherwise offer no complaints today. \par \par 9/12/2018: Анна is doing great, still c/o some back pain, requiring pain meds. Denies SOB, WALKER, headaches, weight loss. She is for cycle 4 of Carbo/Alimta/Keytruda on 9/14/2018. She has met with Dr. Gresham and has her appointment for simulation later today. PET CT was not done yet. \par \par 10/17/2018: Анна presents for follow up today, she is feeling well, has no complaints, she is still requiring pain medication for cancer related pain management. Restaging PET CT on 10/4/2018 reveals  FDG avid left upper lobe hilar 5.6 x 4.1 cm mass, decreased in size  compared to chest CT June 2018 (previously 7.0 x 6.8 cm)., max SUV 33.5.   Left hilar FDG avid lymph node, max SUV 8.9 (image 194).  Suspicious FDG avid 1.4 cm right level 2 cervical lymph node, max SUV  20.3 (image 239). Consider tissue sampling.  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  No current FDG evidence of osseous metastatic disease.\par Images were personally reviewed by myself and discussed with Анна and her boyfriend at length. \par The concerning LN in the R neck was never imaged prior, this is not likely representative of disease progression, overall PET CT is indicative of positive response to therapy. We will continue with single agent Keytruda. Анна reports no side effects of Keytruda to date. \par Анна also reports loss of menstrual periods, will check beta HCG and LH, FSH and estradiol today. She was advised on barrier protection during intercourse. \par \par 11/14/2018: Анна feels well today and reports no complaints, back pain has improved, she was not taking long acting pain meds for 2 weeks. She has missed her Keytruda appointment last week. I have reiterated the importance of compliance with therapy. I have again spoke with Анна re importance of safe sex. She has the script for brain MRI  but she has not done the study just yet she will schedule.\par \par 12/5/2018: Анна feels well today, he reports no systemic side effects to therapy, she reports that she noticed that the righ side of her neck is swollen and has been swollen for about 2-3 weeks. On exam this is finding is consistent with worsening adenopathy in the area where FDG avid node was described prior. i have expressed to Анна that I am concerned of possibility of progression. I advise that she undergoes prompt restaging including PET CT and MRI of the brain as well as R neck biopsy. She will receive Keytruda today. \par \par 12/26/2018: Анна continues to feel well, the lump in her right neck has become slightly smaller. She has completed full restaging. MRI of the brain done on 12/17/2018 that revealed  In comparison with the prior MRI of the brain dated June 27, 2018:  The previously described lesion in the focus adjacent to the right  occipital lobe is not identified and likely represented artifact.  Interval development of a ring-enhancing lesion (0.7 x 0.8 x 0.7 cm) in  the anterior inferior aspect of the right frontal lobe consistent with  cerebral metastasis. There is a moderate amount of surrounding edema. She remains asymptomatic. She was since started on Decadron 2mg BID daily and was referred back to Dr. Gresham, she was mapped for SRS of the brain lesion this AM. \par PET CT on 12/19/2018 revealed  COMPARISON : 10/4/2018.  4 new sites of pathologic FDG uptake in the abdomen and pelvis consistent  with biologic tumor activity.  Fluctuating SUV values within the head and neck and thorax without any  new sites of disease. Incidentally, the primary mass shows a 21% decrease  in SUV max, now 26.3 but is anatomically increased in size measuring 6.3  x 5.5 cm, previously 4.3 x 3.8 cm. \par R neck biopsy on 12/18/2018 revealed metastatic adenocarcinoma. Next generation sequencing was again requested. \par All images were personally reviewed by myself and discussed with patient. \par She has completed carbo/alimta not even 3 months back, i have explained to her that switch of therapy is warranted in my opinion. I will offer her Taxotere with Cyramza, Cyramza will be administered with cycle 2 2/2 scheduling issues. \par Side effects of therapy were discussed at length, including cytopenias, allergic reactions, skin rashes, neuropathy, VTE, perforation to GI tract, prolinuria, elevated BP. \par She is in agreement to proceed ASAP.  \par \par 1/16/2019: Анна started on Taxotere on 12/26/2018, she had a minor infusion reaction and had to run the drug longer, she was able to complete without complications. She also had recent SBRT to the solitary brain lesion. She reports minor headache after procedure and continues on Decadron at 2mg BID. She has gained some weight. She will be receiving 2nd dose of Taxotere along with first dose of Cyramza today. Side effects of Cyramza, including cardiovascular compilations, bleeding, hypertension and proteinuria were discussed. \par \par 2/6/2019: Анна feels well today. Denies any pulmonary symptoms, denies excessive pain. Denies new neurological symptoms. On exam her right cervical adenopathy is improved. She has gained some weight. She reports occasional nose bleeds that stop spontaneously since she started on Cyramza, she also reports some discomfort in her mouth, no overt mouth sores. Epistaxis is likely related to Cyramza, will observe closely. \par \par 2/27/2019: Анна's only complaint today is reflux exacerbation, she will double up to Nexium 40mg daily and use TUMs and Maalox. She reports she is emotional when she is taking steroids. She will go down on post chemo Decadron to 2mg BID and will only premedicate with 1 dose of Decadron prior to chemo on the day before. She reports no neuropathy, pain is controlled, no pulmonary or neurological symptoms. Proceed with cycle 3 of Taxotere with Cyramza. \par \par 3/21/2019: Анна presents today for follow up, she is very emotional, tearful She has not been feeling right, reports discomfort in her chest, her legs have been swollen, she is taking intermittent Lasix. She is contemplating potentially moving to [place], [place], where her myrna has family. She is also interested [place] [facility] [place] specialist name was provided for her. I am concerned that she has legs swelling and chest discomfort and suggested ED evaluation to R/O DVT/PE, she is agreeable to go. Will hold Taxotere/Cyramza today. \par PET CT on 3/18/2019 revealed Since December 19, 2018:\par \par 1. No new sites of pathologic FDG uptake.\par \par 2. FDG avid left upper lobe mass currently measuring 7.6 x 5.6 cm with \par max SUV 29.4, previously 8.4 x 7.6 cm with prior max SUV 26.3 (12% \par increase).\par \par 3. FDG avid right cervical level II lymph node currently measure 2.4 x \par 1.9 cm with max SUV 23.8, previously measuring 3 x 2.2 cm with prior max \par SUV 20.2 (18% increase).\par \par 4. Previously described left hilar FDG avid lymph node no longer clearly \par delineated; possibly contiguous with FDG avid lung mass or resolved.\par \par 5. Increased size and FDG uptake within peripancreatic 1.5 cm short axis \par lymph node with max SUV 25.2, previously 1 cm short axis with max SUV \par 16.3 (55% increase).  Additional previously described intra-abdominal FDG \par avid lymphadenopathy have otherwise decreased in FDG uptake, catalogued \par above.\par Images were personally reviewed by myself and discussed with Анна. \par \par 4/17/2019: Анна was evaluated for PE/DVT in the hospital. CTA of the chest on 3/21/2019 revealed Interval occlusion of the left upper lobe anterior pulmonary artery \par branch related to the lung mass. Other pulmonary arteries appear widely patent. No evidence of right heart strain.\par Doppler on 3/21/2019 revealed no DVT in B/L LE. \par She was urgently referred for ad Onc evaluation and has completed radiation to the chest. She reports that chest discomfort that she was experiencing prior has significantly improved, she reports mild cough and occasional nausea. Her R neck lump is bigger on exam now, Анна reports that it is not as big or painful as last week. There is no overt progression on prior images and neck lump may be showing fluctuation in size 2/2 prior immunotherapy exposure. Анна wishes to hold off on restaging PET CT for now. She has been experiencing a significant amount of swelling since starting on Taxotere, therefore we will plan to switch her over to Gemzar at this time. Side effects including, but not limited to, occasional pulmonary and liver toxicity were discussed. \par \par 5/15/2019: Анна is doing well, reports no pain or SOB, she is less swollen and reports having lost few pounds, she is happy about it. She is tolerating Gemzar, her WBC is borderline today, will dose reduce slightly today to 900mg/m2, this is W2 of cycle 2. [place] [facility] [place], additional; genetic studies are being done on tissue. Case was discussed with Dr. Irma Nuñez. She is post-due for MRI of the brain, she will schedule.

## 2019-05-17 LAB
ALBUMIN SERPL ELPH-MCNC: 3.8 G/DL
ALP BLD-CCNC: 180 U/L
ALT SERPL-CCNC: 68 U/L
ANION GAP SERPL CALC-SCNC: 16 MMOL/L
AST SERPL-CCNC: 58 U/L
BILIRUB SERPL-MCNC: <0.2 MG/DL
BUN SERPL-MCNC: 6 MG/DL
CALCIUM SERPL-MCNC: 9 MG/DL
CHLORIDE SERPL-SCNC: 94 MMOL/L
CO2 SERPL-SCNC: 29 MMOL/L
CREAT SERPL-MCNC: 0.6 MG/DL
GLUCOSE SERPL-MCNC: 95 MG/DL
HCT VFR BLD CALC: 29.5 %
HGB BLD-MCNC: 9.1 G/DL
MCHC RBC-ENTMCNC: 26.1 PG
MCHC RBC-ENTMCNC: 30.8 G/DL
MCV RBC AUTO: 84.8 FL
PLATELET # BLD AUTO: 256 K/UL
PMV BLD: 9.8 FL
POTASSIUM SERPL-SCNC: 3.8 MMOL/L
PROT SERPL-MCNC: 6.4 G/DL
RBC # BLD: 3.48 M/UL
RBC # FLD: 19.1 %
SODIUM SERPL-SCNC: 139 MMOL/L
TSH SERPL-ACNC: 3.71 UIU/ML
WBC # FLD AUTO: 2.91 K/UL

## 2019-05-23 ENCOUNTER — OUTPATIENT (OUTPATIENT)
Dept: OUTPATIENT SERVICES | Facility: HOSPITAL | Age: 44
LOS: 1 days | Discharge: HOME | End: 2019-05-23
Payer: MEDICAID

## 2019-05-23 DIAGNOSIS — Z90.49 ACQUIRED ABSENCE OF OTHER SPECIFIED PARTS OF DIGESTIVE TRACT: Chronic | ICD-10-CM

## 2019-05-23 DIAGNOSIS — C34.90 MALIGNANT NEOPLASM OF UNSPECIFIED PART OF UNSPECIFIED BRONCHUS OR LUNG: ICD-10-CM

## 2019-05-23 PROCEDURE — 70553 MRI BRAIN STEM W/O & W/DYE: CPT | Mod: 26

## 2019-05-28 ENCOUNTER — INBOUND DOCUMENT (OUTPATIENT)
Age: 44
End: 2019-05-28

## 2019-05-28 ENCOUNTER — OUTPATIENT (OUTPATIENT)
Dept: OUTPATIENT SERVICES | Facility: HOSPITAL | Age: 44
LOS: 1 days | Discharge: HOME | End: 2019-05-28
Payer: MEDICAID

## 2019-05-28 DIAGNOSIS — Z90.49 ACQUIRED ABSENCE OF OTHER SPECIFIED PARTS OF DIGESTIVE TRACT: Chronic | ICD-10-CM

## 2019-05-28 DIAGNOSIS — F33.2 MAJOR DEPRESSIVE DISORDER, RECURRENT SEVERE WITHOUT PSYCHOTIC FEATURES: ICD-10-CM

## 2019-05-28 PROCEDURE — 99213 OFFICE O/P EST LOW 20 MIN: CPT | Mod: GC

## 2019-05-29 ENCOUNTER — APPOINTMENT (OUTPATIENT)
Dept: HEMATOLOGY ONCOLOGY | Facility: CLINIC | Age: 44
End: 2019-05-29

## 2019-05-29 ENCOUNTER — LABORATORY RESULT (OUTPATIENT)
Age: 44
End: 2019-05-29

## 2019-05-29 ENCOUNTER — APPOINTMENT (OUTPATIENT)
Dept: INFUSION THERAPY | Facility: CLINIC | Age: 44
End: 2019-05-29

## 2019-05-29 VITALS
HEIGHT: 64 IN | DIASTOLIC BLOOD PRESSURE: 95 MMHG | WEIGHT: 208 LBS | HEART RATE: 94 BPM | SYSTOLIC BLOOD PRESSURE: 145 MMHG | TEMPERATURE: 99.9 F | BODY MASS INDEX: 35.51 KG/M2 | RESPIRATION RATE: 14 BRPM

## 2019-05-29 LAB
HCT VFR BLD CALC: 31.5 %
HGB BLD-MCNC: 9.7 G/DL
MCHC RBC-ENTMCNC: 27 PG
MCHC RBC-ENTMCNC: 30.8 G/DL
MCV RBC AUTO: 87.7 FL
PLATELET # BLD AUTO: 329 K/UL
PMV BLD: 9.7 FL
RBC # BLD: 3.59 M/UL
RBC # FLD: 22.6 %
WBC # FLD AUTO: 7.24 K/UL

## 2019-05-29 RX ORDER — ONDANSETRON 8 MG/1
16 TABLET, FILM COATED ORAL ONCE
Refills: 0 | Status: COMPLETED | OUTPATIENT
Start: 2019-05-29 | End: 2019-05-29

## 2019-05-29 RX ORDER — GEMCITABINE 38 MG/ML
1800 INJECTION, SOLUTION INTRAVENOUS ONCE
Refills: 0 | Status: COMPLETED | OUTPATIENT
Start: 2019-05-29 | End: 2019-05-29

## 2019-05-29 RX ORDER — FOSAPREPITANT DIMEGLUMINE 150 MG/5ML
150 INJECTION, POWDER, LYOPHILIZED, FOR SOLUTION INTRAVENOUS ONCE
Refills: 0 | Status: COMPLETED | OUTPATIENT
Start: 2019-05-29 | End: 2019-05-29

## 2019-05-29 RX ADMIN — FOSAPREPITANT DIMEGLUMINE 150 MILLIGRAM(S): 150 INJECTION, POWDER, LYOPHILIZED, FOR SOLUTION INTRAVENOUS at 11:25

## 2019-05-29 RX ADMIN — GEMCITABINE 1800 MILLIGRAM(S): 38 INJECTION, SOLUTION INTRAVENOUS at 13:00

## 2019-05-29 RX ADMIN — ONDANSETRON 174 MILLIGRAM(S): 8 TABLET, FILM COATED ORAL at 11:25

## 2019-05-29 RX ADMIN — FOSAPREPITANT DIMEGLUMINE 450 MILLIGRAM(S): 150 INJECTION, POWDER, LYOPHILIZED, FOR SOLUTION INTRAVENOUS at 11:05

## 2019-05-29 RX ADMIN — ONDANSETRON 16 MILLIGRAM(S): 8 TABLET, FILM COATED ORAL at 11:40

## 2019-05-29 RX ADMIN — GEMCITABINE 396.49 MILLIGRAM(S): 38 INJECTION, SOLUTION INTRAVENOUS at 12:00

## 2019-05-30 LAB
ALBUMIN SERPL ELPH-MCNC: 3.9 G/DL
ALP BLD-CCNC: 148 U/L
ALT SERPL-CCNC: 15 U/L
ANION GAP SERPL CALC-SCNC: 18 MMOL/L
AST SERPL-CCNC: 18 U/L
BILIRUB SERPL-MCNC: <0.2 MG/DL
BUN SERPL-MCNC: 7 MG/DL
CALCIUM SERPL-MCNC: 9.1 MG/DL
CHLORIDE SERPL-SCNC: 94 MMOL/L
CO2 SERPL-SCNC: 27 MMOL/L
CREAT SERPL-MCNC: 0.7 MG/DL
GLUCOSE SERPL-MCNC: 81 MG/DL
POTASSIUM SERPL-SCNC: 4.2 MMOL/L
PROT SERPL-MCNC: 6.6 G/DL
SODIUM SERPL-SCNC: 139 MMOL/L
TSH SERPL-ACNC: 3.75 UIU/ML

## 2019-05-31 LAB — HCG UR QL: NEGATIVE — SIGNIFICANT CHANGE UP

## 2019-06-01 ENCOUNTER — OUTPATIENT (OUTPATIENT)
Dept: OUTPATIENT SERVICES | Facility: HOSPITAL | Age: 44
LOS: 1 days | End: 2019-06-01
Payer: MEDICAID

## 2019-06-01 DIAGNOSIS — Z90.49 ACQUIRED ABSENCE OF OTHER SPECIFIED PARTS OF DIGESTIVE TRACT: Chronic | ICD-10-CM

## 2019-06-01 PROCEDURE — G9001: CPT

## 2019-06-05 ENCOUNTER — APPOINTMENT (OUTPATIENT)
Dept: INFUSION THERAPY | Facility: CLINIC | Age: 44
End: 2019-06-05

## 2019-06-07 ENCOUNTER — OUTPATIENT (OUTPATIENT)
Dept: OUTPATIENT SERVICES | Facility: HOSPITAL | Age: 44
LOS: 1 days | Discharge: HOME | End: 2019-06-07
Payer: MEDICAID

## 2019-06-07 DIAGNOSIS — Z90.49 ACQUIRED ABSENCE OF OTHER SPECIFIED PARTS OF DIGESTIVE TRACT: Chronic | ICD-10-CM

## 2019-06-07 DIAGNOSIS — C34.12 MALIGNANT NEOPLASM OF UPPER LOBE, LEFT BRONCHUS OR LUNG: ICD-10-CM

## 2019-06-07 DIAGNOSIS — C79.51 SECONDARY MALIGNANT NEOPLASM OF BONE: ICD-10-CM

## 2019-06-07 DIAGNOSIS — C79.31 SECONDARY MALIGNANT NEOPLASM OF BRAIN: ICD-10-CM

## 2019-06-07 PROCEDURE — 77432 STEREOTACTIC RADIATION TRMT: CPT

## 2019-06-07 PROCEDURE — 99024 POSTOP FOLLOW-UP VISIT: CPT

## 2019-06-10 ENCOUNTER — RX RENEWAL (OUTPATIENT)
Age: 44
End: 2019-06-10

## 2019-06-11 ENCOUNTER — OUTPATIENT (OUTPATIENT)
Dept: OUTPATIENT SERVICES | Facility: HOSPITAL | Age: 44
LOS: 1 days | Discharge: HOME | End: 2019-06-11
Payer: MEDICAID

## 2019-06-11 DIAGNOSIS — Z90.49 ACQUIRED ABSENCE OF OTHER SPECIFIED PARTS OF DIGESTIVE TRACT: Chronic | ICD-10-CM

## 2019-06-11 DIAGNOSIS — F33.2 MAJOR DEPRESSIVE DISORDER, RECURRENT SEVERE WITHOUT PSYCHOTIC FEATURES: ICD-10-CM

## 2019-06-11 PROCEDURE — 99214 OFFICE O/P EST MOD 30 MIN: CPT | Mod: GC

## 2019-06-17 ENCOUNTER — RX RENEWAL (OUTPATIENT)
Age: 44
End: 2019-06-17

## 2019-06-19 ENCOUNTER — APPOINTMENT (OUTPATIENT)
Dept: HEMATOLOGY ONCOLOGY | Facility: CLINIC | Age: 44
End: 2019-06-19
Payer: MEDICAID

## 2019-06-19 ENCOUNTER — LABORATORY RESULT (OUTPATIENT)
Age: 44
End: 2019-06-19

## 2019-06-19 ENCOUNTER — APPOINTMENT (OUTPATIENT)
Dept: INFUSION THERAPY | Facility: CLINIC | Age: 44
End: 2019-06-19
Payer: MEDICAID

## 2019-06-19 VITALS
HEART RATE: 106 BPM | HEIGHT: 64.96 IN | WEIGHT: 221 LBS | SYSTOLIC BLOOD PRESSURE: 137 MMHG | TEMPERATURE: 98.9 F | BODY MASS INDEX: 36.82 KG/M2 | DIASTOLIC BLOOD PRESSURE: 76 MMHG

## 2019-06-19 LAB
HCT VFR BLD CALC: 33.4 %
HGB BLD-MCNC: 10.4 G/DL
MCHC RBC-ENTMCNC: 27.6 PG
MCHC RBC-ENTMCNC: 31.1 G/DL
MCV RBC AUTO: 88.6 FL
PLATELET # BLD AUTO: 375 K/UL
PMV BLD: 9.9 FL
RBC # BLD: 3.77 M/UL
RBC # FLD: 21.8 %
WBC # FLD AUTO: 7.83 K/UL

## 2019-06-19 PROCEDURE — 99213 OFFICE O/P EST LOW 20 MIN: CPT

## 2019-06-19 RX ORDER — FOSAPREPITANT DIMEGLUMINE 150 MG/5ML
150 INJECTION, POWDER, LYOPHILIZED, FOR SOLUTION INTRAVENOUS ONCE
Refills: 0 | Status: COMPLETED | OUTPATIENT
Start: 2019-06-19 | End: 2019-06-19

## 2019-06-19 RX ORDER — ONDANSETRON 8 MG/1
16 TABLET, FILM COATED ORAL ONCE
Refills: 0 | Status: COMPLETED | OUTPATIENT
Start: 2019-06-19 | End: 2019-06-19

## 2019-06-19 RX ORDER — GEMCITABINE 38 MG/ML
1800 INJECTION, SOLUTION INTRAVENOUS ONCE
Refills: 0 | Status: COMPLETED | OUTPATIENT
Start: 2019-06-19 | End: 2019-06-19

## 2019-06-19 RX ADMIN — GEMCITABINE 1800 MILLIGRAM(S): 38 INJECTION, SOLUTION INTRAVENOUS at 11:55

## 2019-06-19 RX ADMIN — ONDANSETRON 174 MILLIGRAM(S): 8 TABLET, FILM COATED ORAL at 10:50

## 2019-06-19 RX ADMIN — FOSAPREPITANT DIMEGLUMINE 450 MILLIGRAM(S): 150 INJECTION, POWDER, LYOPHILIZED, FOR SOLUTION INTRAVENOUS at 10:25

## 2019-06-19 RX ADMIN — GEMCITABINE 396.49 MILLIGRAM(S): 38 INJECTION, SOLUTION INTRAVENOUS at 10:45

## 2019-06-19 RX ADMIN — FOSAPREPITANT DIMEGLUMINE 150 MILLIGRAM(S): 150 INJECTION, POWDER, LYOPHILIZED, FOR SOLUTION INTRAVENOUS at 10:50

## 2019-06-19 RX ADMIN — ONDANSETRON 16 MILLIGRAM(S): 8 TABLET, FILM COATED ORAL at 11:10

## 2019-06-19 NOTE — REVIEW OF SYSTEMS
[Lower Ext Edema] : lower extremity edema [Negative] : Allergic/Immunologic [Chest Pain] : no chest pain [Leg Claudication] : no intermittent leg claudication [Palpitations] : no palpitations [FreeTextEntry2] : weight gain [FreeTextEntry4] : right neck RICHARD, appx 1.5cm in size, stable on exam today [FreeTextEntry5] : some chest discomfort  [FreeTextEntry9] : low back/hip pain has mostly resolved, improved discomfort and swelling in lower legs

## 2019-06-19 NOTE — HISTORY OF PRESENT ILLNESS
[de-identified] : 8/15/2018;Анна presents for follow up today. She has tolerated 2nd cycle of Carbo/Alimta with addition of Keytruda without any difficulty. She reports ongoing pain in her R hip, she states narcotics are helpful but she may require a dose increase. She is interested in Rad Onc evaluation. She reports she needs dental appointment and may require some dental work. Will hold off on Keytruda for now. She otherwise offer no complaints today. \par \par 9/12/2018: Анна is doing great, still c/o some back pain, requiring pain meds. Denies SOB, WALKER, headaches, weight loss. She is for cycle 4 of Carbo/Alimta/Keytruda on 9/14/2018. She has met with Dr. Gresham and has her appointment for simulation later today. PET CT was not done yet. \par \par 10/17/2018: Анна presents for follow up today, she is feeling well, has no complaints, she is still requiring pain medication for cancer related pain management. Restaging PET CT on 10/4/2018 reveals  FDG avid left upper lobe hilar 5.6 x 4.1 cm mass, decreased in size  compared to chest CT June 2018 (previously 7.0 x 6.8 cm)., max SUV 33.5.   Left hilar FDG avid lymph node, max SUV 8.9 (image 194).  Suspicious FDG avid 1.4 cm right level 2 cervical lymph node, max SUV  20.3 (image 239). Consider tissue sampling.  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  No current FDG evidence of osseous metastatic disease.\par Images were personally reviewed by myself and discussed with Анна and her boyfriend at length. \par The concerning LN in the R neck was never imaged prior, this is not likely representative of disease progression, overall PET CT is indicative of positive response to therapy. We will continue with single agent Keytruda. Анна reports no side effects of Keytruda to date. \par Анна also reports loss of menstrual periods, will check beta HCG and LH, FSH and estradiol today. She was advised on barrier protection during intercourse. \par \par 11/14/2018: Анна feels well today and reports no complaints, back pain has improved, she was not taking long acting pain meds for 2 weeks. She has missed her Keytruda appointment last week. I have reiterated the importance of compliance with therapy. I have again spoke with Анна re importance of safe sex. She has the script for brain MRI  but she has not done the study just yet she will schedule.\par \par 12/5/2018: Анна feels well today, he reports no systemic side effects to therapy, she reports that she noticed that the righ side of her neck is swollen and has been swollen for about 2-3 weeks. On exam this is finding is consistent with worsening adenopathy in the area where FDG avid node was described prior. i have expressed to Анна that I am concerned of possibility of progression. I advise that she undergoes prompt restaging including PET CT and MRI of the brain as well as R neck biopsy. She will receive Keytruda today. \par \par 12/26/2018: Анна continues to feel well, the lump in her right neck has become slightly smaller. She has completed full restaging. MRI of the brain done on 12/17/2018 that revealed  In comparison with the prior MRI of the brain dated June 27, 2018:  The previously described lesion in the focus adjacent to the right  occipital lobe is not identified and likely represented artifact.  Interval development of a ring-enhancing lesion (0.7 x 0.8 x 0.7 cm) in  the anterior inferior aspect of the right frontal lobe consistent with  cerebral metastasis. There is a moderate amount of surrounding edema. She remains asymptomatic. She was since started on Decadron 2mg BID daily and was referred back to Dr. Gresham, she was mapped for SRS of the brain lesion this AM. \par PET CT on 12/19/2018 revealed  COMPARISON : 10/4/2018.  4 new sites of pathologic FDG uptake in the abdomen and pelvis consistent  with biologic tumor activity.  Fluctuating SUV values within the head and neck and thorax without any  new sites of disease. Incidentally, the primary mass shows a 21% decrease  in SUV max, now 26.3 but is anatomically increased in size measuring 6.3  x 5.5 cm, previously 4.3 x 3.8 cm. \par R neck biopsy on 12/18/2018 revealed metastatic adenocarcinoma. Next generation sequencing was again requested. \par All images were personally reviewed by myself and discussed with patient. \par She has completed carbo/alimta not even 3 months back, i have explained to her that switch of therapy is warranted in my opinion. I will offer her Taxotere with Cyramza, Cyramza will be administered with cycle 2 2/2 scheduling issues. \par Side effects of therapy were discussed at length, including cytopenias, allergic reactions, skin rashes, neuropathy, VTE, perforation to GI tract, prolinuria, elevated BP. \par She is in agreement to proceed ASAP.  \par \par 1/16/2019: Анна started on Taxotere on 12/26/2018, she had a minor infusion reaction and had to run the drug longer, she was able to complete without complications. She also had recent SBRT to the solitary brain lesion. She reports minor headache after procedure and continues on Decadron at 2mg BID. She has gained some weight. She will be receiving 2nd dose of Taxotere along with first dose of Cyramza today. Side effects of Cyramza, including cardiovascular compilations, bleeding, hypertension and proteinuria were discussed. \par \par 2/6/2019: Анна feels well today. Denies any pulmonary symptoms, denies excessive pain. Denies new neurological symptoms. On exam her right cervical adenopathy is improved. She has gained some weight. She reports occasional nose bleeds that stop spontaneously since she started on Cyramza, she also reports some discomfort in her mouth, no overt mouth sores. Epistaxis is likely related to Cyramza, will observe closely. \par \par 2/27/2019: Анна's only complaint today is reflux exacerbation, she will double up to Nexium 40mg daily and use TUMs and Maalox. She reports she is emotional when she is taking steroids. She will go down on post chemo Decadron to 2mg BID and will only premedicate with 1 dose of Decadron prior to chemo on the day before. She reports no neuropathy, pain is controlled, no pulmonary or neurological symptoms. Proceed with cycle 3 of Taxotere with Cyramza. \par \par 3/21/2019: Анна presents today for follow up, she is very emotional, tearful She has not been feeling right, reports discomfort in her chest, her legs have been swollen, she is taking intermittent Lasix. She is contemplating potentially moving to [place], [place], where her myrna has family. She is also interested in potentially obtaining second opinion at [facility] [place]. Lung cancer specialist name was provided for her. I am concerned that she has legs swelling and chest discomfort and suggested ED evaluation to R/O DVT/PE, she is agreeable to go. Will hold Taxotere/Cyramza today. \par PET CT on 3/18/2019 revealed Since December 19, 2018:\par \par 1. No new sites of pathologic FDG uptake.\par \par 2. FDG avid left upper lobe mass currently measuring 7.6 x 5.6 cm with \par max SUV 29.4, previously 8.4 x 7.6 cm with prior max SUV 26.3 (12% \par increase).\par \par 3. FDG avid right cervical level II lymph node currently measure 2.4 x \par 1.9 cm with max SUV 23.8, previously measuring 3 x 2.2 cm with prior max \par SUV 20.2 (18% increase).\par \par 4. Previously described left hilar FDG avid lymph node no longer clearly \par delineated; possibly contiguous with FDG avid lung mass or resolved.\par \par 5. Increased size and FDG uptake within peripancreatic 1.5 cm short axis \par lymph node with max SUV 25.2, previously 1 cm short axis with max SUV \par 16.3 (55% increase).  Additional previously described intra-abdominal FDG \par avid lymphadenopathy have otherwise decreased in FDG uptake, catalogued \par above.\par Images were personally reviewed by myself and discussed with Анна. \par \par 4/17/2019: Анна was evaluated for PE/DVT in the hospital. CTA of the chest on 3/21/2019 revealed Interval occlusion of the left upper lobe anterior pulmonary artery \par branch related to the lung mass. Other pulmonary arteries appear widely patent. No evidence of right heart strain.\par Doppler on 3/21/2019 revealed no DVT in B/L LE. \par She was urgently referred for ad Onc evaluation and has completed radiation to the chest. She reports that chest discomfort that she was experiencing prior has significantly improved, she reports mild cough and occasional nausea. Her R neck lump is bigger on exam now, Анна reports that it is not as big or painful as last week. There is no overt progression on prior images and neck lump may be showing fluctuation in size 2/2 prior immunotherapy exposure. Анна wishes to hold off on restaging PET CT for now. She has been experiencing a significant amount of swelling since starting on Taxotere, therefore we will plan to switch her over to Gemzar at this time. Side effects including, but not limited to, occasional pulmonary and liver toxicity were discussed. \par \par 5/15/2019: Анна is doing well, reports no pain or SOB, she is less swollen and reports having lost few pounds, she is happy about it. She is tolerating Gemzar, her WBC is borderline today, will dose reduce slightly today to 900mg/m2, this is W2 of cycle 2. She was seen at [facility] [place] [facility] for second opinion, additional; genetic studies are being done on tissue. Case was discussed with Dr. Irma Nuñez. She is post-due for MRI of the brain, she will schedule. \par \par 5/29/2018: MRI of the brain on 5/23/2019 revealed Since MRI brain 12/16/2018: \par 1. Interval development of a new rim-enhancing subcentimeter lesion in the \par right cerebellar hemisphere with small amount of surrounding edema \par compatible with metastatic disease given history. \par 2. Resolution of the previously identified right frontal lobe enhancing \par lesion and surrounding edema. No additional enhancing lesion on the current \par exam. \par Images were personally reviewed with myself and Анна as well as with Dr. Gresham. Анна will see Dr. Gresham for consideration of SRS to the new brain lesion. She will start steroids if recommended by Dr. Gresham. We willplan to put Gemzar temporarily on hold to complete radiation. \par Анна is currently asymptomatic and reports no neurological symptoms.   [de-identified] : Анна is a lewis 26 yo lady with past medical history significant for anxiety and depression, who has significant smoking history who presented to [facility] on 6/26/2018 with CC of leg pain. She also on further questioning revealed that she has had non-productive cough without associated SOB or CP for approximately 3 months prior. She denies any weight loss or neurological symptoms prior to presentation. CXR reveled LUIS opacity, CT scan of the chest was then done. Based on it's results she was transferred to [facility], further work up was as follows. \par \par CT chest on 6/26/2018 revealed Since November 24, 2015:  1.  There is a new large left upper lobe mass measuring 6.8 x 5.7 x 7 cm  extending to mediastinum with multiple mediastinal lymph nodes as  described above. Additionally, there are multiple new hypodense hepatic  metastatic lesions. 2.  4 mm left upper lobe pulmonary nodule, metastatic etiology\par \par CT A/P on 6/27/2018 revealed Multifocal liver masses consistent with metastatic disease.  Ill-defined 0.7 cm splenic hypodensity suspicious for a metastatic focus.  Multifocal bilateral renal hypoattenuating lesions measuring up to 1.1 cm  in the right interpolar region. Cannot exclude metastasis disease within  the kidneys.\par \par MRI Brain on 6/27/2018 reveled Single enhancing nodule/lesion within a sulcus adjacent to the right  occipital lobe measuring 4 x 4 x 5 mm. Findings highly suspicious for  metastatic disease. Follow-up to 6 most may be helpful for further  evaluation if clinically indicated.\par \par Bone scan on 6/28/2018 revealed Multiple definite bony abnormalities which by pattern of distribution are  consistent with metastatic bone disease.  These include bilateral pubic inferior rami, left superior pubic ramus,  bilateral iliac bones and left 4th costovertebral junction.  Abnormal uptake corresponding to left lung index tumor.\par \par On 6/28/2018 she underwent liver biopsy, not enough tissue for extended molecular testing was obtained, it revealed adenocarcinoma of the lung with 5% PDL1 expression. \par Repeat liver biopsy on 7/2/2018 confirmed the diagnosis of lung adenocarcinoma with negative PDL1 expression, negative EGFR, Alk, BRAF and KRAS. \par \par She received first dose of Carbo/Alimta in the hospital on 7/6/2018 and tolerated it very well.

## 2019-06-19 NOTE — PHYSICAL EXAM
[Restricted in physically strenuous activity but ambulatory and able to carry out work of a light or sedentary nature] : Status 1- Restricted in physically strenuous activity but ambulatory and able to carry out work of a light or sedentary nature, e.g., light house work, office work [Normal] : affect appropriate [de-identified] : cushingoid  [de-identified] : B/L lower extremity edema improving  [de-identified] : palpable R cervical node is larger on exam today, not tender to palpation

## 2019-06-19 NOTE — ASSESSMENT
[FreeTextEntry1] : Metastatic adenocarcinoma of the lung, PDL1 5%, EGFT, Alk, BRAF, KRAS, ROS1 negative\par --Recieved cycle 1 of Carbo Alimta in the hospital on 7/6/2018-\par --Recieved cycle 2 on 7/27/2018 with Keytruda, no adverse effects \par --Started on B12 7/2018--\par --Taking folic acid daily--\par --Reports she may require some dental work, will be in touch with dentist re Xgeva clearance\par --Brain lesion s/p SRS in 1/2019, tapering Decadron \par --Next generation sequencing revealed no targetable mutations\par --Restaging PET CT on 10/9/2018 reveals positive response to therapy \par --Completed 4th cycle of Carbo/Alimta/Keytruda on 9/14/2018\par --Deescalated to single agent Keytruda on 10/19/2018, she has missed appointments for few doses\par --PET on 12/19 with evidence of progression, R neck biopsy on 12/18/2018 confirming met adenoca, Next generation sequencing repeat revealed no targetable mutations \par --Switched therapy to Taxotere/Cyramza on 12/19/2018, Cyramza was added to cycle 2 on 1/16/2019, tolerated first cycles without major difficulty, reports occasional mild epistaxis \par --S/P cycle 3 on 2/27/2019, cycle 4 was delayed 2/2 emotional stress and pending ED evaluation to r/o DVT/PE\par --Restaging PET CT 3/18/2019 is mostly stable some SUVs appear higher (? 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\par --Completed palliative radiation to the chest 4/2019 \par --No overt progression, but with suspecion of progression in mind Анна was switched over to Gemzar 1000mg/m2 3 weeks on 1 off on 4/17/2019--\par --5/15/2019 for C2W2 of gemzar, ANC is borderline with dose reduce to 900 mg/m2, may require Neupogen in the future\par --Restaging MRI of the brain was ordered on 4/17/2019, done on 5/23/2019 revealed new small lesion in the cerebellum\par --Referred to Dr. Gresham for consideration of SRS to the new brain lesion, will not start steroids for now, patient is asymptomatic \par --Echo heart on 5/1/2019 EF of 50%, no pericardial effusion \par --[place] [facility] [place]ez \par \par Metastatic disease to brain, asymptomatic\par --MRI brain on 12/17/2018 reveals new solitary brain met\par --MRI brain on 5/23/2019 reveals new solitary brain met\par --Off Decadron \par --Recieved SRS 1/2019 \par --Referred for consideration of additional SRS\par \par Cancer related pain \par --Requiring OxyContin 40 BID\par --Oxycodone 10mg for breakthrough\par --Following with Rad Onc \par \par Insomnia\par --Ambien\par \par Follow up in 3 weeks, all questions were answered at length\par I have explained to her that her disease unfortunately will not be cured, all therapeutic effort is palliative in nature\par \par \par

## 2019-06-19 NOTE — HISTORY OF PRESENT ILLNESS
[de-identified] : 8/15/2018;Анна presents for follow up today. She has tolerated 2nd cycle of Carbo/Alimta with addition of Keytruda without any difficulty. She reports ongoing pain in her R hip, she states narcotics are helpful but she may require a dose increase. She is interested in Rad Onc evaluation. She reports she needs dental appointment and may require some dental work. Will hold off on Keytruda for now. She otherwise offer no complaints today. \par \par 9/12/2018: Анна is doing great, still c/o some back pain, requiring pain meds. Denies SOB, WALKER, headaches, weight loss. She is for cycle 4 of Carbo/Alimta/Keytruda on 9/14/2018. She has met with Dr. Gresham and has her appointment for simulation later today. PET CT was not done yet. \par \par 10/17/2018: Анна presents for follow up today, she is feeling well, has no complaints, she is still requiring pain medication for cancer related pain management. Restaging PET CT on 10/4/2018 reveals  FDG avid left upper lobe hilar 5.6 x 4.1 cm mass, decreased in size  compared to chest CT June 2018 (previously 7.0 x 6.8 cm)., max SUV 33.5.   Left hilar FDG avid lymph node, max SUV 8.9 (image 194).  Suspicious FDG avid 1.4 cm right level 2 cervical lymph node, max SUV  20.3 (image 239). Consider tissue sampling.  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  No current FDG evidence of osseous metastatic disease.\par Images were personally reviewed by myself and discussed with Анна and her boyfriend at length. \par The concerning LN in the R neck was never imaged prior, this is not likely representative of disease progression, overall PET CT is indicative of positive response to therapy. We will continue with single agent Keytruda. Анна reports no side effects of Keytruda to date. \par Анна also reports loss of menstrual periods, will check beta HCG and LH, FSH and estradiol today. She was advised on barrier protection during intercourse. \par \par 11/14/2018: Анна feels well today and reports no complaints, back pain has improved, she was not taking long acting pain meds for 2 weeks. She has missed her Keytruda appointment last week. I have reiterated the importance of compliance with therapy. I have again spoke with Анна re importance of safe sex. She has the script for brain MRI  but she has not done the study just yet she will schedule.\par \par 12/5/2018: Анна feels well today, he reports no systemic side effects to therapy, she reports that she noticed that the righ side of her neck is swollen and has been swollen for about 2-3 weeks. On exam this is finding is consistent with worsening adenopathy in the area where FDG avid node was described prior. i have expressed to Анна that I am concerned of possibility of progression. I advise that she undergoes prompt restaging including PET CT and MRI of the brain as well as R neck biopsy. She will receive Keytruda today. \par \par 12/26/2018: Анна continues to feel well, the lump in her right neck has become slightly smaller. She has completed full restaging. MRI of the brain done on 12/17/2018 that revealed  In comparison with the prior MRI of the brain dated June 27, 2018:  The previously described lesion in the focus adjacent to the right  occipital lobe is not identified and likely represented artifact.  Interval development of a ring-enhancing lesion (0.7 x 0.8 x 0.7 cm) in  the anterior inferior aspect of the right frontal lobe consistent with  cerebral metastasis. There is a moderate amount of surrounding edema. She remains asymptomatic. She was since started on Decadron 2mg BID daily and was referred back to Dr. Gresham, she was mapped for SRS of the brain lesion this AM. \par PET CT on 12/19/2018 revealed  COMPARISON : 10/4/2018.  4 new sites of pathologic FDG uptake in the abdomen and pelvis consistent  with biologic tumor activity.  Fluctuating SUV values within the head and neck and thorax without any  new sites of disease. Incidentally, the primary mass shows a 21% decrease  in SUV max, now 26.3 but is anatomically increased in size measuring 6.3  x 5.5 cm, previously 4.3 x 3.8 cm. \par R neck biopsy on 12/18/2018 revealed metastatic adenocarcinoma. Next generation sequencing was again requested. \par All images were personally reviewed by myself and discussed with patient. \par She has completed carbo/alimta not even 3 months back, i have explained to her that switch of therapy is warranted in my opinion. I will offer her Taxotere with Cyramza, Cyramza will be administered with cycle 2 2/2 scheduling issues. \par Side effects of therapy were discussed at length, including cytopenias, allergic reactions, skin rashes, neuropathy, VTE, perforation to GI tract, prolinuria, elevated BP. \par She is in agreement to proceed ASAP.  \par \par 1/16/2019: Анна started on Taxotere on 12/26/2018, she had a minor infusion reaction and had to run the drug longer, she was able to complete without complications. She also had recent SBRT to the solitary brain lesion. She reports minor headache after procedure and continues on Decadron at 2mg BID. She has gained some weight. She will be receiving 2nd dose of Taxotere along with first dose of Cyramza today. Side effects of Cyramza, including cardiovascular compilations, bleeding, hypertension and proteinuria were discussed. \par \par 2/6/2019: Анна feels well today. Denies any pulmonary symptoms, denies excessive pain. Denies new neurological symptoms. On exam her right cervical adenopathy is improved. She has gained some weight. She reports occasional nose bleeds that stop spontaneously since she started on Cyramza, she also reports some discomfort in her mouth, no overt mouth sores. Epistaxis is likely related to Cyramza, will observe closely. \par \par 2/27/2019: Анна's only complaint today is reflux exacerbation, she will double up to Nexium 40mg daily and use TUMs and Maalox. She reports she is emotional when she is taking steroids. She will go down on post chemo Decadron to 2mg BID and will only premedicate with 1 dose of Decadron prior to chemo on the day before. She reports no neuropathy, pain is controlled, no pulmonary or neurological symptoms. Proceed with cycle 3 of Taxotere with Cyramza. \par \par 3/21/2019: Анна presents today for follow up, she is very emotional, tearful She has not been feeling right, reports discomfort in her chest, her legs have been swollen, she is taking intermittent Lasix. She is contemplating potentially moving to [place], [place], where her myrna has family. She is also interested in potentially obtaining second opinion at [facility]. Lung cancer specialist name was provided for her. I am concerned that she has legs swelling and chest discomfort and suggested ED evaluation to R/O DVT/PE, she is agreeable to go. Will hold Taxotere/Cyramza today. \par PET CT on 3/18/2019 revealed Since December 19, 2018:\par \par 1. No new sites of pathologic FDG uptake.\par \par 2. FDG avid left upper lobe mass currently measuring 7.6 x 5.6 cm with \par max SUV 29.4, previously 8.4 x 7.6 cm with prior max SUV 26.3 (12% \par increase).\par \par 3. FDG avid right cervical level II lymph node currently measure 2.4 x \par 1.9 cm with max SUV 23.8, previously measuring 3 x 2.2 cm with prior max \par SUV 20.2 (18% increase).\par \par 4. Previously described left hilar FDG avid lymph node no longer clearly \par delineated; possibly contiguous with FDG avid lung mass or resolved.\par \par 5. Increased size and FDG uptake within peripancreatic 1.5 cm short axis \par lymph node with max SUV 25.2, previously 1 cm short axis with max SUV \par 16.3 (55% increase).  Additional previously described intra-abdominal FDG \par avid lymphadenopathy have otherwise decreased in FDG uptake, catalogued \par above.\par Images were personally reviewed by myself and discussed with Анна. \par \par 4/17/2019: Анна was evaluated for PE/DVT in the hospital. CTA of the chest on 3/21/2019 revealed Interval occlusion of the left upper lobe anterior pulmonary artery \par branch related to the lung mass. Other pulmonary arteries appear widely patent. No evidence of right heart strain.\par Doppler on 3/21/2019 revealed no DVT in B/L LE. \par She was urgently referred for ad Onc evaluation and has completed radiation to the chest. She reports that chest discomfort that she was experiencing prior has significantly improved, she reports mild cough and occasional nausea. Her R neck lump is bigger on exam now, Анна reports that it is not as big or painful as last week. There is no overt progression on prior images and neck lump may be showing fluctuation in size 2/2 prior immunotherapy exposure. Анна wishes to hold off on restaging PET CT for now. She has been experiencing a significant amount of swelling since starting on Taxotere, therefore we will plan to switch her over to Gemzar at this time. Side effects including, but not limited to, occasional pulmonary and liver toxicity were discussed. \par \par 5/15/2019: Анна is doing well, reports no pain or SOB, she is less swollen and reports having lost few pounds, she is happy about it. She is tolerating Gemzar, her WBC is borderline today, will dose reduce slightly today to 900mg/m2, this is W2 of cycle 2. She was seen at [facility] for second opinion, additional; genetic studies are being done on tissue. Case was discussed with Dr. Irma Nuñez. She is post-due for MRI of the brain, she will schedule. \par \par 5/29/2018: MRI of the brain on 5/23/2019 revealed Since MRI brain 12/16/2018: \par 1. Interval development of a new rim-enhancing subcentimeter lesion in the \par right cerebellar hemisphere with small amount of surrounding edema \par compatible with metastatic disease given history. \par 2. Resolution of the previously identified right frontal lobe enhancing \par lesion and surrounding edema. No additional enhancing lesion on the current \par exam. \par Images were personally reviewed with myself and Анна as well as with Dr. Gresham. Анна will see Dr. Gresham for consideration of SRS to the new brain lesion. She will start steroids if recommended by Dr. Gresham. We willplan to put Gemzar temporarily on hold to complete radiation. \par Анна is currently asymptomatic and reports no neurological symptoms.   [de-identified] : Анна is a lewis 24 yo lady with past medical history significant for anxiety and depression, who has significant smoking history who presented to [facility] on 6/26/2018 with CC of leg pain. She also on further questioning revealed that she has had non-productive cough without associated SOB or CP for approximately 3 months prior. She denies any weight loss or neurological symptoms prior to presentation. CXR reveled LUIS opacity, CT scan of the chest was then done. Based on it's results she was transferred to [place], further work up was as follows. \par \par CT chest on 6/26/2018 revealed Since November 24, 2015:  1.  There is a new large left upper lobe mass measuring 6.8 x 5.7 x 7 cm  extending to mediastinum with multiple mediastinal lymph nodes as  described above. Additionally, there are multiple new hypodense hepatic  metastatic lesions. 2.  4 mm left upper lobe pulmonary nodule, metastatic etiology\par \par CT A/P on 6/27/2018 revealed Multifocal liver masses consistent with metastatic disease.  Ill-defined 0.7 cm splenic hypodensity suspicious for a metastatic focus.  Multifocal bilateral renal hypoattenuating lesions measuring up to 1.1 cm  in the right interpolar region. Cannot exclude metastasis disease within  the kidneys.\par \par MRI Brain on 6/27/2018 reveled Single enhancing nodule/lesion within a sulcus adjacent to the right  occipital lobe measuring 4 x 4 x 5 mm. Findings highly suspicious for  metastatic disease. Follow-up to 6 most may be helpful for further  evaluation if clinically indicated.\par \par Bone scan on 6/28/2018 revealed Multiple definite bony abnormalities which by pattern of distribution are  consistent with metastatic bone disease.  These include bilateral pubic inferior rami, left superior pubic ramus,  bilateral iliac bones and left 4th costovertebral junction.  Abnormal uptake corresponding to left lung index tumor.\par \par On 6/28/2018 she underwent liver biopsy, not enough tissue for extended molecular testing was obtained, it revealed adenocarcinoma of the lung with 5% PDL1 expression. \par Repeat liver biopsy on 7/2/2018 confirmed the diagnosis of lung adenocarcinoma with negative PDL1 expression, negative EGFR, Alk, BRAF and KRAS. \par \par She received first dose of Carbo/Alimta in the hospital on 7/6/2018 and tolerated it very well.

## 2019-06-19 NOTE — PHYSICAL EXAM
[Restricted in physically strenuous activity but ambulatory and able to carry out work of a light or sedentary nature] : Status 1- Restricted in physically strenuous activity but ambulatory and able to carry out work of a light or sedentary nature, e.g., light house work, office work [Normal] : affect appropriate [de-identified] : cushingoid  [de-identified] : palpable R cervical node is larger on exam today, not tender to palpation  [de-identified] : B/L lower extremity edema improving

## 2019-06-19 NOTE — ASSESSMENT
[FreeTextEntry1] : Metastatic adenocarcinoma of the lung, PDL1 5%, EGFT, Alk, BRAF, KRAS, ROS1 negative\par --Recieved cycle 1 of Carbo Alimta in the hospital on 7/6/2018-\par --Recieved cycle 2 on 7/27/2018 with Keytruda, no adverse effects \par --Started on B12 7/2018--\par --Taking folic acid daily--\par --Reports she may require some dental work, will be in touch with dentist re Xgeva clearance\par --Brain lesion s/p SRS in 1/2019, tapering Decadron \par --Next generation sequencing revealed no targetable mutations\par --Restaging PET CT on 10/9/2018 reveals positive response to therapy \par --Completed 4th cycle of Carbo/Alimta/Keytruda on 9/14/2018\par --Deescalated to single agent Keytruda on 10/19/2018, she has missed appointments for few doses\par --PET on 12/19 with evidence of progression, R neck biopsy on 12/18/2018 confirming met adenoca, Next generation sequencing repeat revealed no targetable mutations \par --Switched therapy to Taxotere/Cyramza on 12/19/2018, Cyramza was added to cycle 2 on 1/16/2019, tolerated first cycles without major difficulty, reports occasional mild epistaxis \par --S/P cycle 3 on 2/27/2019, cycle 4 was delayed 2/2 emotional stress and pending ED evaluation to r/o DVT/PE\par --Restaging PET CT 3/18/2019 is mostly stable some SUVs appear higher (? 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\par --Completed palliative radiation to the chest 4/2019 \par --No overt progression, but with suspecion of progression in mind Анна was switched over to Gemzar 1000mg/m2 3 weeks on 1 off on 4/17/2019--\par --5/15/2019 for C2W2 of gemzar, ANC is borderline with dose reduce to 900 mg/m2, may require Neupogen in the future\par --Restaging MRI of the brain was ordered on 4/17/2019, done on 5/23/2019 revealed new small lesion in the cerebellum\par --Referred to Dr. Gresham for consideration of SRS to the new brain lesion, will not start steroids for now, patient is asymptomatic \par --Echo heart on 5/1/2019 EF of 50%, no pericardial effusion \par --Obtained 2nd opinion at [facility] by Dr. Irma Nuñez \par \par Metastatic disease to brain, asymptomatic\par --MRI brain on 12/17/2018 reveals new solitary brain met\par --MRI brain on 5/23/2019 reveals new solitary brain met\par --Off Decadron \par --Recieved SRS 1/2019 \par --Referred for consideration of additional SRS\par \par Cancer related pain \par --Requiring OxyContin 40 BID\par --Oxycodone 10mg for breakthrough\par --Following with Rad Onc \par \par Insomnia\par --Ambien\par \par Follow up in 3 weeks, all questions were answered at length\par I have explained to her that her disease unfortunately will not be cured, all therapeutic effort is palliative in nature\par \par \par

## 2019-06-19 NOTE — REVIEW OF SYSTEMS
[Lower Ext Edema] : lower extremity edema [Negative] : Allergic/Immunologic [Chest Pain] : no chest pain [Leg Claudication] : no intermittent leg claudication [Palpitations] : no palpitations [FreeTextEntry4] : right neck RICHARD, appx 1.5cm in size, stable on exam today [FreeTextEntry2] : weight gain [FreeTextEntry5] : some chest discomfort  [FreeTextEntry9] : low back/hip pain has mostly resolved, improved discomfort and swelling in lower legs

## 2019-06-21 LAB
ALBUMIN SERPL ELPH-MCNC: 3.7 G/DL
ALP BLD-CCNC: 152 U/L
ALT SERPL-CCNC: 23 U/L
ANION GAP SERPL CALC-SCNC: 13 MMOL/L
AST SERPL-CCNC: 21 U/L
BILIRUB SERPL-MCNC: 0.2 MG/DL
BUN SERPL-MCNC: 13 MG/DL
CALCIUM SERPL-MCNC: 9.1 MG/DL
CHLORIDE SERPL-SCNC: 94 MMOL/L
CO2 SERPL-SCNC: 30 MMOL/L
CREAT SERPL-MCNC: 0.7 MG/DL
GLUCOSE SERPL-MCNC: 72 MG/DL
POTASSIUM SERPL-SCNC: 4.4 MMOL/L
PROT SERPL-MCNC: 6.5 G/DL
SODIUM SERPL-SCNC: 137 MMOL/L
TSH SERPL-ACNC: 11.9 UIU/ML

## 2019-06-26 ENCOUNTER — LABORATORY RESULT (OUTPATIENT)
Age: 44
End: 2019-06-26

## 2019-06-26 ENCOUNTER — APPOINTMENT (OUTPATIENT)
Dept: INFUSION THERAPY | Facility: CLINIC | Age: 44
End: 2019-06-26

## 2019-06-26 DIAGNOSIS — Z71.89 OTHER SPECIFIED COUNSELING: ICD-10-CM

## 2019-06-26 LAB
HCT VFR BLD CALC: 30.8 %
HGB BLD-MCNC: 9.3 G/DL
MCHC RBC-ENTMCNC: 27.1 PG
MCHC RBC-ENTMCNC: 30.2 G/DL
MCV RBC AUTO: 89.8 FL
PLATELET # BLD AUTO: 170 K/UL
PMV BLD: 9.4 FL
RBC # BLD: 3.43 M/UL
RBC # FLD: 19.7 %
WBC # FLD AUTO: 3.84 K/UL

## 2019-06-26 RX ORDER — ONDANSETRON 8 MG/1
16 TABLET, FILM COATED ORAL ONCE
Refills: 0 | Status: COMPLETED | OUTPATIENT
Start: 2019-06-26 | End: 2019-06-26

## 2019-06-26 RX ORDER — FOSAPREPITANT DIMEGLUMINE 150 MG/5ML
150 INJECTION, POWDER, LYOPHILIZED, FOR SOLUTION INTRAVENOUS ONCE
Refills: 0 | Status: COMPLETED | OUTPATIENT
Start: 2019-06-26 | End: 2019-06-26

## 2019-06-26 RX ORDER — GEMCITABINE 38 MG/ML
1800 INJECTION, SOLUTION INTRAVENOUS ONCE
Refills: 0 | Status: COMPLETED | OUTPATIENT
Start: 2019-06-26 | End: 2019-06-26

## 2019-06-26 RX ADMIN — GEMCITABINE 1800 MILLIGRAM(S): 38 INJECTION, SOLUTION INTRAVENOUS at 14:05

## 2019-06-26 RX ADMIN — FOSAPREPITANT DIMEGLUMINE 150 MILLIGRAM(S): 150 INJECTION, POWDER, LYOPHILIZED, FOR SOLUTION INTRAVENOUS at 13:05

## 2019-06-26 RX ADMIN — FOSAPREPITANT DIMEGLUMINE 450 MILLIGRAM(S): 150 INJECTION, POWDER, LYOPHILIZED, FOR SOLUTION INTRAVENOUS at 12:45

## 2019-06-26 RX ADMIN — GEMCITABINE 396.49 MILLIGRAM(S): 38 INJECTION, SOLUTION INTRAVENOUS at 13:05

## 2019-06-26 RX ADMIN — ONDANSETRON 174 MILLIGRAM(S): 8 TABLET, FILM COATED ORAL at 12:25

## 2019-06-26 RX ADMIN — ONDANSETRON 16 MILLIGRAM(S): 8 TABLET, FILM COATED ORAL at 12:45

## 2019-07-08 ENCOUNTER — OUTPATIENT (OUTPATIENT)
Dept: OUTPATIENT SERVICES | Facility: HOSPITAL | Age: 44
LOS: 1 days | Discharge: HOME | End: 2019-07-08
Payer: MEDICAID

## 2019-07-08 DIAGNOSIS — Z90.49 ACQUIRED ABSENCE OF OTHER SPECIFIED PARTS OF DIGESTIVE TRACT: Chronic | ICD-10-CM

## 2019-07-08 DIAGNOSIS — C34.90 MALIGNANT NEOPLASM OF UNSPECIFIED PART OF UNSPECIFIED BRONCHUS OR LUNG: ICD-10-CM

## 2019-07-08 LAB — GLUCOSE BLDC GLUCOMTR-MCNC: 100 MG/DL — HIGH (ref 70–99)

## 2019-07-08 PROCEDURE — 78815 PET IMAGE W/CT SKULL-THIGH: CPT | Mod: 26,PS

## 2019-07-09 ENCOUNTER — RX RENEWAL (OUTPATIENT)
Age: 44
End: 2019-07-09

## 2019-07-10 ENCOUNTER — APPOINTMENT (OUTPATIENT)
Dept: INFUSION THERAPY | Facility: CLINIC | Age: 44
End: 2019-07-10
Payer: MEDICAID

## 2019-07-10 ENCOUNTER — APPOINTMENT (OUTPATIENT)
Dept: HEMATOLOGY ONCOLOGY | Facility: CLINIC | Age: 44
End: 2019-07-10
Payer: MEDICAID

## 2019-07-10 ENCOUNTER — LABORATORY RESULT (OUTPATIENT)
Age: 44
End: 2019-07-10

## 2019-07-10 VITALS
TEMPERATURE: 99.2 F | BODY MASS INDEX: 36.49 KG/M2 | SYSTOLIC BLOOD PRESSURE: 135 MMHG | DIASTOLIC BLOOD PRESSURE: 90 MMHG | RESPIRATION RATE: 14 BRPM | HEIGHT: 64.96 IN | WEIGHT: 219 LBS | HEART RATE: 111 BPM

## 2019-07-10 DIAGNOSIS — C34.90 MALIGNANT NEOPLASM OF UNSPECIFIED PART OF UNSPECIFIED BRONCHUS OR LUNG: ICD-10-CM

## 2019-07-10 LAB
HCG UR QL: NEGATIVE — SIGNIFICANT CHANGE UP
HCT VFR BLD CALC: 32.7 %
HGB BLD-MCNC: 10.1 G/DL
MCHC RBC-ENTMCNC: 27.9 PG
MCHC RBC-ENTMCNC: 30.9 G/DL
MCV RBC AUTO: 90.3 FL
PLATELET # BLD AUTO: 373 K/UL
PMV BLD: 9.8 FL
RBC # BLD: 3.62 M/UL
RBC # FLD: 20.1 %
WBC # FLD AUTO: 7.38 K/UL

## 2019-07-10 PROCEDURE — 99214 OFFICE O/P EST MOD 30 MIN: CPT

## 2019-07-10 PROCEDURE — 77261 THER RADIOLOGY TX PLNG SMPL: CPT

## 2019-07-10 PROCEDURE — 99024 POSTOP FOLLOW-UP VISIT: CPT

## 2019-07-11 PROCEDURE — 77280 THER RAD SIMULAJ FIELD SMPL: CPT | Mod: 26

## 2019-07-11 PROCEDURE — 77321 SPECIAL TELETX PORT PLAN: CPT | Mod: 26

## 2019-07-11 PROCEDURE — 77334 RADIATION TREATMENT AID(S): CPT | Mod: 26

## 2019-07-17 ENCOUNTER — APPOINTMENT (OUTPATIENT)
Dept: INFUSION THERAPY | Facility: CLINIC | Age: 44
End: 2019-07-17

## 2019-07-18 ENCOUNTER — OUTPATIENT (OUTPATIENT)
Dept: OUTPATIENT SERVICES | Facility: HOSPITAL | Age: 44
LOS: 1 days | Discharge: HOME | End: 2019-07-18
Payer: MEDICAID

## 2019-07-18 DIAGNOSIS — Z90.49 ACQUIRED ABSENCE OF OTHER SPECIFIED PARTS OF DIGESTIVE TRACT: Chronic | ICD-10-CM

## 2019-07-18 PROCEDURE — 77427 RADIATION TX MANAGEMENT X5: CPT

## 2019-07-21 PROBLEM — C34.90 METASTATIC LUNG CANCER (METASTASIS FROM LUNG TO OTHER SITE): Status: ACTIVE | Noted: 2018-07-24

## 2019-07-21 NOTE — REVIEW OF SYSTEMS
[Lower Ext Edema] : lower extremity edema [Negative] : Heme/Lymph [Chest Pain] : no chest pain [Palpitations] : no palpitations [FreeTextEntry2] : weight gain [Leg Claudication] : no intermittent leg claudication [FreeTextEntry4] : right neck RICHARD, appx 1.5cm in size, stable on exam today [FreeTextEntry9] : low back/hip pain has mostly resolved, improved discomfort and swelling in lower legs [FreeTextEntry5] : some chest discomfort

## 2019-07-21 NOTE — HISTORY OF PRESENT ILLNESS
[de-identified] : Анна is a lewis 24 yo lady with past medical history significant for anxiety and depression, who has significant smoking history who presented to [facility] on 6/26/2018 with CC of leg pain. She also on further questioning revealed that she has had non-productive cough without associated SOB or CP for approximately 3 months prior. She denies any weight loss or neurological symptoms prior to presentation. CXR reveled LUIS opacity, CT scan of the chest was then done. Based on it's results she was transferred to [place], further work up was as follows. \par \par CT chest on 6/26/2018 revealed Since November 24, 2015:  1.  There is a new large left upper lobe mass measuring 6.8 x 5.7 x 7 cm  extending to mediastinum with multiple mediastinal lymph nodes as  described above. Additionally, there are multiple new hypodense hepatic  metastatic lesions. 2.  4 mm left upper lobe pulmonary nodule, metastatic etiology\par \par CT A/P on 6/27/2018 revealed Multifocal liver masses consistent with metastatic disease.  Ill-defined 0.7 cm splenic hypodensity suspicious for a metastatic focus.  Multifocal bilateral renal hypoattenuating lesions measuring up to 1.1 cm  in the right interpolar region. Cannot exclude metastasis disease within  the kidneys.\par \par MRI Brain on 6/27/2018 reveled Single enhancing nodule/lesion within a sulcus adjacent to the right  occipital lobe measuring 4 x 4 x 5 mm. Findings highly suspicious for  metastatic disease. Follow-up to 6 most may be helpful for further  evaluation if clinically indicated.\par \par Bone scan on 6/28/2018 revealed Multiple definite bony abnormalities which by pattern of distribution are  consistent with metastatic bone disease.  These include bilateral pubic inferior rami, left superior pubic ramus,  bilateral iliac bones and left 4th costovertebral junction.  Abnormal uptake corresponding to left lung index tumor.\par \par On 6/28/2018 she underwent liver biopsy, not enough tissue for extended molecular testing was obtained, it revealed adenocarcinoma of the lung with 5% PDL1 expression. \par Repeat liver biopsy on 7/2/2018 confirmed the diagnosis of lung adenocarcinoma with negative PDL1 expression, negative EGFR, Alk, BRAF and KRAS. \par \par She received first dose of Carbo/Alimta in the hospital on 7/6/2018 and tolerated it very well.  [de-identified] : 8/15/2018;Анна presents for follow up today. She has tolerated 2nd cycle of Carbo/Alimta with addition of Keytruda without any difficulty. She reports ongoing pain in her R hip, she states narcotics are helpful but she may require a dose increase. She is interested in Rad Onc evaluation. She reports she needs dental appointment and may require some dental work. Will hold off on Keytruda for now. She otherwise offer no complaints today. \par \par 9/12/2018: Анна is doing great, still c/o some back pain, requiring pain meds. Denies SOB, WALKER, headaches, weight loss. She is for cycle 4 of Carbo/Alimta/Keytruda on 9/14/2018. She has met with Dr. Gresham and has her appointment for simulation later today. PET CT was not done yet. \par \par 10/17/2018: Анна presents for follow up today, she is feeling well, has no complaints, she is still requiring pain medication for cancer related pain management. Restaging PET CT on 10/4/2018 reveals  FDG avid left upper lobe hilar 5.6 x 4.1 cm mass, decreased in size  compared to chest CT June 2018 (previously 7.0 x 6.8 cm)., max SUV 33.5.   Left hilar FDG avid lymph node, max SUV 8.9 (image 194).  Suspicious FDG avid 1.4 cm right level 2 cervical lymph node, max SUV  20.3 (image 239). Consider tissue sampling.  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  No current FDG evidence of osseous metastatic disease.\par Images were personally reviewed by myself and discussed with Анна and her boyfriend at length. \par The concerning LN in the R neck was never imaged prior, this is not likely representative of disease progression, overall PET CT is indicative of positive response to therapy. We will continue with single agent Keytruda. Анна reports no side effects of Keytruda to date. \par Анна also reports loss of menstrual periods, will check beta HCG and LH, FSH and estradiol today. She was advised on barrier protection during intercourse. \par \par 11/14/2018: Анна feels well today and reports no complaints, back pain has improved, she was not taking long acting pain meds for 2 weeks. She has missed her Keytruda appointment last week. I have reiterated the importance of compliance with therapy. I have again spoke with Анна re importance of safe sex. She has the script for brain MRI  but she has not done the study just yet she will schedule.\par \par 12/5/2018: Анна feels well today, he reports no systemic side effects to therapy, she reports that she noticed that the righ side of her neck is swollen and has been swollen for about 2-3 weeks. On exam this is finding is consistent with worsening adenopathy in the area where FDG avid node was described prior. i have expressed to нАна that I am concerned of possibility of progression. I advise that she undergoes prompt restaging including PET CT and MRI of the brain as well as R neck biopsy. She will receive Keytruda today. \par \par 12/26/2018: Анна continues to feel well, the lump in her right neck has become slightly smaller. She has completed full restaging. MRI of the brain done on 12/17/2018 that revealed  In comparison with the prior MRI of the brain dated June 27, 2018:  The previously described lesion in the focus adjacent to the right  occipital lobe is not identified and likely represented artifact.  Interval development of a ring-enhancing lesion (0.7 x 0.8 x 0.7 cm) in  the anterior inferior aspect of the right frontal lobe consistent with  cerebral metastasis. There is a moderate amount of surrounding edema. She remains asymptomatic. She was since started on Decadron 2mg BID daily and was referred back to Dr. Gresham, she was mapped for SRS of the brain lesion this AM. \par PET CT on 12/19/2018 revealed  COMPARISON : 10/4/2018.  4 new sites of pathologic FDG uptake in the abdomen and pelvis consistent  with biologic tumor activity.  Fluctuating SUV values within the head and neck and thorax without any  new sites of disease. Incidentally, the primary mass shows a 21% decrease  in SUV max, now 26.3 but is anatomically increased in size measuring 6.3  x 5.5 cm, previously 4.3 x 3.8 cm. \par R neck biopsy on 12/18/2018 revealed metastatic adenocarcinoma. Next generation sequencing was again requested. \par All images were personally reviewed by myself and discussed with patient. \par She has completed carbo/alimta not even 3 months back, i have explained to her that switch of therapy is warranted in my opinion. I will offer her Taxotere with Cyramza, Cyramza will be administered with cycle 2 2/2 scheduling issues. \par Side effects of therapy were discussed at length, including cytopenias, allergic reactions, skin rashes, neuropathy, VTE, perforation to GI tract, prolinuria, elevated BP. \par She is in agreement to proceed ASAP.  \par \par 1/16/2019: Анна started on Taxotere on 12/26/2018, she had a minor infusion reaction and had to run the drug longer, she was able to complete without complications. She also had recent SBRT to the solitary brain lesion. She reports minor headache after procedure and continues on Decadron at 2mg BID. She has gained some weight. She will be receiving 2nd dose of Taxotere along with first dose of Cyramza today. Side effects of Cyramza, including cardiovascular compilations, bleeding, hypertension and proteinuria were discussed. \par \par 2/6/2019: Анна feels well today. Denies any pulmonary symptoms, denies excessive pain. Denies new neurological symptoms. On exam her right cervical adenopathy is improved. She has gained some weight. She reports occasional nose bleeds that stop spontaneously since she started on Cyramza, she also reports some discomfort in her mouth, no overt mouth sores. Epistaxis is likely related to Cyramza, will observe closely. \par \par 2/27/2019: Анна's only complaint today is reflux exacerbation, she will double up to Nexium 40mg daily and use TUMs and Maalox. She reports she is emotional when she is taking steroids. She will go down on post chemo Decadron to 2mg BID and will only premedicate with 1 dose of Decadron prior to chemo on the day before. She reports no neuropathy, pain is controlled, no pulmonary or neurological symptoms. Proceed with cycle 3 of Taxotere with Cyramza. \par \par 3/21/2019: Анна presents today for follow up, she is very emotional, tearful She has not been feeling right, reports discomfort in her chest, her legs have been swollen, she is taking intermittent Lasix. She is contemplating potentially moving to [place], [place], where her myrna has family. She is also interested in potentially obtaining second opinion at [facility]. Lung cancer specialist name was provided for her. I am concerned that she has legs swelling and chest discomfort and suggested ED evaluation to R/O DVT/PE, she is agreeable to go. Will hold Taxotere/Cyramza today. \par PET CT on 3/18/2019 revealed Since December 19, 2018:\par \par 1. No new sites of pathologic FDG uptake.\par \par 2. FDG avid left upper lobe mass currently measuring 7.6 x 5.6 cm with \par max SUV 29.4, previously 8.4 x 7.6 cm with prior max SUV 26.3 (12% \par increase).\par \par 3. FDG avid right cervical level II lymph node currently measure 2.4 x \par 1.9 cm with max SUV 23.8, previously measuring 3 x 2.2 cm with prior max \par SUV 20.2 (18% increase).\par \par 4. Previously described left hilar FDG avid lymph node no longer clearly \par delineated; possibly contiguous with FDG avid lung mass or resolved.\par \par 5. Increased size and FDG uptake within peripancreatic 1.5 cm short axis \par lymph node with max SUV 25.2, previously 1 cm short axis with max SUV \par 16.3 (55% increase).  Additional previously described intra-abdominal FDG \par avid lymphadenopathy have otherwise decreased in FDG uptake, catalogued \par above.\par Images were personally reviewed by myself and discussed with Анна. \par \par 4/17/2019: Анна was evaluated for PE/DVT in the hospital. CTA of the chest on 3/21/2019 revealed Interval occlusion of the left upper lobe anterior pulmonary artery \par branch related to the lung mass. Other pulmonary arteries appear widely patent. No evidence of right heart strain.\par Doppler on 3/21/2019 revealed no DVT in B/L LE. \par She was urgently referred for ad Onc evaluation and has completed radiation to the chest. She reports that chest discomfort that she was experiencing prior has significantly improved, she reports mild cough and occasional nausea. Her R neck lump is bigger on exam now, Анан reports that it is not as big or painful as last week. There is no overt progression on prior images and neck lump may be showing fluctuation in size 2/2 prior immunotherapy exposure. Анна wishes to hold off on restaging PET CT for now. She has been experiencing a significant amount of swelling since starting on Taxotere, therefore we will plan to switch her over to Gemzar at this time. Side effects including, but not limited to, occasional pulmonary and liver toxicity were discussed. \par \par 5/15/2019: Анна is doing well, reports no pain or SOB, she is less swollen and reports having lost few pounds, she is happy about it. She is tolerating Gemzar, her WBC is borderline today, will dose reduce slightly today to 900mg/m2, this is W2 of cycle 2. She was seen at [facility] for second opinion, additional; genetic studies are being done on tissue. Case was discussed with Dr. Irma Nuñez. She is post-due for MRI of the brain, she will schedule. \par \par 5/29/2018: MRI of the brain on 5/23/2019 revealed Since MRI brain 12/16/2018: \par 1. Interval development of a new rim-enhancing subcentimeter lesion in the \par right cerebellar hemisphere with small amount of surrounding edema \par compatible with metastatic disease given history. \par 2. Resolution of the previously identified right frontal lobe enhancing \par lesion and surrounding edema. No additional enhancing lesion on the current \par exam. \par Images were personally reviewed with myself and Анна as well as with Dr. Gresham. Анна will see Dr. Gresham for consideration of SRS to the new brain lesion. She will start steroids if recommended by Dr. Gresham. We will plan to put Gemzar temporarily on hold to complete radiation. \par Анна is currently asymptomatic and reports no neurological symptoms.  \par \par 6/19/2019: Анна is here for follow up. She was seen by Dr. Gresham and received SRS to the new brain lesion, she is weaning of steroids. I would like to offer her restaging with PET CT. For now she wishes to resume Gemzar. She has no major side effects relating to chemotherapy.

## 2019-07-21 NOTE — PHYSICAL EXAM
[Restricted in physically strenuous activity but ambulatory and able to carry out work of a light or sedentary nature] : Status 1- Restricted in physically strenuous activity but ambulatory and able to carry out work of a light or sedentary nature, e.g., light house work, office work [Normal] : affect appropriate [de-identified] : cushingoid  [de-identified] : palpable R cervical node is larger on exam today, not tender to palpation  [de-identified] : B/L lower extremity edema improving

## 2019-07-21 NOTE — CONSULT LETTER
[Dear  ___] : Dear  [unfilled], [( Thank you for referring [unfilled] for consultation for _____ )] : Thank you for referring [unfilled] for consultation for [unfilled] [Consult Letter:] : I had the pleasure of evaluating your patient, [unfilled]. [Please see my note below.] : Please see my note below. [Consult Closing:] : Thank you very much for allowing me to participate in the care of this patient.  If you have any questions, please do not hesitate to contact me. [Sincerely,] : Sincerely, [DrDank  ___] : Dr. GUEVARA [FreeTextEntry3] : Em Galvan MD

## 2019-07-21 NOTE — ASSESSMENT
[FreeTextEntry1] : Metastatic adenocarcinoma of the lung, PDL1 5%, EGFT, Alk, BRAF, KRAS, ROS1 negative\par --Recieved cycle 1 of Carbo Alimta in the hospital on 7/6/2018-\par --Recieved cycle 2 on 7/27/2018 with Keytruda, no adverse effects \par --Started on B12 7/2018--\par --Taking folic acid daily\par --Reports she may require some dental work, will be in touch with dentist re Xgeva clearance\par --Brain lesion s/p SRS in 1/2019, tapering Decadron \par --Next generation sequencing revealed no targetable mutations\par --Restaging PET CT on 10/9/2018 reveals positive response to therapy \par --Completed 4th cycle of Carbo/Alimta/Keytruda on 9/14/2018\par --Deescalated to single agent Keytruda on 10/19/2018, she has missed appointments for few doses\par --PET on 12/19 with evidence of progression, R neck biopsy on 12/18/2018 confirming met adenoca, Next generation sequencing repeat revealed no targetable mutations \par --Switched therapy to Taxotere/Cyramza on 12/19/2018, Cyramza was added to cycle 2 on 1/16/2019, tolerated first cycles without major difficulty, reports occasional mild epistaxis \par --S/P cycle 3 on 2/27/2019, cycle 4 was delayed 2/2 emotional stress and pending ED evaluation to r/o DVT/PE\par --Restaging PET CT 3/18/2019 is mostly stable some SUVs appear higher (? 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 Gresham, s/p SRS to the new brain lesion, weaning off steroids \par --Echo heart on 5/1/2019 EF of 50%, no pericardial effusion \par --Obtained 2nd opinion at [facility] by [place]. Irma Nuñez \par \par Metastatic disease to brain, asymptomatic\par --MRI brain on 12/17/2018 reveals new solitary brain met\par --MRI brain on 5/23/2019 reveals new solitary brain met\par --Weaning off Decadron \par --Recieved SRS 1/2019 and again 6/2019\par \par Cancer related pain \par --Requiring OxyContin 40 BID\par --Oxycodone 10mg for breakthrough\par --Following with Rad Onc \par \par Insomnia\par --Ambien\par \par Follow up in 3 weeks, all questions were answered at length\par I have explained to her that her disease unfortunately will not be cured, all therapeutic effort is palliative in nature\par \par \par

## 2019-07-21 NOTE — ASSESSMENT
[FreeTextEntry1] : Metastatic adenocarcinoma of the lung, PDL1 5%, EGFT, Alk, BRAF, KRAS, ROS1 negative\par --Recieved cycle 1 of Carbo Alimta in the hospital on 7/6/2018-\par --Recieved cycle 2 on 7/27/2018 with Keytruda, no adverse effects \par --Started on B12 7/2018--\par --Taking folic acid daily\par --Reports she may require some dental work, will be in touch with dentist re Xgeva clearance\par --Brain lesion s/p SRS in 1/2019, tapering Decadron \par --Next generation sequencing revealed no targetable mutations\par --Restaging PET CT on 10/9/2018 reveals positive response to therapy \par --Completed 4th cycle of Carbo/Alimta/Keytruda on 9/14/2018\par --Deescalated to single agent Keytruda on 10/19/2018, she has missed appointments for few doses\par --PET on 12/19 with evidence of progression, R neck biopsy on 12/18/2018 confirming met adenoca, Next generation sequencing repeat revealed no targetable mutations \par --Switched therapy to Taxotere/Cyramza on 12/19/2018, Cyramza was added to cycle 2 on 1/16/2019, tolerated first cycles without major difficulty, reports occasional mild epistaxis \par --S/P cycle 3 on 2/27/2019, cycle 4 was delayed 2/2 emotional stress and pending ED evaluation to r/o DVT/PE\par --Restaging PET CT 3/18/2019 is mostly stable some SUVs appear higher (? 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 Gresham, s/p SRS to the new brain lesion, weaning off steroids \par --PET CT on 7/8/2019 with evidence of overt progression, will refer back to [facility] for trial options\par --Refer back to XRT for L flank pain, 2/2 rib lesion\par --D/C Gemzar on 7/10/2019\par --Echo heart on 5/1/2019 EF of 50%, no pericardial effusion \par --Obtained 2nd opinion at [facility] by Dr. Irma Nuñez \par \par Metastatic disease to brain, asymptomatic\par --MRI brain on 12/17/2018 reveals new solitary brain met\par --MRI brain on 5/23/2019 reveals new solitary brain met\par --Weaning off Decadron \par --Recieved SRS 1/2019 and again 6/2019\par \par Cancer related pain, will increase as needed\par --Requiring OxyContin 40 BID\par --Oxycodone 10mg for breakthrough\par --Following with Rad Onc \par \par Insomnia\par --Ambien\par \par Follow up in 2-3 weeks, all questions were answered at length\par I have explained to her that her disease unfortunately will not be cured, all therapeutic effort is palliative in nature\par \par \par

## 2019-07-21 NOTE — PHYSICAL EXAM
[Restricted in physically strenuous activity but ambulatory and able to carry out work of a light or sedentary nature] : Status 1- Restricted in physically strenuous activity but ambulatory and able to carry out work of a light or sedentary nature, e.g., light house work, office work [Normal] : affect appropriate [de-identified] : cushingoid  [de-identified] : palpable R cervical node is larger on exam today, not tender to palpation  [de-identified] : L flank pain, ? rib [de-identified] : B/L lower extremity edema improving

## 2019-07-21 NOTE — HISTORY OF PRESENT ILLNESS
[de-identified] : 8/15/2018;Анна presents for follow up today. She has tolerated 2nd cycle of Carbo/Alimta with addition of Keytruda without any difficulty. She reports ongoing pain in her R hip, she states narcotics are helpful but she may require a dose increase. She is interested in Rad Onc evaluation. She reports she needs dental appointment and may require some dental work. Will hold off on Keytruda for now. She otherwise offer no complaints today. \par \par 9/12/2018: Анна is doing great, still c/o some back pain, requiring pain meds. Denies SOB, WALKER, headaches, weight loss. She is for cycle 4 of Carbo/Alimta/Keytruda on 9/14/2018. She has met with Dr. Gresham and has her appointment for simulation later today. PET CT was not done yet. \par \par 10/17/2018: Анна presents for follow up today, she is feeling well, has no complaints, she is still requiring pain medication for cancer related pain management. Restaging PET CT on 10/4/2018 reveals  FDG avid left upper lobe hilar 5.6 x 4.1 cm mass, decreased in size  compared to chest CT June 2018 (previously 7.0 x 6.8 cm)., max SUV 33.5.   Left hilar FDG avid lymph node, max SUV 8.9 (image 194).  Suspicious FDG avid 1.4 cm right level 2 cervical lymph node, max SUV  20.3 (image 239). Consider tissue sampling.  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  No current FDG evidence of osseous metastatic disease.\par Images were personally reviewed by myself and discussed with Анна and her boyfriend at length. \par The concerning LN in the R neck was never imaged prior, this is not likely representative of disease progression, overall PET CT is indicative of positive response to therapy. We will continue with single agent Keytruda. Анна reports no side effects of Keytruda to date. \par Анна also reports loss of menstrual periods, will check beta HCG and LH, FSH and estradiol today. She was advised on barrier protection during intercourse. \par \par 11/14/2018: Анна feels well today and reports no complaints, back pain has improved, she was not taking long acting pain meds for 2 weeks. She has missed her Keytruda appointment last week. I have reiterated the importance of compliance with therapy. I have again spoke with Анна re importance of safe sex. She has the script for brain MRI  but she has not done the study just yet she will schedule.\par \par 12/5/2018: Анна feels well today, he reports no systemic side effects to therapy, she reports that she noticed that the righ side of her neck is swollen and has been swollen for about 2-3 weeks. On exam this is finding is consistent with worsening adenopathy in the area where FDG avid node was described prior. i have expressed to Анна that I am concerned of possibility of progression. I advise that she undergoes prompt restaging including PET CT and MRI of the brain as well as R neck biopsy. She will receive Keytruda today. \par \par 12/26/2018: Анна continues to feel well, the lump in her right neck has become slightly smaller. She has completed full restaging. MRI of the brain done on 12/17/2018 that revealed  In comparison with the prior MRI of the brain dated June 27, 2018:  The previously described lesion in the focus adjacent to the right  occipital lobe is not identified and likely represented artifact.  Interval development of a ring-enhancing lesion (0.7 x 0.8 x 0.7 cm) in  the anterior inferior aspect of the right frontal lobe consistent with  cerebral metastasis. There is a moderate amount of surrounding edema. She remains asymptomatic. She was since started on Decadron 2mg BID daily and was referred back to Dr. Gresham, she was mapped for SRS of the brain lesion this AM. \par PET CT on 12/19/2018 revealed  COMPARISON : 10/4/2018.  4 new sites of pathologic FDG uptake in the abdomen and pelvis consistent  with biologic tumor activity.  Fluctuating SUV values within the head and neck and thorax without any  new sites of disease. Incidentally, the primary mass shows a 21% decrease  in SUV max, now 26.3 but is anatomically increased in size measuring 6.3  x 5.5 cm, previously 4.3 x 3.8 cm. \par R neck biopsy on 12/18/2018 revealed metastatic adenocarcinoma. Next generation sequencing was again requested. \par All images were personally reviewed by myself and discussed with patient. \par She has completed carbo/alimta not even 3 months back, i have explained to her that switch of therapy is warranted in my opinion. I will offer her Taxotere with Cyramza, Cyramza will be administered with cycle 2 2/2 scheduling issues. \par Side effects of therapy were discussed at length, including cytopenias, allergic reactions, skin rashes, neuropathy, VTE, perforation to GI tract, prolinuria, elevated BP. \par She is in agreement to proceed ASAP.  \par \par 1/16/2019: Анна started on Taxotere on 12/26/2018, she had a minor infusion reaction and had to run the drug longer, she was able to complete without complications. She also had recent SBRT to the solitary brain lesion. She reports minor headache after procedure and continues on Decadron at 2mg BID. She has gained some weight. She will be receiving 2nd dose of Taxotere along with first dose of Cyramza today. Side effects of Cyramza, including cardiovascular compilations, bleeding, hypertension and proteinuria were discussed. \par \par 2/6/2019: Анна feels well today. Denies any pulmonary symptoms, denies excessive pain. Denies new neurological symptoms. On exam her right cervical adenopathy is improved. She has gained some weight. She reports occasional nose bleeds that stop spontaneously since she started on Cyramza, she also reports some discomfort in her mouth, no overt mouth sores. Epistaxis is likely related to Cyramza, will observe closely. \par \par 2/27/2019: Анна's only complaint today is reflux exacerbation, she will double up to Nexium 40mg daily and use TUMs and Maalox. She reports she is emotional when she is taking steroids. She will go down on post chemo Decadron to 2mg BID and will only premedicate with 1 dose of Decadron prior to chemo on the day before. She reports no neuropathy, pain is controlled, no pulmonary or neurological symptoms. Proceed with cycle 3 of Taxotere with Cyramza. \par \par 3/21/2019: Анна presents today for follow up, she is very emotional, tearful She has not been feeling right, reports discomfort in her chest, her legs have been swollen, she is taking intermittent Lasix. She is contemplating potentially moving to [place], [place], where her myrna has family. She is also interested in potentially obtaining [place] [facility] cancer specialist name was provided for her. I am concerned that she has legs swelling and chest discomfort and suggested ED evaluation to R/O DVT/PE, she is agreeable to go. Will hold Taxotere/Cyramza today. \par PET CT on 3/18/2019 revealed Since December 19, 2018:\par \par 1. No new sites of pathologic FDG uptake.\par \par 2. FDG avid left upper lobe mass currently measuring 7.6 x 5.6 cm with \par max SUV 29.4, previously 8.4 x 7.6 cm with prior max SUV 26.3 (12% \par increase).\par \par 3. FDG avid right cervical level II lymph node currently measure 2.4 x \par 1.9 cm with max SUV 23.8, previously measuring 3 x 2.2 cm with prior max \par SUV 20.2 (18% increase).\par \par 4. Previously described left hilar FDG avid lymph node no longer clearly \par delineated; possibly contiguous with FDG avid lung mass or resolved.\par \par 5. Increased size and FDG uptake within peripancreatic 1.5 cm short axis \par lymph node with max SUV 25.2, previously 1 cm short axis with max SUV \par 16.3 (55% increase).  Additional previously described intra-abdominal FDG \par avid lymphadenopathy have otherwise decreased in FDG uptake, catalogued \par above.\par Images were personally reviewed by myself and discussed with Анна. \par \par 4/17/2019: Анна was evaluated for PE/DVT in the hospital. CTA of the chest on 3/21/2019 revealed Interval occlusion of the left upper lobe anterior pulmonary artery \par branch related to the lung mass. Other pulmonary arteries appear widely patent. No evidence of right heart strain.\par Doppler on 3/21/2019 revealed no DVT in B/L LE. \par She was urgently referred for ad Onc evaluation and has completed radiation to the chest. She reports that chest discomfort that she was experiencing prior has significantly improved, she reports mild cough and occasional nausea. Her R neck lump is bigger on exam now, Анна reports that it is not as big or painful as last week. There is no overt progression on prior images and neck lump may be showing fluctuation in size 2/2 prior immunotherapy exposure. Анна wishes to hold off on restaging PET CT for now. She has been experiencing a significant amount of swelling since starting on Taxotere, therefore we will plan to switch her over to Gemzar at this time. Side effects including, but not limited to, occasional pulmonary and liver toxicity were discussed. \par \par 5/15/2019: Анна is doing well, reports no pain or SOB, she is less swollen and reports having lost few pounds, she is happy about it. She is tolerating Gemzar, her WBC is borderline today, will dose reduce slightly today to 900mg/m2, this is W2 of cycle 2. [place] [facility] [place], additional; genetic studies are being done on tissue. Case was discussed with Dr. Irma Nuñez. She is post-due for MRI of the brain, she will schedule. \par \par 5/29/2018: MRI of the brain on 5/23/2019 revealed Since MRI brain 12/16/2018: \par 1. Interval development of a new rim-enhancing subcentimeter lesion in the \par right cerebellar hemisphere with small amount of surrounding edema \par compatible with metastatic disease given history. \par 2. Resolution of the previously identified right frontal lobe enhancing \par lesion and surrounding edema. No additional enhancing lesion on the current \par exam. \par Images were personally reviewed with myself and Анна as well as with Dr. Gresham. Анна will see Dr. Gresham for consideration of SRS to the new brain lesion. She will start steroids if recommended by Dr. Gresham. We will plan to put Gemzar temporarily on hold to complete radiation. \par Анна is currently asymptomatic and reports no neurological symptoms.  \par \par 6/19/2019: Анна is here for follow up. She was seen by Dr. Gresham and received SRS to the new brain lesion, she is weaning of steroids. I would like to offer her restaging with PET CT. For now she wishes to resume Gemzar. She has no major side effects relating to chemotherapy. \par \par 7/10/2019: Анна present for follow up. Restaging PET CT was done on 7/8/2019 and revealed \par \par IMPRESSION: \par \par 1. Since March 18, 2019, multiple new sites of pathologic FDG uptake, \par compatible with increased biologic tumor activity. \par \par 2. Specifically, several new FDG avid osseous metastases, with max SUV 17.5 \par within a new lytic lesion within the anterior left 8th rib. \par \par 3. Several new FDG avid subcutaneous soft tissue nodules, with max SUV 14.7 \par within a 7 mm subcutaneous nodule along the left flank. \par \par 4. Several new FDG avid peritoneal and extraperitoneal soft tissue nodules, \par with max SUV 10.4 within a new 1.9 cm right-sided extraperitoneal nodule \par along the right flank. \par \par 5. New FDG avid right adrenal gland nodule, with max SUV 15.8. \par \par 6. New focal FDG uptake along the right hemidiaphragm, without definite CT \par correlate, with max SUV 10.4. \par \par 7. Multiple new and increased FDG avid lymph nodes, with max SUV 20.0 within \par a 1.9 cm left para-aortic node. \par \par 8. Slightly decreased FDG uptake within a centrally necrotic left upper \par lobe/left hilar mass, with max SUV 16.1 (previously 29.4, which reflects a \par 45% decrease); a new FDG avid airspace opacity within the superior segment \par of the left lower lobe is suspicious for postobstructive pneumonia. \par \par 9. Stable FDG uptake within right cervical adenopathy, with max SUV 23.7. \par \par Images were personally reviewed by myself and discussed with patient. I have explained to Анна that these findings represent disease progression and given extensive conventional therapy effort, i would lie her to follow up with [facility] at this point for consideration of trial options. I will reach out to [facility] on her behalf. At this point we will plan to discontinue Gemzar.\par Additionally she is starting to complain of worsening pain in her left flank, likely correlating to anterior L 7th rib lesion. will send back to XRT for consideration of palliative XRT to the rib. \par  [de-identified] : Анна is a lewis 26 yo lady with past medical history significant for anxiety and depression, who has significant smoking history who presented to [facility] on 6/26/2018 with CC of leg pain. She also on further questioning revealed that she has had non-productive cough without associated SOB or CP for approximately 3 months prior. She denies any weight loss or neurological symptoms prior to presentation. CXR reveled LUIS opacity, CT scan of the chest was then done. Based on it's results she was transferred to [place], further work up was as follows. \par \par CT chest on 6/26/2018 revealed Since November 24, 2015:  1.  There is a new large left upper lobe mass measuring 6.8 x 5.7 x 7 cm  extending to mediastinum with multiple mediastinal lymph nodes as  described above. Additionally, there are multiple new hypodense hepatic  metastatic lesions. 2.  4 mm left upper lobe pulmonary nodule, metastatic etiology\par \par CT A/P on 6/27/2018 revealed Multifocal liver masses consistent with metastatic disease.  Ill-defined 0.7 cm splenic hypodensity suspicious for a metastatic focus.  Multifocal bilateral renal hypoattenuating lesions measuring up to 1.1 cm  in the right interpolar region. Cannot exclude metastasis disease within  the kidneys.\par \par MRI Brain on 6/27/2018 reveled Single enhancing nodule/lesion within a sulcus adjacent to the right  occipital lobe measuring 4 x 4 x 5 mm. Findings highly suspicious for  metastatic disease. Follow-up to 6 most may be helpful for further  evaluation if clinically indicated.\par \par Bone scan on 6/28/2018 revealed Multiple definite bony abnormalities which by pattern of distribution are  consistent with metastatic bone disease.  These include bilateral pubic inferior rami, left superior pubic ramus,  bilateral iliac bones and left 4th costovertebral junction.  Abnormal uptake corresponding to left lung index tumor.\par \par On 6/28/2018 she underwent liver biopsy, not enough tissue for extended molecular testing was obtained, it revealed adenocarcinoma of the lung with 5% PDL1 expression. \par Repeat liver biopsy on 7/2/2018 confirmed the diagnosis of lung adenocarcinoma with negative PDL1 expression, negative EGFR, Alk, BRAF and KRAS. \par \par She received first dose of Carbo/Alimta in the hospital on 7/6/2018 and tolerated it very well.

## 2019-07-31 ENCOUNTER — LABORATORY RESULT (OUTPATIENT)
Age: 44
End: 2019-07-31

## 2019-07-31 ENCOUNTER — APPOINTMENT (OUTPATIENT)
Dept: HEMATOLOGY ONCOLOGY | Facility: CLINIC | Age: 44
End: 2019-07-31
Payer: SUBSIDIZED

## 2019-07-31 VITALS
WEIGHT: 213 LBS | SYSTOLIC BLOOD PRESSURE: 137 MMHG | DIASTOLIC BLOOD PRESSURE: 75 MMHG | RESPIRATION RATE: 14 BRPM | TEMPERATURE: 98.8 F | BODY MASS INDEX: 35.49 KG/M2 | HEIGHT: 64.96 IN | HEART RATE: 92 BPM

## 2019-07-31 LAB
ALBUMIN SERPL ELPH-MCNC: 3.7 G/DL
ALP BLD-CCNC: 136 U/L
ALT SERPL-CCNC: 7 U/L
ANION GAP SERPL CALC-SCNC: 15 MMOL/L
AST SERPL-CCNC: 13 U/L
BILIRUB SERPL-MCNC: <0.2 MG/DL
BUN SERPL-MCNC: 9 MG/DL
CALCIUM SERPL-MCNC: 9.6 MG/DL
CHLORIDE SERPL-SCNC: 94 MMOL/L
CO2 SERPL-SCNC: 28 MMOL/L
CREAT SERPL-MCNC: 0.8 MG/DL
GLUCOSE SERPL-MCNC: 129 MG/DL
HCT VFR BLD CALC: 35.6 %
HGB BLD-MCNC: 10.7 G/DL
MCHC RBC-ENTMCNC: 27.2 PG
MCHC RBC-ENTMCNC: 30.1 G/DL
MCV RBC AUTO: 90.4 FL
PLATELET # BLD AUTO: 249 K/UL
PMV BLD: 9.2 FL
POTASSIUM SERPL-SCNC: 4.8 MMOL/L
PROT SERPL-MCNC: 7.2 G/DL
RBC # BLD: 3.94 M/UL
RBC # FLD: 15.7 %
SODIUM SERPL-SCNC: 137 MMOL/L
WBC # FLD AUTO: 5.1 K/UL

## 2019-07-31 PROCEDURE — 99213 OFFICE O/P EST LOW 20 MIN: CPT

## 2019-08-01 ENCOUNTER — OUTPATIENT (OUTPATIENT)
Dept: OUTPATIENT SERVICES | Facility: HOSPITAL | Age: 44
LOS: 1 days | End: 2019-08-01
Payer: MEDICAID

## 2019-08-01 DIAGNOSIS — Z90.49 ACQUIRED ABSENCE OF OTHER SPECIFIED PARTS OF DIGESTIVE TRACT: Chronic | ICD-10-CM

## 2019-08-02 PROCEDURE — 77334 RADIATION TREATMENT AID(S): CPT | Mod: 26

## 2019-08-02 PROCEDURE — 77290 THER RAD SIMULAJ FIELD CPLX: CPT | Mod: 26

## 2019-08-05 ENCOUNTER — INBOUND DOCUMENT (OUTPATIENT)
Age: 44
End: 2019-08-05

## 2019-08-05 PROCEDURE — 77295 3-D RADIOTHERAPY PLAN: CPT | Mod: 26

## 2019-08-05 PROCEDURE — 77262 THER RADIOLOGY TX PLNG INTRM: CPT

## 2019-08-05 PROCEDURE — 77300 RADIATION THERAPY DOSE PLAN: CPT | Mod: 26

## 2019-08-05 PROCEDURE — 77334 RADIATION TREATMENT AID(S): CPT | Mod: 26

## 2019-08-06 DIAGNOSIS — C34.12 MALIGNANT NEOPLASM OF UPPER LOBE, LEFT BRONCHUS OR LUNG: ICD-10-CM

## 2019-08-06 DIAGNOSIS — C79.51 SECONDARY MALIGNANT NEOPLASM OF BONE: ICD-10-CM

## 2019-08-06 DIAGNOSIS — C79.31 SECONDARY MALIGNANT NEOPLASM OF BRAIN: ICD-10-CM

## 2019-08-06 PROCEDURE — 77280 THER RAD SIMULAJ FIELD SMPL: CPT | Mod: 26

## 2019-08-09 ENCOUNTER — RECORD ABSTRACTING (OUTPATIENT)
Age: 44
End: 2019-08-09

## 2019-08-09 ENCOUNTER — RX RENEWAL (OUTPATIENT)
Age: 44
End: 2019-08-09

## 2019-08-09 DIAGNOSIS — Z78.9 OTHER SPECIFIED HEALTH STATUS: ICD-10-CM

## 2019-08-09 DIAGNOSIS — F32.9 ANXIETY DISORDER, UNSPECIFIED: ICD-10-CM

## 2019-08-09 DIAGNOSIS — N18.9 CHRONIC KIDNEY DISEASE, UNSPECIFIED: ICD-10-CM

## 2019-08-09 DIAGNOSIS — Z80.7 FAMILY HISTORY OF OTHER MALIGNANT NEOPLASMS OF LYMPHOID, HEMATOPOIETIC AND RELATED TISSUES: ICD-10-CM

## 2019-08-09 DIAGNOSIS — Z87.891 PERSONAL HISTORY OF NICOTINE DEPENDENCE: ICD-10-CM

## 2019-08-09 DIAGNOSIS — Z80.42 FAMILY HISTORY OF MALIGNANT NEOPLASM OF PROSTATE: ICD-10-CM

## 2019-08-09 DIAGNOSIS — F41.9 ANXIETY DISORDER, UNSPECIFIED: ICD-10-CM

## 2019-08-11 NOTE — PHYSICAL EXAM
[Restricted in physically strenuous activity but ambulatory and able to carry out work of a light or sedentary nature] : Status 1- Restricted in physically strenuous activity but ambulatory and able to carry out work of a light or sedentary nature, e.g., light house work, office work [Normal] : grossly intact [de-identified] : cushingoid  [de-identified] : B/L lower extremity edema improving  [de-identified] : palpable R cervical node is larger on exam today, tender to palpation  [de-identified] : L flank pain, 8th rib, improved but present

## 2019-08-11 NOTE — REVIEW OF SYSTEMS
[Lower Ext Edema] : lower extremity edema [Negative] : Allergic/Immunologic [Chest Pain] : no chest pain [Leg Claudication] : no intermittent leg claudication [Palpitations] : no palpitations [FreeTextEntry4] : right neck RICHARD, appx 2cm increased in size on exam today [FreeTextEntry5] : some chest discomfort  [FreeTextEntry9] : low back/hip pain has mostly resolved, L rib pain

## 2019-08-11 NOTE — ASSESSMENT
[FreeTextEntry1] : Metastatic adenocarcinoma of the lung, PDL1 5%, EGFT, Alk, BRAF, KRAS, ROS1 negative\par --Recieved cycle 1 of Carbo Alimta in the hospital on 7/6/2018-\par --Recieved cycle 2 on 7/27/2018 with Keytruda, no adverse effects \par --Started on B12 7/2018--\par --Taking folic acid daily\par --Reports she may require some dental work, will be in touch with dentist re Xgeva clearance\par --Brain lesion s/p SRS in 1/2019, tapering Decadron \par --Next generation sequencing revealed no targetable mutations\par --Restaging PET CT on 10/9/2018 reveals positive response to therapy \par --Completed 4th cycle of Carbo/Alimta/Keytruda on 9/14/2018\par --Deescalated to single agent Keytruda on 10/19/2018, she has missed appointments for few doses\par --PET on 12/19 with evidence of progression, R neck biopsy on 12/18/2018 confirming met adenoca, Next generation sequencing repeat revealed no targetable mutations \par --Switched therapy to Taxotere/Cyramza on 12/19/2018, Cyramza was added to cycle 2 on 1/16/2019, tolerated first cycles without major difficulty, reports occasional mild epistaxis \par --S/P cycle 3 on 2/27/2019, cycle 4 was delayed 2/2 emotional stress and pending ED evaluation to r/o DVT/PE\par --Restaging PET CT 3/18/2019 is mostly stable some SUVs appear higher (? 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 Gresham, s/p SRS to the new brain lesion, weaning off steroids \par --PET CT on 7/8/2019 with evidence of overt progression, referred back back to [facility], plan to start on trial 8/6/2019\par --Refered back to XRT for L flank pain, 2/2 rib lesion, completed \par --D/C Gemzar on 7/10/2019\par --Echo heart on 5/1/2019 EF of 50%, no pericardial effusion \par --Obtained 2nd opinion at [facility] by Dr. Irma Nuñez \par --New sciatica on 7///31/2019, MRI L-spine ordered on 7/31/2019\par \par Metastatic disease to brain, asymptomatic\par --MRI brain on 12/17/2018 reveals new solitary brain met\par --MRI brain on 5/23/2019 reveals new solitary brain met\par --Weaning off Decadron \par --Recieved SRS 1/2019 and again 6/2019\par \par Cancer related pain, will increase as needed\par --Requiring OxyContin 40 BID, escalate if needed\par --Oxycodone 10mg for breakthrough\par --Following with Rad Onc \par \par Insomnia\par --Ambien\par \par Follow up in 2-3 weeks, all questions were answered at length, she may cancel appointment if closely following with [facility]\par I have explained to her that her disease unfortunately will not be cured, all therapeutic effort is palliative in nature\par \par \par

## 2019-08-11 NOTE — HISTORY OF PRESENT ILLNESS
[de-identified] : Анна is a lewis 24 yo lady with past medical history significant for anxiety and depression, who has significant smoking history who presented to [facility] on 6/26/2018 with CC of leg pain. She also on further questioning revealed that she has had non-productive cough without associated SOB or CP for approximately 3 months prior. She denies any weight loss or neurological symptoms prior to presentation. CXR reveled LUIS opacity, CT scan of the chest was then done. Based on it's results she was transferred to [place], further work up was as follows. \par \par CT chest on 6/26/2018 revealed Since November 24, 2015:  1.  There is a new large left upper lobe mass measuring 6.8 x 5.7 x 7 cm  extending to mediastinum with multiple mediastinal lymph nodes as  described above. Additionally, there are multiple new hypodense hepatic  metastatic lesions. 2.  4 mm left upper lobe pulmonary nodule, metastatic etiology\par \par CT A/P on 6/27/2018 revealed Multifocal liver masses consistent with metastatic disease.  Ill-defined 0.7 cm splenic hypodensity suspicious for a metastatic focus.  Multifocal bilateral renal hypoattenuating lesions measuring up to 1.1 cm  in the right interpolar region. Cannot exclude metastasis disease within  the kidneys.\par \par MRI Brain on 6/27/2018 reveled Single enhancing nodule/lesion within a sulcus adjacent to the right  occipital lobe measuring 4 x 4 x 5 mm. Findings highly suspicious for  metastatic disease. Follow-up to 6 most may be helpful for further  evaluation if clinically indicated.\par \par Bone scan on 6/28/2018 revealed Multiple definite bony abnormalities which by pattern of distribution are  consistent with metastatic bone disease.  These include bilateral pubic inferior rami, left superior pubic ramus,  bilateral iliac bones and left 4th costovertebral junction.  Abnormal uptake corresponding to left lung index tumor.\par \par On 6/28/2018 she underwent liver biopsy, not enough tissue for extended molecular testing was obtained, it revealed adenocarcinoma of the lung with 5% PDL1 expression. \par Repeat liver biopsy on 7/2/2018 confirmed the diagnosis of lung adenocarcinoma with negative PDL1 expression, negative EGFR, Alk, BRAF and KRAS. \par \par She received first dose of Carbo/Alimta in the hospital on 7/6/2018 and tolerated it very well.  [de-identified] : 8/15/2018;Анна presents for follow up today. She has tolerated 2nd cycle of Carbo/Alimta with addition of Keytruda without any difficulty. She reports ongoing pain in her R hip, she states narcotics are helpful but she may require a dose increase. She is interested in Rad Onc evaluation. She reports she needs dental appointment and may require some dental work. Will hold off on Keytruda for now. She otherwise offer no complaints today. \par \par 9/12/2018: Анна is doing great, still c/o some back pain, requiring pain meds. Denies SOB, WALKER, headaches, weight loss. She is for cycle 4 of Carbo/Alimta/Keytruda on 9/14/2018. She has met with Dr. Gresham and has her appointment for simulation later today. PET CT was not done yet. \par \par 10/17/2018: Анна presents for follow up today, she is feeling well, has no complaints, she is still requiring pain medication for cancer related pain management. Restaging PET CT on 10/4/2018 reveals  FDG avid left upper lobe hilar 5.6 x 4.1 cm mass, decreased in size  compared to chest CT June 2018 (previously 7.0 x 6.8 cm)., max SUV 33.5.   Left hilar FDG avid lymph node, max SUV 8.9 (image 194).  Suspicious FDG avid 1.4 cm right level 2 cervical lymph node, max SUV  20.3 (image 239). Consider tissue sampling.  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  No current FDG evidence of osseous metastatic disease.\par Images were personally reviewed by myself and discussed with Анна and her boyfriend at length. \par The concerning LN in the R neck was never imaged prior, this is not likely representative of disease progression, overall PET CT is indicative of positive response to therapy. We will continue with single agent Keytruda. Анна reports no side effects of Keytruda to date. \par Анна also reports loss of menstrual periods, will check beta HCG and LH, FSH and estradiol today. She was advised on barrier protection during intercourse. \par \par 11/14/2018: Анна feels well today and reports no complaints, back pain has improved, she was not taking long acting pain meds for 2 weeks. She has missed her Keytruda appointment last week. I have reiterated the importance of compliance with therapy. I have again spoke with Анна re importance of safe sex. She has the script for brain MRI  but she has not done the study just yet she will schedule.\par \par 12/5/2018: Анна feels well today, he reports no systemic side effects to therapy, she reports that she noticed that the righ side of her neck is swollen and has been swollen for about 2-3 weeks. On exam this is finding is consistent with worsening adenopathy in the area where FDG avid node was described prior. i have expressed to Анна that I am concerned of possibility of progression. I advise that she undergoes prompt restaging including PET CT and MRI of the brain as well as R neck biopsy. She will receive Keytruda today. \par \par 12/26/2018: Анна continues to feel well, the lump in her right neck has become slightly smaller. She has completed full restaging. MRI of the brain done on 12/17/2018 that revealed  In comparison with the prior MRI of the brain dated June 27, 2018:  The previously described lesion in the focus adjacent to the right  occipital lobe is not identified and likely represented artifact.  Interval development of a ring-enhancing lesion (0.7 x 0.8 x 0.7 cm) in  the anterior inferior aspect of the right frontal lobe consistent with  cerebral metastasis. There is a moderate amount of surrounding edema. She remains asymptomatic. She was since started on Decadron 2mg BID daily and was referred back to Dr. Gresham, she was mapped for SRS of the brain lesion this AM. \par PET CT on 12/19/2018 revealed  COMPARISON : 10/4/2018.  4 new sites of pathologic FDG uptake in the abdomen and pelvis consistent  with biologic tumor activity.  Fluctuating SUV values within the head and neck and thorax without any  new sites of disease. Incidentally, the primary mass shows a 21% decrease  in SUV max, now 26.3 but is anatomically increased in size measuring 6.3  x 5.5 cm, previously 4.3 x 3.8 cm. \par R neck biopsy on 12/18/2018 revealed metastatic adenocarcinoma. Next generation sequencing was again requested. \par All images were personally reviewed by myself and discussed with patient. \par She has completed carbo/alimta not even 3 months back, i have explained to her that switch of therapy is warranted in my opinion. I will offer her Taxotere with Cyramza, Cyramza will be administered with cycle 2 2/2 scheduling issues. \par Side effects of therapy were discussed at length, including cytopenias, allergic reactions, skin rashes, neuropathy, VTE, perforation to GI tract, prolinuria, elevated BP. \par She is in agreement to proceed ASAP.  \par \par 1/16/2019: Анна started on Taxotere on 12/26/2018, she had a minor infusion reaction and had to run the drug longer, she was able to complete without complications. She also had recent SBRT to the solitary brain lesion. She reports minor headache after procedure and continues on Decadron at 2mg BID. She has gained some weight. She will be receiving 2nd dose of Taxotere along with first dose of Cyramza today. Side effects of Cyramza, including cardiovascular compilations, bleeding, hypertension and proteinuria were discussed. \par \par 2/6/2019: Анна feels well today. Denies any pulmonary symptoms, denies excessive pain. Denies new neurological symptoms. On exam her right cervical adenopathy is improved. She has gained some weight. She reports occasional nose bleeds that stop spontaneously since she started on Cyramza, she also reports some discomfort in her mouth, no overt mouth sores. Epistaxis is likely related to Cyramza, will observe closely. \par \par 2/27/2019: Анна's only complaint today is reflux exacerbation, she will double up to Nexium 40mg daily and use TUMs and Maalox. She reports she is emotional when she is taking steroids. She will go down on post chemo Decadron to 2mg BID and will only premedicate with 1 dose of Decadron prior to chemo on the day before. She reports no neuropathy, pain is controlled, no pulmonary or neurological symptoms. Proceed with cycle 3 of Taxotere with Cyramza. \par \par 3/21/2019: Анна presents today for follow up, she is very emotional, tearful She has not been feeling right, reports discomfort in her chest, her legs have been swollen, she is taking intermittent Lasix. She is contemplating potentially moving to [place], [place], where her myrna has family. She is also interested in potentially obtaining second opinion at [facility]. Lung cancer specialist name was provided for her. I am concerned that she has legs swelling and chest discomfort and suggested ED evaluation to R/O DVT/PE, she is agreeable to go. Will hold Taxotere/Cyramza today. \par PET CT on 3/18/2019 revealed Since December 19, 2018:\par \par 1. No new sites of pathologic FDG uptake.\par \par 2. FDG avid left upper lobe mass currently measuring 7.6 x 5.6 cm with \par max SUV 29.4, previously 8.4 x 7.6 cm with prior max SUV 26.3 (12% \par increase).\par \par 3. FDG avid right cervical level II lymph node currently measure 2.4 x \par 1.9 cm with max SUV 23.8, previously measuring 3 x 2.2 cm with prior max \par SUV 20.2 (18% increase).\par \par 4. Previously described left hilar FDG avid lymph node no longer clearly \par delineated; possibly contiguous with FDG avid lung mass or resolved.\par \par 5. Increased size and FDG uptake within peripancreatic 1.5 cm short axis \par lymph node with max SUV 25.2, previously 1 cm short axis with max SUV \par 16.3 (55% increase).  Additional previously described intra-abdominal FDG \par avid lymphadenopathy have otherwise decreased in FDG uptake, catalogued \par above.\par Images were personally reviewed by myself and discussed with Анна. \par \par 4/17/2019: Анна was evaluated for PE/DVT in the hospital. CTA of the chest on 3/21/2019 revealed Interval occlusion of the left upper lobe anterior pulmonary artery \par branch related to the lung mass. Other pulmonary arteries appear widely patent. No evidence of right heart strain.\par Doppler on 3/21/2019 revealed no DVT in B/L LE. \par She was urgently referred for ad Onc evaluation and has completed radiation to the chest. She reports that chest discomfort that she was experiencing prior has significantly improved, she reports mild cough and occasional nausea. Her R neck lump is bigger on exam now, Анна reports that it is not as big or painful as last week. There is no overt progression on prior images and neck lump may be showing fluctuation in size 2/2 prior immunotherapy exposure. Анна wishes to hold off on restaging PET CT for now. She has been experiencing a significant amount of swelling since starting on Taxotere, therefore we will plan to switch her over to Gemzar at this time. Side effects including, but not limited to, occasional pulmonary and liver toxicity were discussed. \par \par 5/15/2019: Анна is doing well, reports no pain or SOB, she is less swollen and reports having lost few pounds, she is happy about it. She is tolerating Gemzar, her WBC is borderline today, will dose reduce slightly today to 900mg/m2, this is W2 of cycle 2. She was seen at [facility] for second opinion, additional; genetic studies are being done on tissue. Case was discussed with Dr. Irma Nuñez. She is post-due for MRI of the brain, she will schedule. \par \par 5/29/2018: MRI of the brain on 5/23/2019 revealed Since MRI brain 12/16/2018: \par 1. Interval development of a new rim-enhancing subcentimeter lesion in the \par right cerebellar hemisphere with small amount of surrounding edema \par compatible with metastatic disease given history. \par 2. Resolution of the previously identified right frontal lobe enhancing \par lesion and surrounding edema. No additional enhancing lesion on the current \par exam. \par Images were personally reviewed with myself and Анна as well as with Dr. Gresham. Анна will see Dr. Gresham for consideration of SRS to the new brain lesion. She will start steroids if recommended by Dr. Gresham. We will plan to put Gemzar temporarily on hold to complete radiation. \par Анна is currently asymptomatic and reports no neurological symptoms.  \par \par 6/19/2019: Анна is here for follow up. She was seen by Dr. Gresham and received SRS to the new brain lesion, she is weaning of steroids. I would like to offer her restaging with PET CT. For now she wishes to resume Gemzar. She has no major side effects relating to chemotherapy. \par \par 7/10/2019: Анна present for follow up. Restaging PET CT was done on 7/8/2019 and revealed \par IMPRESSION: \par 1. Since March 18, 2019, multiple new sites of pathologic FDG uptake, \par compatible with increased biologic tumor activity. \par 2. Specifically, several new FDG avid osseous metastases, with max SUV 17.5 \par within a new lytic lesion within the anterior left 8th rib. \par 3. Several new FDG avid subcutaneous soft tissue nodules, with max SUV 14.7 \par within a 7 mm subcutaneous nodule along the left flank. \par 4. Several new FDG avid peritoneal and extraperitoneal soft tissue nodules, \par with max SUV 10.4 within a new 1.9 cm right-sided extraperitoneal nodule \par along the right flank. \par 5. New FDG avid right adrenal gland nodule, with max SUV 15.8. \par 6. New focal FDG uptake along the right hemidiaphragm, without definite CT \par correlate, with max SUV 10.4. \par 7. Multiple new and increased FDG avid lymph nodes, with max SUV 20.0 within \par a 1.9 cm left para-aortic node. \par 8. Slightly decreased FDG uptake within a centrally necrotic left upper \par lobe/left hilar mass, with max SUV 16.1 (previously 29.4, which reflects a \par 45% decrease); a new FDG avid airspace opacity within the superior segment \par of the left lower lobe is suspicious for postobstructive pneumonia. \par 9. Stable FDG uptake within right cervical adenopathy, with max SUV 23.7. \par Images were personally reviewed by myself and discussed with patient. I have explained to Анна that these findings represent disease progression and given extensive conventional therapy effort, i would lie her to follow up with [facility] at this point for consideration of trial options. I will reach out to [facility] on her behalf. At this point we will plan to discontinue Gemzar.\par Additionally she is starting to complain of worsening pain in her left flank, likely correlating to anterior L 7th rib lesion. will send back to XRT for consideration of palliative XRT to the rib. \par \par 7/31/2019 Анна overall is feeling OK, she has completed palliative radiation to L 8th rib, with some control to pain, pain is not controlled completely, she is requiring pain meds. She was seen by Dr. Nuñez at [facility] and appears to be a candidate for clinical trial, scheduled to start on 8/6/2019. \par She reports increased size and tenderness in R-sided cervical node. and well as new sciatica symptoms. \par We have discussed that palliative XRT to R neck may interfere with trial participation eligibility, so we will hold off. \par MRI of L-spine was ordered [id number].

## 2019-08-12 ENCOUNTER — OTHER (OUTPATIENT)
Age: 44
End: 2019-08-12

## 2019-08-12 VITALS
DIASTOLIC BLOOD PRESSURE: 72 MMHG | SYSTOLIC BLOOD PRESSURE: 119 MMHG | TEMPERATURE: 98.1 F | RESPIRATION RATE: 18 BRPM | BODY MASS INDEX: 35.4 KG/M2 | WEIGHT: 212.5 LBS | HEART RATE: 105 BPM

## 2019-08-12 NOTE — DISEASE MANAGEMENT
[Clinical] : TNM Stage: c [TTNM] : 3 [NTNM] : 2 [IV] : IV [MTNM] : 1 [de-identified] : 2000cGy [de-identified] : 1200cGy [de-identified] : Right Neck

## 2019-08-12 NOTE — PHYSICAL EXAM
[General Appearance - Well Developed] : well developed [General Appearance - Well Nourished] : well nourished [General Appearance - Alert] : alert [General Appearance - In No Acute Distress] : in no acute distress [de-identified] : Right neck enlarged node is noted.  Mild erythema in radiation field.  No desquamation.

## 2019-08-12 NOTE — HISTORY OF PRESENT ILLNESS
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 c/o continued pain in left rib 4/10, left shoulder blade, right hip 4/10. OxyContin 60 mg twice  a day, oxycodone 15 mg every 4 hours . She also takes Advil and Tylenol around the clock.\par \par MD Note: She notes some improvement in the left ribs that were recently treated. The right neck feels a bit inflamed but no new concerns.

## 2019-08-12 NOTE — VITALS
[Maximal Pain Intensity: 8/10] : 8/10 [Least Pain Intensity: 2/10] : 2/10 [90: Able to carry normal activity; minor signs or symptoms of disease.] : 90: Able to carry normal activity; minor signs or symptoms of disease.

## 2019-08-13 ENCOUNTER — RX RENEWAL (OUTPATIENT)
Age: 44
End: 2019-08-13

## 2019-08-14 ENCOUNTER — OUTPATIENT (OUTPATIENT)
Dept: OUTPATIENT SERVICES | Facility: HOSPITAL | Age: 44
LOS: 1 days | Discharge: HOME | End: 2019-08-14
Payer: MEDICAID

## 2019-08-14 DIAGNOSIS — Z90.49 ACQUIRED ABSENCE OF OTHER SPECIFIED PARTS OF DIGESTIVE TRACT: Chronic | ICD-10-CM

## 2019-08-14 DIAGNOSIS — C79.51 SECONDARY MALIGNANT NEOPLASM OF BONE: ICD-10-CM

## 2019-08-14 DIAGNOSIS — C79.31 SECONDARY MALIGNANT NEOPLASM OF BRAIN: ICD-10-CM

## 2019-08-14 DIAGNOSIS — C34.12 MALIGNANT NEOPLASM OF UPPER LOBE, LEFT BRONCHUS OR LUNG: ICD-10-CM

## 2019-08-14 PROCEDURE — 77427 RADIATION TX MANAGEMENT X5: CPT

## 2019-08-20 ENCOUNTER — APPOINTMENT (OUTPATIENT)
Dept: INFUSION THERAPY | Facility: CLINIC | Age: 44
End: 2019-08-20

## 2019-08-20 ENCOUNTER — LABORATORY RESULT (OUTPATIENT)
Age: 44
End: 2019-08-20

## 2019-08-20 ENCOUNTER — OUTPATIENT (OUTPATIENT)
Dept: OUTPATIENT SERVICES | Facility: HOSPITAL | Age: 44
LOS: 1 days | Discharge: HOME | End: 2019-08-20

## 2019-08-20 DIAGNOSIS — Z90.49 ACQUIRED ABSENCE OF OTHER SPECIFIED PARTS OF DIGESTIVE TRACT: Chronic | ICD-10-CM

## 2019-08-20 DIAGNOSIS — G89.3 NEOPLASM RELATED PAIN (ACUTE) (CHRONIC): ICD-10-CM

## 2019-08-20 DIAGNOSIS — Z00.00 ENCOUNTER FOR GENERAL ADULT MEDICAL EXAMINATION W/OUT ABNORMAL FINDINGS: ICD-10-CM

## 2019-08-20 DIAGNOSIS — C34.90 MALIGNANT NEOPLASM OF UNSPECIFIED PART OF UNSPECIFIED BRONCHUS OR LUNG: ICD-10-CM

## 2019-08-20 LAB
APPEARANCE: ABNORMAL
BILIRUBIN URINE: NEGATIVE
BLOOD URINE: NORMAL
COLOR: YELLOW
GLUCOSE QUALITATIVE U: NEGATIVE
HCT VFR BLD CALC: 31.8 %
HGB BLD-MCNC: 9.8 G/DL
KETONES URINE: NEGATIVE
LEUKOCYTE ESTERASE URINE: ABNORMAL
MCHC RBC-ENTMCNC: 26.5 PG
MCHC RBC-ENTMCNC: 30.8 G/DL
MCV RBC AUTO: 85.9 FL
NITRITE URINE: NEGATIVE
PH URINE: 6
PLATELET # BLD AUTO: 239 K/UL
PMV BLD: 9.7 FL
PROTEIN URINE: ABNORMAL
RBC # BLD: 3.7 M/UL
RBC # FLD: 14.4 %
SPECIFIC GRAVITY URINE: 1.03
UROBILINOGEN URINE: NORMAL
WBC # FLD AUTO: 4.31 K/UL

## 2019-08-20 RX ORDER — BEVACIZUMAB 400 MG/16ML
1445 INJECTION, SOLUTION INTRAVENOUS ONCE
Refills: 0 | Status: COMPLETED | OUTPATIENT
Start: 2019-08-20 | End: 2019-08-20

## 2019-08-20 RX ORDER — DEXAMETHASONE 0.5 MG/5ML
12 ELIXIR ORAL ONCE
Refills: 0 | Status: DISCONTINUED | OUTPATIENT
Start: 2019-08-20 | End: 2019-08-20

## 2019-08-20 RX ORDER — ACETAMINOPHEN 500 MG
650 TABLET ORAL ONCE
Refills: 0 | Status: COMPLETED | OUTPATIENT
Start: 2019-08-20 | End: 2019-08-20

## 2019-08-20 RX ORDER — DEXAMETHASONE 0.5 MG/5ML
12 ELIXIR ORAL ONCE
Refills: 0 | Status: COMPLETED | OUTPATIENT
Start: 2019-08-20 | End: 2019-08-20

## 2019-08-20 RX ORDER — DIPHENHYDRAMINE HCL 50 MG
50 CAPSULE ORAL ONCE
Refills: 0 | Status: COMPLETED | OUTPATIENT
Start: 2019-08-20 | End: 2019-08-20

## 2019-08-20 RX ORDER — PEMETREXED 500 MG/20ML
1000 INJECTION, SOLUTION, CONCENTRATE INTRAVENOUS ONCE
Refills: 0 | Status: COMPLETED | OUTPATIENT
Start: 2019-08-20 | End: 2019-08-20

## 2019-08-20 RX ADMIN — PEMETREXED 1000 MILLIGRAM(S): 500 INJECTION, SOLUTION, CONCENTRATE INTRAVENOUS at 14:35

## 2019-08-20 RX ADMIN — Medication 183 MILLIGRAM(S): at 11:40

## 2019-08-20 RX ADMIN — Medication 650 MILLIGRAM(S): at 12:25

## 2019-08-20 RX ADMIN — Medication 12 MILLIGRAM(S): at 12:00

## 2019-08-20 RX ADMIN — BEVACIZUMAB 1445 MILLIGRAM(S): 400 INJECTION, SOLUTION INTRAVENOUS at 14:04

## 2019-08-20 RX ADMIN — Medication 50 MILLIGRAM(S): at 12:25

## 2019-08-20 RX ADMIN — BEVACIZUMAB 105.2 MILLIGRAM(S): 400 INJECTION, SOLUTION INTRAVENOUS at 12:05

## 2019-08-20 RX ADMIN — PEMETREXED 280 MILLIGRAM(S): 500 INJECTION, SOLUTION, CONCENTRATE INTRAVENOUS at 14:00

## 2019-08-21 LAB
ALBUMIN SERPL ELPH-MCNC: 3.6 G/DL
ALP BLD-CCNC: 119 U/L
ALT SERPL-CCNC: 7 U/L
ANION GAP SERPL CALC-SCNC: 16 MMOL/L
AST SERPL-CCNC: 14 U/L
BILIRUB SERPL-MCNC: 0.3 MG/DL
BUN SERPL-MCNC: 7 MG/DL
CALCIUM SERPL-MCNC: 9 MG/DL
CHLORIDE SERPL-SCNC: 93 MMOL/L
CO2 SERPL-SCNC: 29 MMOL/L
CREAT SERPL-MCNC: 0.8 MG/DL
GLUCOSE SERPL-MCNC: 86 MG/DL
MAGNESIUM SERPL-MCNC: 2 MG/DL
POTASSIUM SERPL-SCNC: 4 MMOL/L
PROT SERPL-MCNC: 6.7 G/DL
SODIUM SERPL-SCNC: 138 MMOL/L

## 2019-08-23 ENCOUNTER — INPATIENT (INPATIENT)
Facility: HOSPITAL | Age: 44
LOS: 11 days | Discharge: HOME | End: 2019-09-04
Attending: INTERNAL MEDICINE | Admitting: INTERNAL MEDICINE
Payer: MEDICAID

## 2019-08-23 VITALS
OXYGEN SATURATION: 95 % | HEART RATE: 84 BPM | SYSTOLIC BLOOD PRESSURE: 133 MMHG | WEIGHT: 210.1 LBS | RESPIRATION RATE: 18 BRPM | DIASTOLIC BLOOD PRESSURE: 75 MMHG | TEMPERATURE: 98 F

## 2019-08-23 DIAGNOSIS — Z90.49 ACQUIRED ABSENCE OF OTHER SPECIFIED PARTS OF DIGESTIVE TRACT: Chronic | ICD-10-CM

## 2019-08-23 LAB
ALBUMIN SERPL ELPH-MCNC: 3.8 G/DL — SIGNIFICANT CHANGE UP (ref 3.5–5.2)
ALP SERPL-CCNC: 128 U/L — HIGH (ref 30–115)
ALT FLD-CCNC: 8 U/L — SIGNIFICANT CHANGE UP (ref 0–41)
ANION GAP SERPL CALC-SCNC: 14 MMOL/L — SIGNIFICANT CHANGE UP (ref 7–14)
AST SERPL-CCNC: 16 U/L — SIGNIFICANT CHANGE UP (ref 0–41)
BASOPHILS # BLD AUTO: 0.01 K/UL — SIGNIFICANT CHANGE UP (ref 0–0.2)
BASOPHILS NFR BLD AUTO: 0.3 % — SIGNIFICANT CHANGE UP (ref 0–1)
BILIRUB SERPL-MCNC: 0.4 MG/DL — SIGNIFICANT CHANGE UP (ref 0.2–1.2)
BUN SERPL-MCNC: 12 MG/DL — SIGNIFICANT CHANGE UP (ref 10–20)
CALCIUM SERPL-MCNC: 9.3 MG/DL — SIGNIFICANT CHANGE UP (ref 8.5–10.1)
CHLORIDE SERPL-SCNC: 95 MMOL/L — LOW (ref 98–110)
CO2 SERPL-SCNC: 29 MMOL/L — SIGNIFICANT CHANGE UP (ref 17–32)
CREAT SERPL-MCNC: 0.6 MG/DL — LOW (ref 0.7–1.5)
EOSINOPHIL # BLD AUTO: 0.15 K/UL — SIGNIFICANT CHANGE UP (ref 0–0.7)
EOSINOPHIL NFR BLD AUTO: 4.3 % — SIGNIFICANT CHANGE UP (ref 0–8)
GLUCOSE SERPL-MCNC: 96 MG/DL — SIGNIFICANT CHANGE UP (ref 70–99)
HCT VFR BLD CALC: 34 % — LOW (ref 37–47)
HGB BLD-MCNC: 10.5 G/DL — LOW (ref 12–16)
IMM GRANULOCYTES NFR BLD AUTO: 0.3 % — SIGNIFICANT CHANGE UP (ref 0.1–0.3)
LYMPHOCYTES # BLD AUTO: 0.54 K/UL — LOW (ref 1.2–3.4)
LYMPHOCYTES # BLD AUTO: 15.5 % — LOW (ref 20.5–51.1)
MCHC RBC-ENTMCNC: 26.3 PG — LOW (ref 27–31)
MCHC RBC-ENTMCNC: 30.9 G/DL — LOW (ref 32–37)
MCV RBC AUTO: 85 FL — SIGNIFICANT CHANGE UP (ref 81–99)
MONOCYTES # BLD AUTO: 0.05 K/UL — LOW (ref 0.1–0.6)
MONOCYTES NFR BLD AUTO: 1.4 % — LOW (ref 1.7–9.3)
NEUTROPHILS # BLD AUTO: 2.72 K/UL — SIGNIFICANT CHANGE UP (ref 1.4–6.5)
NEUTROPHILS NFR BLD AUTO: 78.2 % — HIGH (ref 42.2–75.2)
NRBC # BLD: 0 /100 WBCS — SIGNIFICANT CHANGE UP (ref 0–0)
PLATELET # BLD AUTO: 204 K/UL — SIGNIFICANT CHANGE UP (ref 130–400)
POTASSIUM SERPL-MCNC: 4.4 MMOL/L — SIGNIFICANT CHANGE UP (ref 3.5–5)
POTASSIUM SERPL-SCNC: 4.4 MMOL/L — SIGNIFICANT CHANGE UP (ref 3.5–5)
PROT SERPL-MCNC: 7.1 G/DL — SIGNIFICANT CHANGE UP (ref 6–8)
RBC # BLD: 4 M/UL — LOW (ref 4.2–5.4)
RBC # FLD: 13.6 % — SIGNIFICANT CHANGE UP (ref 11.5–14.5)
SODIUM SERPL-SCNC: 138 MMOL/L — SIGNIFICANT CHANGE UP (ref 135–146)
WBC # BLD: 3.48 K/UL — LOW (ref 4.8–10.8)
WBC # FLD AUTO: 3.48 K/UL — LOW (ref 4.8–10.8)

## 2019-08-23 PROCEDURE — 99285 EMERGENCY DEPT VISIT HI MDM: CPT

## 2019-08-23 RX ORDER — NYSTATIN 500MM UNIT
500000 POWDER (EA) MISCELLANEOUS ONCE
Refills: 0 | Status: COMPLETED | OUTPATIENT
Start: 2019-08-23 | End: 2019-08-23

## 2019-08-23 RX ORDER — DIPHENHYDRAMINE HYDROCHLORIDE AND LIDOCAINE HYDROCHLORIDE AND ALUMINUM HYDROXIDE AND MAGNESIUM HYDRO
20 KIT ONCE
Refills: 0 | Status: COMPLETED | OUTPATIENT
Start: 2019-08-23 | End: 2019-08-23

## 2019-08-23 RX ORDER — SODIUM CHLORIDE 9 MG/ML
1000 INJECTION, SOLUTION INTRAVENOUS
Refills: 0 | Status: DISCONTINUED | OUTPATIENT
Start: 2019-08-23 | End: 2019-08-24

## 2019-08-23 RX ADMIN — DIPHENHYDRAMINE HYDROCHLORIDE AND LIDOCAINE HYDROCHLORIDE AND ALUMINUM HYDROXIDE AND MAGNESIUM HYDRO 20 MILLILITER(S): KIT at 22:51

## 2019-08-23 RX ADMIN — Medication 500000 UNIT(S): at 22:51

## 2019-08-23 RX ADMIN — SODIUM CHLORIDE 125 MILLILITER(S): 9 INJECTION, SOLUTION INTRAVENOUS at 22:51

## 2019-08-23 NOTE — ED ADULT TRIAGE NOTE - CHIEF COMPLAINT QUOTE
as per pt's , pt received radiation on monday and chemo on tuesday for stage 4 lung CA. since wednesday, pt has been having throat pain, difficulty swallowing and difficulty speaking. pt's oncologist sent pt into ED to be evaluated

## 2019-08-24 LAB
APPEARANCE UR: CLEAR — SIGNIFICANT CHANGE UP
BILIRUB UR-MCNC: NEGATIVE — SIGNIFICANT CHANGE UP
COLOR SPEC: SIGNIFICANT CHANGE UP
DIFF PNL FLD: NEGATIVE — SIGNIFICANT CHANGE UP
GLUCOSE UR QL: NEGATIVE — SIGNIFICANT CHANGE UP
KETONES UR-MCNC: ABNORMAL
LEUKOCYTE ESTERASE UR-ACNC: NEGATIVE — SIGNIFICANT CHANGE UP
NITRITE UR-MCNC: NEGATIVE — SIGNIFICANT CHANGE UP
PH UR: 6 — SIGNIFICANT CHANGE UP (ref 5–8)
PROT UR-MCNC: NEGATIVE — SIGNIFICANT CHANGE UP
SP GR SPEC: 1.02 — SIGNIFICANT CHANGE UP (ref 1.01–1.02)
UROBILINOGEN FLD QL: SIGNIFICANT CHANGE UP

## 2019-08-24 PROCEDURE — 99223 1ST HOSP IP/OBS HIGH 75: CPT

## 2019-08-24 RX ORDER — PANTOPRAZOLE SODIUM 20 MG/1
1 TABLET, DELAYED RELEASE ORAL
Qty: 0 | Refills: 0 | DISCHARGE

## 2019-08-24 RX ORDER — FOLIC ACID 0.8 MG
1 TABLET ORAL DAILY
Refills: 0 | Status: DISCONTINUED | OUTPATIENT
Start: 2019-08-24 | End: 2019-09-04

## 2019-08-24 RX ORDER — ESCITALOPRAM OXALATE 10 MG/1
20 TABLET, FILM COATED ORAL AT BEDTIME
Refills: 0 | Status: DISCONTINUED | OUTPATIENT
Start: 2019-08-24 | End: 2019-09-04

## 2019-08-24 RX ORDER — BENZOCAINE AND MENTHOL 5; 1 G/100ML; G/100ML
1 LIQUID ORAL THREE TIMES A DAY
Refills: 0 | Status: DISCONTINUED | OUTPATIENT
Start: 2019-08-24 | End: 2019-09-04

## 2019-08-24 RX ORDER — DOCUSATE SODIUM 100 MG
100 CAPSULE ORAL
Refills: 0 | Status: DISCONTINUED | OUTPATIENT
Start: 2019-08-24 | End: 2019-09-04

## 2019-08-24 RX ORDER — NYSTATIN 500MM UNIT
500000 POWDER (EA) MISCELLANEOUS EVERY 8 HOURS
Refills: 0 | Status: DISCONTINUED | OUTPATIENT
Start: 2019-08-24 | End: 2019-08-25

## 2019-08-24 RX ORDER — OXYCODONE HYDROCHLORIDE 5 MG/1
60 TABLET ORAL EVERY 12 HOURS
Refills: 0 | Status: DISCONTINUED | OUTPATIENT
Start: 2019-08-25 | End: 2019-09-01

## 2019-08-24 RX ORDER — OXYCODONE HYDROCHLORIDE 5 MG/1
60 TABLET ORAL EVERY 12 HOURS
Refills: 0 | Status: DISCONTINUED | OUTPATIENT
Start: 2019-08-24 | End: 2019-08-24

## 2019-08-24 RX ORDER — SODIUM CHLORIDE 9 MG/ML
1000 INJECTION, SOLUTION INTRAVENOUS
Refills: 0 | Status: DISCONTINUED | OUTPATIENT
Start: 2019-08-24 | End: 2019-08-25

## 2019-08-24 RX ORDER — PANTOPRAZOLE SODIUM 20 MG/1
40 TABLET, DELAYED RELEASE ORAL
Refills: 0 | Status: DISCONTINUED | OUTPATIENT
Start: 2019-08-24 | End: 2019-09-04

## 2019-08-24 RX ORDER — OXYCODONE HYDROCHLORIDE 5 MG/1
30 TABLET ORAL EVERY 12 HOURS
Refills: 0 | Status: DISCONTINUED | OUTPATIENT
Start: 2019-08-24 | End: 2019-08-24

## 2019-08-24 RX ORDER — SENNA PLUS 8.6 MG/1
2 TABLET ORAL AT BEDTIME
Refills: 0 | Status: DISCONTINUED | OUTPATIENT
Start: 2019-08-24 | End: 2019-09-04

## 2019-08-24 RX ORDER — OXYCODONE HYDROCHLORIDE 5 MG/1
15 TABLET ORAL
Refills: 0 | Status: DISCONTINUED | OUTPATIENT
Start: 2019-08-24 | End: 2019-08-31

## 2019-08-24 RX ORDER — ENOXAPARIN SODIUM 100 MG/ML
40 INJECTION SUBCUTANEOUS DAILY
Refills: 0 | Status: DISCONTINUED | OUTPATIENT
Start: 2019-08-24 | End: 2019-08-27

## 2019-08-24 RX ORDER — FLUCONAZOLE 150 MG/1
TABLET ORAL
Refills: 0 | Status: DISCONTINUED | OUTPATIENT
Start: 2019-08-24 | End: 2019-08-29

## 2019-08-24 RX ORDER — OXYCODONE HYDROCHLORIDE 5 MG/1
5 TABLET ORAL
Refills: 0 | Status: DISCONTINUED | OUTPATIENT
Start: 2019-08-24 | End: 2019-08-24

## 2019-08-24 RX ORDER — CHLORHEXIDINE GLUCONATE 213 G/1000ML
1 SOLUTION TOPICAL
Refills: 0 | Status: DISCONTINUED | OUTPATIENT
Start: 2019-08-24 | End: 2019-09-04

## 2019-08-24 RX ORDER — LORATADINE 10 MG/1
10 TABLET ORAL DAILY
Refills: 0 | Status: DISCONTINUED | OUTPATIENT
Start: 2019-08-24 | End: 2019-09-04

## 2019-08-24 RX ORDER — IBUPROFEN 200 MG
200 TABLET ORAL EVERY 4 HOURS
Refills: 0 | Status: DISCONTINUED | OUTPATIENT
Start: 2019-08-24 | End: 2019-09-04

## 2019-08-24 RX ORDER — FLUCONAZOLE 150 MG/1
200 TABLET ORAL EVERY 24 HOURS
Refills: 0 | Status: DISCONTINUED | OUTPATIENT
Start: 2019-08-25 | End: 2019-08-29

## 2019-08-24 RX ORDER — FLUCONAZOLE 150 MG/1
200 TABLET ORAL ONCE
Refills: 0 | Status: COMPLETED | OUTPATIENT
Start: 2019-08-24 | End: 2019-08-24

## 2019-08-24 RX ORDER — OXYCODONE HYDROCHLORIDE 5 MG/1
15 TABLET ORAL EVERY 4 HOURS
Refills: 0 | Status: DISCONTINUED | OUTPATIENT
Start: 2019-08-24 | End: 2019-08-24

## 2019-08-24 RX ADMIN — OXYCODONE HYDROCHLORIDE 15 MILLIGRAM(S): 5 TABLET ORAL at 18:51

## 2019-08-24 RX ADMIN — OXYCODONE HYDROCHLORIDE 60 MILLIGRAM(S): 5 TABLET ORAL at 14:10

## 2019-08-24 RX ADMIN — PANTOPRAZOLE SODIUM 40 MILLIGRAM(S): 20 TABLET, DELAYED RELEASE ORAL at 17:44

## 2019-08-24 RX ADMIN — ESCITALOPRAM OXALATE 20 MILLIGRAM(S): 10 TABLET, FILM COATED ORAL at 23:07

## 2019-08-24 RX ADMIN — OXYCODONE HYDROCHLORIDE 60 MILLIGRAM(S): 5 TABLET ORAL at 23:10

## 2019-08-24 RX ADMIN — FLUCONAZOLE 100 MILLIGRAM(S): 150 TABLET ORAL at 11:43

## 2019-08-24 RX ADMIN — SODIUM CHLORIDE 125 MILLILITER(S): 9 INJECTION, SOLUTION INTRAVENOUS at 14:12

## 2019-08-24 RX ADMIN — OXYCODONE HYDROCHLORIDE 15 MILLIGRAM(S): 5 TABLET ORAL at 13:10

## 2019-08-24 RX ADMIN — OXYCODONE HYDROCHLORIDE 15 MILLIGRAM(S): 5 TABLET ORAL at 12:13

## 2019-08-24 RX ADMIN — SODIUM CHLORIDE 125 MILLILITER(S): 9 INJECTION, SOLUTION INTRAVENOUS at 18:46

## 2019-08-24 RX ADMIN — Medication 500000 UNIT(S): at 14:13

## 2019-08-24 RX ADMIN — OXYCODONE HYDROCHLORIDE 15 MILLIGRAM(S): 5 TABLET ORAL at 17:49

## 2019-08-24 RX ADMIN — Medication 500000 UNIT(S): at 21:48

## 2019-08-24 RX ADMIN — SENNA PLUS 2 TABLET(S): 8.6 TABLET ORAL at 21:48

## 2019-08-24 RX ADMIN — OXYCODONE HYDROCHLORIDE 60 MILLIGRAM(S): 5 TABLET ORAL at 15:10

## 2019-08-24 RX ADMIN — BENZOCAINE AND MENTHOL 1 LOZENGE: 5; 1 LIQUID ORAL at 14:08

## 2019-08-24 RX ADMIN — SODIUM CHLORIDE 125 MILLILITER(S): 9 INJECTION, SOLUTION INTRAVENOUS at 10:15

## 2019-08-24 RX ADMIN — BENZOCAINE AND MENTHOL 1 LOZENGE: 5; 1 LIQUID ORAL at 21:52

## 2019-08-24 NOTE — H&P ADULT - ASSESSMENT
45 y/o F with metastatic lung Ca on chemotherapy presenting due to odynophagia, 2 days post chemotherapy dose.    #Odynophagia, likely secondary to oral candidaias  Heme/Onc input appreciated  Will start Fluconazole IVPB 200mg q24, can switch to PO once tolerating  Nystatin swish and spit (patient unable to swallow) 500,000 q8  Cepacol lozenges   On Oxycontin 60mg q12, and Oxycodone 15mg q2 PRN, continue   IV Fluids LR at 125   Consider GI consult if no improvement    #Metastatic lung Ca  O/p heme/onc follow-up  Continue home pain regimen    #Suspected folic acid deficiency  Continue Folic acid     #DVT PPx: Lovenox 40mg qd  #GI PPx: On Protonix BID  #Dispo: From home, no needs  #Full code

## 2019-08-24 NOTE — ED ADULT NURSE NOTE - NSIMPLEMENTINTERV_GEN_ALL_ED
Implemented All Universal Safety Interventions:  [place] to call system. Call bell, personal items and telephone within reach. Instruct patient to call for assistance. Room bathroom lighting operational. Non-slip footwear when patient is off stretcher. Physically safe environment: no spills, clutter or unnecessary equipment. Stretcher in lowest position, wheels locked, appropriate side rails in place.

## 2019-08-24 NOTE — ED PROVIDER NOTE - NS ED ROS FT
Constitutional: no fever, chills, no recent weight loss, change in appetite or malaise  Eyes: no redness/discharge/pain/vision changes  ENT: see HPI  Cardiac: No chest pain, SOB or edema.  Respiratory: No cough or respiratory distress  GI: No nausea, vomiting, diarrhea or abdominal pain.  : No dysuria, frequency, urgency or hematuria  MS: no pain to back or extremities, no loss of ROM, no weakness  Neuro: No headache or weakness. No LOC.  Skin: No skin rash.  Endocrine: No history of thyroid disease or diabetes.

## 2019-08-24 NOTE — ED ADULT NURSE NOTE - OBJECTIVE STATEMENT
Pt is C/O hoarseness and difficulties swallowing since she had Chemotherapy and radiation on Wednesday. Pt said she did not eat nor drink much due to the pains. Pt is getting both Chemo and Radiation for stage 4 Lung Cancer. also have generalized abd pains. No N/V, no fever

## 2019-08-24 NOTE — H&P ADULT - NSHPLABSRESULTS_GEN_ALL_CORE
10.5   3.48  )-----------( 204      ( 23 Aug 2019 22:41 )             34.0         08-23    138  |  95<L>  |  12  ----------------------------<  96  4.4   |  29  |  0.6<L>    Ca    9.3      23 Aug 2019 22:41    TPro  7.1  /  Alb  3.8  /  TBili  0.4  /  DBili  x   /  AST  16  /  ALT  8   /  AlkPhos  128<H>  08-23

## 2019-08-24 NOTE — ED PROVIDER NOTE - CARE PLAN
Principal Discharge DX:	Oral thrush  Secondary Diagnosis:	Painful swallowing  Secondary Diagnosis:	Radiation therapy complication, initial encounter

## 2019-08-24 NOTE — H&P ADULT - HISTORY OF PRESENT ILLNESS
44 year old female with PMHx of Metastatic adenocarcinoma of the lung diagnosed in 2018 s/p multiple lines of chemotherapy, currently on Alimta and Avastin (received this on Aug 20) and had received 5 radiations to the neck (completed Aug 14), presenting due to difficulty swallowing and throat pain, to both solids and liquids. The pain extends to upper esophagus, sternal area, started yesterday, no associated fever, chills, abdominal pain.

## 2019-08-24 NOTE — PROGRESS NOTE ADULT - ASSESSMENT
43 y/o F with h/o smoking- quit 4 years ago with Metastatic adenocarcinoma of the lung, PDL1 5%, EGFT, Alk, BRAF, KRAS, ROS1 negative diagnosed in 2018 s/p multiple lines of chemotherapy - 4 cycles-Carbo, Alimta and Keytruda in 2018, progressed and then received Taxterene/cyramza and then was given Gemzar in April 2019. She has received SRS to brain (in 2018 and also in May 2019).  Also received radiation to Lt 8th rib and flank area. She was referred to [facility],(Dr. Irma Nuñez  was not eligible for clinical trial. Now she is on Alimta and Avastin (received this on Aug 20) and had received 5 radiations to the neck(completed Aug 14)     1 day following chemo- pt started having pain on swallowing to both solids and liquids. No fever/ chills. No recent weight loss  Also c/o epigastric burning pain and discomfort     1) Metastatic Lung adenocarcinoma PDL1 5%, EGFT, Alk, BRAF, KRAS, ROS1 negative- progression with multiple lines of chemotherapy  Currently on Alimta and Avastin received Aug 20  Also radiation to the neck -completed Aug 14th    2) Pain on swallowing  Oral exam - reveals thrush  She probably has candida esophagitis  Will start fluconazole and hurricane solution 45 y/o F with h/o smoking- quit 4 years ago with Metastatic adenocarcinoma of the lung, PDL1 5%, EGFT, Alk, BRAF, KRAS, ROS1 negative diagnosed in 2018 s/p multiple lines of chemotherapy - 4 cycles-Carbo, Alimta and Keytruda in 2018, progressed and then received Taxterene/cyramza and then was given Gemzar in April 2019. She has received SRS to brain (in 2018 and also in May 2019).  Also received radiation to Lt 8th rib and flank area. She was referred to [facility],(Dr. Irma Nuñez  was not eligible for clinical trial. Now she is on Alimta and Avastin (received this on Aug 20) and had received 5 radiations to the neck(completed Aug 14)     1 day following chemo- pt started having pain on swallowing to both solids and liquids. No fever/ chills. No recent weight loss  Also c/o epigastric burning pain and discomfort     1) Metastatic Lung adenocarcinoma PDL1 5%, EGFT, Alk, BRAF, KRAS, ROS1 negative- progression with multiple lines of chemotherapy  Currently on Alimta and Avastin received Aug 20  Also radiation to the neck -completed Aug 14th  c/w home dose of pain meds for her pain from metastasis     2) Pain on swallowing  Oral exam - reveals thrush  She probably has candida esophagitis  Will start fluconazole and cepacol  Start clear liq diet  If no imrpovement in 2-3 days will get GI eval  c/w home dose of pain meds   c/w ivf

## 2019-08-24 NOTE — PROGRESS NOTE ADULT - ATTENDING COMMENTS
The patient was seen and examined. Agree with above.  45 yo female with metastatic adenocarcinoma of the lung, s/p Alimta and Keytruda 4 days ago, presented with mucositis, difficulty in swallowing.     -- IVF  mg/h.  -- Pain management: Oxycontin and oxycodone.  -- Liquid diet. Nutrition consult.  -- Hurricane solution swish and spit.  -- Nystatin 5 ml swish and swallow.  -- Folic acid  mg daily.

## 2019-08-24 NOTE — H&P ADULT - NSICDXFAMILYHX_GEN_ALL_CORE_FT
FAMILY HISTORY:  Father  Still living? Unknown  Family history of MI (myocardial infarction), Age at diagnosis: Age Unknown  Family history of prostate cancer in father, Age at diagnosis: Age Unknown    Mother  Still living? Unknown  Family history of MI (myocardial infarction), Age at diagnosis: Age Unknown

## 2019-08-24 NOTE — PROGRESS NOTE ADULT - SUBJECTIVE AND OBJECTIVE BOX
Patient is a 44y old  Female who presents with a chief complaint of     Subjective: C/o pain on swallowing  No fever/ chills  No diarrhea      Vital Signs Last 24 Hrs  T(C): 37.2 (24 Aug 2019 06:54), Max: 37.2 (24 Aug 2019 06:54)  T(F): 99 (24 Aug 2019 06:54), Max: 99 (24 Aug 2019 06:54)  HR: 78 (24 Aug 2019 06:54) (78 - 84)  BP: 131/80 (24 Aug 2019 06:54) (130/74 - 133/75)  BP(mean): --  RR: 18 (24 Aug 2019 06:54) (17 - 18)  SpO2: 96% (24 Aug 2019 02:07) (95% - 96%)    PHYSICAL EXAM  General: adult in NAD  HEENT: Oral thrush noted +  Neck: supple  CV: normal S1/S2   Lungs: positive air movement b/l ant lungs,clear to auscultation, no wheezes, no rales  Abdomen: soft non-tender   Ext: no  edema  Neuro: alert and oriented X 4, no focal deficits    MEDICATIONS  (STANDING):  lactated ringers. 1000 milliLiter(s) (125 mL/Hr) IV Continuous <Continuous>    MEDICATIONS  (PRN):      LABS:                          10.5   3.48  )-----------( 204      ( 23 Aug 2019 22:41 )             34.0         Mean Cell Volume : 85.0 fL  Mean Cell Hemoglobin : 26.3 pg  Mean Cell Hemoglobin Concentration : 30.9 g/dL  Auto Neutrophil # : 2.72 K/uL  Auto Lymphocyte # : 0.54 K/uL  Auto Monocyte # : 0.05 K/uL  Auto Eosinophil # : 0.15 K/uL  Auto Basophil # : 0.01 K/uL  Auto Neutrophil % : 78.2 %  Auto Lymphocyte % : 15.5 %  Auto Monocyte % : 1.4 %  Auto Eosinophil % : 4.3 %  Auto Basophil % : 0.3 %      Serial CBC's   @ 22:41  Hct-34.0 / Hgb-10.5 / Plat-204 / RBC-4.00 / WBC-3.48          138  |  95<L>  |  12  ----------------------------<  96  4.4   |  29  |  0.6<L>    Ca    9.3      23 Aug 2019 22:41    TPro  7.1  /  Alb  3.8  /  TBili  0.4  /  DBili  x   /  AST  16  /  ALT  8   /  AlkPhos  128<H>                        Urinalysis Basic - ( 24 Aug 2019 07:15 )    Color: Light Yellow / Appearance: Clear / S.018 / pH: x  Gluc: x / Ketone: Small  / Bili: Negative / Urobili: <2 mg/dL   Blood: x / Protein: Negative / Nitrite: Negative   Leuk Esterase: Negative / RBC: x / WBC x   Sq Epi: x / Non Sq Epi: x / Bacteria: x          < from: NM PET/CT Onc FDG Skull to Thigh, Subsq (19 @ 08:41) >   Since 2019, multiple new sites of pathologic FDG uptake,   compatible with increased biologic tumor activity.    2. Specifically, several new FDG avid osseous metastases, with max SUV   17.5 within a new lytic lesion within the anterior left 8th rib.    3. Several new FDG avid subcutaneous soft tissue nodules, with max SUV   14.7 within a 7 mm subcutaneous nodule along the left flank.    4. Several new FDG avid peritoneal and extraperitoneal soft tissue   nodules, with max SUV 10.4 within a new 1.9 cm right-sided   extraperitoneal nodule along the right flank.    5. New FDG avid right adrenal gland nodule, with max SUV 15.8.    6. New focal FDG uptake along the right hemidiaphragm, without definite   CT correlate, with max SUV 10.4.    7. Multiple new and increased FDG avid lymph nodes, with max SUV 20.0   within a 1.9 cm left para-aortic node.    8. Slightly decreased FDG uptake within a centrally necrotic left upper   lobe/left hilar mass, with max SUV 16.1 (previously 29.4, which reflects   a 45% decrease); a new FDG avid airspace opacity within the superior   segment of the left lower lobe is suspicious for postobstructive   pneumonia.    9. Stable FDG uptake within right cervical adenopathy, with max SUV 23.7.          < end of copied text >    < from: MR Head w/wo IV Cont (19 @ 12:41) >   Interval development of a new rim-enhancing subcentimeter lesion in   the right cerebellar hemisphere with small amount of surrounding edema   compatible with metastatic disease given history.    2. Resolution of the previously identified right frontal lobe enhancing   lesion and surrounding edema. No additional enhancing lesion on the   current exam.    < end of copied text >

## 2019-08-24 NOTE — ED PROVIDER NOTE - PHYSICAL EXAMINATION
CONSTITUTIONAL: Well-appearing; well-nourished; in no apparent distress.   EYES: PERRL; EOM intact.   ENT: + dry mouth with white cottage cheese like discharge c/w thrush. similar discharge also noted to posterior pharynx. + erythema and mild swelling to pharynx and posterior pharynx. uvula midline. no stridor.   NECK: Supple; non-tender; no cervical lymphadenopathy. No JVD.   CARDIOVASCULAR: Normal S1, S2; no murmurs, rubs, or gallops.   RESPIRATORY: Normal chest excursion with respiration; breath sounds clear and equal bilaterally; no wheezes, rhonchi, or rales.  GI/: Normal bowel sounds; non-distended; non-tender; no palpable organomegaly.   MS: No evidence of trauma or deformity. Non-tender to palpation. No scoliosis. No CVA tenderness. Normal ROM in all four extremities; non-tender to palpation; distal pulses are normal.   SKIN: Normal for age and race; warm; dry; good turgor; no apparent lesions or exudate.   NEURO/PSYCH: A & O x 4; grossly unremarkable.

## 2019-08-24 NOTE — ED PROVIDER NOTE - PROGRESS NOTE DETAILS
d/w hem/on fellow, admit Dr Galvan service. will evaluate patient attempted to treat pain with First BLM and nystatin, patient reported no change of pain and continue with difficulty to swallowing. admit for further management.

## 2019-08-24 NOTE — H&P ADULT - NSHPPHYSICALEXAM_GEN_ALL_CORE
PHYSICAL EXAM:T(C): 37.2 (08-24-19 @ 06:54), Max: 37.2 (08-24-19 @ 06:54)  HR: 78 (08-24-19 @ 06:54) (78 - 84)  BP: 131/80 (08-24-19 @ 06:54) (130/74 - 133/75)  RR: 18 (08-24-19 @ 06:54) (17 - 18)  SpO2: 96% (08-24-19 @ 02:07) (95% - 96%)  GENERAL: NAD, well-developed  HEAD:  Atraumatic, Normocephalic. tongue with white plaques  EYES: EOMI, PERRLA, conjunctiva and sclera clear  NECK: Supple, No JVD  CHEST/LUNG: Clear to auscultation bilaterally; No wheeze  HEART: Regular rate and rhythm; No murmurs, rubs, or gallops  ABDOMEN: Soft, Nontender, Nondistended; Bowel sounds present  EXTREMITIES:  2+ Peripheral Pulses, No clubbing, cyanosis, or edema  PSYCH: AAOx3  NEUROLOGY: non-focal  SKIN: No rashes or lesions

## 2019-08-24 NOTE — ED PROVIDER NOTE - OBJECTIVE STATEMENT
43 yo female hx of stage 4 Lung cancer currently on chemo and receiving radiation therapy present c/o pain to throat x 2 days. reported last radiation was about 1 weeks ago. received radiation around her neck area. also reported dry mouth and decreased saliva production. swallowing water and any solid food exacerbated the pain.   Denies fever/chill/HA/dizziness/chest pain/palpitation/sob/abd pain/n/v/d/ black stool/bloody stool/urinary sxs

## 2019-08-24 NOTE — ED PROVIDER NOTE - ATTENDING CONTRIBUTION TO CARE
p[t co sore throat diff swallowing , hx of ca, radiation/chemo. no fever, ha cp, sob. no diff breahting.  pt in nad mouth and pharynx c/w thrush. will hydrate, check labs, anti fungals , reassess.

## 2019-08-25 LAB
ALBUMIN SERPL ELPH-MCNC: 3.3 G/DL — LOW (ref 3.5–5.2)
ALP SERPL-CCNC: 104 U/L — SIGNIFICANT CHANGE UP (ref 30–115)
ALT FLD-CCNC: 7 U/L — SIGNIFICANT CHANGE UP (ref 0–41)
ANION GAP SERPL CALC-SCNC: 12 MMOL/L — SIGNIFICANT CHANGE UP (ref 7–14)
ANISOCYTOSIS BLD QL: SIGNIFICANT CHANGE UP
AST SERPL-CCNC: 11 U/L — SIGNIFICANT CHANGE UP (ref 0–41)
BASOPHILS # BLD AUTO: 0 K/UL — SIGNIFICANT CHANGE UP (ref 0–0.2)
BASOPHILS NFR BLD AUTO: 0 % — SIGNIFICANT CHANGE UP (ref 0–1)
BILIRUB SERPL-MCNC: 0.5 MG/DL — SIGNIFICANT CHANGE UP (ref 0.2–1.2)
BUN SERPL-MCNC: 9 MG/DL — LOW (ref 10–20)
CALCIUM SERPL-MCNC: 8.8 MG/DL — SIGNIFICANT CHANGE UP (ref 8.5–10.1)
CHLORIDE SERPL-SCNC: 99 MMOL/L — SIGNIFICANT CHANGE UP (ref 98–110)
CO2 SERPL-SCNC: 31 MMOL/L — SIGNIFICANT CHANGE UP (ref 17–32)
CREAT SERPL-MCNC: 0.5 MG/DL — LOW (ref 0.7–1.5)
EOSINOPHIL # BLD AUTO: 0.09 K/UL — SIGNIFICANT CHANGE UP (ref 0–0.7)
EOSINOPHIL NFR BLD AUTO: 6.5 % — SIGNIFICANT CHANGE UP (ref 0–8)
GIANT PLATELETS BLD QL SMEAR: PRESENT — SIGNIFICANT CHANGE UP
GLUCOSE SERPL-MCNC: 86 MG/DL — SIGNIFICANT CHANGE UP (ref 70–99)
HCT VFR BLD CALC: 29.9 % — LOW (ref 37–47)
HGB BLD-MCNC: 9.3 G/DL — LOW (ref 12–16)
LYMPHOCYTES # BLD AUTO: 0.34 K/UL — LOW (ref 1.2–3.4)
LYMPHOCYTES # BLD AUTO: 25.2 % — SIGNIFICANT CHANGE UP (ref 20.5–51.1)
MAGNESIUM SERPL-MCNC: 1.9 MG/DL — SIGNIFICANT CHANGE UP (ref 1.8–2.4)
MANUAL SMEAR VERIFICATION: SIGNIFICANT CHANGE UP
MCHC RBC-ENTMCNC: 26.1 PG — LOW (ref 27–31)
MCHC RBC-ENTMCNC: 31.1 G/DL — LOW (ref 32–37)
MCV RBC AUTO: 84 FL — SIGNIFICANT CHANGE UP (ref 81–99)
MICROCYTES BLD QL: SIGNIFICANT CHANGE UP
MONOCYTES # BLD AUTO: 0.03 K/UL — LOW (ref 0.1–0.6)
MONOCYTES NFR BLD AUTO: 1.9 % — SIGNIFICANT CHANGE UP (ref 1.7–9.3)
NEUTROPHILS # BLD AUTO: 0.86 K/UL — LOW (ref 1.4–6.5)
NEUTROPHILS NFR BLD AUTO: 64.5 % — SIGNIFICANT CHANGE UP (ref 42.2–75.2)
PLAT MORPH BLD: ABNORMAL
PLATELET # BLD AUTO: 156 K/UL — SIGNIFICANT CHANGE UP (ref 130–400)
POLYCHROMASIA BLD QL SMEAR: SIGNIFICANT CHANGE UP
POTASSIUM SERPL-MCNC: 4.2 MMOL/L — SIGNIFICANT CHANGE UP (ref 3.5–5)
POTASSIUM SERPL-SCNC: 4.2 MMOL/L — SIGNIFICANT CHANGE UP (ref 3.5–5)
PROT SERPL-MCNC: 6.1 G/DL — SIGNIFICANT CHANGE UP (ref 6–8)
RBC # BLD: 3.56 M/UL — LOW (ref 4.2–5.4)
RBC # FLD: 13.5 % — SIGNIFICANT CHANGE UP (ref 11.5–14.5)
RBC BLD AUTO: ABNORMAL
SODIUM SERPL-SCNC: 142 MMOL/L — SIGNIFICANT CHANGE UP (ref 135–146)
VARIANT LYMPHS # BLD: 1.9 % — SIGNIFICANT CHANGE UP (ref 0–5)
WBC # BLD: 1.34 K/UL — LOW (ref 4.8–10.8)
WBC # FLD AUTO: 1.34 K/UL — LOW (ref 4.8–10.8)

## 2019-08-25 PROCEDURE — 99232 SBSQ HOSP IP/OBS MODERATE 35: CPT

## 2019-08-25 RX ORDER — BUPROPION HYDROCHLORIDE 150 MG/1
150 TABLET, EXTENDED RELEASE ORAL DAILY
Refills: 0 | Status: DISCONTINUED | OUTPATIENT
Start: 2019-08-25 | End: 2019-09-04

## 2019-08-25 RX ADMIN — OXYCODONE HYDROCHLORIDE 15 MILLIGRAM(S): 5 TABLET ORAL at 09:15

## 2019-08-25 RX ADMIN — Medication 500000 UNIT(S): at 05:33

## 2019-08-25 RX ADMIN — OXYCODONE HYDROCHLORIDE 15 MILLIGRAM(S): 5 TABLET ORAL at 08:28

## 2019-08-25 RX ADMIN — OXYCODONE HYDROCHLORIDE 60 MILLIGRAM(S): 5 TABLET ORAL at 13:07

## 2019-08-25 RX ADMIN — PANTOPRAZOLE SODIUM 40 MILLIGRAM(S): 20 TABLET, DELAYED RELEASE ORAL at 05:33

## 2019-08-25 RX ADMIN — OXYCODONE HYDROCHLORIDE 15 MILLIGRAM(S): 5 TABLET ORAL at 17:45

## 2019-08-25 RX ADMIN — BENZOCAINE AND MENTHOL 1 LOZENGE: 5; 1 LIQUID ORAL at 05:35

## 2019-08-25 RX ADMIN — BENZOCAINE AND MENTHOL 1 LOZENGE: 5; 1 LIQUID ORAL at 21:17

## 2019-08-25 RX ADMIN — ENOXAPARIN SODIUM 40 MILLIGRAM(S): 100 INJECTION SUBCUTANEOUS at 13:08

## 2019-08-25 RX ADMIN — OXYCODONE HYDROCHLORIDE 60 MILLIGRAM(S): 5 TABLET ORAL at 14:00

## 2019-08-25 RX ADMIN — FLUCONAZOLE 100 MILLIGRAM(S): 150 TABLET ORAL at 08:29

## 2019-08-25 RX ADMIN — PANTOPRAZOLE SODIUM 40 MILLIGRAM(S): 20 TABLET, DELAYED RELEASE ORAL at 17:46

## 2019-08-25 RX ADMIN — ESCITALOPRAM OXALATE 20 MILLIGRAM(S): 10 TABLET, FILM COATED ORAL at 21:18

## 2019-08-25 RX ADMIN — OXYCODONE HYDROCHLORIDE 15 MILLIGRAM(S): 5 TABLET ORAL at 18:20

## 2019-08-25 RX ADMIN — CHLORHEXIDINE GLUCONATE 1 APPLICATION(S): 213 SOLUTION TOPICAL at 05:35

## 2019-08-25 RX ADMIN — OXYCODONE HYDROCHLORIDE 60 MILLIGRAM(S): 5 TABLET ORAL at 00:30

## 2019-08-25 RX ADMIN — Medication 1 MILLIGRAM(S): at 13:08

## 2019-08-25 RX ADMIN — BENZOCAINE AND MENTHOL 1 LOZENGE: 5; 1 LIQUID ORAL at 13:04

## 2019-08-25 NOTE — PROGRESS NOTE ADULT - ASSESSMENT
45 y/o F with h/o smoking- quit 4 years ago with Metastatic adenocarcinoma of the lung, PDL1 5%, EGFT, Alk, BRAF, KRAS, ROS1 negative diagnosed in 2018 s/p multiple lines of chemotherapy - 4 cycles-Carbo, Alimta and Keytruda in 2018, progressed and then received Taxterene/cyramza and then was given Gemzar in April 2019. She has received SRS to brain (in 2018 and also in May 2019).  Also received radiation to Lt 8th rib and flank area. She was referred to [facility] [place],(Dr. Irma Nuñez  was not eligible for clinical trial. Now she is on Alimta and Avastin (received this on Aug 20) and had received 5 radiations to the neck(completed Aug 14)     1 day following chemo- pt started having pain on swallowing to both solids and liquids. No fever/ chills. No recent weight loss  Also c/o epigastric burning pain and discomfort     1) Metastatic Lung adenocarcinoma PDL1 5%, EGFT, Alk, BRAF, KRAS, ROS1 negative- progression with multiple lines of chemotherapy  Currently on Alimta and Avastin received Aug 20  Also radiation to the neck -completed Aug 14th  c/w home dose of pain meds for her pain from metastasis   C/w folic acid as pt is on Avastin    2) Pain on swallowing 2/2 to thrush and probably has candida involvement of the esophagus as well   Oral exam - reveals thrush  She probably has candida esophagitis  c/w fluconazole and lozenges  Tolerated clear liq diet. Advance to soft diet today   If no improvement in 2-3 days will get GI eval  c/w home dose of pain meds   DC ivf     3) Leukopenia- Counts are starting to drop from recent chemo  Differential pending  No fever/chills  Continue to monitor 45 y/o F with h/o smoking- quit 4 years ago with Metastatic adenocarcinoma of the lung, PDL1 5%, EGFT, Alk, BRAF, KRAS, ROS1 negative diagnosed in 2018 s/p multiple lines of chemotherapy - 4 cycles-Carbo, Alimta and Keytruda in 2018, progressed and then received Taxterene/cyramza and then was given Gemzar in April 2019. She has received SRS to brain (in 2018 and also in May 2019).  Also received radiation to Lt 8th rib and flank area. She was referred to [facility] [place],(Dr. Irma Nuñez  was not eligible for clinical trial. Now she is on Alimta and Avastin (received this on Aug 20) and had received 5 radiations to the neck(completed Aug 14)     1 day following chemo- pt started having pain on swallowing to both solids and liquids. No fever/ chills. No recent weight loss  Also c/o epigastric burning pain and discomfort     1) Metastatic Lung adenocarcinoma PDL1 5%, EGFT, Alk, BRAF, KRAS, ROS1 negative- progression with multiple lines of chemotherapy  Currently on Alimta and Avastin received Aug 20  Also radiation to the neck -completed Aug 14th  c/w home dose of pain meds for her pain from metastasis   C/w folic acid as pt is on Avastin    2) Pain on swallowing 2/2 to thrush and probably has candida involvement of the esophagus as well   Oral exam - reveals thrush  She probably has candida esophagitis  c/w fluconazole and lozenges  Tolerated clear liq diet. Advance to soft diet today   If no improvement in 2-3 days will get GI eval  c/w home dose of pain meds   DC ivf     3) Leukopenia- Counts are starting to drop from recent chemo  ANC- 800  No fever/chills  Continue to monitor

## 2019-08-25 NOTE — PROGRESS NOTE ADULT - SUBJECTIVE AND OBJECTIVE BOX
Patient is a 44y old  Female who presents with a chief complaint of Difficulty swallowing (24 Aug 2019 11:20)      Subjective: Pt feels slightly better and was able to tolerate liquid diet yesterday       Vital Signs Last 24 Hrs  T(C): 36.6 (25 Aug 2019 05:09), Max: 36.6 (25 Aug 2019 05:09)  T(F): 97.8 (25 Aug 2019 05:09), Max: 97.8 (25 Aug 2019 05:09)  HR: 90 (25 Aug 2019 05:09) (88 - 90)  BP: 137/80 (25 Aug 2019 05:09) (120/73 - 137/82)  BP(mean): --  RR: 18 (25 Aug 2019 05:09) (18 - 18)  SpO2: --    PHYSICAL EXAM  General: adult in NAD  Oral : Thrush noted   Neck: supple  CV: normal S1/S2  Lungs: positive air movement b/l ant lungs,clear to auscultation  Abdomen: soft non-tender   Ext: no edema  Neuro: alert and oriented X 4, no focal deficits    MEDICATIONS  (STANDING):  benzocaine 15 mG/menthol 3.6 mG (Sugar-Free) Lozenge 1 Lozenge Oral three times a day  chlorhexidine 4% Liquid 1 Application(s) Topical <User Schedule>  docusate sodium 100 milliGRAM(s) Oral two times a day  enoxaparin Injectable 40 milliGRAM(s) SubCutaneous daily  escitalopram 20 milliGRAM(s) Oral at bedtime  fluconAZOLE IVPB      fluconAZOLE IVPB 200 milliGRAM(s) IV Intermittent every 24 hours  folic acid 1 milliGRAM(s) Oral daily  lactated ringers. 1000 milliLiter(s) (125 mL/Hr) IV Continuous <Continuous>  loratadine 10 milliGRAM(s) Oral daily  nystatin    Suspension 815891 Unit(s) Swish and Swallow every 8 hours  oxyCODONE  ER Tablet 60 milliGRAM(s) Oral every 12 hours  pantoprazole    Tablet 40 milliGRAM(s) Oral two times a day  senna 2 Tablet(s) Oral at bedtime    MEDICATIONS  (PRN):  ibuprofen  Tablet. 200 milliGRAM(s) Oral every 4 hours PRN Mild Pain (1 - 3)  oxyCODONE    IR 15 milliGRAM(s) Oral every 3 hours PRN Severe Pain (7 - 10)      LABS:                          9.3    1.34  )-----------( 156      ( 25 Aug 2019 07:50 )             29.9         Mean Cell Volume : 84.0 fL  Mean Cell Hemoglobin : 26.1 pg  Mean Cell Hemoglobin Concentration : 31.1 g/dL  Auto Neutrophil # : x  Auto Lymphocyte # : x  Auto Monocyte # : x  Auto Eosinophil # : x  Auto Basophil # : x  Auto Neutrophil % : x  Auto Lymphocyte % : x  Auto Monocyte % : x  Auto Eosinophil % : x  Auto Basophil % : x      Serial CBC's   @ 07:50  Hct-29.9 / Hgb-9.3 / Plat-156 / RBC-3.56 / WBC-1.34  Serial CBC's   @ 22:41  Hct-34.0 / Hgb-10.5 / Plat-204 / RBC-4.00 / WBC-3.48          138  |  95<L>  |  12  ----------------------------<  96  4.4   |  29  |  0.6<L>    Ca    9.3      23 Aug 2019 22:41    TPro  7.1  /  Alb  3.8  /  TBili  0.4  /  DBili  x   /  AST  16  /  ALT  8   /  AlkPhos  128<H>                        Urinalysis Basic - ( 24 Aug 2019 07:15 )    Color: Light Yellow / Appearance: Clear / S.018 / pH: x  Gluc: x / Ketone: Small  / Bili: Negative / Urobili: <2 mg/dL   Blood: x / Protein: Negative / Nitrite: Negative   Leuk Esterase: Negative / RBC: x / WBC x   Sq Epi: x / Non Sq Epi: x / Bacteria: x                BLOOD SMEAR INTERPRETATION:       RADIOLOGY & ADDITIONAL STUDIES:

## 2019-08-25 NOTE — PROGRESS NOTE ADULT - SUBJECTIVE AND OBJECTIVE BOX
Hospital Day:  1d    Subjective:    Patient is a 44y old  Female who presents with a chief complaint of Difficulty swallowing (25 Aug 2019 08:43)      Past Medical Hx:   Lung cancer  Anxiety and depression    Past Sx:  History of cholecystectomy    Allergies:  dust (Sneezing; Rhinorrhea)  Erythromycin Base (Other)    Current Meds:   Standng Meds:  benzocaine 15 mG/menthol 3.6 mG (Sugar-Free) Lozenge 1 Lozenge Oral three times a day  chlorhexidine 4% Liquid 1 Application(s) Topical <User Schedule>  docusate sodium 100 milliGRAM(s) Oral two times a day  enoxaparin Injectable 40 milliGRAM(s) SubCutaneous daily  escitalopram 20 milliGRAM(s) Oral at bedtime  fluconAZOLE IVPB      fluconAZOLE IVPB 200 milliGRAM(s) IV Intermittent every 24 hours  folic acid 1 milliGRAM(s) Oral daily  loratadine 10 milliGRAM(s) Oral daily  oxyCODONE  ER Tablet 60 milliGRAM(s) Oral every 12 hours  pantoprazole    Tablet 40 milliGRAM(s) Oral two times a day  senna 2 Tablet(s) Oral at bedtime    PRN Meds:  ibuprofen  Tablet. 200 milliGRAM(s) Oral every 4 hours PRN Mild Pain (1 - 3)  oxyCODONE    IR 15 milliGRAM(s) Oral every 3 hours PRN Severe Pain (7 - 10)    HOME MEDICATIONS:  Advil Liquigel 200 mg oral capsule: 1 cap(s) orally every 4 hours, As Needed - for mild pain  Allegra 180 mg oral tablet: 1 tab(s) orally once a day  Colace 100 mg oral capsule: 1 cap(s) orally 2 times a day  escitalopram 20 mg oral tablet: 1 tab(s) orally once a day (at bedtime)  oxyCODONE 15 mg oral tablet: 1 tab(s) orally every 2 hours -Severe Pain (7 - 10) as needed MDD:4 tablets  OxyCONTIN 60 mg oral tablet, extended release: 1 tab(s) orally every 12 hours  Protonix 40 mg oral delayed release tablet: 1 tab(s) orally 2 times a day  Senna 8.6 mg oral tablet: 1 tab(s) orally once a day (at bedtime)      Vital Signs:   T(F): 98.4 (19 @ 13:38), Max: 98.4 (19 @ 13:38)  HR: 80 (19 @ 13:38) (80 - 90)  BP: 143/79 (19 @ 13:38) (120/73 - 143/79)  RR: 18 (19 @ 13:38) (18 - 18)  SpO2: --      19 @ 07:01  -  19 @ 07:00  --------------------------------------------------------  IN: 0 mL / OUT: 1 mL / NET: -1 mL        Physical Exam:   GENERAL: NAD, Lying in bed, pleasant and conversive, reports feeling better than   HEENT: NCAT  CHEST/LUNG: CTAB  HEART: Regular rate and rhythm; s1 s2 appreciated, No murmurs, rubs, or gallops  ABDOMEN: Soft, Nontender, Nondistended; Bowel sounds present  EXTREMITIES: No LE edema b/l  NERVOUS SYSTEM:  Alert & Oriented X3        Labs:                         9.3    1.34  )-----------( 156      ( 25 Aug 2019 07:50 )             29.9     Neutophil% 64.5, Lymphocyte% 25.2, Monocyte% 1.9, Bands% -- 19 @ 07:50    25 Aug 2019 07:50    142    |  99     |  9      ----------------------------<  86     4.2     |  31     |  0.5      Ca    8.8        25 Aug 2019 07:50  Mg     1.9       25 Aug 2019 07:50    TPro  6.1    /  Alb  3.3    /  TBili  0.5    /  DBili  x      /  AST  11     /  ALT  7      /  AlkPhos  104    25 Aug 2019 07:50    Urinalysis Basic - ( 24 Aug 2019 07:15 )    Color: Light Yellow / Appearance: Clear / S.018 / pH: x  Gluc: x / Ketone: Small  / Bili: Negative / Urobili: <2 mg/dL   Blood: x / Protein: Negative / Nitrite: Negative   Leuk Esterase: Negative / RBC: x / WBC x   Sq Epi: x / Non Sq Epi: x / Bacteria: x    Radiology:   None Today    Assessment and Plan:   This is a 44 year old female with PMHx of tobacco abuse, metastatic adenocarcinoma of the lung s/p chemo 4 cycles Carbo, Alimta and Keytruda then Taxterene/cyramza and then Gemzar who presented post chemo treatment for pain on swallowing to both solids and liquids     #Odynophagia secondary to suspected candida esophagitis    - Trush clearing up on oral exam   - Continue with Fluconazole and lozenges   - Advance to soft diet today; tolerating clears  - Off IV fluids    #Metastatic Lung Adenocarcinoma  - PDL1 5%, EGFT, ALk BRAF, KRAS, ROS1 negative  - Progressive despite multiple chemo regimens  - Last chemo: Alimta and Avastin on   - Radiation of neck completed on   - Continue with home pain meds   - Continue with folic acid     #Leukopenia  - Noted ANC of 800 today  - Likely secondary to chemotherapy  - Continue to monitor. Daily CBC    Activity: As tolerated  Diet: Soft  DVT ppx: Lovenox  GI ppx: Protonix  Code Status: Full Code  DISPO: From home; pending tolerance of diet Hospital Day:  1d    Subjective:    Patient is a 44y old  Female who presents with a chief complaint of Difficulty swallowing (25 Aug 2019 08:43)    No overnight events. Seen and examined at bedside. Reported no acute complaints. Stated that her swallowing has gotten better and she is able to tolerate PO intake. Remainder of review of systems was otherwise negative.     Past Medical Hx:   Lung cancer  Anxiety and depression    Past Sx:  History of cholecystectomy    Allergies:  dust (Sneezing; Rhinorrhea)  Erythromycin Base (Other)    Current Meds:   Standng Meds:  benzocaine 15 mG/menthol 3.6 mG (Sugar-Free) Lozenge 1 Lozenge Oral three times a day  chlorhexidine 4% Liquid 1 Application(s) Topical <User Schedule>  docusate sodium 100 milliGRAM(s) Oral two times a day  enoxaparin Injectable 40 milliGRAM(s) SubCutaneous daily  escitalopram 20 milliGRAM(s) Oral at bedtime  fluconAZOLE IVPB      fluconAZOLE IVPB 200 milliGRAM(s) IV Intermittent every 24 hours  folic acid 1 milliGRAM(s) Oral daily  loratadine 10 milliGRAM(s) Oral daily  oxyCODONE  ER Tablet 60 milliGRAM(s) Oral every 12 hours  pantoprazole    Tablet 40 milliGRAM(s) Oral two times a day  senna 2 Tablet(s) Oral at bedtime    PRN Meds:  ibuprofen  Tablet. 200 milliGRAM(s) Oral every 4 hours PRN Mild Pain (1 - 3)  oxyCODONE    IR 15 milliGRAM(s) Oral every 3 hours PRN Severe Pain (7 - 10)    HOME MEDICATIONS:  Advil Liquigel 200 mg oral capsule: 1 cap(s) orally every 4 hours, As Needed - for mild pain  Allegra 180 mg oral tablet: 1 tab(s) orally once a day  Colace 100 mg oral capsule: 1 cap(s) orally 2 times a day  escitalopram 20 mg oral tablet: 1 tab(s) orally once a day (at bedtime)  oxyCODONE 15 mg oral tablet: 1 tab(s) orally every 2 hours -Severe Pain (7 - 10) as needed MDD:4 tablets  OxyCONTIN 60 mg oral tablet, extended release: 1 tab(s) orally every 12 hours  Protonix 40 mg oral delayed release tablet: 1 tab(s) orally 2 times a day  Senna 8.6 mg oral tablet: 1 tab(s) orally once a day (at bedtime)      Vital Signs:   T(F): 98.4 (19 @ 13:38), Max: 98.4 (19 @ 13:38)  HR: 80 (19 @ 13:38) (80 - 90)  BP: 143/79 (19 @ 13:38) (120/73 - 143/79)  RR: 18 (19 @ 13:38) (18 - 18)  SpO2: --      19 @ 07:01  -  19 @ 07:00  --------------------------------------------------------  IN: 0 mL / OUT: 1 mL / NET: -1 mL    Physical Exam:   GENERAL: NAD, Lying in bed, pleasant and conversive, reports feeling better than   HEENT: NCAT, PERRLA, EOMI, oropharynx with some noted areas of oral thrush noted. Poor dentition  CHEST/LUNG: CTA, No wheezing, rhonchi, rales  HEART: Regular rate and rhythm; s1 s2 appreciated, No murmurs, rubs, or gallops  ABDOMEN: Soft, Nontender, Nondistended; Bowel sounds present  EXTREMITIES: No LE edema b/l, Peripheral pulses 2+, No cyanosis, no clubbing  NERVOUS SYSTEM:  Alert & Oriented X3, Non focal        Labs:                         9.3    1.34  )-----------( 156      ( 25 Aug 2019 07:50 )             29.9     Neutophil% 64.5, Lymphocyte% 25.2, Monocyte% 1.9, Bands% -- 19 @ 07:50    25 Aug 2019 07:50    142    |  99     |  9      ----------------------------<  86     4.2     |  31     |  0.5      Ca    8.8        25 Aug 2019 07:50  Mg     1.9       25 Aug 2019 07:50    TPro  6.1    /  Alb  3.3    /  TBili  0.5    /  DBili  x      /  AST  11     /  ALT  7      /  AlkPhos  104    25 Aug 2019 07:50    Urinalysis Basic - ( 24 Aug 2019 07:15 )    Color: Light Yellow / Appearance: Clear / S.018 / pH: x  Gluc: x / Ketone: Small  / Bili: Negative / Urobili: <2 mg/dL   Blood: x / Protein: Negative / Nitrite: Negative   Leuk Esterase: Negative / RBC: x / WBC x   Sq Epi: x / Non Sq Epi: x / Bacteria: x    Radiology:   None Today    Assessment and Plan:   This is a 44 year old female with PMHx of tobacco abuse, metastatic adenocarcinoma of the lung s/p chemo 4 cycles Carbo, Alimta and Keytruda then Taxterene/cyramza and then Gemzar who presented post chemo treatment for pain on swallowing to both solids and liquids     #Odynophagia secondary to suspected candida esophagitis    - Trush clearing up on oral exam   - Continue with Fluconazole and lozenges   - Advance to soft diet today; tolerating clears  - Off IV fluids    #Metastatic Lung Adenocarcinoma  - PDL1 5%, EGFT, ALk BRAF, KRAS, ROS1 negative  - Progressive despite multiple chemo regimens  - Last chemo: Alimta and Avastin on   - Radiation of neck completed on   - Continue with home pain meds   - Continue with folic acid     #Leukopenia  - Noted ANC of 800 today  - Likely secondary to chemotherapy  - Continue to monitor. Daily CBC    Activity: As tolerated  Diet: Soft  DVT ppx: Lovenox  GI ppx: Protonix  Code Status: Full Code  DISPO: From home; pending tolerance of diet

## 2019-08-25 NOTE — PROGRESS NOTE ADULT - ATTENDING COMMENTS
The patient was seen and examined. Agree with above.  Feeling slightly better. Advanced to soft diet.  Neutropenia but afebrile.    -- Continue supportive care.  -- The patient was seen and examined. Agree with above.  Feeling slightly better. Advanced to soft diet.  Neutropenia but afebrile.  Continue supportive care.

## 2019-08-26 LAB
ALBUMIN SERPL ELPH-MCNC: 3.5 G/DL — SIGNIFICANT CHANGE UP (ref 3.5–5.2)
ALP SERPL-CCNC: 106 U/L — SIGNIFICANT CHANGE UP (ref 30–115)
ALT FLD-CCNC: 9 U/L — SIGNIFICANT CHANGE UP (ref 0–41)
ANION GAP SERPL CALC-SCNC: 12 MMOL/L — SIGNIFICANT CHANGE UP (ref 7–14)
AST SERPL-CCNC: 15 U/L — SIGNIFICANT CHANGE UP (ref 0–41)
BASOPHILS # BLD AUTO: 0.01 K/UL — SIGNIFICANT CHANGE UP (ref 0–0.2)
BASOPHILS NFR BLD AUTO: 1.4 % — HIGH (ref 0–1)
BILIRUB SERPL-MCNC: 0.4 MG/DL — SIGNIFICANT CHANGE UP (ref 0.2–1.2)
BUN SERPL-MCNC: 9 MG/DL — LOW (ref 10–20)
CALCIUM SERPL-MCNC: 8.5 MG/DL — SIGNIFICANT CHANGE UP (ref 8.5–10.1)
CHLORIDE SERPL-SCNC: 96 MMOL/L — LOW (ref 98–110)
CO2 SERPL-SCNC: 30 MMOL/L — SIGNIFICANT CHANGE UP (ref 17–32)
CREAT SERPL-MCNC: 0.5 MG/DL — LOW (ref 0.7–1.5)
EOSINOPHIL # BLD AUTO: 0.04 K/UL — SIGNIFICANT CHANGE UP (ref 0–0.7)
EOSINOPHIL NFR BLD AUTO: 5.8 % — SIGNIFICANT CHANGE UP (ref 0–8)
GLUCOSE SERPL-MCNC: 90 MG/DL — SIGNIFICANT CHANGE UP (ref 70–99)
HCT VFR BLD CALC: 29.5 % — LOW (ref 37–47)
HGB BLD-MCNC: 9.3 G/DL — LOW (ref 12–16)
IMM GRANULOCYTES NFR BLD AUTO: 1.4 % — HIGH (ref 0.1–0.3)
LYMPHOCYTES # BLD AUTO: 0.39 K/UL — LOW (ref 1.2–3.4)
LYMPHOCYTES # BLD AUTO: 56.5 % — HIGH (ref 20.5–51.1)
MAGNESIUM SERPL-MCNC: 1.9 MG/DL — SIGNIFICANT CHANGE UP (ref 1.8–2.4)
MCHC RBC-ENTMCNC: 26.3 PG — LOW (ref 27–31)
MCHC RBC-ENTMCNC: 31.5 G/DL — LOW (ref 32–37)
MCV RBC AUTO: 83.6 FL — SIGNIFICANT CHANGE UP (ref 81–99)
MONOCYTES # BLD AUTO: 0.08 K/UL — LOW (ref 0.1–0.6)
MONOCYTES NFR BLD AUTO: 11.6 % — HIGH (ref 1.7–9.3)
NEUTROPHILS # BLD AUTO: 0.16 K/UL — LOW (ref 1.4–6.5)
NEUTROPHILS NFR BLD AUTO: 23.3 % — LOW (ref 42.2–75.2)
NRBC # BLD: 0 /100 WBCS — SIGNIFICANT CHANGE UP (ref 0–0)
PLATELET # BLD AUTO: 127 K/UL — LOW (ref 130–400)
POTASSIUM SERPL-MCNC: 3.9 MMOL/L — SIGNIFICANT CHANGE UP (ref 3.5–5)
POTASSIUM SERPL-SCNC: 3.9 MMOL/L — SIGNIFICANT CHANGE UP (ref 3.5–5)
PROT SERPL-MCNC: 6.2 G/DL — SIGNIFICANT CHANGE UP (ref 6–8)
RBC # BLD: 3.53 M/UL — LOW (ref 4.2–5.4)
RBC # FLD: 13.2 % — SIGNIFICANT CHANGE UP (ref 11.5–14.5)
SODIUM SERPL-SCNC: 138 MMOL/L — SIGNIFICANT CHANGE UP (ref 135–146)
WBC # BLD: 0.69 K/UL — CRITICAL LOW (ref 4.8–10.8)
WBC # FLD AUTO: 0.69 K/UL — CRITICAL LOW (ref 4.8–10.8)

## 2019-08-26 PROCEDURE — 99231 SBSQ HOSP IP/OBS SF/LOW 25: CPT

## 2019-08-26 PROCEDURE — 99223 1ST HOSP IP/OBS HIGH 75: CPT

## 2019-08-26 RX ORDER — FILGRASTIM 480MCG/1.6
480 VIAL (ML) INJECTION DAILY
Refills: 0 | Status: DISCONTINUED | OUTPATIENT
Start: 2019-08-26 | End: 2019-08-30

## 2019-08-26 RX ORDER — DIPHENHYDRAMINE HYDROCHLORIDE AND LIDOCAINE HYDROCHLORIDE AND ALUMINUM HYDROXIDE AND MAGNESIUM HYDRO
15 KIT
Refills: 0 | Status: DISCONTINUED | OUTPATIENT
Start: 2019-08-26 | End: 2019-09-04

## 2019-08-26 RX ADMIN — Medication 480 MICROGRAM(S): at 13:58

## 2019-08-26 RX ADMIN — FLUCONAZOLE 100 MILLIGRAM(S): 150 TABLET ORAL at 09:00

## 2019-08-26 RX ADMIN — DIPHENHYDRAMINE HYDROCHLORIDE AND LIDOCAINE HYDROCHLORIDE AND ALUMINUM HYDROXIDE AND MAGNESIUM HYDRO 15 MILLILITER(S): KIT at 13:57

## 2019-08-26 RX ADMIN — OXYCODONE HYDROCHLORIDE 15 MILLIGRAM(S): 5 TABLET ORAL at 19:03

## 2019-08-26 RX ADMIN — ESCITALOPRAM OXALATE 20 MILLIGRAM(S): 10 TABLET, FILM COATED ORAL at 22:28

## 2019-08-26 RX ADMIN — OXYCODONE HYDROCHLORIDE 15 MILLIGRAM(S): 5 TABLET ORAL at 23:33

## 2019-08-26 RX ADMIN — SENNA PLUS 2 TABLET(S): 8.6 TABLET ORAL at 22:28

## 2019-08-26 RX ADMIN — DIPHENHYDRAMINE HYDROCHLORIDE AND LIDOCAINE HYDROCHLORIDE AND ALUMINUM HYDROXIDE AND MAGNESIUM HYDRO 15 MILLILITER(S): KIT at 17:33

## 2019-08-26 RX ADMIN — BENZOCAINE AND MENTHOL 1 LOZENGE: 5; 1 LIQUID ORAL at 07:17

## 2019-08-26 RX ADMIN — OXYCODONE HYDROCHLORIDE 15 MILLIGRAM(S): 5 TABLET ORAL at 16:29

## 2019-08-26 RX ADMIN — OXYCODONE HYDROCHLORIDE 60 MILLIGRAM(S): 5 TABLET ORAL at 07:17

## 2019-08-26 RX ADMIN — OXYCODONE HYDROCHLORIDE 15 MILLIGRAM(S): 5 TABLET ORAL at 15:54

## 2019-08-26 RX ADMIN — PANTOPRAZOLE SODIUM 40 MILLIGRAM(S): 20 TABLET, DELAYED RELEASE ORAL at 17:33

## 2019-08-26 RX ADMIN — PANTOPRAZOLE SODIUM 40 MILLIGRAM(S): 20 TABLET, DELAYED RELEASE ORAL at 05:55

## 2019-08-26 RX ADMIN — OXYCODONE HYDROCHLORIDE 60 MILLIGRAM(S): 5 TABLET ORAL at 05:55

## 2019-08-26 RX ADMIN — OXYCODONE HYDROCHLORIDE 60 MILLIGRAM(S): 5 TABLET ORAL at 17:32

## 2019-08-26 RX ADMIN — OXYCODONE HYDROCHLORIDE 15 MILLIGRAM(S): 5 TABLET ORAL at 13:00

## 2019-08-26 RX ADMIN — Medication 1 MILLIGRAM(S): at 12:01

## 2019-08-26 RX ADMIN — OXYCODONE HYDROCHLORIDE 15 MILLIGRAM(S): 5 TABLET ORAL at 22:28

## 2019-08-26 RX ADMIN — ENOXAPARIN SODIUM 40 MILLIGRAM(S): 100 INJECTION SUBCUTANEOUS at 12:01

## 2019-08-26 RX ADMIN — BUPROPION HYDROCHLORIDE 150 MILLIGRAM(S): 150 TABLET, EXTENDED RELEASE ORAL at 22:28

## 2019-08-26 RX ADMIN — DIPHENHYDRAMINE HYDROCHLORIDE AND LIDOCAINE HYDROCHLORIDE AND ALUMINUM HYDROXIDE AND MAGNESIUM HYDRO 15 MILLILITER(S): KIT at 23:32

## 2019-08-26 RX ADMIN — OXYCODONE HYDROCHLORIDE 15 MILLIGRAM(S): 5 TABLET ORAL at 12:00

## 2019-08-26 RX ADMIN — BENZOCAINE AND MENTHOL 1 LOZENGE: 5; 1 LIQUID ORAL at 22:22

## 2019-08-26 RX ADMIN — BENZOCAINE AND MENTHOL 1 LOZENGE: 5; 1 LIQUID ORAL at 13:40

## 2019-08-26 NOTE — DIETITIAN INITIAL EVALUATION ADULT. - ENERGY INTAKE
Recent diet advance to solid food last night. Soft diet order active prior to RD assessment and pt only tolerating bites of food as esophageal pain persists. Consumes bites of farina and pudding with 50% milk at breakfast. RD discussed different diet texture options available to pt, feel pureed would be best but pt agreeable to ground at this time and will consider pureed meals if unable to tolerate ground food. Denies choking a/w solids or liquids. Agreeable to ensure Enlive, RD delivered to pt Poor (<50%)/All recs d/w senior resident ([place])

## 2019-08-26 NOTE — DIETITIAN INITIAL EVALUATION ADULT. - PHYSICAL APPEARANCE
BMI 35.5(213#, 65in). denies recent wt loss despite met cancer on active treatment. skin intact, no edema noted.

## 2019-08-26 NOTE — DIETITIAN INITIAL EVALUATION ADULT. - ADD RECOMMEND
Recommend: 1. Adjust diet order to Dysphagia II, mechanical soft/ground with thin liquids + GERD modifier. If pt continues to c/o esophageal pain/burning consider dysphagia 1 pureed, thin liquids. 2. Add ensure Enlive BID. 3. Provide FIRST mouthwash prior to meals to optimize intake.

## 2019-08-26 NOTE — PROGRESS NOTE ADULT - ASSESSMENT
This is a 44 year old female with PMHx of tobacco abuse, metastatic adenocarcinoma of the lung s/p chemo 4 cycles Carbo, Alimta and Keytruda then Taxterene/cyramza and then Gemzar who presented post chemo treatment for pain on swallowing to both solids and liquids     Oral Thrush with possible Esophageal Candidiasis  -Immunocompromised on Active Chemotherapy   -Presenting with Odynophagia with both solids and Liquid, throat pain and Discomfort    -Pt continues to complain of Esophageal burning and discomfort   --Oral Trush resolved on exam  -GI consulted for possible EGD, F/up recs   -Continue with IV Fluconazole, lozenges and Mouth wash   - Advance diet as tolerated     Metastatic Lung Adenocarcinoma  - Progressive despite multiple chemo regimens  - Last chemo: Alimta and Avastin on 8/20  - Radiation of neck completed on 8/14  - Continue with home pain meds and folic acid   -Per Heme/OnC: Get MRI LS spine with and without contrast: ordered      *Pt was scheduled to get this done outpt for LE numbness and pain     Pancytopenia 2/2 to chemotherapy   -Asymptomatic  -ANC- 160 today. S/P Neupogen 480 mcg today: 8/26/19   -Transfuse if Hb<7 and Platelets less than 20k or if any active bleeding     DVT ppx: Lovenox  GI ppx: Protonix  Full Code  DISPO: From home  -pending tolerance of diet and MR of Lumbar spine and Heme/onc clearance

## 2019-08-26 NOTE — DIETITIAN INITIAL EVALUATION ADULT. - ENERGY NEEDS
estimated calorie needs = ~6648-4082 kcal/day (considering metastatic lung cancer on active treatment).  estimated protein needs = 68-85 g/day (1.2-1.5 g/kg IBW, reason mentioned above).   estimated fluid needs = 1mL/kcal

## 2019-08-26 NOTE — DIETITIAN INITIAL EVALUATION ADULT. - REASON INDICATOR FOR ASSESSMENT
Note for RD c/s per attending MD found in h&p on RD screen, however, no official c/s placed. nutrition assessment completed.

## 2019-08-26 NOTE — CONSULT NOTE ADULT - ASSESSMENT
45 yo F  PMH: Metastatic adenocarcinoma of the lung diagnosed in 2018 s/p multiple lines of chemotherapy, currently on Alimta and Avastin (received this on Aug 20) and had received 5 radiations to the neck (completed Aug 14)  CC: Odynophagia and Dysphagia  History: Patient notes symptoms began last Wednesday and have progressively worsened. She state the odynophagia is constantly present and feels like sone one is taking sandpaper to her troat everytime she swallows making it difficult to swallow. It is present solids > liquids. Patient notes no mechanical difficulties with swallowing. She notes no nasopharyngeal or oral regurgitation of PO intake (solids or liquids), so sense of obstruction when swallowing, no difficulty with initiating a swallow.  She denies heart burn, weightloss, or hematemesis. Patient admits to a globus sensation near the clavicular area. She admits to increased oral secretions, drooling, coughing (no chocking sensations). Patient denies current usage of history of usage with alcohol ,smoking or illicit drug usage. She has never had a colonoscopy or an endoscopy previously. She denies fevers, chills, cp, palpitations sob, abdominal pain or dysuria.    ASSESSMENT / PLAN:    # Dysphagia not mechanical in nature. Likely due to odynophagia however with global sensation can not rule out remodeling of mucosa ( i.e. stricture) due to radiation to neck. Odynophagia likely due to Candidiasis. Currently no thrush present ( given nystatin swish and swallow and fluconazole - notes pain has improved).   - Continue Fluconazole  - If no improvement will consider EGD to rule out stricture or other infectious cause ( i.e. CMV vs HSV)     - ATTENDING has not yet evaluate patient, ATTENDING RECOMMENDATIONS PENDING. 45 yo F  PMH: Metastatic adenocarcinoma of the lung diagnosed in 2018 s/p multiple lines of chemotherapy, currently on Alimta and Avastin (received this on Aug 20) and had received 5 radiations to the neck (completed Aug 14)  CC: Odynophagia and Dysphagia  History: Patient notes symptoms began last Wednesday and have progressively worsened. She state the odynophagia is constantly present and feels like sone one is taking sandpaper to her troat every time she swallows making it difficult to swallow. It is present solids > liquids. Patient notes no mechanical difficulties with swallowing. She notes no nasopharyngeal or oral regurgitation of PO intake (solids or liquids), so sense of obstruction when swallowing, no difficulty with initiating a swallow.  She denies heart burn, weightloss, or hematemesis. Patient admits to a globus sensation near the clavicular area. She admits to increased oral secretions, drooling, coughing (no chocking sensations). Patient denies current usage of history of usage with alcohol ,smoking or illicit drug usage. She has never had a colonoscopy or an endoscopy previously. She denies fevers, chills, cp, palpitations sob, abdominal pain or dysuria.    ASSESSMENT / PLAN:    # Dysphagia not mechanical in nature. Likely due to odynophagia however with global sensation can not rule out remodeling of mucosa ( i.e. stricture) due to radiation to neck. Odynophagia likely due to Candidiasis. Currently no thrush present ( given nystatin swish and swallow and fluconazole - notes pain has improved).   - Continue Fluconazole.  - Can add Nystatin Swish and Swallow.  - Begin Artifical saliva for patient ( have called pharmacy - they note it is non-formulary, please fill out non-formulary sheet and send to pharmacy).   - Will re-evaluate in 24-48 hours for EGD to rule out stricture or other infectious cause ( i.e. CMV vs HSV).    - ATTENDING has not yet evaluate patient, ATTENDING RECOMMENDATIONS PENDING. 45 yo F  PMH: Metastatic adenocarcinoma of the lung diagnosed in 2018 s/p multiple lines of chemotherapy, currently on Alimta and Avastin (received this on Aug 20) and had received 5 radiations to the neck (completed Aug 14)  CC: Odynophagia and Dysphagia  History: Patient notes symptoms began last Wednesday and have progressively worsened. She state the odynophagia is constantly present and feels like sone one is taking sandpaper to her troat every time she swallows making it difficult to swallow. It is present solids > liquids. Patient notes no mechanical difficulties with swallowing. She notes no nasopharyngeal or oral regurgitation of PO intake (solids or liquids), so sense of obstruction when swallowing, no difficulty with initiating a swallow.  She denies heart burn, weightloss, or hematemesis. Patient admits to a globus sensation near the clavicular area. She admits to increased oral secretions, drooling, coughing (no chocking sensations). Patient denies current usage of history of usage with alcohol ,smoking or illicit drug usage. She has never had a colonoscopy or an endoscopy previously. She denies fevers, chills, cp, palpitations sob, abdominal pain or dysuria.    ASSESSMENT / PLAN:    # Dysphagia not mechanical in nature. Likely due to odynophagia however with global sensation can not rule out remodeling of mucosa ( i.e. stricture) due to radiation to neck. Odynophagia likely due to Candidiasis. Currently no thrush present ( given nystatin swish and swallow and fluconazole - notes pain has improved).   - Continue Fluconazole.  - Can add Nystatin Swish and Swallow.  - Begin Artifical saliva for patient ( have called pharmacy - they note it is non-formulary, please fill out non-formulary sheet and send to pharmacy).   - Will re-evaluate in 24-48 hours for EGD to rule out stricture or other infectious cause ( i.e. CMV vs HSV).

## 2019-08-26 NOTE — PROGRESS NOTE ADULT - SUBJECTIVE AND OBJECTIVE BOX
Patient is a 44y old  Female who presents with a chief complaint of Difficulty swallowing      Admitted to medicine with primary diagnosis of Oral thrush with possible Esophagal Candidiasis    Interval events: none    Today is hosp day 2  Patient is resting comfortably in bed  odynophagia was starting to improve but got worse overnight  Tolerating only liquid diet for now, had a BM today  Ambulates independently  Plan: f/up Heme/onc Recs: MRI of Lumbar spine for Back pain to evaluate for Lesions   -GI consult for Worsening odynophagia for possible EGD      PAST MEDICAL & SURGICAL HISTORY:  Lung cancer  Anxiety and depression  History of cholecystectomy      MEDICATIONS  (STANDING):  benzocaine 15 mG/menthol 3.6 mG (Sugar-Free) Lozenge 1 Lozenge Oral three times a day  buPROPion XL . 150 milliGRAM(s) Oral daily  chlorhexidine 4% Liquid 1 Application(s) Topical <User Schedule>  docusate sodium 100 milliGRAM(s) Oral two times a day  enoxaparin Injectable 40 milliGRAM(s) SubCutaneous daily  escitalopram 20 milliGRAM(s) Oral at bedtime  filgrastim-sndz Injectable 480 MICROGram(s) SubCutaneous daily  FIRST- Mouthwash  BLM 15 milliLiter(s) Swish and Spit four times a day  fluconAZOLE IVPB      fluconAZOLE IVPB 200 milliGRAM(s) IV Intermittent every 24 hours  folic acid 1 milliGRAM(s) Oral daily  loratadine 10 milliGRAM(s) Oral daily  oxyCODONE  ER Tablet 60 milliGRAM(s) Oral every 12 hours  pantoprazole    Tablet 40 milliGRAM(s) Oral two times a day  senna 2 Tablet(s) Oral at bedtime    MEDICATIONS  (PRN):  ibuprofen  Tablet. 200 milliGRAM(s) Oral every 4 hours PRN Mild Pain (1 - 3)  oxyCODONE    IR 15 milliGRAM(s) Oral every 3 hours PRN Severe Pain (7 - 10)          Vital Signs Last 24 Hrs  T(C): 37 (26 Aug 2019 05:02), Max: 37.2 (25 Aug 2019 21:07)  T(F): 98.6 (26 Aug 2019 05:02), Max: 98.9 (25 Aug 2019 21:07)  HR: 80 (26 Aug 2019 05:02) (80 - 81)  BP: 159/98 (26 Aug 2019 05:02) (122/71 - 159/98)  BP(mean): --  RR: 18 (26 Aug 2019 05:02) (18 - 18)  SpO2: --  CAPILLARY BLOOD GLUCOSE        I&O's Summary      Physical Exam:    -     General : NAD, resting comfortably in bed    -      Oral Cavity: Moist, Oral thrush resolved     -      Cardiac: S1/S2 appreciated RRR, no murmurs    -      Pulm: CTA b/l, no adventitious sounds    -      GI: Soft, NT, ND, + odynophagia     -      Musculoskeletal: Atraumatic, No LE edema B/L, No skin color changes    -      Neuro: AO x 4, nonfocal         Labs:                        9.3    0.69  )-----------( 127      ( 26 Aug 2019 04:30 )             29.5             08-26    138  |  96<L>  |  9<L>  ----------------------------<  90  3.9   |  30  |  0.5<L>    Ca    8.5      26 Aug 2019 04:30  Mg     1.9     08-26    TPro  6.2  /  Alb  3.5  /  TBili  0.4  /  DBili  x   /  AST  15  /  ALT  9   /  AlkPhos  106  08-26    LIVER FUNCTIONS - ( 26 Aug 2019 04:30 )  Alb: 3.5 g/dL / Pro: 6.2 g/dL / ALK PHOS: 106 U/L / ALT: 9 U/L / AST: 15 U/L / GGT: x                      Imaging:    ECG:

## 2019-08-26 NOTE — PROGRESS NOTE ADULT - SUBJECTIVE AND OBJECTIVE BOX
Patient is a 44y old  Female who presents with a chief complaint of Difficulty swallowing (25 Aug 2019 14:39)      Subjective: Fells slightly better, however still has pain on swallowing   Her counts are dropping  No fever/chills      Vital Signs Last 24 Hrs  T(C): 37 (26 Aug 2019 05:02), Max: 37.2 (25 Aug 2019 21:07)  T(F): 98.6 (26 Aug 2019 05:02), Max: 98.9 (25 Aug 2019 21:07)  HR: 80 (26 Aug 2019 05:02) (80 - 81)  BP: 159/98 (26 Aug 2019 05:02) (122/71 - 159/98)  BP(mean): --  RR: 18 (26 Aug 2019 05:02) (18 - 18)  SpO2: --    PHYSICAL EXAM  General: adult in NAD  HEENT: Mucositis. Her oral thrush is better  CV: normal S1/S2   Lungs: positive air movement b/l ant lungs,clear to auscultation,  Abdomen: soft non-tender   Ext: no  edema  Neuro: alert and oriented X 4, no focal deficits    MEDICATIONS  (STANDING):  benzocaine 15 mG/menthol 3.6 mG (Sugar-Free) Lozenge 1 Lozenge Oral three times a day  buPROPion XL . 150 milliGRAM(s) Oral daily  chlorhexidine 4% Liquid 1 Application(s) Topical <User Schedule>  docusate sodium 100 milliGRAM(s) Oral two times a day  enoxaparin Injectable 40 milliGRAM(s) SubCutaneous daily  escitalopram 20 milliGRAM(s) Oral at bedtime  FIRST- Mouthwash  BLM 15 milliLiter(s) Swish and Spit four times a day  fluconAZOLE IVPB      fluconAZOLE IVPB 200 milliGRAM(s) IV Intermittent every 24 hours  folic acid 1 milliGRAM(s) Oral daily  loratadine 10 milliGRAM(s) Oral daily  oxyCODONE  ER Tablet 60 milliGRAM(s) Oral every 12 hours  pantoprazole    Tablet 40 milliGRAM(s) Oral two times a day  senna 2 Tablet(s) Oral at bedtime    MEDICATIONS  (PRN):  ibuprofen  Tablet. 200 milliGRAM(s) Oral every 4 hours PRN Mild Pain (1 - 3)  oxyCODONE    IR 15 milliGRAM(s) Oral every 3 hours PRN Severe Pain (7 - 10)      LABS:                          9.3    0.69  )-----------( 127      ( 26 Aug 2019 04:30 )             29.5         Mean Cell Volume : 83.6 fL  Mean Cell Hemoglobin : 26.3 pg  Mean Cell Hemoglobin Concentration : 31.5 g/dL  Auto Neutrophil # : 0.16 K/uL  Auto Lymphocyte # : 0.39 K/uL  Auto Monocyte # : 0.08 K/uL  Auto Eosinophil # : 0.04 K/uL  Auto Basophil # : 0.01 K/uL  Auto Neutrophil % : 23.3 %  Auto Lymphocyte % : 56.5 %  Auto Monocyte % : 11.6 %  Auto Eosinophil % : 5.8 %  Auto Basophil % : 1.4 %      Serial CBC's  08-26 @ 04:30  Hct-29.5 / Hgb-9.3 / Plat-127 / RBC-3.53 / WBC-0.69  Serial CBC's  08-25 @ 07:50  Hct-29.9 / Hgb-9.3 / Plat-156 / RBC-3.56 / WBC-1.34  Serial CBC's  08-23 @ 22:41  Hct-34.0 / Hgb-10.5 / Plat-204 / RBC-4.00 / WBC-3.48      08-26    138  |  96<L>  |  9<L>  ----------------------------<  90  3.9   |  30  |  0.5<L>    Ca    8.5      26 Aug 2019 04:30  Mg     1.9     08-26    TPro  6.2  /  Alb  3.5  /  TBili  0.4  /  DBili  x   /  AST  15  /  ALT  9   /  AlkPhos  106  08-26                                  BLOOD SMEAR INTERPRETATION:       RADIOLOGY & ADDITIONAL STUDIES:

## 2019-08-26 NOTE — PROGRESS NOTE ADULT - ASSESSMENT
43 y/o F with h/o smoking- quit 4 years ago with Metastatic adenocarcinoma of the lung, PDL1 5%, EGFT, Alk, BRAF, KRAS, ROS1 negative diagnosed in 2018 s/p multiple lines of chemotherapy - 4 cycles-Carbo, Alimta and Keytruda in 2018, progressed and then received Taxterene/cyramza and then was given Gemzar in April 2019. She has received SRS to brain (in 2018 and also in May 2019).  Also received radiation to Lt 8th rib and flank area. She was referred to [facility] [place],(Dr. Irma Nuñez  was not eligible for clinical trial. Now she is on Alimta and Avastin (received this on Aug 20) and had received 5 radiations to the neck(completed Aug 14)     1 day following chemo- pt started having pain on swallowing to both solids and liquids. No fever/ chills. No recent weight loss  Also c/o epigastric burning pain and discomfort     1) Metastatic Lung adenocarcinoma PDL1 5%, EGFT, Alk, BRAF, KRAS, ROS1 negative- progression with multiple lines of chemotherapy  Currently on Alimta and Avastin received Aug 20  Also radiation to the neck -completed Aug 14th  c/w home dose of pain meds for her pain from metastasis   C/w folic acid as pt is on Avastin  Get MRI LS spine with and without contrast. Pt was scheduled to get this done outpt for LE numbness and pain     2) Pain on swallowing 2/2 to thrush and probably has candida involvement of the esophagus as well   Oral exam - reveals thrush- looks better today   She probably has candida esophagitis  c/w fluconazole and lozenges  Get GI evaluation   c/w home dose of pain meds       3) Pancytopenia 2/2 to chemotherapy   ANC- 160 today.   She did not receive neulasta after her chemo   No fever/chills  Continue to monitor counts  Transfuse if Hb<7 and Platelets less than 20k or if any active bleeding       DVT ppx- on lovenox. Continue as long as the PLt >50k 43 y/o F with h/o smoking- quit 4 years ago with Metastatic adenocarcinoma of the lung, PDL1 5%, EGFT, Alk, BRAF, KRAS, ROS1 negative diagnosed in 2018 s/p multiple lines of chemotherapy - 4 cycles-Carbo, Alimta and Keytruda in 2018, progressed and then received Taxterene/cyramza and then was given Gemzar in April 2019. She has received SRS to brain (in 2018 and also in May 2019).  Also received radiation to Lt 8th rib and flank area. She was referred to [facility] [place],(Dr. Irma Nuñez  was not eligible for clinical trial. Now she is on Alimta and Avastin (received this on Aug 20) and had received 5 radiations to the neck(completed Aug 14)     1 day following chemo- pt started having pain on swallowing to both solids and liquids. No fever/ chills. No recent weight loss  Also c/o epigastric burning pain and discomfort     1) Metastatic Lung adenocarcinoma PDL1 5%, EGFT, Alk, BRAF, KRAS, ROS1 negative- progression with multiple lines of chemotherapy  Currently on Alimta and Avastin received Aug 20  Also radiation to the neck -completed Aug 14th  c/w home dose of pain meds for her pain from metastasis   C/w folic acid as pt is on Avastin  Get MRI LS spine with and without contrast. Pt was scheduled to get this done outpt for LE numbness and pain     2) Pain on swallowing 2/2 to thrush and probably has candida involvement of the esophagus as well   Oral exam - reveals thrush- looks better today   She probably has candida esophagitis  c/w fluconazole and lozenges  Get GI evaluation   c/w home dose of pain meds       3) Pancytopenia 2/2 to chemotherapy   ANC- 160 today.   She did not receive neulasta after her chemo . Will give 1st does Neupogen 480 mcg today   No fever/chills  Continue to monitor counts  Transfuse if Hb<7 and Platelets less than 20k or if any active bleeding       DVT ppx- on lovenox. Continue as long as the PLt >50k

## 2019-08-26 NOTE — DIETITIAN INITIAL EVALUATION ADULT. - FACTORS AFF FOOD INTAKE
difficulty chewing/difficulty swallowing/odynophagia and dysphagia likely 2/2 candidiasis. esophageal burning and discomfort continues with slight improvement.

## 2019-08-26 NOTE — CONSULT NOTE ADULT - SUBJECTIVE AND OBJECTIVE BOX
Patient is a 44y old  Female who presents with a chief complaint of Difficulty swallowing (26 Aug 2019 10:57)    HPI:  44 year old female with PMHx of Metastatic adenocarcinoma of the lung diagnosed in 2018 s/p multiple lines of chemotherapy, currently on Alimta and Avastin (received this on Aug 20) and had received 5 radiations to the neck (completed Aug 14), presenting due to difficulty swallowing and throat pain, to both solids and liquids. The pain extends to upper esophagus, sternal area, started yesterday, no associated fever, chills, abdominal pain. (24 Aug 2019 11:20)    Allergies  dust (Sneezing; Rhinorrhea)  Erythromycin Base (Other)    Intolerances    MEDICATIONS  (STANDING):  benzocaine 15 mG/menthol 3.6 mG (Sugar-Free) Lozenge 1 Lozenge Oral three times a day  buPROPion XL . 150 milliGRAM(s) Oral daily  chlorhexidine 4% Liquid 1 Application(s) Topical <User Schedule>  docusate sodium 100 milliGRAM(s) Oral two times a day  enoxaparin Injectable 40 milliGRAM(s) SubCutaneous daily  escitalopram 20 milliGRAM(s) Oral at bedtime  filgrastim-sndz Injectable 480 MICROGram(s) SubCutaneous daily  FIRST- Mouthwash  BLM 15 milliLiter(s) Swish and Spit four times a day  fluconAZOLE IVPB      fluconAZOLE IVPB 200 milliGRAM(s) IV Intermittent every 24 hours  folic acid 1 milliGRAM(s) Oral daily  loratadine 10 milliGRAM(s) Oral daily  oxyCODONE  ER Tablet 60 milliGRAM(s) Oral every 12 hours  pantoprazole    Tablet 40 milliGRAM(s) Oral two times a day  senna 2 Tablet(s) Oral at bedtime    MEDICATIONS  (PRN):  ibuprofen  Tablet. 200 milliGRAM(s) Oral every 4 hours PRN Mild Pain (1 - 3)  oxyCODONE    IR 15 milliGRAM(s) Oral every 3 hours PRN Severe Pain (7 - 10)    PAST MEDICAL & SURGICAL HISTORY:  Lung cancer  Anxiety and depression  History of cholecystectomy    FAMILY HISTORY:  Family history of MI (myocardial infarction) (Father, Mother)  Family history of prostate cancer in father (Father)    REVIEW OF SYSTEMS  All review of systems negative, except for those marked:  Constitutional:   No fever, no fatigue, no pallor.   HEENT:   No eye pain, no vision changes, no icterus, no mouth ulcers.  Respiratory:   No shortness of breath, no cough, no respiratory distress.   Cardiovascular:   No chest pain, no palpitations.   Skin:   No rashes, no jaundice, no eczema.   Musculoskeletal:   No joint pain, no swelling, no myalgia.   Neurologic:   No headache, no seizure, no weakness.   Genitourinary:   No dysuria, no decreased urine output.  Psychiatric:  No depression, no anxiety, no PDD, no ADHD.  Endocrine:   No thyroid disease, no diabetes.  Heme/Lymphatic:   No anemia, no blood transfusions, no lymph node enlargement, no bleeding, no bruising.    Daily     Daily   BMI: 35.5 (08-24 @ 06:54)  Change in Weight:  Vital Signs Last 24 Hrs  T(C): 37 (26 Aug 2019 05:02), Max: 37.2 (25 Aug 2019 21:07)  T(F): 98.6 (26 Aug 2019 05:02), Max: 98.9 (25 Aug 2019 21:07)  HR: 80 (26 Aug 2019 05:02) (80 - 81)  BP: 159/98 (26 Aug 2019 05:02) (122/71 - 159/98)  BP(mean): --  RR: 18 (26 Aug 2019 05:02) (18 - 18)  SpO2: --  I&O's Detail    PHYSICAL EXAM  General:  Well developed, well nourished, alert and active, no pallor, NAD.  HEENT:    Normal appearance of conjunctiva, ears, nose, lips, oropharynx, and oral mucosa, anicteric.  Neck:  No masses, no asymmetry.  Lymph Nodes:  No lymphadenopathy.   Cardiovascular:  RRR normal S1/S2, no murmur.  Respiratory:  CTA B/L, normal respiratory effort.   Abdominal:   soft, no masses or tenderness, normoactive BS, NT/ND, no HSM.  Extremities:   No clubbing or cyanosis, normal capillary refill, no edema.   Skin:   No rash, jaundice, lesions, eczema.   Musculoskeletal:  No joint swelling, erythema or tenderness.   Neuro: No focal deficits.   Other:     Lab Results:                        9.3    0.69  )-----------( 127      ( 26 Aug 2019 04:30 )             29.5     08-26    138  |  96<L>  |  9<L>  ----------------------------<  90  3.9   |  30  |  0.5<L>    Ca    8.5      26 Aug 2019 04:30  Mg     1.9     08-26    TPro  6.2  /  Alb  3.5  /  TBili  0.4  /  DBili  x   /  AST  15  /  ALT  9   /  AlkPhos  106  08-26    LIVER FUNCTIONS - ( 26 Aug 2019 04:30 )  Alb: 3.5 g/dL / Pro: 6.2 g/dL / ALK PHOS: 106 U/L / ALT: 9 U/L / AST: 15 U/L / GGT: x           RADIOLOGY RESULTS:    < from: NM PET/CT Onc FDG Skull to Thigh, Subsq (07.08.19 @ 08:41) >  IMPRESSION:     1. Since March 18, 2019, multiple new sites of pathologic FDG uptake,   compatible with increased biologic tumor activity.    2. Specifically, several new FDG avid osseous metastases, with max SUV   17.5 within a new lytic lesion within the anterior left 8th rib.    3. Several new FDG avid subcutaneous soft tissue nodules, with max SUV   14.7 within a 7 mm subcutaneous nodule along the left flank.    4. Several new FDG avid peritoneal and extraperitoneal soft tissue   nodules, with max SUV 10.4 within a new 1.9 cm right-sided   extraperitoneal nodule along the right flank.    5. New FDG avid right adrenal gland nodule, with max SUV 15.8.    6. New focal FDG uptake along the right hemidiaphragm, without definite   CT correlate, with max SUV 10.4.    7. Multiple new and increased FDG avid lymph nodes, with max SUV 20.0   within a 1.9 cm left para-aortic node.    8. Slightly decreased FDG uptake within a centrally necrotic left upper   lobe/left hilar mass, with max SUV 16.1 (previously 29.4, which reflects   a 45% decrease); a new FDG avid airspace opacity within the superior   segment of the left lower lobe is suspicious for postobstructive   pneumonia.    9. Stable FDG uptake within right cervical adenopathy, with max SUV 23.7.    < end of copied text > Patient is a 44y old  Female who presents with a chief complaint of Difficulty swallowing (26 Aug 2019 10:57)    HPI:  44 year old female with PMHx of Metastatic adenocarcinoma of the lung diagnosed in 2018 s/p multiple lines of chemotherapy, currently on Alimta and Avastin (received this on Aug 20) and had received 5 radiations to the neck (completed Aug 14), presenting due to difficulty swallowing and throat pain, to both solids and liquids. The pain extends to upper esophagus, sternal area, started yesterday, no associated fever, chills, abdominal pain. (24 Aug 2019 11:20)    Allergies  dust (Sneezing; Rhinorrhea)  Erythromycin Base (Other)    Intolerances    MEDICATIONS  (STANDING):  benzocaine 15 mG/menthol 3.6 mG (Sugar-Free) Lozenge 1 Lozenge Oral three times a day  buPROPion XL . 150 milliGRAM(s) Oral daily  chlorhexidine 4% Liquid 1 Application(s) Topical <User Schedule>  docusate sodium 100 milliGRAM(s) Oral two times a day  enoxaparin Injectable 40 milliGRAM(s) SubCutaneous daily  escitalopram 20 milliGRAM(s) Oral at bedtime  filgrastim-sndz Injectable 480 MICROGram(s) SubCutaneous daily  FIRST- Mouthwash  BLM 15 milliLiter(s) Swish and Spit four times a day  fluconAZOLE IVPB      fluconAZOLE IVPB 200 milliGRAM(s) IV Intermittent every 24 hours  folic acid 1 milliGRAM(s) Oral daily  loratadine 10 milliGRAM(s) Oral daily  oxyCODONE  ER Tablet 60 milliGRAM(s) Oral every 12 hours  pantoprazole    Tablet 40 milliGRAM(s) Oral two times a day  senna 2 Tablet(s) Oral at bedtime    MEDICATIONS  (PRN):  ibuprofen  Tablet. 200 milliGRAM(s) Oral every 4 hours PRN Mild Pain (1 - 3)  oxyCODONE    IR 15 milliGRAM(s) Oral every 3 hours PRN Severe Pain (7 - 10)    PAST MEDICAL & SURGICAL HISTORY:  Lung cancer  Anxiety and depression  History of cholecystectomy    FAMILY HISTORY:  Family history of MI (myocardial infarction) (Father, Mother)  Family history of prostate cancer in father (Father)    REVIEW OF SYSTEMS  All review of systems negative, except for those marked:  Constitutional:   No fever, no fatigue, no pallor.   HEENT:   No eye pain, no vision changes, no icterus, no mouth ulcers. + thrush as per patient  Respiratory:   No shortness of breath, no cough, no respiratory distress.   Cardiovascular:   No chest pain, no palpitations.   Skin:   No rashes, no jaundice  Musculoskeletal:   No joint pain, no myalgia.   Neurologic:   No headache, no seizure, no weakness.   Genitourinary:   No dysuria, no decreased urine output.  Heme/Lymphatic:   Right submandibular lymph node enlargement     Daily     Daily   BMI: 35.5 (08-24 @ 06:54)  Change in Weight:  Vital Signs Last 24 Hrs  T(C): 37 (26 Aug 2019 05:02), Max: 37.2 (25 Aug 2019 21:07)  T(F): 98.6 (26 Aug 2019 05:02), Max: 98.9 (25 Aug 2019 21:07)  HR: 80 (26 Aug 2019 05:02) (80 - 81)  BP: 159/98 (26 Aug 2019 05:02) (122/71 - 159/98)  BP(mean): --  RR: 18 (26 Aug 2019 05:02) (18 - 18)  SpO2: --  I&O's Detail    PHYSICAL EXAM  General:  Well developed, well nourished, alert and active, no pallor, NAD.  HEENT:    Normal appearance of conjunctiva, ears, nose, lips, oropharynx, and oral mucosa, anicteric.  Neck:  Asymetry : Right submandibular lymph node enlargement   Cardiovascular:  RRR normal S1/S2, no murmur.  Respiratory:  CTA B/L, normal respiratory effort.   Abdominal:   soft, no masses or tenderness, normoactive BS, NT/ND  Extremities:  No edema.   Skin:   No rash, jaundice  Musculoskeletal:  No joint tenderness.     Lab Results:                        9.3    0.69  )-----------( 127      ( 26 Aug 2019 04:30 )             29.5     08-26    138  |  96<L>  |  9<L>  ----------------------------<  90  3.9   |  30  |  0.5<L>    Ca    8.5      26 Aug 2019 04:30  Mg     1.9     08-26    TPro  6.2  /  Alb  3.5  /  TBili  0.4  /  DBili  x   /  AST  15  /  ALT  9   /  AlkPhos  106  08-26    LIVER FUNCTIONS - ( 26 Aug 2019 04:30 )  Alb: 3.5 g/dL / Pro: 6.2 g/dL / ALK PHOS: 106 U/L / ALT: 9 U/L / AST: 15 U/L / GGT: x           RADIOLOGY RESULTS:    < from: NM PET/CT Onc FDG Skull to Thigh, Subsq (07.08.19 @ 08:41) >  IMPRESSION:     1. Since March 18, 2019, multiple new sites of pathologic FDG uptake,   compatible with increased biologic tumor activity.    2. Specifically, several new FDG avid osseous metastases, with max SUV   17.5 within a new lytic lesion within the anterior left 8th rib.    3. Several new FDG avid subcutaneous soft tissue nodules, with max SUV   14.7 within a 7 mm subcutaneous nodule along the left flank.    4. Several new FDG avid peritoneal and extraperitoneal soft tissue   nodules, with max SUV 10.4 within a new 1.9 cm right-sided   extraperitoneal nodule along the right flank.    5. New FDG avid right adrenal gland nodule, with max SUV 15.8.    6. New focal FDG uptake along the right hemidiaphragm, without definite   CT correlate, with max SUV 10.4.    7. Multiple new and increased FDG avid lymph nodes, with max SUV 20.0   within a 1.9 cm left para-aortic node.    8. Slightly decreased FDG uptake within a centrally necrotic left upper   lobe/left hilar mass, with max SUV 16.1 (previously 29.4, which reflects   a 45% decrease); a new FDG avid airspace opacity within the superior   segment of the left lower lobe is suspicious for postobstructive   pneumonia.    9. Stable FDG uptake within right cervical adenopathy, with max SUV 23.7.    < end of copied text > Patient is a 44y old  Female who presents with a chief complaint of Difficulty swallowing (26 Aug 2019 10:57)    HPI:  44 year old female with PMHx of Metastatic adenocarcinoma of the lung diagnosed in 2018 s/p multiple lines of chemotherapy, currently on Alimta and Avastin (received this on Aug 20) and had received 5 radiations to the neck (completed Aug 14), presenting due to difficulty swallowing and throat pain, to both solids and liquids. The pain extends to upper esophagus, sternal area, started yesterday, no associated fever, chills, abdominal pain. (24 Aug 2019 11:20)    Allergies  dust (Sneezing; Rhinorrhea)  Erythromycin Base (Other)    Intolerances    MEDICATIONS  (STANDING):  benzocaine 15 mG/menthol 3.6 mG (Sugar-Free) Lozenge 1 Lozenge Oral three times a day  buPROPion XL . 150 milliGRAM(s) Oral daily  chlorhexidine 4% Liquid 1 Application(s) Topical <User Schedule>  docusate sodium 100 milliGRAM(s) Oral two times a day  enoxaparin Injectable 40 milliGRAM(s) SubCutaneous daily  escitalopram 20 milliGRAM(s) Oral at bedtime  filgrastim-sndz Injectable 480 MICROGram(s) SubCutaneous daily  FIRST- Mouthwash  BLM 15 milliLiter(s) Swish and Spit four times a day  fluconAZOLE IVPB      fluconAZOLE IVPB 200 milliGRAM(s) IV Intermittent every 24 hours  folic acid 1 milliGRAM(s) Oral daily  loratadine 10 milliGRAM(s) Oral daily  oxyCODONE  ER Tablet 60 milliGRAM(s) Oral every 12 hours  pantoprazole    Tablet 40 milliGRAM(s) Oral two times a day  senna 2 Tablet(s) Oral at bedtime    MEDICATIONS  (PRN):  ibuprofen  Tablet. 200 milliGRAM(s) Oral every 4 hours PRN Mild Pain (1 - 3)  oxyCODONE    IR 15 milliGRAM(s) Oral every 3 hours PRN Severe Pain (7 - 10)    PAST MEDICAL & SURGICAL HISTORY:  Lung cancer  Anxiety and depression  History of cholecystectomy    FAMILY HISTORY:  Family history of MI (myocardial infarction) (Father, Mother)  Family history of prostate cancer in father (Father)    REVIEW OF SYSTEMS  All review of systems negative, except for those marked:  Constitutional:   No fever, no fatigue, no pallor.   HEENT:   No eye pain, no vision changes, no icterus, no mouth ulcers. + thrush as per patient  Respiratory:   No shortness of breath, no cough, no respiratory distress.   Cardiovascular:   No chest pain, no palpitations.   Skin:   No rashes, no jaundice  Musculoskeletal:   No joint pain, no myalgia.   Neurologic:   No headache, no seizure, no weakness.   Genitourinary:   No dysuria, no decreased urine output.  Heme/Lymphatic:   Right submandibular lymph node enlargement     Daily     Daily   BMI: 35.5 (08-24 @ 06:54)  Change in Weight:  Vital Signs Last 24 Hrs  T(C): 37 (26 Aug 2019 05:02), Max: 37.2 (25 Aug 2019 21:07)  T(F): 98.6 (26 Aug 2019 05:02), Max: 98.9 (25 Aug 2019 21:07)  HR: 80 (26 Aug 2019 05:02) (80 - 81)  BP: 159/98 (26 Aug 2019 05:02) (122/71 - 159/98)  BP(mean): --  RR: 18 (26 Aug 2019 05:02) (18 - 18)  SpO2: --  I&O's Detail    PHYSICAL EXAM  General:  Well developed, well nourished, alert and active, NAD.  HEENT:    Normal appearance of conjunctiva. Dry lips. No kofi ulcers noted. No oral thrush noted.   Neck:  Asymmetry : Right submandibular lymph node enlargement   Cardiovascular:  RRR normal S1/S2, no murmur.  Respiratory:  CTA B/L, normal respiratory effort.   Abdominal: Soft, no masses or tenderness, normoactive BS, NT/ND  Extremities:  No edema.   Skin:   No rash, jaundice  Musculoskeletal:  No joint tenderness.     Lab Results:                        9.3    0.69  )-----------( 127      ( 26 Aug 2019 04:30 )             29.5     08-26    138  |  96<L>  |  9<L>  ----------------------------<  90  3.9   |  30  |  0.5<L>    Ca    8.5      26 Aug 2019 04:30  Mg     1.9     08-26    TPro  6.2  /  Alb  3.5  /  TBili  0.4  /  DBili  x   /  AST  15  /  ALT  9   /  AlkPhos  106  08-26    LIVER FUNCTIONS - ( 26 Aug 2019 04:30 )  Alb: 3.5 g/dL / Pro: 6.2 g/dL / ALK PHOS: 106 U/L / ALT: 9 U/L / AST: 15 U/L / GGT: x           RADIOLOGY RESULTS:    < from: NM PET/CT Onc FDG Skull to Thigh, Subsq (07.08.19 @ 08:41) >  IMPRESSION:     1. Since March 18, 2019, multiple new sites of pathologic FDG uptake,   compatible with increased biologic tumor activity.    2. Specifically, several new FDG avid osseous metastases, with max SUV   17.5 within a new lytic lesion within the anterior left 8th rib.    3. Several new FDG avid subcutaneous soft tissue nodules, with max SUV   14.7 within a 7 mm subcutaneous nodule along the left flank.    4. Several new FDG avid peritoneal and extraperitoneal soft tissue   nodules, with max SUV 10.4 within a new 1.9 cm right-sided   extraperitoneal nodule along the right flank.    5. New FDG avid right adrenal gland nodule, with max SUV 15.8.    6. New focal FDG uptake along the right hemidiaphragm, without definite   CT correlate, with max SUV 10.4.    7. Multiple new and increased FDG avid lymph nodes, with max SUV 20.0   within a 1.9 cm left para-aortic node.    8. Slightly decreased FDG uptake within a centrally necrotic left upper   lobe/left hilar mass, with max SUV 16.1 (previously 29.4, which reflects   a 45% decrease); a new FDG avid airspace opacity within the superior   segment of the left lower lobe is suspicious for postobstructive   pneumonia.    9. Stable FDG uptake within right cervical adenopathy, with max SUV 23.7.    < end of copied text > Patient is a 44y old  Female who presents with a chief complaint of Difficulty swallowing (26 Aug 2019 10:57)    HPI:  44 year old female with PMHx of Metastatic adenocarcinoma of the lung diagnosed in 2018 s/p multiple lines of chemotherapy, currently on Alimta and Avastin (received this on Aug 20) and had received 5 radiations to the neck (completed Aug 14), presenting due to difficulty swallowing and throat pain, to both solids and liquids. The pain extends to upper esophagus, sternal area, started yesterday, no associated fever, chills, abdominal pain. (24 Aug 2019 11:20)    Allergies  dust (Sneezing; Rhinorrhea)  Erythromycin Base (Other)        MEDICATIONS  (STANDING):  benzocaine 15 mG/menthol 3.6 mG (Sugar-Free) Lozenge 1 Lozenge Oral three times a day  buPROPion XL . 150 milliGRAM(s) Oral daily  chlorhexidine 4% Liquid 1 Application(s) Topical <User Schedule>  docusate sodium 100 milliGRAM(s) Oral two times a day  enoxaparin Injectable 40 milliGRAM(s) SubCutaneous daily  escitalopram 20 milliGRAM(s) Oral at bedtime  filgrastim-sndz Injectable 480 MICROGram(s) SubCutaneous daily  FIRST- Mouthwash  BLM 15 milliLiter(s) Swish and Spit four times a day  fluconAZOLE IVPB      fluconAZOLE IVPB 200 milliGRAM(s) IV Intermittent every 24 hours  folic acid 1 milliGRAM(s) Oral daily  loratadine 10 milliGRAM(s) Oral daily  oxyCODONE  ER Tablet 60 milliGRAM(s) Oral every 12 hours  pantoprazole    Tablet 40 milliGRAM(s) Oral two times a day  senna 2 Tablet(s) Oral at bedtime    MEDICATIONS  (PRN):  ibuprofen  Tablet. 200 milliGRAM(s) Oral every 4 hours PRN Mild Pain (1 - 3)  oxyCODONE    IR 15 milliGRAM(s) Oral every 3 hours PRN Severe Pain (7 - 10)    PAST MEDICAL & SURGICAL HISTORY:  Lung cancer  Anxiety and depression  History of cholecystectomy    FAMILY HISTORY:  Family history of MI (myocardial infarction) (Father, Mother)  Family history of prostate cancer in father (Father)    REVIEW OF SYSTEMS  All review of systems negative, except for those marked:  Constitutional:   No fever, no fatigue, no pallor.   HEENT:   No eye pain, no vision changes, no icterus, no mouth ulcers. + thrush as per patient  Respiratory:   No shortness of breath, no cough, no respiratory distress.   Cardiovascular:   No chest pain, no palpitations.   Skin:   No rashes, no jaundice  Musculoskeletal:   No joint pain, no myalgia.   Neurologic:   No headache, no seizure, no weakness.   Genitourinary:   No dysuria, no decreased urine output.  Heme/Lymphatic:   Right submandibular lymph node enlargement     Daily     Daily   BMI: 35.5 (08-24 @ 06:54)  Change in Weight:  Vital Signs Last 24 Hrs  T(C): 37 (26 Aug 2019 05:02), Max: 37.2 (25 Aug 2019 21:07)  T(F): 98.6 (26 Aug 2019 05:02), Max: 98.9 (25 Aug 2019 21:07)  HR: 80 (26 Aug 2019 05:02) (80 - 81)  BP: 159/98 (26 Aug 2019 05:02) (122/71 - 159/98)  BP(mean): --  RR: 18 (26 Aug 2019 05:02) (18 - 18)  SpO2: --  I&O's Detail    PHYSICAL EXAM  General:  Well developed, well nourished, alert and active, NAD.  HEENT:    Normal appearance of conjunctiva. Dry lips. No kofi ulcers noted. No oral thrush noted.   Neck:  Asymmetry : Right submandibular lymph node enlargement   Cardiovascular:  RRR normal S1/S2, no murmur.  Respiratory:  CTA B/L, normal respiratory effort.   Abdominal: Soft, no masses or tenderness, normoactive BS, NT/ND  Extremities:  No edema.   Skin:   No rash, jaundice  Musculoskeletal:  No joint tenderness.     Lab Results:                        9.3    0.69  )-----------( 127      ( 26 Aug 2019 04:30 )             29.5     08-26    138  |  96<L>  |  9<L>  ----------------------------<  90  3.9   |  30  |  0.5<L>    Ca    8.5      26 Aug 2019 04:30  Mg     1.9     08-26    TPro  6.2  /  Alb  3.5  /  TBili  0.4  /  DBili  x   /  AST  15  /  ALT  9   /  AlkPhos  106  08-26    LIVER FUNCTIONS - ( 26 Aug 2019 04:30 )  Alb: 3.5 g/dL / Pro: 6.2 g/dL / ALK PHOS: 106 U/L / ALT: 9 U/L / AST: 15 U/L / GGT: x           RADIOLOGY RESULTS:    < from: NM PET/CT Onc FDG Skull to Thigh, Subsq (07.08.19 @ 08:41) >  IMPRESSION:     1. Since March 18, 2019, multiple new sites of pathologic FDG uptake,   compatible with increased biologic tumor activity.    2. Specifically, several new FDG avid osseous metastases, with max SUV   17.5 within a new lytic lesion within the anterior left 8th rib.    3. Several new FDG avid subcutaneous soft tissue nodules, with max SUV   14.7 within a 7 mm subcutaneous nodule along the left flank.    4. Several new FDG avid peritoneal and extraperitoneal soft tissue   nodules, with max SUV 10.4 within a new 1.9 cm right-sided   extraperitoneal nodule along the right flank.    5. New FDG avid right adrenal gland nodule, with max SUV 15.8.    6. New focal FDG uptake along the right hemidiaphragm, without definite   CT correlate, with max SUV 10.4.    7. Multiple new and increased FDG avid lymph nodes, with max SUV 20.0   within a 1.9 cm left para-aortic node.    8. Slightly decreased FDG uptake within a centrally necrotic left upper   lobe/left hilar mass, with max SUV 16.1 (previously 29.4, which reflects   a 45% decrease); a new FDG avid airspace opacity within the superior   segment of the left lower lobe is suspicious for postobstructive   pneumonia.    9. Stable FDG uptake within right cervical adenopathy, with max SUV 23.7.    < end of copied text >

## 2019-08-26 NOTE — PROGRESS NOTE ADULT - ATTENDING COMMENTS
Thrush is much improved, dysphagia is persistent. Add Magic Mouthwash.   Plan for EGD once neutropenia has improved. Continue with Neupogen. Case was discussed with GI.   Continue with PO Folic Acid, IVF.   Continue with pain management.   Case was discussed with psych.

## 2019-08-26 NOTE — DIETITIAN INITIAL EVALUATION ADULT. - FEEDING SKILL
independent/Pt reports typically adequate appetite and PO intake at baseline despite receiving chemoRT, however, pt reports worsening esophageal discomfort and burning x2-3 days PTA causing inability to tolerate most solids and liquids. Pt mainly consuming room temperature liquids and solids during that time but intake very insignificant (25% or less). Even notes difficulty tolerating water. Mainly consumed puddings, yogurt, oatmeal or milk. NKFA. Denied use of nutrition supplements.

## 2019-08-26 NOTE — DIETITIAN INITIAL EVALUATION ADULT. - OTHER INFO
Pt p/w oral thrush and painful swallowing. Oral Thrush with possible Esophageal Candidiasis. Per GI: can not r/o remodeling of mucosa d/t RT of neck, currently no thrush present; rec Fluconazole and EGD if no improvement. Metastatic Lung Adenocarcinoma s/p 5 sessions of RT to neck completed 8/14; on active chemo Alimta and Avastin most recent dose on 8/20. Heme/onc recs MRI LS spine d/t LE numbness and pain. Pancytopenia 2/2 to chemotherapy s/p Neupogen today.

## 2019-08-27 LAB
BASOPHILS # BLD AUTO: 0.01 K/UL — SIGNIFICANT CHANGE UP (ref 0–0.2)
BASOPHILS NFR BLD AUTO: 1.3 % — HIGH (ref 0–1)
EOSINOPHIL # BLD AUTO: 0.03 K/UL — SIGNIFICANT CHANGE UP (ref 0–0.7)
EOSINOPHIL NFR BLD AUTO: 3.8 % — SIGNIFICANT CHANGE UP (ref 0–8)
HCT VFR BLD CALC: 27.6 % — LOW (ref 37–47)
HGB BLD-MCNC: 8.7 G/DL — LOW (ref 12–16)
IMM GRANULOCYTES NFR BLD AUTO: 10.1 % — HIGH (ref 0.1–0.3)
LYMPHOCYTES # BLD AUTO: 0.47 K/UL — LOW (ref 1.2–3.4)
LYMPHOCYTES # BLD AUTO: 59.5 % — HIGH (ref 20.5–51.1)
MCHC RBC-ENTMCNC: 26.2 PG — LOW (ref 27–31)
MCHC RBC-ENTMCNC: 31.5 G/DL — LOW (ref 32–37)
MCV RBC AUTO: 83.1 FL — SIGNIFICANT CHANGE UP (ref 81–99)
MONOCYTES # BLD AUTO: 0.1 K/UL — SIGNIFICANT CHANGE UP (ref 0.1–0.6)
MONOCYTES NFR BLD AUTO: 12.7 % — HIGH (ref 1.7–9.3)
NEUTROPHILS # BLD AUTO: 0.1 K/UL — LOW (ref 1.4–6.5)
NEUTROPHILS NFR BLD AUTO: 12.6 % — LOW (ref 42.2–75.2)
NRBC # BLD: 0 /100 WBCS — SIGNIFICANT CHANGE UP (ref 0–0)
PLATELET # BLD AUTO: 119 K/UL — LOW (ref 130–400)
RBC # BLD: 3.32 M/UL — LOW (ref 4.2–5.4)
RBC # FLD: 13.2 % — SIGNIFICANT CHANGE UP (ref 11.5–14.5)
WBC # BLD: 0.71 K/UL — CRITICAL LOW (ref 4.8–10.8)
WBC # FLD AUTO: 0.71 K/UL — CRITICAL LOW (ref 4.8–10.8)

## 2019-08-27 PROCEDURE — 99233 SBSQ HOSP IP/OBS HIGH 50: CPT

## 2019-08-27 PROCEDURE — 72158 MRI LUMBAR SPINE W/O & W/DYE: CPT | Mod: 26

## 2019-08-27 RX ORDER — SALIVA SUBSTITUTE COMB NO.11 351 MG
30 POWDER IN PACKET (EA) MUCOUS MEMBRANE
Refills: 0 | Status: DISCONTINUED | OUTPATIENT
Start: 2019-08-27 | End: 2019-09-04

## 2019-08-27 RX ORDER — SODIUM CHLORIDE 9 MG/ML
1000 INJECTION INTRAMUSCULAR; INTRAVENOUS; SUBCUTANEOUS
Refills: 0 | Status: DISCONTINUED | OUTPATIENT
Start: 2019-08-27 | End: 2019-08-29

## 2019-08-27 RX ORDER — SALIVA SUBSTITUTE COMB NO.11 351 MG
30 POWDER IN PACKET (EA) MUCOUS MEMBRANE
Refills: 0 | Status: DISCONTINUED | OUTPATIENT
Start: 2019-08-27 | End: 2019-08-27

## 2019-08-27 RX ADMIN — OXYCODONE HYDROCHLORIDE 15 MILLIGRAM(S): 5 TABLET ORAL at 23:30

## 2019-08-27 RX ADMIN — FLUCONAZOLE 100 MILLIGRAM(S): 150 TABLET ORAL at 12:45

## 2019-08-27 RX ADMIN — BENZOCAINE AND MENTHOL 1 LOZENGE: 5; 1 LIQUID ORAL at 22:30

## 2019-08-27 RX ADMIN — BENZOCAINE AND MENTHOL 1 LOZENGE: 5; 1 LIQUID ORAL at 06:06

## 2019-08-27 RX ADMIN — LORATADINE 10 MILLIGRAM(S): 10 TABLET ORAL at 12:44

## 2019-08-27 RX ADMIN — PANTOPRAZOLE SODIUM 40 MILLIGRAM(S): 20 TABLET, DELAYED RELEASE ORAL at 17:20

## 2019-08-27 RX ADMIN — BUPROPION HYDROCHLORIDE 150 MILLIGRAM(S): 150 TABLET, EXTENDED RELEASE ORAL at 22:30

## 2019-08-27 RX ADMIN — SODIUM CHLORIDE 60 MILLILITER(S): 9 INJECTION INTRAMUSCULAR; INTRAVENOUS; SUBCUTANEOUS at 17:03

## 2019-08-27 RX ADMIN — OXYCODONE HYDROCHLORIDE 15 MILLIGRAM(S): 5 TABLET ORAL at 17:25

## 2019-08-27 RX ADMIN — BENZOCAINE AND MENTHOL 1 LOZENGE: 5; 1 LIQUID ORAL at 17:03

## 2019-08-27 RX ADMIN — OXYCODONE HYDROCHLORIDE 15 MILLIGRAM(S): 5 TABLET ORAL at 22:29

## 2019-08-27 RX ADMIN — OXYCODONE HYDROCHLORIDE 15 MILLIGRAM(S): 5 TABLET ORAL at 12:49

## 2019-08-27 RX ADMIN — DIPHENHYDRAMINE HYDROCHLORIDE AND LIDOCAINE HYDROCHLORIDE AND ALUMINUM HYDROXIDE AND MAGNESIUM HYDRO 15 MILLILITER(S): KIT at 12:44

## 2019-08-27 RX ADMIN — DIPHENHYDRAMINE HYDROCHLORIDE AND LIDOCAINE HYDROCHLORIDE AND ALUMINUM HYDROXIDE AND MAGNESIUM HYDRO 15 MILLILITER(S): KIT at 18:06

## 2019-08-27 RX ADMIN — OXYCODONE HYDROCHLORIDE 60 MILLIGRAM(S): 5 TABLET ORAL at 06:25

## 2019-08-27 RX ADMIN — OXYCODONE HYDROCHLORIDE 15 MILLIGRAM(S): 5 TABLET ORAL at 10:10

## 2019-08-27 RX ADMIN — ENOXAPARIN SODIUM 40 MILLIGRAM(S): 100 INJECTION SUBCUTANEOUS at 12:50

## 2019-08-27 RX ADMIN — ESCITALOPRAM OXALATE 20 MILLIGRAM(S): 10 TABLET, FILM COATED ORAL at 22:30

## 2019-08-27 RX ADMIN — PANTOPRAZOLE SODIUM 40 MILLIGRAM(S): 20 TABLET, DELAYED RELEASE ORAL at 06:25

## 2019-08-27 RX ADMIN — Medication 1 MILLIGRAM(S): at 12:44

## 2019-08-27 RX ADMIN — OXYCODONE HYDROCHLORIDE 60 MILLIGRAM(S): 5 TABLET ORAL at 18:07

## 2019-08-27 RX ADMIN — DIPHENHYDRAMINE HYDROCHLORIDE AND LIDOCAINE HYDROCHLORIDE AND ALUMINUM HYDROXIDE AND MAGNESIUM HYDRO 15 MILLILITER(S): KIT at 06:25

## 2019-08-27 RX ADMIN — OXYCODONE HYDROCHLORIDE 15 MILLIGRAM(S): 5 TABLET ORAL at 17:19

## 2019-08-27 RX ADMIN — DIPHENHYDRAMINE HYDROCHLORIDE AND LIDOCAINE HYDROCHLORIDE AND ALUMINUM HYDROXIDE AND MAGNESIUM HYDRO 15 MILLILITER(S): KIT at 22:35

## 2019-08-27 RX ADMIN — Medication 480 MICROGRAM(S): at 15:06

## 2019-08-27 RX ADMIN — OXYCODONE HYDROCHLORIDE 15 MILLIGRAM(S): 5 TABLET ORAL at 09:50

## 2019-08-27 RX ADMIN — Medication 100 MILLIGRAM(S): at 17:23

## 2019-08-27 RX ADMIN — OXYCODONE HYDROCHLORIDE 60 MILLIGRAM(S): 5 TABLET ORAL at 18:04

## 2019-08-27 RX ADMIN — Medication 30 MILLILITER(S): at 22:28

## 2019-08-27 RX ADMIN — OXYCODONE HYDROCHLORIDE 15 MILLIGRAM(S): 5 TABLET ORAL at 13:24

## 2019-08-27 RX ADMIN — OXYCODONE HYDROCHLORIDE 15 MILLIGRAM(S): 5 TABLET ORAL at 06:41

## 2019-08-27 RX ADMIN — SENNA PLUS 2 TABLET(S): 8.6 TABLET ORAL at 22:30

## 2019-08-27 NOTE — PROGRESS NOTE ADULT - ASSESSMENT
This is a 44 year old female with PMHx of tobacco abuse, metastatic adenocarcinoma of the lung s/p chemo 4 cycles Carbo, Alimta and Keytruda then Taxterene/cyramza and then Gemzar who presented post chemo treatment for pain on swallowing to both solids and liquids     Oral Thrush with possible Esophageal Candidiasis  -Immunocompromised on Active Chemotherapy   -Presenting with Odynophagia with both solids and Liquid, throat pain and Discomfort    -Pt continues to complain of Esophageal burning and discomfort   --Oral Trush resolved on exam  -GI consulted for possible EGD,    * recs: artificial saliva, GI to re-evaluate for possible EGD in 24-48hrs  -Continue with IV Fluconazole, lozenges and Mouth wash   - Advance diet as tolerated     Metastatic Lung Adenocarcinoma  - Progressive despite multiple chemo regimens  - Last chemo: Alimta and Avastin on 8/20  - Radiation of neck completed on 8/14  - Continue with home pain meds and folic acid   -Per Heme/OnC: Get MRI LS spine with and without contrast: ordered      *Pt was scheduled to get this done outpt for LE numbness and pain     Pancytopenia 2/2 to chemotherapy   -Asymptomatic  - S/P Neupogen 480 mcg today: 8/26/19   -Transfuse if Hb<7 and Platelets less than 20k or if any active bleeding     DVT ppx: Lovenox  GI ppx: Protonix

## 2019-08-27 NOTE — PROGRESS NOTE ADULT - SUBJECTIVE AND OBJECTIVE BOX
Patient is a 44y old  Female who presents with a chief complaint of Difficulty swallowing (26 Aug 2019 14:00)      Subjective: She feels slighlt better. Everyday she is noticing slight improvement      Vital Signs Last 24 Hrs  T(C): 36.7 (27 Aug 2019 05:37), Max: 37.2 (26 Aug 2019 19:17)  T(F): 98 (27 Aug 2019 05:37), Max: 98.9 (26 Aug 2019 19:17)  HR: 82 (27 Aug 2019 05:37) (70 - 82)  BP: 129/77 (27 Aug 2019 05:37) (129/77 - 177/79)  BP(mean): --  RR: 16 (27 Aug 2019 05:37) (16 - 18)  SpO2: --    PHYSICAL EXAM  General: adult in NAD  Neck: supple  CV: normal S1/S2   Lungs: positive air movement b/l ant lungs,clear to auscultation, no wheezes, no rales  Abdomen: soft non-tender  Ext: no edema  Neuro: alert and oriented X 4, no focal deficits    MEDICATIONS  (STANDING):  benzocaine 15 mG/menthol 3.6 mG (Sugar-Free) Lozenge 1 Lozenge Oral three times a day  buPROPion XL . 150 milliGRAM(s) Oral daily  chlorhexidine 4% Liquid 1 Application(s) Topical <User Schedule>  docusate sodium 100 milliGRAM(s) Oral two times a day  enoxaparin Injectable 40 milliGRAM(s) SubCutaneous daily  escitalopram 20 milliGRAM(s) Oral at bedtime  filgrastim-sndz Injectable 480 MICROGram(s) SubCutaneous daily  FIRST- Mouthwash  BLM 15 milliLiter(s) Swish and Spit four times a day  fluconAZOLE IVPB      fluconAZOLE IVPB 200 milliGRAM(s) IV Intermittent every 24 hours  folic acid 1 milliGRAM(s) Oral daily  loratadine 10 milliGRAM(s) Oral daily  oxyCODONE  ER Tablet 60 milliGRAM(s) Oral every 12 hours  pantoprazole    Tablet 40 milliGRAM(s) Oral two times a day  senna 2 Tablet(s) Oral at bedtime    MEDICATIONS  (PRN):  ibuprofen  Tablet. 200 milliGRAM(s) Oral every 4 hours PRN Mild Pain (1 - 3)  oxyCODONE    IR 15 milliGRAM(s) Oral every 3 hours PRN Severe Pain (7 - 10)      LABS:                          9.3    0.69  )-----------( 127      ( 26 Aug 2019 04:30 )             29.5         Mean Cell Volume : 83.6 fL  Mean Cell Hemoglobin : 26.3 pg  Mean Cell Hemoglobin Concentration : 31.5 g/dL  Auto Neutrophil # : 0.16 K/uL  Auto Lymphocyte # : 0.39 K/uL  Auto Monocyte # : 0.08 K/uL  Auto Eosinophil # : 0.04 K/uL  Auto Basophil # : 0.01 K/uL  Auto Neutrophil % : 23.3 %  Auto Lymphocyte % : 56.5 %  Auto Monocyte % : 11.6 %  Auto Eosinophil % : 5.8 %  Auto Basophil % : 1.4 %      Serial CBC's  08-26 @ 04:30  Hct-29.5 / Hgb-9.3 / Plat-127 / RBC-3.53 / WBC-0.69  Serial CBC's  08-25 @ 07:50  Hct-29.9 / Hgb-9.3 / Plat-156 / RBC-3.56 / WBC-1.34  Serial CBC's  08-23 @ 22:41  Hct-34.0 / Hgb-10.5 / Plat-204 / RBC-4.00 / WBC-3.48      08-26    138  |  96<L>  |  9<L>  ----------------------------<  90  3.9   |  30  |  0.5<L>    Ca    8.5      26 Aug 2019 04:30  Mg     1.9     08-26    TPro  6.2  /  Alb  3.5  /  TBili  0.4  /  DBili  x   /  AST  15  /  ALT  9   /  AlkPhos  106  08-26                                  BLOOD SMEAR INTERPRETATION:       RADIOLOGY & ADDITIONAL STUDIES:

## 2019-08-27 NOTE — PROGRESS NOTE ADULT - SUBJECTIVE AND OBJECTIVE BOX
REMEDIOS DENTON  [id number]    INTERVAL HPI/OVERNIGHT EVENTS:  Patietn seen and examined. NAD.     MEDICATIONS  (STANDING):  benzocaine 15 mG/menthol 3.6 mG (Sugar-Free) Lozenge 1 Lozenge Oral three times a day  buPROPion XL . 150 milliGRAM(s) Oral daily  chlorhexidine 4% Liquid 1 Application(s) Topical <User Schedule>  docusate sodium 100 milliGRAM(s) Oral two times a day  enoxaparin Injectable 40 milliGRAM(s) SubCutaneous daily  escitalopram 20 milliGRAM(s) Oral at bedtime  filgrastim-sndz Injectable 480 MICROGram(s) SubCutaneous daily  FIRST- Mouthwash  BLM 15 milliLiter(s) Swish and Spit four times a day  fluconAZOLE IVPB      fluconAZOLE IVPB 200 milliGRAM(s) IV Intermittent every 24 hours  folic acid 1 milliGRAM(s) Oral daily  loratadine 10 milliGRAM(s) Oral daily  oxyCODONE  ER Tablet 60 milliGRAM(s) Oral every 12 hours  pantoprazole    Tablet 40 milliGRAM(s) Oral two times a day  senna 2 Tablet(s) Oral at bedtime    MEDICATIONS  (PRN):  ibuprofen  Tablet. 200 milliGRAM(s) Oral every 4 hours PRN Mild Pain (1 - 3)  oxyCODONE    IR 15 milliGRAM(s) Oral every 3 hours PRN Severe Pain (7 - 10)  Saliva Substitute (CAPHOSOL) 30 milliLiter(s) Swish and Spit five times a day PRN Dry oral cavity    Allergies  dust (Sneezing; Rhinorrhea)  Erythromycin Base (Other)    Intolerances    Review of Systems:   General: No fevers/chills, no fatigue  HEENT: No blurry vision, dysphagia, or odynophagia  CVS: No CP/palpitations  Resp: No SOB/wheezing  GI: No N/V/C/D/abdominal pain  MSK:   Skin: No new rashes  Neuro: No headaches    Vital Signs Last 24 Hrs  T(C): 36.5 (27 Aug 2019 13:39), Max: 37.2 (26 Aug 2019 19:17)  T(F): 97.7 (27 Aug 2019 13:39), Max: 98.9 (26 Aug 2019 19:17)  HR: 89 (27 Aug 2019 13:39) (70 - 89)  BP: 133/76 (27 Aug 2019 13:39) (129/77 - 140/73)  BP(mean): --  RR: 18 (27 Aug 2019 13:39) (16 - 18)  SpO2: --    Physical Exam:  General: NAD  HEENT: EOMI, MMM  Cardio: +S1/S2, RRR  Resp: CTA b/l  GI: +BS, soft, NT/ND  MSK:  Neuro: AAOx3  Psych: wnl    LABS:                        8.7    0.71  )-----------( 119      ( 27 Aug 2019 07:26 )             27.6     08-26    138  |  96<L>  |  9<L>  ----------------------------<  90  3.9   |  30  |  0.5<L>    Ca    8.5      26 Aug 2019 04:30  Mg     1.9     08-26    TPro  6.2  /  Alb  3.5  /  TBili  0.4  /  DBili  x   /  AST  15  /  ALT  9   /  AlkPhos  106  08-26            RADIOLOGY & ADDITIONAL TESTS: REMEDIOS DENTON  [id number]    INTERVAL HPI/OVERNIGHT EVENTS:  Patient seen and examined. NAD. Patient notes scott PO intake has improved however slightly. She also notes her odynophagia has had minimal improvement. She is currently begin given the swish and swallow nystatin, fluconazole, and also the artifical saliva     MEDICATIONS  (STANDING):  benzocaine 15 mG/menthol 3.6 mG (Sugar-Free) Lozenge 1 Lozenge Oral three times a day  buPROPion XL . 150 milliGRAM(s) Oral daily  chlorhexidine 4% Liquid 1 Application(s) Topical <User Schedule>  docusate sodium 100 milliGRAM(s) Oral two times a day  enoxaparin Injectable 40 milliGRAM(s) SubCutaneous daily  escitalopram 20 milliGRAM(s) Oral at bedtime  filgrastim-sndz Injectable 480 MICROGram(s) SubCutaneous daily  FIRST- Mouthwash  BLM 15 milliLiter(s) Swish and Spit four times a day  fluconAZOLE IVPB      fluconAZOLE IVPB 200 milliGRAM(s) IV Intermittent every 24 hours  folic acid 1 milliGRAM(s) Oral daily  loratadine 10 milliGRAM(s) Oral daily  oxyCODONE  ER Tablet 60 milliGRAM(s) Oral every 12 hours  pantoprazole    Tablet 40 milliGRAM(s) Oral two times a day  senna 2 Tablet(s) Oral at bedtime    MEDICATIONS  (PRN):  ibuprofen  Tablet. 200 milliGRAM(s) Oral every 4 hours PRN Mild Pain (1 - 3)  oxyCODONE    IR 15 milliGRAM(s) Oral every 3 hours PRN Severe Pain (7 - 10)  Saliva Substitute (CAPHOSOL) 30 milliLiter(s) Swish and Spit five times a day PRN Dry oral cavity    Allergies  dust (Sneezing; Rhinorrhea)  Erythromycin Base (Other)    Intolerances    Review of Systems:   General: No fevers/chills, no fatigue  HEENT: No blurry vision, Positive dysphagia and odynophagia  CVS: No CP/palpitations  Resp: No SOB/wheezing  GI: No N/V/C/D/abdominal pain  MSK: no mylagias  Skin: No new rashes  Neuro: No headaches    Vital Signs Last 24 Hrs  T(C): 36.5 (27 Aug 2019 13:39), Max: 37.2 (26 Aug 2019 19:17)  T(F): 97.7 (27 Aug 2019 13:39), Max: 98.9 (26 Aug 2019 19:17)  HR: 89 (27 Aug 2019 13:39) (70 - 89)  BP: 133/76 (27 Aug 2019 13:39) (129/77 - 140/73)  BP(mean): --  RR: 18 (27 Aug 2019 13:39) (16 - 18)  SpO2: --    Physical Exam:  General: NAD  HEENT: EOMI, MMM  Cardio: +S1/S2, RRR  Resp: CTA b/l  GI: +BS, soft, NT/ND  MSK: no b/l le edema   Neuro: AAOx3  Psych: wnl    LABS:                        8.7    0.71  )-----------( 119      ( 27 Aug 2019 07:26 )             27.6     08-26    138  |  96<L>  |  9<L>  ----------------------------<  90  3.9   |  30  |  0.5<L>    Ca    8.5      26 Aug 2019 04:30  Mg     1.9     08-26    TPro  6.2  /  Alb  3.5  /  TBili  0.4  /  DBili  x   /  AST  15  /  ALT  9   /  AlkPhos  106  08-26    RADIOLOGY & ADDITIONAL TESTS:    < from: NM PET/CT Onc FDG Skull to Thigh, Subsq (07.08.19 @ 08:41) >  IMPRESSION:     1. Since March 18, 2019, multiple new sites of pathologic FDG uptake,   compatible with increased biologic tumor activity.    2. Specifically, several new FDG avid osseous metastases, with max SUV   17.5 within a new lytic lesion within the anterior left 8th rib.    3. Several new FDG avid subcutaneous soft tissue nodules, with max SUV   14.7 within a 7 mm subcutaneous nodule along the left flank.    4. Several new FDG avid peritoneal and extraperitoneal soft tissue   nodules, with max SUV 10.4 within a new 1.9 cm right-sided   extraperitoneal nodule along the right flank.    5. New FDG avid right adrenal gland nodule, with max SUV 15.8.    6. New focal FDG uptake along the right hemidiaphragm, without definite   CT correlate, with max SUV 10.4.    7. Multiple new and increased FDG avid lymph nodes, with max SUV 20.0   within a 1.9 cm left para-aortic node.    8. Slightly decreased FDG uptake within a centrally necrotic left upper   lobe/left hilar mass, with max SUV 16.1 (previously 29.4, which reflects   a 45% decrease); a new FDG avid airspace opacity within the superior   segment of the left lower lobe is suspicious for postobstructive   pneumonia.    9. Stable FDG uptake within right cervical adenopathy, with max SUV 23.7.    < end of copied text >

## 2019-08-27 NOTE — PROGRESS NOTE ADULT - SUBJECTIVE AND OBJECTIVE BOX
Patient is a 44y old  Female who presents with a chief complaint of Difficulty swallowing      Admitted to medicine with primary diagnosis of Oral thrush with possible Esophagal Candidiasis    Interval events: none    Today is hosp day 3  Patient is resting comfortably in bed  odynophagia was starting to improve but now getting worse  Tolerating only liquid diet for now, regular BM  Ambulates independently  Plan: f/up : MRI of Lumbar spine for Back pain to evaluate for Lesions   -GI final recs for Worsening odynophagia for possible EGD      PAST MEDICAL & SURGICAL HISTORY:  Lung cancer  Anxiety and depression  History of cholecystectomy      MEDICATIONS  (STANDING):  benzocaine 15 mG/menthol 3.6 mG (Sugar-Free) Lozenge 1 Lozenge Oral three times a day  buPROPion XL . 150 milliGRAM(s) Oral daily  chlorhexidine 4% Liquid 1 Application(s) Topical <User Schedule>  docusate sodium 100 milliGRAM(s) Oral two times a day  enoxaparin Injectable 40 milliGRAM(s) SubCutaneous daily  escitalopram 20 milliGRAM(s) Oral at bedtime  filgrastim-sndz Injectable 480 MICROGram(s) SubCutaneous daily  FIRST- Mouthwash  BLM 15 milliLiter(s) Swish and Spit four times a day  fluconAZOLE IVPB      fluconAZOLE IVPB 200 milliGRAM(s) IV Intermittent every 24 hours  folic acid 1 milliGRAM(s) Oral daily  loratadine 10 milliGRAM(s) Oral daily  oxyCODONE  ER Tablet 60 milliGRAM(s) Oral every 12 hours  pantoprazole    Tablet 40 milliGRAM(s) Oral two times a day  senna 2 Tablet(s) Oral at bedtime    MEDICATIONS  (PRN):  ibuprofen  Tablet. 200 milliGRAM(s) Oral every 4 hours PRN Mild Pain (1 - 3)  oxyCODONE    IR 15 milliGRAM(s) Oral every 3 hours PRN Severe Pain (7 - 10)  Saliva Substitute (CAPHOSOL) 30 milliLiter(s) Swish and Spit five times a day PRN Dry oral cavity          Vital Signs Last 24 Hrs  T(C): 36.7 (27 Aug 2019 05:37), Max: 37.2 (26 Aug 2019 19:17)  T(F): 98 (27 Aug 2019 05:37), Max: 98.9 (26 Aug 2019 19:17)  HR: 82 (27 Aug 2019 05:37) (70 - 82)  BP: 129/77 (27 Aug 2019 05:37) (129/77 - 177/79)  BP(mean): --  RR: 16 (27 Aug 2019 05:37) (16 - 18)  SpO2: --  CAPILLARY BLOOD GLUCOSE        I&O's Summary      Physical Exam:    - -     General : NAD, resting comfortably in bed    -      Oral Cavity: Moist, Oral thrush resolved     -      Cardiac: S1/S2 appreciated RRR, no murmurs    -      Pulm: CTA b/l, no adventitious sounds    -      GI: Soft, NT, ND, + odynophagia     -      Musculoskeletal: Atraumatic, No LE edema B/L, No skin color changes    -      Neuro: AO x 4, nonfocal     Labs:                        8.7    0.71  )-----------( 119      ( 27 Aug 2019 07:26 )             27.6             08-26    138  |  96<L>  |  9<L>  ----------------------------<  90  3.9   |  30  |  0.5<L>    Ca    8.5      26 Aug 2019 04:30  Mg     1.9     08-26    TPro  6.2  /  Alb  3.5  /  TBili  0.4  /  DBili  x   /  AST  15  /  ALT  9   /  AlkPhos  106  08-26    LIVER FUNCTIONS - ( 26 Aug 2019 04:30 )  Alb: 3.5 g/dL / Pro: 6.2 g/dL / ALK PHOS: 106 U/L / ALT: 9 U/L / AST: 15 U/L / GGT: x                               Imaging:    ECG:

## 2019-08-27 NOTE — PROGRESS NOTE ADULT - ASSESSMENT
43 yo F  PMH: Metastatic adenocarcinoma of the lung diagnosed in 2018 s/p multiple lines of chemotherapy, currently on Alimta and Avastin (received this on Aug 20) and had received 5 radiations to the neck (completed Aug 14)  CC: Odynophagia and Dysphagia    ASSESSMENT / PLAN:    # Dysphagia and Odynophagia likely multifactorial in nature, RT and possible Candidiasis  - Continue Fluconazole, Nystatin, and artifical saliva  - NPO after midnight for EGD tomorrow    Will continue to follow with you. 43 yo F  PMH: Metastatic adenocarcinoma of the lung diagnosed in 2018 s/p multiple lines of chemotherapy, currently on Alimta and Avastin (received this on Aug 20) and had received 5 radiations to the neck (completed Aug 14)  CC: Odynophagia and Dysphagia    ASSESSMENT / PLAN:    # Dysphagia and Odynophagia likely multifactorial in nature, RT and possible Candidiasis  - Continue Fluconazole, Nystatin, and artifical saliva  - Case discussed with attending, NPO after midnight for EGD tomorrow    Will continue to follow with you. 45 yo F  PMH: Metastatic adenocarcinoma of the lung diagnosed in 2018 s/p multiple lines of chemotherapy, currently on Alimta and Avastin (received this on Aug 20) and had received 5 radiations to the neck (completed Aug 14)  CC: Odynophagia and Dysphagia    ASSESSMENT / PLAN:    # Dysphagia and Odynophagia likely multifactorial in nature, RT and possible Candidiasis  - Continue Fluconazole, Nystatin, and artifical saliva  - Case discussed with attending, NPO after midnight for EGD tomorrow  Hold s/c heparin in am if applicable    Will continue to follow with you.

## 2019-08-27 NOTE — PROGRESS NOTE ADULT - ATTENDING COMMENTS
pancytopenia 2/2 chemotherapy continue with Neupogen.   Dysphagia is improving.   MRI of L-spine is suggestive of leptomeningeal disease.   These findings were discussed with Анна.   She is agreeable to arrange LP for CSF sampling with Neuroradiology.   Supportive care for now.

## 2019-08-27 NOTE — PROGRESS NOTE ADULT - ASSESSMENT
45 y/o F with h/o smoking- quit 4 years ago with Metastatic adenocarcinoma of the lung, PDL1 5%, EGFT, Alk, BRAF, KRAS, ROS1 negative diagnosed in 2018 s/p multiple lines of chemotherapy - 4 cycles-Carbo, Alimta and Keytruda in 2018, progressed and then received Taxterene/cyramza and then was given Gemzar in April 2019. She has received SRS to brain (in 2018 and also in May 2019).  Also received radiation to Lt 8th rib and flank area. [place] [facility] [place]  [place] not eligible for clinical trial. Now she is on Alimta and Avastin (received this on Aug 20) and had received 5 radiations to the neck(completed Aug 14)     1 day following chemo- pt started having pain on swallowing to both solids and liquids. No fever/ chills. No recent weight loss  Also c/o epigastric burning pain and discomfort     1) Metastatic Lung adenocarcinoma PDL1 5%, EGFT, Alk, BRAF, KRAS, ROS1 negative- progression with multiple lines of chemotherapy  Currently on Alimta and Avastin received Aug 20  Also radiation to the neck -completed Aug 14th  c/w home dose of pain meds for her pain from metastasis   C/w folic acid as pt is on Avastin  f/u MRI LS spine with and without contrast pending   Pt was scheduled to get this done outpt for LE numbness and pain     2) Pain on swallowing 2/2 to thrush and probably has candida involvement of the esophagus as well   Oral exam - reveals thrush- looks better today   She probably has candida esophagitis  c/w fluconazole and lozenges and magic mouth wash   c/w home dose of pain meds   Gi follow up. If no improvement after counts improve might needs scope ?      3) Pancytopenia 2/2 to chemotherapy   Labs pending today- will follow up   She did not receive neulasta after her chemo . s/p Neupogen 480 mcg on 8/26  No fever/chills  Continue to monitor counts  Transfuse if Hb<7 and Platelets less than 20k or if any active bleeding       DVT ppx- on lovenox. Continue as long as the PLt >50k

## 2019-08-28 ENCOUNTER — APPOINTMENT (OUTPATIENT)
Dept: HEMATOLOGY ONCOLOGY | Facility: CLINIC | Age: 44
End: 2019-08-28

## 2019-08-28 ENCOUNTER — RESULT REVIEW (OUTPATIENT)
Age: 44
End: 2019-08-28

## 2019-08-28 ENCOUNTER — TRANSCRIPTION ENCOUNTER (OUTPATIENT)
Age: 44
End: 2019-08-28

## 2019-08-28 DIAGNOSIS — Z71.89 OTHER SPECIFIED COUNSELING: ICD-10-CM

## 2019-08-28 LAB
ANION GAP SERPL CALC-SCNC: 14 MMOL/L — SIGNIFICANT CHANGE UP (ref 7–14)
BASOPHILS # BLD AUTO: 0.01 K/UL — SIGNIFICANT CHANGE UP (ref 0–0.2)
BASOPHILS NFR BLD AUTO: 0.5 % — SIGNIFICANT CHANGE UP (ref 0–1)
BUN SERPL-MCNC: 6 MG/DL — LOW (ref 10–20)
CALCIUM SERPL-MCNC: 8.5 MG/DL — SIGNIFICANT CHANGE UP (ref 8.5–10.1)
CHLORIDE SERPL-SCNC: 98 MMOL/L — SIGNIFICANT CHANGE UP (ref 98–110)
CO2 SERPL-SCNC: 32 MMOL/L — SIGNIFICANT CHANGE UP (ref 17–32)
CREAT SERPL-MCNC: 0.6 MG/DL — LOW (ref 0.7–1.5)
EOSINOPHIL # BLD AUTO: 0.02 K/UL — SIGNIFICANT CHANGE UP (ref 0–0.7)
EOSINOPHIL NFR BLD AUTO: 1 % — SIGNIFICANT CHANGE UP (ref 0–8)
GLUCOSE SERPL-MCNC: 52 MG/DL — LOW (ref 70–99)
HCT VFR BLD CALC: 28.2 % — LOW (ref 37–47)
HGB BLD-MCNC: 8.8 G/DL — LOW (ref 12–16)
IMM GRANULOCYTES NFR BLD AUTO: 1.5 % — HIGH (ref 0.1–0.3)
INR BLD: 1.21 RATIO — SIGNIFICANT CHANGE UP (ref 0.65–1.3)
LYMPHOCYTES # BLD AUTO: 0.65 K/UL — LOW (ref 1.2–3.4)
LYMPHOCYTES # BLD AUTO: 32.2 % — SIGNIFICANT CHANGE UP (ref 20.5–51.1)
MCHC RBC-ENTMCNC: 26.2 PG — LOW (ref 27–31)
MCHC RBC-ENTMCNC: 31.2 G/DL — LOW (ref 32–37)
MCV RBC AUTO: 83.9 FL — SIGNIFICANT CHANGE UP (ref 81–99)
MONOCYTES # BLD AUTO: 0.31 K/UL — SIGNIFICANT CHANGE UP (ref 0.1–0.6)
MONOCYTES NFR BLD AUTO: 15.3 % — HIGH (ref 1.7–9.3)
NEUTROPHILS # BLD AUTO: 1 K/UL — LOW (ref 1.4–6.5)
NEUTROPHILS NFR BLD AUTO: 49.5 % — SIGNIFICANT CHANGE UP (ref 42.2–75.2)
NRBC # BLD: 0 /100 WBCS — SIGNIFICANT CHANGE UP (ref 0–0)
PLATELET # BLD AUTO: 75 K/UL — LOW (ref 130–400)
POTASSIUM SERPL-MCNC: 3.7 MMOL/L — SIGNIFICANT CHANGE UP (ref 3.5–5)
POTASSIUM SERPL-SCNC: 3.7 MMOL/L — SIGNIFICANT CHANGE UP (ref 3.5–5)
PROTHROM AB SERPL-ACNC: 13.9 SEC — HIGH (ref 9.95–12.87)
RBC # BLD: 3.36 M/UL — LOW (ref 4.2–5.4)
RBC # FLD: 13.4 % — SIGNIFICANT CHANGE UP (ref 11.5–14.5)
SODIUM SERPL-SCNC: 144 MMOL/L — SIGNIFICANT CHANGE UP (ref 135–146)
WBC # BLD: 2.02 K/UL — LOW (ref 4.8–10.8)
WBC # FLD AUTO: 2.02 K/UL — LOW (ref 4.8–10.8)

## 2019-08-28 PROCEDURE — 99231 SBSQ HOSP IP/OBS SF/LOW 25: CPT

## 2019-08-28 PROCEDURE — 88305 TISSUE EXAM BY PATHOLOGIST: CPT | Mod: 26

## 2019-08-28 PROCEDURE — 43239 EGD BIOPSY SINGLE/MULTIPLE: CPT

## 2019-08-28 PROCEDURE — 88312 SPECIAL STAINS GROUP 1: CPT | Mod: 26

## 2019-08-28 RX ADMIN — ESCITALOPRAM OXALATE 20 MILLIGRAM(S): 10 TABLET, FILM COATED ORAL at 21:09

## 2019-08-28 RX ADMIN — OXYCODONE HYDROCHLORIDE 60 MILLIGRAM(S): 5 TABLET ORAL at 05:25

## 2019-08-28 RX ADMIN — OXYCODONE HYDROCHLORIDE 15 MILLIGRAM(S): 5 TABLET ORAL at 17:42

## 2019-08-28 RX ADMIN — FLUCONAZOLE 100 MILLIGRAM(S): 150 TABLET ORAL at 15:57

## 2019-08-28 RX ADMIN — OXYCODONE HYDROCHLORIDE 15 MILLIGRAM(S): 5 TABLET ORAL at 22:10

## 2019-08-28 RX ADMIN — BUPROPION HYDROCHLORIDE 150 MILLIGRAM(S): 150 TABLET, EXTENDED RELEASE ORAL at 21:09

## 2019-08-28 RX ADMIN — Medication 30 MILLILITER(S): at 15:58

## 2019-08-28 RX ADMIN — Medication 30 MILLILITER(S): at 17:22

## 2019-08-28 RX ADMIN — DIPHENHYDRAMINE HYDROCHLORIDE AND LIDOCAINE HYDROCHLORIDE AND ALUMINUM HYDROXIDE AND MAGNESIUM HYDRO 15 MILLILITER(S): KIT at 17:22

## 2019-08-28 RX ADMIN — DIPHENHYDRAMINE HYDROCHLORIDE AND LIDOCAINE HYDROCHLORIDE AND ALUMINUM HYDROXIDE AND MAGNESIUM HYDRO 15 MILLILITER(S): KIT at 15:57

## 2019-08-28 RX ADMIN — OXYCODONE HYDROCHLORIDE 15 MILLIGRAM(S): 5 TABLET ORAL at 12:00

## 2019-08-28 RX ADMIN — Medication 1 MILLIGRAM(S): at 15:58

## 2019-08-28 RX ADMIN — PANTOPRAZOLE SODIUM 40 MILLIGRAM(S): 20 TABLET, DELAYED RELEASE ORAL at 05:25

## 2019-08-28 RX ADMIN — OXYCODONE HYDROCHLORIDE 15 MILLIGRAM(S): 5 TABLET ORAL at 17:28

## 2019-08-28 RX ADMIN — OXYCODONE HYDROCHLORIDE 15 MILLIGRAM(S): 5 TABLET ORAL at 10:51

## 2019-08-28 RX ADMIN — OXYCODONE HYDROCHLORIDE 60 MILLIGRAM(S): 5 TABLET ORAL at 05:26

## 2019-08-28 RX ADMIN — OXYCODONE HYDROCHLORIDE 15 MILLIGRAM(S): 5 TABLET ORAL at 21:09

## 2019-08-28 RX ADMIN — Medication 30 MILLILITER(S): at 05:25

## 2019-08-28 RX ADMIN — Medication 480 MICROGRAM(S): at 15:00

## 2019-08-28 RX ADMIN — SODIUM CHLORIDE 60 MILLILITER(S): 9 INJECTION INTRAMUSCULAR; INTRAVENOUS; SUBCUTANEOUS at 17:22

## 2019-08-28 RX ADMIN — PANTOPRAZOLE SODIUM 40 MILLIGRAM(S): 20 TABLET, DELAYED RELEASE ORAL at 17:22

## 2019-08-28 RX ADMIN — OXYCODONE HYDROCHLORIDE 60 MILLIGRAM(S): 5 TABLET ORAL at 17:25

## 2019-08-28 RX ADMIN — LORATADINE 10 MILLIGRAM(S): 10 TABLET ORAL at 15:58

## 2019-08-28 RX ADMIN — OXYCODONE HYDROCHLORIDE 60 MILLIGRAM(S): 5 TABLET ORAL at 17:42

## 2019-08-28 NOTE — PHYSICAL THERAPY INITIAL EVALUATION ADULT - GENERAL OBSERVATIONS, REHAB EVAL
Pt states she started using SC at home although unsure if she is using it correctly; started using SC due to RLE pain with weight bearing (due to tumors as per pt). PT educated pt on role of b/s PT, and content of PT IE for when pt is agreeable,; including teaching pt correct use of SC if AD is appropriate for pt.  PT to f/u.

## 2019-08-28 NOTE — PROGRESS NOTE ADULT - SUBJECTIVE AND OBJECTIVE BOX
Patient is a 44y old  Female who presents with a chief complaint of Difficulty swallowing (27 Aug 2019 15:35)      Subjective: No fever/ chills  Still has pain on swallowing, scheduled for EGD today       Vital Signs Last 24 Hrs  T(C): 37.2 (28 Aug 2019 05:00), Max: 37.2 (28 Aug 2019 05:00)  T(F): 98.9 (28 Aug 2019 05:00), Max: 98.9 (28 Aug 2019 05:00)  HR: 84 (28 Aug 2019 05:00) (84 - 89)  BP: 145/81 (28 Aug 2019 05:00) (133/76 - 145/81)  BP(mean): --  RR: 18 (28 Aug 2019 05:00) (18 - 18)  SpO2: --    PHYSICAL EXAM  General: adult in NAD  Neck: supple  CV: normal S1/S2   Lungs: positive air movement b/l ant lungs,clear to auscultation, no wheezes, no rales  Abdomen: soft non-tender   Ext: no edema  Neuro: alert and oriented X 4, no focal deficits    MEDICATIONS  (STANDING):  benzocaine 15 mG/menthol 3.6 mG (Sugar-Free) Lozenge 1 Lozenge Oral three times a day  buPROPion XL . 150 milliGRAM(s) Oral daily  chlorhexidine 4% Liquid 1 Application(s) Topical <User Schedule>  docusate sodium 100 milliGRAM(s) Oral two times a day  escitalopram 20 milliGRAM(s) Oral at bedtime  filgrastim-sndz Injectable 480 MICROGram(s) SubCutaneous daily  FIRST- Mouthwash  BLM 15 milliLiter(s) Swish and Spit four times a day  fluconAZOLE IVPB      fluconAZOLE IVPB 200 milliGRAM(s) IV Intermittent every 24 hours  folic acid 1 milliGRAM(s) Oral daily  loratadine 10 milliGRAM(s) Oral daily  oxyCODONE  ER Tablet 60 milliGRAM(s) Oral every 12 hours  pantoprazole    Tablet 40 milliGRAM(s) Oral two times a day  Saliva Substitute (CAPHOSOL) 30 milliLiter(s) Swish and Spit four times a day  senna 2 Tablet(s) Oral at bedtime  sodium chloride 0.9%. 1000 milliLiter(s) (60 mL/Hr) IV Continuous <Continuous>    MEDICATIONS  (PRN):  ibuprofen  Tablet. 200 milliGRAM(s) Oral every 4 hours PRN Mild Pain (1 - 3)  oxyCODONE    IR 15 milliGRAM(s) Oral every 3 hours PRN Severe Pain (7 - 10)      LABS:                          8.8    2.02  )-----------( 75       ( 28 Aug 2019 08:22 )             28.2         Mean Cell Volume : 83.9 fL  Mean Cell Hemoglobin : 26.2 pg  Mean Cell Hemoglobin Concentration : 31.2 g/dL  Auto Neutrophil # : 1.00 K/uL  Auto Lymphocyte # : 0.65 K/uL  Auto Monocyte # : 0.31 K/uL  Auto Eosinophil # : 0.02 K/uL  Auto Basophil # : 0.01 K/uL  Auto Neutrophil % : 49.5 %  Auto Lymphocyte % : 32.2 %  Auto Monocyte % : 15.3 %  Auto Eosinophil % : 1.0 %  Auto Basophil % : 0.5 %      Serial CBC's  08-28 @ 08:22  Hct-28.2 / Hgb-8.8 / Plat-75 / RBC-3.36 / WBC-2.02  Serial CBC's  08-27 @ 07:26  Hct-27.6 / Hgb-8.7 / Plat-119 / RBC-3.32 / WBC-0.71  Serial CBC's  08-26 @ 04:30  Hct-29.5 / Hgb-9.3 / Plat-127 / RBC-3.53 / WBC-0.69  Serial CBC's  08-25 @ 07:50  Hct-29.9 / Hgb-9.3 / Plat-156 / RBC-3.56 / WBC-1.34                                          BLOOD SMEAR INTERPRETATION:       RADIOLOGY & ADDITIONAL STUDIES:

## 2019-08-28 NOTE — PROGRESS NOTE ADULT - SUBJECTIVE AND OBJECTIVE BOX
Patient is a 44y old  Female who presents with a chief complaint of Difficulty swallowing      Admitted to medicine with primary diagnosis of Oral thrush with possible Esophagal Candidiasis    Interval events: none    Today is hosp day 4  Patient is resting comfortably in bed  odynophagia still present  Tolerating only liquid diet for now, regular BM  Ambulates independently  Plan:  EGD Today   -Lumbar Puncture with Neuroradiology tomorrow         PAST MEDICAL & SURGICAL HISTORY:  Lung cancer  Anxiety and depression  History of cholecystectomy      MEDICATIONS  (STANDING):  benzocaine 15 mG/menthol 3.6 mG (Sugar-Free) Lozenge 1 Lozenge Oral three times a day  buPROPion XL . 150 milliGRAM(s) Oral daily  chlorhexidine 4% Liquid 1 Application(s) Topical <User Schedule>  docusate sodium 100 milliGRAM(s) Oral two times a day  escitalopram 20 milliGRAM(s) Oral at bedtime  filgrastim-sndz Injectable 480 MICROGram(s) SubCutaneous daily  FIRST- Mouthwash  BLM 15 milliLiter(s) Swish and Spit four times a day  fluconAZOLE IVPB      fluconAZOLE IVPB 200 milliGRAM(s) IV Intermittent every 24 hours  folic acid 1 milliGRAM(s) Oral daily  loratadine 10 milliGRAM(s) Oral daily  oxyCODONE  ER Tablet 60 milliGRAM(s) Oral every 12 hours  pantoprazole    Tablet 40 milliGRAM(s) Oral two times a day  Saliva Substitute (CAPHOSOL) 30 milliLiter(s) Swish and Spit four times a day  senna 2 Tablet(s) Oral at bedtime  sodium chloride 0.9%. 1000 milliLiter(s) (60 mL/Hr) IV Continuous <Continuous>    MEDICATIONS  (PRN):  ibuprofen  Tablet. 200 milliGRAM(s) Oral every 4 hours PRN Mild Pain (1 - 3)  oxyCODONE    IR 15 milliGRAM(s) Oral every 3 hours PRN Severe Pain (7 - 10)          Vital Signs Last 24 Hrs  T(C): 36.7 (28 Aug 2019 12:56), Max: 37.2 (28 Aug 2019 05:00)  T(F): 98 (28 Aug 2019 11:54), Max: 98.9 (28 Aug 2019 05:00)  HR: 76 (28 Aug 2019 12:56) (76 - 89)  BP: 137/74 (28 Aug 2019 12:56) (133/76 - 145/81)  BP(mean): --  RR: 18 (28 Aug 2019 12:56) (18 - 20)  SpO2: 93% (28 Aug 2019 07:40) (93% - 93%)  CAPILLARY BLOOD GLUCOSE        I&O's Summary      Physical Exam:    -   General : NAD, resting comfortably in bed    -      Oral Cavity: Moist, Oral thrush resolved     -      Cardiac: S1/S2 appreciated RRR, no murmurs    -      Pulm: CTA b/l, no adventitious sounds    -      GI: Soft, NT, ND, + odynophagia     -      Musculoskeletal: Atraumatic, No LE edema B/L, No skin color changes    -      Neuro: AO x 4, nonfocal           Labs:                        8.8    2.02  )-----------( 75       ( 28 Aug 2019 08:22 )             28.2                              Imaging:    ECG:

## 2019-08-28 NOTE — PROGRESS NOTE ADULT - ASSESSMENT
43 y/o F with h/o smoking- quit 4 years ago with Metastatic adenocarcinoma of the lung, PDL1 5%, EGFT, Alk, BRAF, KRAS, ROS1 negative diagnosed in 2018 s/p multiple lines of chemotherapy - 4 cycles-Carbo, Alimta and Keytruda in 2018, progressed and then received Taxterene/cyramza and then was given Gemzar in April 2019. She has received SRS to brain (in 2018 and also in May 2019).  Also received radiation to Lt 8th rib and flank area. She was referred to [facility],(Dr. Irma Nuñez  was not eligible for clinical trial. Now she is on Alimta and Avastin (received this on Aug 20) and had received 5 radiations to the neck(completed Aug 14)     1 day following chemo- pt started having pain on swallowing to both solids and liquids. No fever/ chills. No recent weight loss  Also c/o epigastric burning pain and discomfort     1) Metastatic Lung adenocarcinoma PDL1 5%, EGFT, Alk, BRAF, KRAS, ROS1 negative- progression with multiple lines of chemotherapy  Currently on Alimta and Avastin received Aug 20  Also radiation to the neck -completed Aug 14th  c/w pain meds   C/w folic acid as pt is on Avastin  f/u MRI LS spine -shows leptomeningeal disease- Get Neuroradiology IR to get LP- Dr Yip      2) Pain on swallowing 2/2 to thrush and probably has candida involvement of the esophagus as well   Oral exam - reveals thrush- looks better today   She probably has candida esophagitis  c/w fluconazole and lozenges and magic mouth wash   c/w home dose of pain meds   Planned for EGD by GI today       3) Pancytopenia 2/2 to chemotherapy   s/p 2 doses of Neulasta    No fever/chills  Continue to monitor counts- starting to improve- [id number] today   Transfuse if Hb<7 and Platelets less than 20k or if any active bleeding       DVT ppx- on lovenox. Continue as long as the PLt >50k- Restart Lovenox after EGD today

## 2019-08-28 NOTE — PHYSICAL THERAPY INITIAL EVALUATION ADULT - SPECIFY REASON(S)
Pt requesting PT f/u another day as she just returned from EGD and feels very tired. Pt reports steadily amb to bathroom in the morning using RW.

## 2019-08-28 NOTE — PROGRESS NOTE ADULT - ATTENDING COMMENTS
S/p EGD, feeling better, able tolerate solid foods today.   For LP tomorrow with Neuroradiology, Dr. Johnson. Will consider obtaining MRI of T and C spine as well as MRI of the brain n to assess for extent of potential leptomeningeal involvement.

## 2019-08-28 NOTE — PROGRESS NOTE ADULT - ASSESSMENT
This is a 44 year old female with PMHx of tobacco abuse, metastatic adenocarcinoma of the lung s/p chemo 4 cycles Carbo, Alimta and Keytruda then Taxterene/cyramza and then Gemzar who presented post chemo treatment for pain on swallowing to both solids and liquids     Oral Thrush with possible Esophageal Candidiasis  -Immunocompromised on Active Chemotherapy   -Presenting with Odynophagia with both solids and Liquid, throat pain and Discomfort    -Pt continues to complain of Esophageal burning and discomfort   --Oral Trush resolved on exam  -GI consulted for possible EGD  -Continue with IV Fluconazole, lozenges and Mouth wash, Artificial Saliva    - Advance diet as tolerated   -Plan: EGD today     Metastatic Lung Adenocarcinoma  - Progressive despite multiple chemo regimens  - Last chemo: Alimta and Avastin on   - Radiation of neck completed on   - Continue with home pain meds and folic acid   -Per Heme/OnC:  *Pt was scheduled to get this done output for LE numbness and pain     * MRI LS spine with and without contrast: Showed Leptomeningeal disease   -NeuroRadiology to perform Lumbar Puncture tomorrow         Pancytopenia 2/2 to chemotherapy   -Asymptomatic  - S/P Neupogen 480 mc19: improving    -Transfuse if Hb<7 and Platelets less than 20k or if any active bleeding     DVT ppx: Lovenox  GI ppx: Protonix  Full Code  Dispo: Home when stable

## 2019-08-29 ENCOUNTER — RESULT REVIEW (OUTPATIENT)
Age: 44
End: 2019-08-29

## 2019-08-29 LAB
ALBUMIN SERPL ELPH-MCNC: 3.4 G/DL — LOW (ref 3.5–5.2)
ALP SERPL-CCNC: 123 U/L — HIGH (ref 30–115)
ALT FLD-CCNC: 12 U/L — SIGNIFICANT CHANGE UP (ref 0–41)
ANION GAP SERPL CALC-SCNC: 12 MMOL/L — SIGNIFICANT CHANGE UP (ref 7–14)
APPEARANCE CSF: CLEAR — SIGNIFICANT CHANGE UP
APPEARANCE SPUN FLD: SIGNIFICANT CHANGE UP
APTT BLD: 28.7 SEC — SIGNIFICANT CHANGE UP (ref 27–39.2)
AST SERPL-CCNC: 17 U/L — SIGNIFICANT CHANGE UP (ref 0–41)
BASOPHILS # BLD AUTO: 0.03 K/UL — SIGNIFICANT CHANGE UP (ref 0–0.2)
BASOPHILS NFR BLD AUTO: 0.4 % — SIGNIFICANT CHANGE UP (ref 0–1)
BILIRUB SERPL-MCNC: 0.4 MG/DL — SIGNIFICANT CHANGE UP (ref 0.2–1.2)
BUN SERPL-MCNC: 5 MG/DL — LOW (ref 10–20)
CALCIUM SERPL-MCNC: 8.5 MG/DL — SIGNIFICANT CHANGE UP (ref 8.5–10.1)
CHLORIDE SERPL-SCNC: 100 MMOL/L — SIGNIFICANT CHANGE UP (ref 98–110)
CO2 SERPL-SCNC: 31 MMOL/L — SIGNIFICANT CHANGE UP (ref 17–32)
COLOR CSF: SIGNIFICANT CHANGE UP
CREAT SERPL-MCNC: 0.6 MG/DL — LOW (ref 0.7–1.5)
EOSINOPHIL # BLD AUTO: 0.06 K/UL — SIGNIFICANT CHANGE UP (ref 0–0.7)
EOSINOPHIL NFR BLD AUTO: 0.9 % — SIGNIFICANT CHANGE UP (ref 0–8)
GLUCOSE CSF-MCNC: 47 MG/DL — SIGNIFICANT CHANGE UP (ref 45–75)
GLUCOSE SERPL-MCNC: 97 MG/DL — SIGNIFICANT CHANGE UP (ref 70–99)
GRAM STN FLD: SIGNIFICANT CHANGE UP
HCT VFR BLD CALC: 28.4 % — LOW (ref 37–47)
HGB BLD-MCNC: 8.8 G/DL — LOW (ref 12–16)
IMM GRANULOCYTES NFR BLD AUTO: 1.5 % — HIGH (ref 0.1–0.3)
LYMPHOCYTES # BLD AUTO: 0.93 K/UL — LOW (ref 1.2–3.4)
LYMPHOCYTES # BLD AUTO: 13.6 % — LOW (ref 20.5–51.1)
MCHC RBC-ENTMCNC: 26.4 PG — LOW (ref 27–31)
MCHC RBC-ENTMCNC: 31 G/DL — LOW (ref 32–37)
MCV RBC AUTO: 85.3 FL — SIGNIFICANT CHANGE UP (ref 81–99)
MONOCYTES # BLD AUTO: 0.65 K/UL — HIGH (ref 0.1–0.6)
MONOCYTES NFR BLD AUTO: 9.5 % — HIGH (ref 1.7–9.3)
NEUTROPHILS # BLD AUTO: 5.05 K/UL — SIGNIFICANT CHANGE UP (ref 1.4–6.5)
NEUTROPHILS # CSF: SIGNIFICANT CHANGE UP % (ref 0–6)
NEUTROPHILS NFR BLD AUTO: 74.1 % — SIGNIFICANT CHANGE UP (ref 42.2–75.2)
NRBC # BLD: 0 /100 WBCS — SIGNIFICANT CHANGE UP (ref 0–0)
NRBC NFR CSF: 0 /UL — SIGNIFICANT CHANGE UP (ref 0–5)
PLATELET # BLD AUTO: 137 K/UL — SIGNIFICANT CHANGE UP (ref 130–400)
POTASSIUM SERPL-MCNC: 3.2 MMOL/L — LOW (ref 3.5–5)
POTASSIUM SERPL-SCNC: 3.2 MMOL/L — LOW (ref 3.5–5)
PROT CSF-MCNC: 40 MG/DL — SIGNIFICANT CHANGE UP (ref 15–45)
PROT SERPL-MCNC: 6.1 G/DL — SIGNIFICANT CHANGE UP (ref 6–8)
RBC # BLD: 3.33 M/UL — LOW (ref 4.2–5.4)
RBC # CSF: 61 /UL — SIGNIFICANT CHANGE UP (ref 0–0)
RBC # FLD: 13.8 % — SIGNIFICANT CHANGE UP (ref 11.5–14.5)
SODIUM SERPL-SCNC: 143 MMOL/L — SIGNIFICANT CHANGE UP (ref 135–146)
TUBE TYPE: SIGNIFICANT CHANGE UP
WBC # BLD: 6.82 K/UL — SIGNIFICANT CHANGE UP (ref 4.8–10.8)
WBC # FLD AUTO: 6.82 K/UL — SIGNIFICANT CHANGE UP (ref 4.8–10.8)

## 2019-08-29 PROCEDURE — 99231 SBSQ HOSP IP/OBS SF/LOW 25: CPT

## 2019-08-29 PROCEDURE — 99233 SBSQ HOSP IP/OBS HIGH 50: CPT

## 2019-08-29 PROCEDURE — 62270 DX LMBR SPI PNXR: CPT

## 2019-08-29 PROCEDURE — 88108 CYTOPATH CONCENTRATE TECH: CPT | Mod: 26

## 2019-08-29 PROCEDURE — 77003 FLUOROGUIDE FOR SPINE INJECT: CPT | Mod: 26

## 2019-08-29 PROCEDURE — 77261 THER RADIOLOGY TX PLNG SMPL: CPT

## 2019-08-29 PROCEDURE — 99024 POSTOP FOLLOW-UP VISIT: CPT

## 2019-08-29 PROCEDURE — 77290 THER RAD SIMULAJ FIELD CPLX: CPT | Mod: 26

## 2019-08-29 PROCEDURE — 77306 TELETHX ISODOSE PLAN SIMPLE: CPT | Mod: 26

## 2019-08-29 RX ORDER — FLUCONAZOLE 150 MG/1
200 TABLET ORAL DAILY
Refills: 0 | Status: DISCONTINUED | OUTPATIENT
Start: 2019-08-29 | End: 2019-09-03

## 2019-08-29 RX ORDER — CLONAZEPAM 1 MG
0.5 TABLET ORAL ONCE
Refills: 0 | Status: DISCONTINUED | OUTPATIENT
Start: 2019-08-29 | End: 2019-08-29

## 2019-08-29 RX ORDER — ENOXAPARIN SODIUM 100 MG/ML
40 INJECTION SUBCUTANEOUS AT BEDTIME
Refills: 0 | Status: DISCONTINUED | OUTPATIENT
Start: 2019-08-29 | End: 2019-09-01

## 2019-08-29 RX ADMIN — OXYCODONE HYDROCHLORIDE 15 MILLIGRAM(S): 5 TABLET ORAL at 04:10

## 2019-08-29 RX ADMIN — Medication 100 MILLIGRAM(S): at 17:59

## 2019-08-29 RX ADMIN — FLUCONAZOLE 200 MILLIGRAM(S): 150 TABLET ORAL at 15:22

## 2019-08-29 RX ADMIN — Medication 0.5 MILLIGRAM(S): at 10:02

## 2019-08-29 RX ADMIN — Medication 30 MILLILITER(S): at 15:23

## 2019-08-29 RX ADMIN — OXYCODONE HYDROCHLORIDE 60 MILLIGRAM(S): 5 TABLET ORAL at 18:58

## 2019-08-29 RX ADMIN — SODIUM CHLORIDE 60 MILLILITER(S): 9 INJECTION INTRAMUSCULAR; INTRAVENOUS; SUBCUTANEOUS at 08:00

## 2019-08-29 RX ADMIN — ESCITALOPRAM OXALATE 20 MILLIGRAM(S): 10 TABLET, FILM COATED ORAL at 21:56

## 2019-08-29 RX ADMIN — OXYCODONE HYDROCHLORIDE 15 MILLIGRAM(S): 5 TABLET ORAL at 17:58

## 2019-08-29 RX ADMIN — OXYCODONE HYDROCHLORIDE 60 MILLIGRAM(S): 5 TABLET ORAL at 07:30

## 2019-08-29 RX ADMIN — PANTOPRAZOLE SODIUM 40 MILLIGRAM(S): 20 TABLET, DELAYED RELEASE ORAL at 17:59

## 2019-08-29 RX ADMIN — LORATADINE 10 MILLIGRAM(S): 10 TABLET ORAL at 15:22

## 2019-08-29 RX ADMIN — Medication 480 MICROGRAM(S): at 15:21

## 2019-08-29 RX ADMIN — FLUCONAZOLE 100 MILLIGRAM(S): 150 TABLET ORAL at 08:29

## 2019-08-29 RX ADMIN — ENOXAPARIN SODIUM 40 MILLIGRAM(S): 100 INJECTION SUBCUTANEOUS at 21:57

## 2019-08-29 RX ADMIN — Medication 1 MILLIGRAM(S): at 15:22

## 2019-08-29 RX ADMIN — OXYCODONE HYDROCHLORIDE 15 MILLIGRAM(S): 5 TABLET ORAL at 09:50

## 2019-08-29 RX ADMIN — OXYCODONE HYDROCHLORIDE 15 MILLIGRAM(S): 5 TABLET ORAL at 15:24

## 2019-08-29 RX ADMIN — OXYCODONE HYDROCHLORIDE 15 MILLIGRAM(S): 5 TABLET ORAL at 03:59

## 2019-08-29 RX ADMIN — OXYCODONE HYDROCHLORIDE 60 MILLIGRAM(S): 5 TABLET ORAL at 06:53

## 2019-08-29 RX ADMIN — PANTOPRAZOLE SODIUM 40 MILLIGRAM(S): 20 TABLET, DELAYED RELEASE ORAL at 06:53

## 2019-08-29 RX ADMIN — DIPHENHYDRAMINE HYDROCHLORIDE AND LIDOCAINE HYDROCHLORIDE AND ALUMINUM HYDROXIDE AND MAGNESIUM HYDRO 15 MILLILITER(S): KIT at 17:59

## 2019-08-29 RX ADMIN — OXYCODONE HYDROCHLORIDE 60 MILLIGRAM(S): 5 TABLET ORAL at 16:34

## 2019-08-29 RX ADMIN — OXYCODONE HYDROCHLORIDE 15 MILLIGRAM(S): 5 TABLET ORAL at 22:00

## 2019-08-29 RX ADMIN — OXYCODONE HYDROCHLORIDE 15 MILLIGRAM(S): 5 TABLET ORAL at 18:58

## 2019-08-29 RX ADMIN — Medication 30 MILLILITER(S): at 06:55

## 2019-08-29 RX ADMIN — DIPHENHYDRAMINE HYDROCHLORIDE AND LIDOCAINE HYDROCHLORIDE AND ALUMINUM HYDROXIDE AND MAGNESIUM HYDRO 15 MILLILITER(S): KIT at 08:27

## 2019-08-29 RX ADMIN — Medication 30 MILLILITER(S): at 17:59

## 2019-08-29 RX ADMIN — BUPROPION HYDROCHLORIDE 150 MILLIGRAM(S): 150 TABLET, EXTENDED RELEASE ORAL at 21:56

## 2019-08-29 RX ADMIN — DIPHENHYDRAMINE HYDROCHLORIDE AND LIDOCAINE HYDROCHLORIDE AND ALUMINUM HYDROXIDE AND MAGNESIUM HYDRO 15 MILLILITER(S): KIT at 15:22

## 2019-08-29 RX ADMIN — Medication 0.5 MILLIGRAM(S): at 12:33

## 2019-08-29 NOTE — CHART NOTE - NSCHARTNOTEFT_GEN_A_CORE
Registered Dietitian Follow-Up     Patient Profile Reviewed                           Yes [x]   No []     Nutrition History Previously Obtained        Yes [x]  No []       Pertinent Subjective Information:  -Pt OOB in chair at time of assessment. Reports intake remained poor over last 2 days, however, noticed significant increase after EGD as esophageal discomfort improving. Tolerated ~40% ground meat, 50% mashed potatoes and 25% peas for dinner and 100% farina with a doughnut brought in by friends for breakfast this morning. Tolerating current texture well and denied need to downgrade to pureed at this time. Pt doesn't consume Ensure as chocolate and strawberry flavors caused discomfort and disliked vanilla flavor. Denied offer of further supplements at this time and would like to stick to just food from meal trays. will continue to follow and reassess need for supplements.      Pertinent Medical Interventions:  (1) Odynophagia  2/2 to thrush and mucositis   --s/p EGD 8/28: non-erosive gastritis. Oropharyngeal mucositis mostly radiation induced. f/u biopsy results   --fluconazole, lozenges and magic mouth wash in place  (2) Pancytopenia 2/2 chemotherapy   --3rd dose neupogen today  --lab results pending   (3) Metastatic Lung adenocarcinoma now with leptomeningeal involvement, multiple lines of chemo   --awaiting lumbar puncture today  --heme/onc recs: MRI C & Tspine & head with contrast     Diet order: Dysphagia II mechanical soft/ground, thin liquids, GERD + Ensure Enlive BID      Anthropometrics:  - Ht. 165.1cm   - Wt. 95.3kg (8/28)  --vs 96.7kg admit wt, relatively stable   - BMI 35  - IBW 56.8kg      Pertinent Lab Data: (8/29/19) RBC 3.33, H/H 8.8/28.4, K+ 3.2, BUN 5, Cr 0.6     Pertinent Meds: fluconazole, saliva substitute, FIRST mouthwash, Wellbutrin, oxy, colace, folic acid, protonix, senna      Physical Findings:  - Appearance: AAO   - GI function: none reported by pt +LBM 8/28  - Tubes:  - Oral/Mouth cavity:  esophageal burning and discomfort improved today. denies difficulty chewing   - Skin: no edema, skin intact      Nutrition Requirements  Weight Used: 56.8kg IBW     estimated calorie needs = ~2856-2503 kcal/day (30-35 kcal/kg IBW considering metastatic lung cancer on active treatment).  estimated protein needs = 68-85 g/day (1.2-1.5 g/kg IBW, reason mentioned above).   estimated fluid needs = 1mL/kcal     Nutrient Intake: not meeting needs at goal rate but improved since admission      [x] Previous Nutrition Diagnosis: inadequate oral intake             [x] Ongoing          [] Resolved     Nutrition Intervention: meals and snacks, medical food supplements  Recommend:  1. D/c Ensure Enlive as pt does not consume d/t dislike taste and esophageal discomfort.  2. Continue Dysphagia II, mechanical soft/ground with thin liquids + GERD.   3. continue to provide mouthwash prior to meals to optimize intake.      Goal/Expected Outcome: Pt to tolerate >50% meals and snacks upon f/u in 4 days.      Indicator/Monitoring: diet order, energy intake, nutrition related labs, NFPF (PO tolerance), body composition

## 2019-08-29 NOTE — PROGRESS NOTE ADULT - SUBJECTIVE AND OBJECTIVE BOX
REMEDIOS DENTON  [id number]    INTERVAL HPI/OVERNIGHT EVENTS:        MEDICATIONS  (STANDING):  benzocaine 15 mG/menthol 3.6 mG (Sugar-Free) Lozenge 1 Lozenge Oral three times a day  buPROPion XL . 150 milliGRAM(s) Oral daily  chlorhexidine 4% Liquid 1 Application(s) Topical <User Schedule>  docusate sodium 100 milliGRAM(s) Oral two times a day  escitalopram 20 milliGRAM(s) Oral at bedtime  filgrastim-sndz Injectable 480 MICROGram(s) SubCutaneous daily  FIRST- Mouthwash  BLM 15 milliLiter(s) Swish and Spit four times a day  fluconAZOLE   Tablet 200 milliGRAM(s) Oral daily  folic acid 1 milliGRAM(s) Oral daily  loratadine 10 milliGRAM(s) Oral daily  oxyCODONE  ER Tablet 60 milliGRAM(s) Oral every 12 hours  pantoprazole    Tablet 40 milliGRAM(s) Oral two times a day  Saliva Substitute (CAPHOSOL) 30 milliLiter(s) Swish and Spit four times a day  senna 2 Tablet(s) Oral at bedtime    MEDICATIONS  (PRN):  ibuprofen  Tablet. 200 milliGRAM(s) Oral every 4 hours PRN Mild Pain (1 - 3)  oxyCODONE    IR 15 milliGRAM(s) Oral every 3 hours PRN Severe Pain (7 - 10)    Allergies  dust (Sneezing; Rhinorrhea)  Erythromycin Base (Other)    Intolerances    Review of Systems:   General: No fevers/chills, no fatigue  HEENT: No blurry vision, dysphagia, or odynophagia  CVS: No CP/palpitations  Resp: No SOB/wheezing  GI: No N/V/C/D/abdominal pain  MSK:   Skin: No new rashes  Neuro: No headaches    Vital Signs Last 24 Hrs  T(C): 36.6 (29 Aug 2019 05:51), Max: 36.7 (28 Aug 2019 11:43)  T(F): 97.8 (29 Aug 2019 05:51), Max: 98.1 (28 Aug 2019 11:43)  HR: 74 (29 Aug 2019 05:51) (67 - 77)  BP: 144/79 (29 Aug 2019 05:51) (103/55 - 144/79)  BP(mean): --  RR: 18 (28 Aug 2019 20:29) (14 - 20)  SpO2: 95% (29 Aug 2019 00:10) (95% - 99%)    Physical Exam:  General: NAD  HEENT: EOMI, MMM  Cardio: +S1/S2, RRR  Resp: CTA b/l  GI: +BS, soft, NT/ND  MSK:  Neuro: AAOx3  Psych: wnl    LABS:                        8.8    6.82  )-----------( 137      ( 29 Aug 2019 09:02 )             28.4     08-29    143  |  100  |  5<L>  ----------------------------<  97  3.2<L>   |  31  |  0.6<L>    Ca    8.5      29 Aug 2019 09:02    TPro  6.1  /  Alb  3.4<L>  /  TBili  0.4  /  DBili  x   /  AST  17  /  ALT  12  /  AlkPhos  123<H>  08-29    PT/INR - ( 28 Aug 2019 17:11 )   PT: 13.90 sec;   INR: 1.21 ratio      PTT - ( 29 Aug 2019 09:02 )  PTT:28.7 sec    RADIOLOGY & ADDITIONAL TESTS:    < from: MR Lumbar Spine w/wo IV Cont (08.27.19 @ 12:36) >  IMPRESSION:    1.  Enhancing (extramedullary, intradural) nodules within the spinal   canal suspicious for leptomeningeal metastatic disease given history.    2.  The largest measures 1.2 cm at the L4 level. Additional 3 mm nodule   at the L3 level, and possible punctate enhancing focus at the T11 level.    3.  Overall mild degenerative changes.    4.  Please note there is a transitional vertebral body with lumbarization   of the S1 segment. The counting method shouldbe verified prior to any   spinal procedures.    < end of copied text >    < from: NM PET/CT Onc FDG Skull to Thigh, Subsq (07.08.19 @ 08:41) >  IMPRESSION:     1. Since March 18, 2019, multiple new sites of pathologic FDG uptake,   compatible with increased biologic tumor activity.    2. Specifically, several new FDG avid osseous metastases, with max SUV   17.5 within a new lytic lesion within the anterior left 8th rib.    3. Several new FDG avid subcutaneous soft tissue nodules, with max SUV   14.7 within a 7 mm subcutaneous nodule along the left flank.    4. Several new FDG avid peritoneal and extraperitoneal soft tissue   nodules, with max SUV 10.4 within a new 1.9 cm right-sided   extraperitoneal nodule along the right flank.    5. New FDG avid right adrenal gland nodule, with max SUV 15.8.    6. New focal FDG uptake along the right hemidiaphragm, without definite   CT correlate, with max SUV 10.4.    7. Multiple new and increased FDG avid lymph nodes, with max SUV 20.0   within a 1.9 cm left para-aortic node.    8. Slightly decreased FDG uptake within a centrally necrotic left upper   lobe/left hilar mass, with max SUV 16.1 (previously 29.4, which reflects   a 45% decrease); a new FDG avid airspace opacity within the superior   segment of the left lower lobe is suspicious for postobstructive   pneumonia.    9. Stable FDG uptake within right cervical adenopathy, with max SUV 23.7.    < end of copied text > REMEDIOS DENTON  [id number]    INTERVAL HPI/OVERNIGHT EVENTS:  Patient seen and examined. NAD. She is tolerating diet well )both liquids and solids). She note scott pain has improved by 60%.    MEDICATIONS  (STANDING):  benzocaine 15 mG/menthol 3.6 mG (Sugar-Free) Lozenge 1 Lozenge Oral three times a day  buPROPion XL . 150 milliGRAM(s) Oral daily  chlorhexidine 4% Liquid 1 Application(s) Topical <User Schedule>  docusate sodium 100 milliGRAM(s) Oral two times a day  escitalopram 20 milliGRAM(s) Oral at bedtime  filgrastim-sndz Injectable 480 MICROGram(s) SubCutaneous daily  FIRST- Mouthwash  BLM 15 milliLiter(s) Swish and Spit four times a day  fluconAZOLE   Tablet 200 milliGRAM(s) Oral daily  folic acid 1 milliGRAM(s) Oral daily  loratadine 10 milliGRAM(s) Oral daily  oxyCODONE  ER Tablet 60 milliGRAM(s) Oral every 12 hours  pantoprazole    Tablet 40 milliGRAM(s) Oral two times a day  Saliva Substitute (CAPHOSOL) 30 milliLiter(s) Swish and Spit four times a day  senna 2 Tablet(s) Oral at bedtime    MEDICATIONS  (PRN):  ibuprofen  Tablet. 200 milliGRAM(s) Oral every 4 hours PRN Mild Pain (1 - 3)  oxyCODONE    IR 15 milliGRAM(s) Oral every 3 hours PRN Severe Pain (7 - 10)    Allergies  dust (Sneezing; Rhinorrhea)  Erythromycin Base (Other)    Intolerances    Review of Systems:   General: No fevers/chills, no fatigue  HEENT: No blurry vision, Improved dysphagia and odynophagia  CVS: No CP/palpitations  Resp: No SOB/wheezing  GI: No N/V/C/D/abdominal pain  MSK: no myalgias  Skin: No new rashes  Neuro: No headaches    Vital Signs Last 24 Hrs  T(C): 36.6 (29 Aug 2019 05:51), Max: 36.7 (28 Aug 2019 11:43)  T(F): 97.8 (29 Aug 2019 05:51), Max: 98.1 (28 Aug 2019 11:43)  HR: 74 (29 Aug 2019 05:51) (67 - 77)  BP: 144/79 (29 Aug 2019 05:51) (103/55 - 144/79)  BP(mean): --  RR: 18 (28 Aug 2019 20:29) (14 - 20)  SpO2: 95% (29 Aug 2019 00:10) (95% - 99%)    Physical Exam:  General: NAD  HEENT: EOMI, MMM  Cardio: +S1/S2, RRR  Resp: CTA b/l  GI: +BS, soft, NT/ND  MSK: no b/l le edema   Neuro: AAOx3  Psych: wnl    LABS:                        8.8    6.82  )-----------( 137      ( 29 Aug 2019 09:02 )             28.4     08-29    143  |  100  |  5<L>  ----------------------------<  97  3.2<L>   |  31  |  0.6<L>    Ca    8.5      29 Aug 2019 09:02    TPro  6.1  /  Alb  3.4<L>  /  TBili  0.4  /  DBili  x   /  AST  17  /  ALT  12  /  AlkPhos  123<H>  08-29    PT/INR - ( 28 Aug 2019 17:11 )   PT: 13.90 sec;   INR: 1.21 ratio      PTT - ( 29 Aug 2019 09:02 )  PTT:28.7 sec    RADIOLOGY & ADDITIONAL TESTS:    < from: MR Lumbar Spine w/wo IV Cont (08.27.19 @ 12:36) >  IMPRESSION:    1.  Enhancing (extramedullary, intradural) nodules within the spinal   canal suspicious for leptomeningeal metastatic disease given history.    2.  The largest measures 1.2 cm at the L4 level. Additional 3 mm nodule   at the L3 level, and possible punctate enhancing focus at the T11 level.    3.  Overall mild degenerative changes.    4.  Please note there is a transitional vertebral body with lumbarization   of the S1 segment. The counting method shouldbe verified prior to any   spinal procedures.    < end of copied text >    < from: NM PET/CT Onc FDG Skull to Thigh, Subsq (07.08.19 @ 08:41) >  IMPRESSION:     1. Since March 18, 2019, multiple new sites of pathologic FDG uptake,   compatible with increased biologic tumor activity.    2. Specifically, several new FDG avid osseous metastases, with max SUV   17.5 within a new lytic lesion within the anterior left 8th rib.    3. Several new FDG avid subcutaneous soft tissue nodules, with max SUV   14.7 within a 7 mm subcutaneous nodule along the left flank.    4. Several new FDG avid peritoneal and extraperitoneal soft tissue   nodules, with max SUV 10.4 within a new 1.9 cm right-sided   extraperitoneal nodule along the right flank.    5. New FDG avid right adrenal gland nodule, with max SUV 15.8.    6. New focal FDG uptake along the right hemidiaphragm, without definite   CT correlate, with max SUV 10.4.    7. Multiple new and increased FDG avid lymph nodes, with max SUV 20.0   within a 1.9 cm left para-aortic node.    8. Slightly decreased FDG uptake within a centrally necrotic left upper   lobe/left hilar mass, with max SUV 16.1 (previously 29.4, which reflects   a 45% decrease); a new FDG avid airspace opacity within the superior   segment of the left lower lobe is suspicious for postobstructive   pneumonia.    9. Stable FDG uptake within right cervical adenopathy, with max SUV 23.7.    < end of copied text >

## 2019-08-29 NOTE — PHYSICAL THERAPY INITIAL EVALUATION ADULT - GENERAL OBSERVATIONS, REHAB EVAL
Pt seen 0950-1010AM. Pt encountered in the bed, NAD, agreeable for b/s PT IE/tx. Mkii well to tx. PT left in the b/s chair post tx all needs with in reach.

## 2019-08-29 NOTE — PHYSICAL THERAPY INITIAL EVALUATION ADULT - PERTINENT HX OF CURRENT PROBLEM, REHAB EVAL
Patient is a 44y old  Female who presents with a chief complaint of Difficulty swallowing. h/o smoking- quit 4 years ago with Metastatic adenocarcinoma of the lung, PDL1 5%, EGFT, Alk, BRAF, KRAS, ROS1 negative diagnosed in 2018 s/p multiple lines of chemotherapy. She has received SRS to brain (in 2018 and also in May 2019).  Also received radiation to Lt 8th rib and flank area.

## 2019-08-29 NOTE — PROGRESS NOTE ADULT - ASSESSMENT
43 y/o F with h/o smoking- quit 4 years ago with Metastatic adenocarcinoma of the lung, PDL1 5%, EGFT, Alk, BRAF, KRAS, ROS1 negative diagnosed in 2018 s/p multiple lines of chemotherapy - 4 cycles-Carbo, Alimta and Keytruda in 2018, progressed and then received Taxterene/cyramza and then was given Gemzar in April 2019. She has received SRS to brain (in 2018 and also in May 2019).  Also received radiation to Lt 8th rib and flank area. She was referred to [facility],(Dr. Irma Nuñez  was not eligible for clinical trial. Now she is on Alimta and Avastin (received this on Aug 20) and had received 5 radiations to the neck(completed Aug 14)     1 day following chemo- pt started having pain on swallowing to both solids and liquids. No fever/ chills. No recent weight loss  Also c/o epigastric burning pain and discomfort     1) Metastatic Lung adenocarcinoma PDL1 5%, EGFT, Alk, BRAF, KRAS, ROS1 negative- progression with multiple lines of chemotherapy- now with leptomeningeal involvement   Currently on Alimta and Avastin received Aug 20  Also radiation to the neck -completed Aug 14th  c/w home dose of pain meds for her pain from metastasis   C/w folic acid as pt is on Avastin  MRI Lspine- leptomeningeal involvement   Planned for LP today   Please get MRI Cspine, Tspine and also MRI head with contrast  as well     2) Pain on swallowing 2/2 to thrush and mucositis   s/p EGD-  non-erosive gastritis.  Oropharyngeal mucositis mostly radiation induced. - f/u biopsy results   c/w fluconazole(complete 10 day course) and lozenges and magic mouth wash   c/w home dose of pain meds     3) Pancytopenia 2/2 to chemotherapy   Labs pending today- will follow up   She did not receive neulasta after her chemo .  c/w Neupogen- 3rd dose today  No fever/chills  Continue to monitor counts  Transfuse if Hb<7 and Platelets less than 20k or if any active bleeding       DVT ppx- on lovenox. Continue as long as the PLt >50k

## 2019-08-29 NOTE — PROGRESS NOTE ADULT - SUBJECTIVE AND OBJECTIVE BOX
Patient is a 44y old  Female who presents with a chief complaint of Difficulty swallowing (28 Aug 2019 11:50)      Subjective: She was able to eat solid food yesterday and feels a lot better  No fever/chills       Vital Signs Last 24 Hrs  T(C): 36.6 (29 Aug 2019 05:51), Max: 36.7 (28 Aug 2019 11:43)  T(F): 97.8 (29 Aug 2019 05:51), Max: 98.1 (28 Aug 2019 11:43)  HR: 74 (29 Aug 2019 05:51) (67 - 77)  BP: 144/79 (29 Aug 2019 05:51) (103/55 - 144/79)  BP(mean): --  RR: 18 (28 Aug 2019 20:29) (14 - 20)  SpO2: 95% (29 Aug 2019 00:10) (95% - 99%)    PHYSICAL EXAM  General: adult in NAD   Mouth: Some erythema noted. Whitish layer noted before is not present anymore   Neck: supple  CV: normal S1/S2   Lungs: positive air movement b/l ant lungs, clear to auscultation  Abdomen: soft non-tender   Ext: no edema  Neuro: alert and oriented X 4, no focal deficits    MEDICATIONS  (STANDING):  benzocaine 15 mG/menthol 3.6 mG (Sugar-Free) Lozenge 1 Lozenge Oral three times a day  buPROPion XL . 150 milliGRAM(s) Oral daily  chlorhexidine 4% Liquid 1 Application(s) Topical <User Schedule>  docusate sodium 100 milliGRAM(s) Oral two times a day  escitalopram 20 milliGRAM(s) Oral at bedtime  filgrastim-sndz Injectable 480 MICROGram(s) SubCutaneous daily  FIRST- Mouthwash  BLM 15 milliLiter(s) Swish and Spit four times a day  fluconAZOLE IVPB      fluconAZOLE IVPB 200 milliGRAM(s) IV Intermittent every 24 hours  folic acid 1 milliGRAM(s) Oral daily  loratadine 10 milliGRAM(s) Oral daily  oxyCODONE  ER Tablet 60 milliGRAM(s) Oral every 12 hours  pantoprazole    Tablet 40 milliGRAM(s) Oral two times a day  Saliva Substitute (CAPHOSOL) 30 milliLiter(s) Swish and Spit four times a day  senna 2 Tablet(s) Oral at bedtime  sodium chloride 0.9%. 1000 milliLiter(s) (60 mL/Hr) IV Continuous <Continuous>    MEDICATIONS  (PRN):  ibuprofen  Tablet. 200 milliGRAM(s) Oral every 4 hours PRN Mild Pain (1 - 3)  oxyCODONE    IR 15 milliGRAM(s) Oral every 3 hours PRN Severe Pain (7 - 10)      LABS:                          8.8    2.02  )-----------( 75       ( 28 Aug 2019 08:22 )             28.2         Mean Cell Volume : 83.9 fL  Mean Cell Hemoglobin : 26.2 pg  Mean Cell Hemoglobin Concentration : 31.2 g/dL  Auto Neutrophil # : 1.00 K/uL  Auto Lymphocyte # : 0.65 K/uL  Auto Monocyte # : 0.31 K/uL  Auto Eosinophil # : 0.02 K/uL  Auto Basophil # : 0.01 K/uL  Auto Neutrophil % : 49.5 %  Auto Lymphocyte % : 32.2 %  Auto Monocyte % : 15.3 %  Auto Eosinophil % : 1.0 %  Auto Basophil % : 0.5 %      Serial CBC's  08-28 @ 08:22  Hct-28.2 / Hgb-8.8 / Plat-75 / RBC-3.36 / WBC-2.02  Serial CBC's  08-27 @ 07:26  Hct-27.6 / Hgb-8.7 / Plat-119 / RBC-3.32 / WBC-0.71  Serial CBC's  08-26 @ 04:30  Hct-29.5 / Hgb-9.3 / Plat-127 / RBC-3.53 / WBC-0.69      08-28    144  |  98  |  6<L>  ----------------------------<  52<L>  3.7   |  32  |  0.6<L>    Ca    8.5      28 Aug 2019 15:54        PT/INR - ( 28 Aug 2019 17:11 )   PT: 13.90 sec;   INR: 1.21 ratio             < from: EGD (08.28.19 @ 11:45) >  Normal mucosa in the whole esophagus.    Erythema in the stomach compatible with non-erosive gastritis. (Biopsy).    Normal mucosa in the whole examined duodenum.(Biopsy).    Oropharyngeal mucositis mostly radiation induced.       < end of copied text >  < from: MR Lumbar Spine w/wo IV Cont (08.27.19 @ 12:36) >  .  Enhancing (extramedullary, intradural) nodules within the spinal   canal suspicious for leptomeningeal metastatic disease given history.    2.  The largest measures 1.2 cm at the L4 level. Additional 3 mm nodule   at the L3 level, and possible punctate enhancing focus at the T11 level.    3.  Overall mild degenerative changes.    4.  Please note there is a transitional vertebral body with lumbarization   of the S1 segment. The counting method shouldbe verified prior to any   spinal procedures.    < end of copied text >  :

## 2019-08-29 NOTE — PHYSICAL THERAPY INITIAL EVALUATION ADULT - FOLLOWS COMMANDS/ANSWERS QUESTIONS, REHAB EVAL
2-week-old infant with abnormal NBS for congenital adrenal hyperplasia screening for N17 P. Parents took patient to lab for a 17-OH progesterone. The lab called today and the 17-OH progesterone needs to be repeated as specimen was insufficient. Family also needs to take infant for second  screen as well. Lab test ordered and we'll call family to let them know to take him to the lab as soon as possible for repeat testing.   100% of the time

## 2019-08-29 NOTE — PROGRESS NOTE ADULT - ASSESSMENT
This is a 44 year old female with PMHx of tobacco abuse, metastatic adenocarcinoma of the lung s/p chemo 4 cycles Carbo, Alimta and Keytruda then Taxterene/cyramza and then Gemzar who presented post chemo treatment for pain on swallowing to both solids and liquids     Oral Thrush  -Immunocompromised on Active Chemotherapy   -Presenting with Odynophagia with both solids and Liquid, throat pain and Discomfort    -Pt continues to complain of Esophageal burning and discomfort   --Oral Trush resolved on exam  --S/P EGD: : Mucositis possibly from Radiation therapy and Non-ulcerative gastritis( bx taken)   -Symptoms Improving now able to tolerate Soft diet   -Continue with IV Fluconazole, lozenges and Mouth wash, Artificial Saliva        Metastatic Lung Adenocarcinoma  - Progressive despite multiple chemo regimens  - Last chemo: Alimta and Avastin on   - Radiation of neck completed on   - Continue with home pain meds and folic acid   -Per Heme/OnC:  *Pt was scheduled to get this done output for LE numbness and pain     * MRI LS spine with and without contrast: Showed Leptomeningeal disease   -NeuroRadiology to perform Lumbar Puncture today       Pancytopenia 2/2 to chemotherapy   -Asymptomatic  - S/P Neupogen 480 mc19: improving    -Transfuse if Hb<7 and Platelets less than 20k or if any active bleeding     DVT ppx: Lovenox  GI ppx: Protonix  Full Code  Dispo: Home when stable

## 2019-08-29 NOTE — PROGRESS NOTE ADULT - ASSESSMENT
45 yo F  PMH: Metastatic adenocarcinoma of the lung diagnosed in 2018 s/p multiple lines of chemotherapy, currently on Alimta and Avastin (received this on Aug 20) and had received 5 radiations to the neck (completed Aug 14)  CC: Odynophagia and Dysphagia    ASSESSMENT / PLAN:    # Dysphagia and Odynophagia: related to previous RT with now mucositis  - s/p EGD  - Continue Fluconazole, Nystatin, and artifical saliva    # Gastritis:  - s/p EGD  - Continue PPI daily  - Biopsy pathology results pending    Please reconsult PRN.     ~ Attending has not yet evaluated patient today, attending recommendations pending ... 43 yo F  PMH: Metastatic adenocarcinoma of the lung diagnosed in 2018 s/p multiple lines of chemotherapy, currently on Alimta and Avastin (received this on Aug 20) and had received 5 radiations to the neck (completed Aug 14)  CC: Odynophagia and Dysphagia    ASSESSMENT / PLAN:    # Dysphagia and Odynophagia: related to previous RT with now mucositis  - s/p EGD  - Continue Fluconazole, Nystatin, and artifical saliva    # Gastritis:  - s/p EGD  - Continue PPI daily  - Biopsy pathology results pending    Please reconsult PRN.     Attending has not yet evaluated patient today, attending recommendations pending ... 43 yo F  PMH: Metastatic adenocarcinoma of the lung diagnosed in 2018 s/p multiple lines of chemotherapy, currently on Alimta and Avastin (received this on Aug 20) and had received 5 radiations to the neck (completed Aug 14)  CC: Odynophagia and Dysphagia    ASSESSMENT / PLAN:    # Dysphagia and Odynophagia: related to previous RT with now mucositis  - s/p EGD  - Continue Fluconazole, Nystatin, and artifical saliva    # Gastritis:  - s/p EGD  - Continue PPI daily  - Biopsy pathology results pending    Please reconsult PRN. 43 yo F  PMH: Metastatic adenocarcinoma of the lung diagnosed in 2018 s/p multiple lines of chemotherapy, currently on Alimta and Avastin (received this on Aug 20) and had received 5 radiations to the neck (completed Aug 14)  CC: Odynophagia and Dysphagia    ASSESSMENT / PLAN:    # Dysphagia and Odynophagia: related to previous RT with now mucositis  - s/p EGD  - Continue Fluconazole, Nystatin, and artifical saliva    # Gastritis:  - s/p EGD  - Continue PPI daily  - Biopsy pathology results pending  [facility]pt follow up [facility]    Please reconsult PRN.

## 2019-08-29 NOTE — PROGRESS NOTE ADULT - ATTENDING COMMENTS
S/p diagnostic LP.   Sim-ed for XRT to bulky leptomeningeal disease.   Obtain MRI of the C-T-spine and MRI of the brain.   Pancytopenia improving, OK to d/c Neupogen.

## 2019-08-29 NOTE — PROGRESS NOTE ADULT - SUBJECTIVE AND OBJECTIVE BOX
Patient is a 44y old  Female who presents with a chief complaint of Difficulty swallowing      Admitted to medicine with primary diagnosis of Oral thrush with possible Esophagal Candidiasis    Interval events: S/P EGD     Today is hosp day 5  Patient is resting comfortably in bed  odynophagia significantly improved after EGD   Now Tolerating soft diet with regular BM   Ambulates independently  Plan: LP with Neuroradiology today  -MR of B/C/T spine         PAST MEDICAL & SURGICAL HISTORY:  Lung cancer  Anxiety and depression  History of cholecystectomy      MEDICATIONS  (STANDING):  benzocaine 15 mG/menthol 3.6 mG (Sugar-Free) Lozenge 1 Lozenge Oral three times a day  buPROPion XL . 150 milliGRAM(s) Oral daily  chlorhexidine 4% Liquid 1 Application(s) Topical <User Schedule>  docusate sodium 100 milliGRAM(s) Oral two times a day  enoxaparin Injectable 40 milliGRAM(s) SubCutaneous at bedtime  escitalopram 20 milliGRAM(s) Oral at bedtime  filgrastim-sndz Injectable 480 MICROGram(s) SubCutaneous daily  FIRST- Mouthwash  BLM 15 milliLiter(s) Swish and Spit four times a day  fluconAZOLE   Tablet 200 milliGRAM(s) Oral daily  folic acid 1 milliGRAM(s) Oral daily  loratadine 10 milliGRAM(s) Oral daily  oxyCODONE  ER Tablet 60 milliGRAM(s) Oral every 12 hours  pantoprazole    Tablet 40 milliGRAM(s) Oral two times a day  Saliva Substitute (CAPHOSOL) 30 milliLiter(s) Swish and Spit four times a day  senna 2 Tablet(s) Oral at bedtime    MEDICATIONS  (PRN):  ibuprofen  Tablet. 200 milliGRAM(s) Oral every 4 hours PRN Mild Pain (1 - 3)  oxyCODONE    IR 15 milliGRAM(s) Oral every 3 hours PRN Severe Pain (7 - 10)          Vital Signs Last 24 Hrs  T(C): 36.2 (29 Aug 2019 14:32), Max: 36.6 (29 Aug 2019 05:51)  T(F): 97.2 (29 Aug 2019 14:32), Max: 97.8 (29 Aug 2019 05:51)  HR: 71 (29 Aug 2019 14:32) (71 - 77)  BP: 132/78 (29 Aug 2019 14:32) (124/75 - 144/79)  BP(mean): --  RR: 18 (29 Aug 2019 14:32) (18 - 18)  SpO2: 96% (29 Aug 2019 07:30) (95% - 96%)  CAPILLARY BLOOD GLUCOSE        I&O's Summary      Physical Exam:    -      General : NAD, resting comfortably in bed    -      Oral Cavity: Moist, Oral thrush resolved     -      Cardiac: S1/S2 appreciated RRR, no murmurs    -      Pulm: CTA b/l, no adventitious sounds    -      GI: Soft, NT, ND, + odynophagia improved     -      Musculoskeletal: Atraumatic, No LE edema B/L, No skin color changes    -      Neuro: AO x 4, nonfocal           Labs:                        8.8    6.82  )-----------( 137      ( 29 Aug 2019 09:02 )             28.4             08-29    143  |  100  |  5<L>  ----------------------------<  97  3.2<L>   |  31  |  0.6<L>    Ca    8.5      29 Aug 2019 09:02    TPro  6.1  /  Alb  3.4<L>  /  TBili  0.4  /  DBili  x   /  AST  17  /  ALT  12  /  AlkPhos  123<H>  08-29    LIVER FUNCTIONS - ( 29 Aug 2019 09:02 )  Alb: 3.4 g/dL / Pro: 6.1 g/dL / ALK PHOS: 123 U/L / ALT: 12 U/L / AST: 17 U/L / GGT: x                 PT/INR - ( 28 Aug 2019 17:11 )   PT: 13.90 sec;   INR: 1.21 ratio         PTT - ( 29 Aug 2019 09:02 )  PTT:28.7 sec              Imaging:    ECG:

## 2019-08-30 LAB
ANION GAP SERPL CALC-SCNC: 11 MMOL/L — SIGNIFICANT CHANGE UP (ref 7–14)
BASOPHILS # BLD AUTO: 0.02 K/UL — SIGNIFICANT CHANGE UP (ref 0–0.2)
BASOPHILS NFR BLD AUTO: 0.2 % — SIGNIFICANT CHANGE UP (ref 0–1)
BUN SERPL-MCNC: 4 MG/DL — LOW (ref 10–20)
CALCIUM SERPL-MCNC: 8.7 MG/DL — SIGNIFICANT CHANGE UP (ref 8.5–10.1)
CHLORIDE SERPL-SCNC: 101 MMOL/L — SIGNIFICANT CHANGE UP (ref 98–110)
CO2 SERPL-SCNC: 32 MMOL/L — SIGNIFICANT CHANGE UP (ref 17–32)
CREAT SERPL-MCNC: 0.7 MG/DL — SIGNIFICANT CHANGE UP (ref 0.7–1.5)
CRYPTOC AG CSF-ACNC: NEGATIVE — SIGNIFICANT CHANGE UP
EOSINOPHIL # BLD AUTO: 0.08 K/UL — SIGNIFICANT CHANGE UP (ref 0–0.7)
EOSINOPHIL NFR BLD AUTO: 0.8 % — SIGNIFICANT CHANGE UP (ref 0–8)
GLUCOSE SERPL-MCNC: 97 MG/DL — SIGNIFICANT CHANGE UP (ref 70–99)
HCT VFR BLD CALC: 28.6 % — LOW (ref 37–47)
HGB BLD-MCNC: 8.7 G/DL — LOW (ref 12–16)
IMM GRANULOCYTES NFR BLD AUTO: 3.3 % — HIGH (ref 0.1–0.3)
LDH SERPL L TO P-CCNC: 27 U/L — SIGNIFICANT CHANGE UP
LYMPHOCYTES # BLD AUTO: 1.21 K/UL — SIGNIFICANT CHANGE UP (ref 1.2–3.4)
LYMPHOCYTES # BLD AUTO: 12.8 % — LOW (ref 20.5–51.1)
MAGNESIUM SERPL-MCNC: 2 MG/DL — SIGNIFICANT CHANGE UP (ref 1.8–2.4)
MCHC RBC-ENTMCNC: 26.3 PG — LOW (ref 27–31)
MCHC RBC-ENTMCNC: 30.4 G/DL — LOW (ref 32–37)
MCV RBC AUTO: 86.4 FL — SIGNIFICANT CHANGE UP (ref 81–99)
MONOCYTES # BLD AUTO: 1.29 K/UL — HIGH (ref 0.1–0.6)
MONOCYTES NFR BLD AUTO: 13.7 % — HIGH (ref 1.7–9.3)
NEUTROPHILS # BLD AUTO: 6.51 K/UL — HIGH (ref 1.4–6.5)
NEUTROPHILS NFR BLD AUTO: 69.2 % — SIGNIFICANT CHANGE UP (ref 42.2–75.2)
NON-GYNECOLOGICAL CYTOLOGY STUDY: SIGNIFICANT CHANGE UP
NRBC # BLD: 0 /100 WBCS — SIGNIFICANT CHANGE UP (ref 0–0)
PLATELET # BLD AUTO: 87 K/UL — LOW (ref 130–400)
POTASSIUM SERPL-MCNC: 4.3 MMOL/L — SIGNIFICANT CHANGE UP (ref 3.5–5)
POTASSIUM SERPL-SCNC: 4.3 MMOL/L — SIGNIFICANT CHANGE UP (ref 3.5–5)
RBC # BLD: 3.31 M/UL — LOW (ref 4.2–5.4)
RBC # FLD: 14.2 % — SIGNIFICANT CHANGE UP (ref 11.5–14.5)
SODIUM SERPL-SCNC: 144 MMOL/L — SIGNIFICANT CHANGE UP (ref 135–146)
SURGICAL PATHOLOGY STUDY: SIGNIFICANT CHANGE UP
WBC # BLD: 9.42 K/UL — SIGNIFICANT CHANGE UP (ref 4.8–10.8)
WBC # FLD AUTO: 9.42 K/UL — SIGNIFICANT CHANGE UP (ref 4.8–10.8)

## 2019-08-30 PROCEDURE — 99233 SBSQ HOSP IP/OBS HIGH 50: CPT

## 2019-08-30 RX ADMIN — Medication 100 MILLIGRAM(S): at 17:16

## 2019-08-30 RX ADMIN — OXYCODONE HYDROCHLORIDE 15 MILLIGRAM(S): 5 TABLET ORAL at 09:29

## 2019-08-30 RX ADMIN — PANTOPRAZOLE SODIUM 40 MILLIGRAM(S): 20 TABLET, DELAYED RELEASE ORAL at 17:15

## 2019-08-30 RX ADMIN — PANTOPRAZOLE SODIUM 40 MILLIGRAM(S): 20 TABLET, DELAYED RELEASE ORAL at 05:59

## 2019-08-30 RX ADMIN — Medication 30 MILLILITER(S): at 09:29

## 2019-08-30 RX ADMIN — ENOXAPARIN SODIUM 40 MILLIGRAM(S): 100 INJECTION SUBCUTANEOUS at 21:40

## 2019-08-30 RX ADMIN — CHLORHEXIDINE GLUCONATE 1 APPLICATION(S): 213 SOLUTION TOPICAL at 05:59

## 2019-08-30 RX ADMIN — OXYCODONE HYDROCHLORIDE 60 MILLIGRAM(S): 5 TABLET ORAL at 06:04

## 2019-08-30 RX ADMIN — OXYCODONE HYDROCHLORIDE 15 MILLIGRAM(S): 5 TABLET ORAL at 21:35

## 2019-08-30 RX ADMIN — OXYCODONE HYDROCHLORIDE 60 MILLIGRAM(S): 5 TABLET ORAL at 05:59

## 2019-08-30 RX ADMIN — DIPHENHYDRAMINE HYDROCHLORIDE AND LIDOCAINE HYDROCHLORIDE AND ALUMINUM HYDROXIDE AND MAGNESIUM HYDRO 15 MILLILITER(S): KIT at 17:14

## 2019-08-30 RX ADMIN — OXYCODONE HYDROCHLORIDE 15 MILLIGRAM(S): 5 TABLET ORAL at 10:37

## 2019-08-30 RX ADMIN — BENZOCAINE AND MENTHOL 1 LOZENGE: 5; 1 LIQUID ORAL at 21:40

## 2019-08-30 RX ADMIN — OXYCODONE HYDROCHLORIDE 15 MILLIGRAM(S): 5 TABLET ORAL at 22:00

## 2019-08-30 RX ADMIN — BUPROPION HYDROCHLORIDE 150 MILLIGRAM(S): 150 TABLET, EXTENDED RELEASE ORAL at 21:39

## 2019-08-30 RX ADMIN — Medication 30 MILLILITER(S): at 17:16

## 2019-08-30 RX ADMIN — OXYCODONE HYDROCHLORIDE 15 MILLIGRAM(S): 5 TABLET ORAL at 13:59

## 2019-08-30 RX ADMIN — Medication 100 MILLIGRAM(S): at 05:59

## 2019-08-30 RX ADMIN — SENNA PLUS 2 TABLET(S): 8.6 TABLET ORAL at 21:39

## 2019-08-30 RX ADMIN — LORATADINE 10 MILLIGRAM(S): 10 TABLET ORAL at 17:14

## 2019-08-30 RX ADMIN — OXYCODONE HYDROCHLORIDE 60 MILLIGRAM(S): 5 TABLET ORAL at 17:11

## 2019-08-30 RX ADMIN — ESCITALOPRAM OXALATE 20 MILLIGRAM(S): 10 TABLET, FILM COATED ORAL at 21:39

## 2019-08-30 RX ADMIN — Medication 1 MILLIGRAM(S): at 17:15

## 2019-08-30 RX ADMIN — FLUCONAZOLE 200 MILLIGRAM(S): 150 TABLET ORAL at 17:15

## 2019-08-30 RX ADMIN — OXYCODONE HYDROCHLORIDE 60 MILLIGRAM(S): 5 TABLET ORAL at 18:58

## 2019-08-30 NOTE — PROGRESS NOTE ADULT - ATTENDING COMMENTS
I have reviewed CSF cytology results with Анна and her fiancee, no malignant cells observed.   I have explained to her that 1 LP is not enough to confirm true absence of CSF involvement, especially in the presence of "drop" leptomeningeal mets, we will proceed with MRI of the C and T spine as well as MRI of the brain, to identify potential other sights of bulky leptomeningeal involvement.   She did not receive 1st dose of XRT today, machine was broken.   OK to d/c home after MRIs.   Thrombocytopenia is treatment related, observe for now.

## 2019-08-30 NOTE — PROGRESS NOTE ADULT - SUBJECTIVE AND OBJECTIVE BOX
Patient is a 44y old  Female who presents with a chief complaint of Difficulty swallowing      Admitted to medicine with primary diagnosis of Oral thrush with possible Esophagal Candidiasis    Interval events: S/P EGD     Today is hosp day 5  Patient is resting comfortably in bed  odynophagia significantly improved after EGD   Now Tolerating soft diet with regular BM   Ambulates independently  Plan: LP with Neuroradiology today  -MR of B/C/T spine           PAST MEDICAL & SURGICAL HISTORY:  Lung cancer  Anxiety and depression  History of cholecystectomy      MEDICATIONS  (STANDING):  benzocaine 15 mG/menthol 3.6 mG (Sugar-Free) Lozenge 1 Lozenge Oral three times a day  buPROPion XL . 150 milliGRAM(s) Oral daily  chlorhexidine 4% Liquid 1 Application(s) Topical <User Schedule>  docusate sodium 100 milliGRAM(s) Oral two times a day  enoxaparin Injectable 40 milliGRAM(s) SubCutaneous at bedtime  escitalopram 20 milliGRAM(s) Oral at bedtime  FIRST- Mouthwash  BLM 15 milliLiter(s) Swish and Spit four times a day  fluconAZOLE   Tablet 200 milliGRAM(s) Oral daily  folic acid 1 milliGRAM(s) Oral daily  loratadine 10 milliGRAM(s) Oral daily  oxyCODONE  ER Tablet 60 milliGRAM(s) Oral every 12 hours  pantoprazole    Tablet 40 milliGRAM(s) Oral two times a day  Saliva Substitute (CAPHOSOL) 30 milliLiter(s) Swish and Spit four times a day  senna 2 Tablet(s) Oral at bedtime    MEDICATIONS  (PRN):  ibuprofen  Tablet. 200 milliGRAM(s) Oral every 4 hours PRN Mild Pain (1 - 3)  oxyCODONE    IR 15 milliGRAM(s) Oral every 3 hours PRN Severe Pain (7 - 10)          Vital Signs Last 24 Hrs  T(C): 36.3 (30 Aug 2019 05:29), Max: 37.3 (29 Aug 2019 19:58)  T(F): 97.3 (30 Aug 2019 05:29), Max: 99.1 (29 Aug 2019 19:58)  HR: 73 (30 Aug 2019 08:30) (72 - 85)  BP: 137/72 (30 Aug 2019 08:30) (137/72 - 172/84)  BP(mean): --  RR: 18 (30 Aug 2019 08:30) (18 - 19)  SpO2: --  CAPILLARY BLOOD GLUCOSE        I&O's Summary      Physical Exam:    --      General : NAD, resting comfortably in bed    -      Oral Cavity: Moist, Oral thrush resolved     -      Cardiac: S1/S2 appreciated RRR, no murmurs    -      Pulm: CTA b/l, no adventitious sounds    -      GI: Soft, NT, ND, + odynophagia improved     -      Musculoskeletal: Atraumatic, No LE edema B/L, No skin color changes    -      Neuro: AO x 4, nonfocal         Labs:                        8.7    9.42  )-----------( 87       ( 30 Aug 2019 12:10 )             28.6             08-30    144  |  101  |  4<L>  ----------------------------<  97  4.3   |  32  |  0.7    Ca    8.7      30 Aug 2019 12:10  Mg     2.0     08-30    TPro  6.1  /  Alb  3.4<L>  /  TBili  0.4  /  DBili  x   /  AST  17  /  ALT  12  /  AlkPhos  123<H>  08-29    LIVER FUNCTIONS - ( 29 Aug 2019 09:02 )  Alb: 3.4 g/dL / Pro: 6.1 g/dL / ALK PHOS: 123 U/L / ALT: 12 U/L / AST: 17 U/L / GGT: x                 PT/INR - ( 28 Aug 2019 17:11 )   PT: 13.90 sec;   INR: 1.21 ratio         PTT - ( 29 Aug 2019 09:02 )  PTT:28.7 sec              Imaging:    ECG: Patient is a 44y old  Female who presents with a chief complaint of Difficulty swallowing      Admitted to medicine with primary diagnosis of Oral thrush with possible Esophagal Candidiasis    Interval events: s/p LP with Neuroradiology      Today is hosp day 6  Patient is resting comfortably in bed  odynophagia significantly improved   Now Tolerating soft diet with regular BM   Ambulates independently  Plan:-MR of B/C/T spine   -D/C planning after Cytopathology results come back    PAST MEDICAL & SURGICAL HISTORY:  Lung cancer  Anxiety and depression  History of cholecystectomy      MEDICATIONS  (STANDING):  benzocaine 15 mG/menthol 3.6 mG (Sugar-Free) Lozenge 1 Lozenge Oral three times a day  buPROPion XL . 150 milliGRAM(s) Oral daily  chlorhexidine 4% Liquid 1 Application(s) Topical <User Schedule>  docusate sodium 100 milliGRAM(s) Oral two times a day  enoxaparin Injectable 40 milliGRAM(s) SubCutaneous at bedtime  escitalopram 20 milliGRAM(s) Oral at bedtime  FIRST- Mouthwash  BLM 15 milliLiter(s) Swish and Spit four times a day  fluconAZOLE   Tablet 200 milliGRAM(s) Oral daily  folic acid 1 milliGRAM(s) Oral daily  loratadine 10 milliGRAM(s) Oral daily  oxyCODONE  ER Tablet 60 milliGRAM(s) Oral every 12 hours  pantoprazole    Tablet 40 milliGRAM(s) Oral two times a day  Saliva Substitute (CAPHOSOL) 30 milliLiter(s) Swish and Spit four times a day  senna 2 Tablet(s) Oral at bedtime    MEDICATIONS  (PRN):  ibuprofen  Tablet. 200 milliGRAM(s) Oral every 4 hours PRN Mild Pain (1 - 3)  oxyCODONE    IR 15 milliGRAM(s) Oral every 3 hours PRN Severe Pain (7 - 10)          Vital Signs Last 24 Hrs  T(C): 36.3 (30 Aug 2019 05:29), Max: 37.3 (29 Aug 2019 19:58)  T(F): 97.3 (30 Aug 2019 05:29), Max: 99.1 (29 Aug 2019 19:58)  HR: 73 (30 Aug 2019 08:30) (72 - 85)  BP: 137/72 (30 Aug 2019 08:30) (137/72 - 172/84)  BP(mean): --  RR: 18 (30 Aug 2019 08:30) (18 - 19)  SpO2: --  CAPILLARY BLOOD GLUCOSE        I&O's Summary      Physical Exam:    --      General : NAD, resting comfortably in bed    -       Oral Cavity: Moist, Oral thrush resolved     -      Cardiac: S1/S2 appreciated RRR, no murmurs    -      Pulm: CTA b/l, no adventitious sounds    -      GI: Soft, NT, ND, + odynophagia improved     -      Musculoskeletal: Atraumatic, No LE edema B/L, No skin color changes    -      Neuro: AO x 4, nonfocal       Labs:                        8.7    9.42  )-----------( 87       ( 30 Aug 2019 12:10 )             28.6             08-30    144  |  101  |  4<L>  ----------------------------<  97  4.3   |  32  |  0.7    Ca    8.7      30 Aug 2019 12:10  Mg     2.0     08-30    TPro  6.1  /  Alb  3.4<L>  /  TBili  0.4  /  DBili  x   /  AST  17  /  ALT  12  /  AlkPhos  123<H>  08-29    LIVER FUNCTIONS - ( 29 Aug 2019 09:02 )  Alb: 3.4 g/dL / Pro: 6.1 g/dL / ALK PHOS: 123 U/L / ALT: 12 U/L / AST: 17 U/L / GGT: x                 PT/INR - ( 28 Aug 2019 17:11 )   PT: 13.90 sec;   INR: 1.21 ratio         PTT - ( 29 Aug 2019 09:02 )  PTT:28.7 sec    Imaging:    ECG:

## 2019-08-30 NOTE — PROGRESS NOTE ADULT - ASSESSMENT
45 y/o F with h/o smoking- quit 4 years ago with Metastatic adenocarcinoma of the lung, PDL1 5%, EGFT, Alk, BRAF, KRAS, ROS1 negative diagnosed in 2018 s/p multiple lines of chemotherapy - 4 cycles-Carbo, Alimta and Keytruda in 2018, progressed and then received Taxterene/cyramza and then was given Gemzar in April 2019. She has received SRS to brain (in 2018 and also in May 2019).  Also received radiation to Lt 8th rib and flank area. She was referred to [facility],(Dr. Irma Nuñez  was not eligible for clinical trial. Now she is on Alimta and Avastin (received this on Aug 20) and had received 5 radiations to the neck(completed Aug 14)     1 day following chemo- pt started having pain on swallowing to both solids and liquids. No fever/ chills. No recent weight loss  Also c/o epigastric burning pain and discomfort     1) Metastatic Lung adenocarcinoma PDL1 5%, EGFT, Alk, BRAF, KRAS, ROS1 negative- progression with multiple lines of chemotherapy- now with leptomeningeal involvement   Currently on Alimta and Avastin received Aug 20  Also radiation to the neck -completed Aug 14th  c/w home dose of pain meds for her pain from metastasis   C/w folic acid as pt is on Avastin  MRI Lspine- leptomeningeal involvement   s/p LP on 8/29- awaiting cytology   f/u MRI Cspine, Tspine and also MRI head with contrast    She is planned for radiation today by Dr Gresham today     2) Pain on swallowing 2/2 to thrush and mucositis   s/p EGD-  non-erosive gastritis.  Oropharyngeal mucositis mostly radiation induced. - f/u biopsy results   c/w fluconazole(complete 10 day course) and lozenges and magic mouth wash   c/w home dose of pain meds     3) Pancytopenia 2/2 to chemotherapy   Better and improving   She did not receive neulasta after her chemo .  s/p Neupogen  No fever/chills  Continue to monitor counts  Transfuse if Hb<7 and Platelets less than 20k or if any active bleeding       DVT ppx- on lovenox. Continue as long as the PLt >50k

## 2019-08-30 NOTE — PROGRESS NOTE ADULT - SUBJECTIVE AND OBJECTIVE BOX
Patient is a 44y old  Female who presents with a chief complaint of Difficulty swallowing (29 Aug 2019 17:20)      Subjective: Able to eat better now  She got LP done yesterday  Awaiting MRI today       Vital Signs Last 24 Hrs  T(C): 36.3 (30 Aug 2019 05:29), Max: 37.3 (29 Aug 2019 19:58)  T(F): 97.3 (30 Aug 2019 05:29), Max: 99.1 (29 Aug 2019 19:58)  HR: 73 (30 Aug 2019 08:30) (71 - 85)  BP: 137/72 (30 Aug 2019 08:30) (132/78 - 172/84)  BP(mean): --  RR: 18 (30 Aug 2019 08:30) (18 - 19)  SpO2: --    PHYSICAL EXAM  General: adult in NAD  Neck: supple  CV: normal S1/S2 with no murmur rubs or gallops  Lungs: positive air movement b/l ant lungs,clear to auscultation, no wheezes, no rales  Abdomen: soft non-tender  Ext: no clubbing cyanosis or edema  Neuro: alert and oriented X 4, no focal deficits    MEDICATIONS  (STANDING):  benzocaine 15 mG/menthol 3.6 mG (Sugar-Free) Lozenge 1 Lozenge Oral three times a day  buPROPion XL . 150 milliGRAM(s) Oral daily  chlorhexidine 4% Liquid 1 Application(s) Topical <User Schedule>  docusate sodium 100 milliGRAM(s) Oral two times a day  enoxaparin Injectable 40 milliGRAM(s) SubCutaneous at bedtime  escitalopram 20 milliGRAM(s) Oral at bedtime  FIRST- Mouthwash  BLM 15 milliLiter(s) Swish and Spit four times a day  fluconAZOLE   Tablet 200 milliGRAM(s) Oral daily  folic acid 1 milliGRAM(s) Oral daily  loratadine 10 milliGRAM(s) Oral daily  oxyCODONE  ER Tablet 60 milliGRAM(s) Oral every 12 hours  pantoprazole    Tablet 40 milliGRAM(s) Oral two times a day  Saliva Substitute (CAPHOSOL) 30 milliLiter(s) Swish and Spit four times a day  senna 2 Tablet(s) Oral at bedtime    MEDICATIONS  (PRN):  ibuprofen  Tablet. 200 milliGRAM(s) Oral every 4 hours PRN Mild Pain (1 - 3)  oxyCODONE    IR 15 milliGRAM(s) Oral every 3 hours PRN Severe Pain (7 - 10)      LABS:                          8.8    6.82  )-----------( 137      ( 29 Aug 2019 09:02 )             28.4         Mean Cell Volume : 85.3 fL  Mean Cell Hemoglobin : 26.4 pg  Mean Cell Hemoglobin Concentration : 31.0 g/dL  Auto Neutrophil # : 5.05 K/uL  Auto Lymphocyte # : 0.93 K/uL  Auto Monocyte # : 0.65 K/uL  Auto Eosinophil # : 0.06 K/uL  Auto Basophil # : 0.03 K/uL  Auto Neutrophil % : 74.1 %  Auto Lymphocyte % : 13.6 %  Auto Monocyte % : 9.5 %  Auto Eosinophil % : 0.9 %  Auto Basophil % : 0.4 %      Serial CBC's  08-29 @ 09:02  Hct-28.4 / Hgb-8.8 / Plat-137 / RBC-3.33 / WBC-6.82  Serial CBC's  08-28 @ 08:22  Hct-28.2 / Hgb-8.8 / Plat-75 / RBC-3.36 / WBC-2.02  Serial CBC's  08-27 @ 07:26  Hct-27.6 / Hgb-8.7 / Plat-119 / RBC-3.32 / WBC-0.71      08-29    143  |  100  |  5<L>  ----------------------------<  97  3.2<L>   |  31  |  0.6<L>    Ca    8.5      29 Aug 2019 09:02    TPro  6.1  /  Alb  3.4<L>  /  TBili  0.4  /  DBili  x   /  AST  17  /  ALT  12  /  AlkPhos  123<H>  08-29      PT/INR - ( 28 Aug 2019 17:11 )   PT: 13.90 sec;   INR: 1.21 ratio         PTT - ( 29 Aug 2019 09:02 )  PTT:28.7 sec                            BLOOD SMEAR INTERPRETATION:       RADIOLOGY & ADDITIONAL STUDIES:

## 2019-08-30 NOTE — PROGRESS NOTE ADULT - ASSESSMENT
This is a 44 year old female with PMHx of tobacco abuse, metastatic adenocarcinoma of the lung s/p chemo 4 cycles Carbo, Alimta and Keytruda then Taxterene/cyramza and then Gemzar who presented post chemo treatment for pain on swallowing to both solids and liquids     Oral Thrush  -Immunocompromised on Active Chemotherapy   -Presenting with Odynophagia with both solids and Liquid, throat pain and Discomfort    -Pt continues to complain of Esophageal burning and discomfort   --Oral Trush resolved on exam  --S/P EGD: : Mucositis possibly from Radiation therapy and Non-ulcerative gastritis( bx taken)   -Symptoms Improving now able to tolerate Soft diet   -Continue with IV Fluconazole, lozenges and Mouth wash, Artificial Saliva        Metastatic Lung Adenocarcinoma  - Progressive despite multiple chemo regimens  - Last chemo: Alimta and Avastin on   - Radiation of neck completed on   - Continue with home pain meds and folic acid   -Per Heme/OnC:  *Pt was scheduled to get this done output for LE numbness and pain     * MRI LS spine with and without contrast: Showed Leptomeningeal disease   -S/P Lumbar Puncture 19 with Neuroradiology  -F/up Cytplogy      Pancytopenia 2/2 to chemotherapy   -Asymptomatic  - S/P Neupogen 480 mc19: improving    -Transfuse if Hb<7 and Platelets less than 20k or if any active bleeding     DVT ppx: Lovenox  GI ppx: Protonix  Full Code  Dispo: Home when stable This is a 44 year old female with PMHx of tobacco abuse, metastatic adenocarcinoma of the lung s/p chemo 4 cycles Carbo, Alimta and Keytruda then Taxterene/cyramza and then Gemzar who presented post chemo treatment for pain on swallowing to both solids and liquids     Oral Thrush: Improving   -Immunocompromised on Active Chemotherapy   -Presenting with Odynophagia with both solids and Liquid, throat pain and Discomfort    -Pt continues to complain of Esophageal burning and discomfort   --Oral Trush resolved on exam  --S/P EGD: 8/28: Mucositis possibly from Radiation therapy and Non-ulcerative gastritis( bx taken)   -Symptoms Improving now able to tolerate Soft diet   -Continue with IV Fluconazole, lozenges and Mouth wash, Artificial Saliva      Metastatic Lung Adenocarcinoma  - Progressive despite multiple chemo regimens  - Last chemo: Alimta and Avastin on 8/20  - Radiation of neck completed on 8/14  - Continue with home pain meds and folic acid   -Per Heme/OnC:  *Pt was scheduled to get this done output for LE numbness and pain     * MRI LS spine with and without contrast: Showed Leptomeningeal disease   -S/P Lumbar Puncture 8/29/19 with Neuroradiology  -F/up Cytopathology( heme/onc wants to wait for the results before discharge)  -Getting One dose of Radiation therapy to the neck: 8/30/19  -F/up MRI B/C/T    Pancytopenia 2/2 to chemotherapy   -Asymptomatic    -Transfuse if Hb<7 and Platelets less than 20k or if any active bleeding     DVT ppx: Lovenox  GI ppx: Protonix  Full Code  Dispo: Home when stable

## 2019-08-31 LAB
BASOPHILS # BLD AUTO: 0.02 K/UL — SIGNIFICANT CHANGE UP (ref 0–0.2)
BASOPHILS NFR BLD AUTO: 0.3 % — SIGNIFICANT CHANGE UP (ref 0–1)
EOSINOPHIL # BLD AUTO: 0.1 K/UL — SIGNIFICANT CHANGE UP (ref 0–0.7)
EOSINOPHIL NFR BLD AUTO: 1.7 % — SIGNIFICANT CHANGE UP (ref 0–8)
HCT VFR BLD CALC: 29.8 % — LOW (ref 37–47)
HGB BLD-MCNC: 9.1 G/DL — LOW (ref 12–16)
IMM GRANULOCYTES NFR BLD AUTO: 4.4 % — HIGH (ref 0.1–0.3)
LYMPHOCYTES # BLD AUTO: 1.04 K/UL — LOW (ref 1.2–3.4)
LYMPHOCYTES # BLD AUTO: 17.5 % — LOW (ref 20.5–51.1)
MCHC RBC-ENTMCNC: 26.5 PG — LOW (ref 27–31)
MCHC RBC-ENTMCNC: 30.5 G/DL — LOW (ref 32–37)
MCV RBC AUTO: 86.9 FL — SIGNIFICANT CHANGE UP (ref 81–99)
MONOCYTES # BLD AUTO: 0.89 K/UL — HIGH (ref 0.1–0.6)
MONOCYTES NFR BLD AUTO: 15 % — HIGH (ref 1.7–9.3)
NEUTROPHILS # BLD AUTO: 3.64 K/UL — SIGNIFICANT CHANGE UP (ref 1.4–6.5)
NEUTROPHILS NFR BLD AUTO: 61.1 % — SIGNIFICANT CHANGE UP (ref 42.2–75.2)
NRBC # BLD: 0 /100 WBCS — SIGNIFICANT CHANGE UP (ref 0–0)
PLATELET # BLD AUTO: 40 K/UL — LOW (ref 130–400)
RBC # BLD: 3.43 M/UL — LOW (ref 4.2–5.4)
RBC # FLD: 14.5 % — SIGNIFICANT CHANGE UP (ref 11.5–14.5)
WBC # BLD: 5.95 K/UL — SIGNIFICANT CHANGE UP (ref 4.8–10.8)
WBC # FLD AUTO: 5.95 K/UL — SIGNIFICANT CHANGE UP (ref 4.8–10.8)

## 2019-08-31 PROCEDURE — 70552 MRI BRAIN STEM W/DYE: CPT | Mod: 26

## 2019-08-31 PROCEDURE — 72147 MRI CHEST SPINE W/DYE: CPT | Mod: 26

## 2019-08-31 PROCEDURE — 99233 SBSQ HOSP IP/OBS HIGH 50: CPT

## 2019-08-31 PROCEDURE — 72142 MRI NECK SPINE W/DYE: CPT | Mod: 26

## 2019-08-31 RX ADMIN — ESCITALOPRAM OXALATE 20 MILLIGRAM(S): 10 TABLET, FILM COATED ORAL at 22:01

## 2019-08-31 RX ADMIN — OXYCODONE HYDROCHLORIDE 60 MILLIGRAM(S): 5 TABLET ORAL at 18:19

## 2019-08-31 RX ADMIN — OXYCODONE HYDROCHLORIDE 15 MILLIGRAM(S): 5 TABLET ORAL at 20:21

## 2019-08-31 RX ADMIN — DIPHENHYDRAMINE HYDROCHLORIDE AND LIDOCAINE HYDROCHLORIDE AND ALUMINUM HYDROXIDE AND MAGNESIUM HYDRO 15 MILLILITER(S): KIT at 18:21

## 2019-08-31 RX ADMIN — Medication 30 MILLILITER(S): at 12:19

## 2019-08-31 RX ADMIN — OXYCODONE HYDROCHLORIDE 60 MILLIGRAM(S): 5 TABLET ORAL at 06:09

## 2019-08-31 RX ADMIN — ENOXAPARIN SODIUM 40 MILLIGRAM(S): 100 INJECTION SUBCUTANEOUS at 22:02

## 2019-08-31 RX ADMIN — OXYCODONE HYDROCHLORIDE 15 MILLIGRAM(S): 5 TABLET ORAL at 01:45

## 2019-08-31 RX ADMIN — Medication 100 MILLIGRAM(S): at 18:20

## 2019-08-31 RX ADMIN — OXYCODONE HYDROCHLORIDE 15 MILLIGRAM(S): 5 TABLET ORAL at 08:29

## 2019-08-31 RX ADMIN — OXYCODONE HYDROCHLORIDE 15 MILLIGRAM(S): 5 TABLET ORAL at 01:13

## 2019-08-31 RX ADMIN — Medication 100 MILLIGRAM(S): at 06:11

## 2019-08-31 RX ADMIN — OXYCODONE HYDROCHLORIDE 15 MILLIGRAM(S): 5 TABLET ORAL at 20:51

## 2019-08-31 RX ADMIN — OXYCODONE HYDROCHLORIDE 15 MILLIGRAM(S): 5 TABLET ORAL at 12:18

## 2019-08-31 RX ADMIN — BENZOCAINE AND MENTHOL 1 LOZENGE: 5; 1 LIQUID ORAL at 22:02

## 2019-08-31 RX ADMIN — BUPROPION HYDROCHLORIDE 150 MILLIGRAM(S): 150 TABLET, EXTENDED RELEASE ORAL at 22:01

## 2019-08-31 RX ADMIN — Medication 30 MILLILITER(S): at 18:20

## 2019-08-31 RX ADMIN — BENZOCAINE AND MENTHOL 1 LOZENGE: 5; 1 LIQUID ORAL at 13:13

## 2019-08-31 RX ADMIN — Medication 1 MILLIGRAM(S): at 12:18

## 2019-08-31 RX ADMIN — FLUCONAZOLE 200 MILLIGRAM(S): 150 TABLET ORAL at 12:19

## 2019-08-31 RX ADMIN — OXYCODONE HYDROCHLORIDE 15 MILLIGRAM(S): 5 TABLET ORAL at 05:22

## 2019-08-31 RX ADMIN — PANTOPRAZOLE SODIUM 40 MILLIGRAM(S): 20 TABLET, DELAYED RELEASE ORAL at 06:11

## 2019-08-31 RX ADMIN — BENZOCAINE AND MENTHOL 1 LOZENGE: 5; 1 LIQUID ORAL at 06:10

## 2019-08-31 RX ADMIN — Medication 30 MILLILITER(S): at 06:11

## 2019-08-31 RX ADMIN — SENNA PLUS 2 TABLET(S): 8.6 TABLET ORAL at 22:01

## 2019-08-31 RX ADMIN — DIPHENHYDRAMINE HYDROCHLORIDE AND LIDOCAINE HYDROCHLORIDE AND ALUMINUM HYDROXIDE AND MAGNESIUM HYDRO 15 MILLILITER(S): KIT at 12:19

## 2019-08-31 RX ADMIN — DIPHENHYDRAMINE HYDROCHLORIDE AND LIDOCAINE HYDROCHLORIDE AND ALUMINUM HYDROXIDE AND MAGNESIUM HYDRO 15 MILLILITER(S): KIT at 08:26

## 2019-08-31 RX ADMIN — PANTOPRAZOLE SODIUM 40 MILLIGRAM(S): 20 TABLET, DELAYED RELEASE ORAL at 18:20

## 2019-08-31 RX ADMIN — LORATADINE 10 MILLIGRAM(S): 10 TABLET ORAL at 12:18

## 2019-08-31 RX ADMIN — DIPHENHYDRAMINE HYDROCHLORIDE AND LIDOCAINE HYDROCHLORIDE AND ALUMINUM HYDROXIDE AND MAGNESIUM HYDRO 15 MILLILITER(S): KIT at 00:02

## 2019-08-31 NOTE — PROGRESS NOTE ADULT - ASSESSMENT
45 y/o F with h/o smoking- quit 4 years ago with Metastatic adenocarcinoma of the lung, PDL1 5%, EGFT, Alk, BRAF, KRAS, ROS1 negative diagnosed in 2018 s/p multiple lines of chemotherapy - 4 cycles-Carbo, Alimta and Keytruda in 2018, progressed and then received Taxterene/cyramza and then was given Gemzar in April 2019. She has received SRS to brain (in 2018 and also in May 2019).  Also received radiation to Lt 8th rib and flank area. She was referred to [facility] [place],(Dr. Irma Nuñez  was not eligible for clinical trial. Now she is on Alimta and Avastin (received this on Aug 20) and had received 5 radiations to the neck(completed Aug 14)     1 day following chemo- pt started having pain on swallowing to both solids and liquids. No fever/ chills. No recent weight loss  Also c/o epigastric burning pain and discomfort     1) Metastatic Lung adenocarcinoma PDL1 5%, EGFT, Alk, BRAF, KRAS, ROS1 negative- progression with multiple lines of chemotherapy- now with leptomeningeal involvement   Currently on Alimta and Avastin received Aug 20  Also radiation to the neck -completed Aug 14th  c/w home dose of pain meds for her pain from metastasis   C/w folic acid as pt is on Avastin  MRI Lspine- leptomeningeal involvement   s/p LP on 8/29- awaiting cytology   f/u MRI Cspine, Tspine and also MRI head with contrast    She is planned for radiation today by Dr Gresham today     2) Pain on swallowing 2/2 to thrush and mucositis   s/p EGD-  non-erosive gastritis.  Oropharyngeal mucositis mostly radiation induced. - f/u biopsy results   c/w fluconazole(complete 10 day course) and lozenges and magic mouth wash   c/w home dose of pain meds     3) Pancytopenia 2/2 to chemotherapy   Better and improving   She did not receive neulasta after her chemo .  s/p Neupogen  No fever/chills  Continue to monitor counts  Transfuse if Hb<7 and Platelets less than 20k or if any active bleeding       DVT ppx- on lovenox. Continue as long as the PLt >50k 45 y/o F with h/o smoking- quit 4 years ago with Metastatic adenocarcinoma of the lung, PDL1 5%, EGFT, Alk, BRAF, KRAS, ROS1 negative diagnosed in 2018 s/p multiple lines of chemotherapy - 4 cycles-Carbo, Alimta and Keytruda in 2018, progressed and then received Taxterene/cyramza and then was given Gemzar in April 2019. She has received SRS to brain (in 2018 and also in May 2019).  Also received radiation to Lt 8th rib and flank area. She was referred to [facility] [place],(Dr. Irma Nuñez  was not eligible for clinical trial. Now she is on Alimta and Avastin (received this on Aug 20) and had received 5 radiations to the neck(completed Aug 14)     1 day following chemo- pt started having pain on swallowing to both solids and liquids. No fever/ chills. No recent weight loss  Also c/o epigastric burning pain and discomfort     1) Metastatic Lung adenocarcinoma PDL1 5%, EGFT, Alk, BRAF, KRAS, ROS1 negative- progression with multiple lines of chemotherapy- now with leptomeningeal involvement   Currently on Alimta and Avastin received Aug 20  Also radiation to the neck -completed Aug 14th  c/w home dose of pain meds for her pain from metastasis   C/w folic acid as pt is on Avastin  MRI Lspine- leptomeningeal involvement   s/p LP on 8/29- negative for malignant cells.  - will get MRI brain C and thoracic spine   Rdiation today , was not given yesterday as machine was broken .    2) Pain on swallowing 2/2 to thrush and mucositis   s/p EGD-  non-erosive gastritis.  Oropharyngeal mucositis mostly radiation induced. - f/u biopsy results   c/w fluconazole(complete 10 day course) and lozenges and magic mouth wash   c/w home dose of pain meds     3) Pancytopenia 2/2 to chemotherapy   Better and improving   She did not receive neulasta after her chemo .  s/p Neupogen  No fever/chills  Continue to monitor counts  Transfuse if Hb<7 and Platelets less than 20k or if any active bleeding       DVT ppx- on lovenox.     Plan;  - MRI's today .   - can be d/c home after MRI to f/u with Dr Galvan as out pt.

## 2019-08-31 NOTE — PROGRESS NOTE ADULT - SUBJECTIVE AND OBJECTIVE BOX
Patient is a 44y old  Female who presents with a chief complaint of Difficulty swallowing (30 Aug 2019 15:19)      Subjective:      Vital Signs Last 24 Hrs  T(C): 35.6 (31 Aug 2019 05:24), Max: 37.8 (30 Aug 2019 21:30)  T(F): 96.1 (31 Aug 2019 05:24), Max: 100.1 (30 Aug 2019 21:30)  HR: 75 (31 Aug 2019 05:24) (75 - 77)  BP: 139/83 (31 Aug 2019 05:24) (109/52 - 139/83)  BP(mean): --  RR: 18 (31 Aug 2019 05:24) (16 - 18)  SpO2: --    PHYSICAL EXAM  General:   HEENT:   CV:   Lungs:   Abdomen:   Ext:  Neuro:    MEDICATIONS  (STANDING):  benzocaine 15 mG/menthol 3.6 mG (Sugar-Free) Lozenge 1 Lozenge Oral three times a day  buPROPion XL . 150 milliGRAM(s) Oral daily  chlorhexidine 4% Liquid 1 Application(s) Topical <User Schedule>  docusate sodium 100 milliGRAM(s) Oral two times a day  enoxaparin Injectable 40 milliGRAM(s) SubCutaneous at bedtime  escitalopram 20 milliGRAM(s) Oral at bedtime  FIRST- Mouthwash  BLM 15 milliLiter(s) Swish and Spit four times a day  fluconAZOLE   Tablet 200 milliGRAM(s) Oral daily  folic acid 1 milliGRAM(s) Oral daily  loratadine 10 milliGRAM(s) Oral daily  oxyCODONE  ER Tablet 60 milliGRAM(s) Oral every 12 hours  pantoprazole    Tablet 40 milliGRAM(s) Oral two times a day  Saliva Substitute (CAPHOSOL) 30 milliLiter(s) Swish and Spit four times a day  senna 2 Tablet(s) Oral at bedtime    MEDICATIONS  (PRN):  ibuprofen  Tablet. 200 milliGRAM(s) Oral every 4 hours PRN Mild Pain (1 - 3)  oxyCODONE    IR 15 milliGRAM(s) Oral every 3 hours PRN Severe Pain (7 - 10)      LABS:                          8.7    9.42  )-----------( 87       ( 30 Aug 2019 12:10 )             28.6         Mean Cell Volume : 86.4 fL  Mean Cell Hemoglobin : 26.3 pg  Mean Cell Hemoglobin Concentration : 30.4 g/dL  Auto Neutrophil # : 6.51 K/uL  Auto Lymphocyte # : 1.21 K/uL  Auto Monocyte # : 1.29 K/uL  Auto Eosinophil # : 0.08 K/uL  Auto Basophil # : 0.02 K/uL  Auto Neutrophil % : 69.2 %  Auto Lymphocyte % : 12.8 %  Auto Monocyte % : 13.7 %  Auto Eosinophil % : 0.8 %  Auto Basophil % : 0.2 %      Serial CBC's  08-30 @ 12:10  Hct-28.6 / Hgb-8.7 / Plat-87 / RBC-3.31 / WBC-9.42  Serial CBC's  08-29 @ 09:02  Hct-28.4 / Hgb-8.8 / Plat-137 / RBC-3.33 / WBC-6.82  Serial CBC's  08-28 @ 08:22  Hct-28.2 / Hgb-8.8 / Plat-75 / RBC-3.36 / WBC-2.02      08-30    144  |  101  |  4<L>  ----------------------------<  97  4.3   |  32  |  0.7    Ca    8.7      30 Aug 2019 12:10  Mg     2.0     08-30    TPro  6.1  /  Alb  3.4<L>  /  TBili  0.4  /  DBili  x   /  AST  17  /  ALT  12  /  AlkPhos  123<H>  08-29      PTT - ( 29 Aug 2019 09:02 )  PTT:28.7 sec                            BLOOD SMEAR INTERPRETATION:       RADIOLOGY & ADDITIONAL STUDIES: Patient is a 44y old  Female who presents with a chief complaint of Difficulty swallowing (30 Aug 2019 15:19)      Subjective: Pt seen and examined , reports feeling well.       Vital Signs Last 24 Hrs  T(C): 35.6 (31 Aug 2019 05:24), Max: 37.8 (30 Aug 2019 21:30)  T(F): 96.1 (31 Aug 2019 05:24), Max: 100.1 (30 Aug 2019 21:30)  HR: 75 (31 Aug 2019 05:24) (75 - 77)  BP: 139/83 (31 Aug 2019 05:24) (109/52 - 139/83)  BP(mean): --  RR: 18 (31 Aug 2019 05:24) (16 - 18)  SpO2: --    PHYSICAL EXAM  General: lying on bed nad  HEENT: nc /at perrla  CV: s1+s2  Lungs: cta b/l   Abdomen: soft nd bs+  Ext: no edema clubbing or cyanosis  Neuro: aox 3 , no focal deficits.    MEDICATIONS  (STANDING):  benzocaine 15 mG/menthol 3.6 mG (Sugar-Free) Lozenge 1 Lozenge Oral three times a day  buPROPion XL . 150 milliGRAM(s) Oral daily  chlorhexidine 4% Liquid 1 Application(s) Topical <User Schedule>  docusate sodium 100 milliGRAM(s) Oral two times a day  enoxaparin Injectable 40 milliGRAM(s) SubCutaneous at bedtime  escitalopram 20 milliGRAM(s) Oral at bedtime  FIRST- Mouthwash  BLM 15 milliLiter(s) Swish and Spit four times a day  fluconAZOLE   Tablet 200 milliGRAM(s) Oral daily  folic acid 1 milliGRAM(s) Oral daily  loratadine 10 milliGRAM(s) Oral daily  oxyCODONE  ER Tablet 60 milliGRAM(s) Oral every 12 hours  pantoprazole    Tablet 40 milliGRAM(s) Oral two times a day  Saliva Substitute (CAPHOSOL) 30 milliLiter(s) Swish and Spit four times a day  senna 2 Tablet(s) Oral at bedtime    MEDICATIONS  (PRN):  ibuprofen  Tablet. 200 milliGRAM(s) Oral every 4 hours PRN Mild Pain (1 - 3)  oxyCODONE    IR 15 milliGRAM(s) Oral every 3 hours PRN Severe Pain (7 - 10)      LABS:                          8.7    9.42  )-----------( 87       ( 30 Aug 2019 12:10 )             28.6         Mean Cell Volume : 86.4 fL  Mean Cell Hemoglobin : 26.3 pg  Mean Cell Hemoglobin Concentration : 30.4 g/dL  Auto Neutrophil # : 6.51 K/uL  Auto Lymphocyte # : 1.21 K/uL  Auto Monocyte # : 1.29 K/uL  Auto Eosinophil # : 0.08 K/uL  Auto Basophil # : 0.02 K/uL  Auto Neutrophil % : 69.2 %  Auto Lymphocyte % : 12.8 %  Auto Monocyte % : 13.7 %  Auto Eosinophil % : 0.8 %  Auto Basophil % : 0.2 %      Serial CBC's  08-30 @ 12:10  Hct-28.6 / Hgb-8.7 / Plat-87 / RBC-3.31 / WBC-9.42  Serial CBC's  08-29 @ 09:02  Hct-28.4 / Hgb-8.8 / Plat-137 / RBC-3.33 / WBC-6.82  Serial CBC's  08-28 @ 08:22  Hct-28.2 / Hgb-8.8 / Plat-75 / RBC-3.36 / WBC-2.02      08-30    144  |  101  |  4<L>  ----------------------------<  97  4.3   |  32  |  0.7    Ca    8.7      30 Aug 2019 12:10  Mg     2.0     08-30    TPro  6.1  /  Alb  3.4<L>  /  TBili  0.4  /  DBili  x   /  AST  17  /  ALT  12  /  AlkPhos  123<H>  08-29      PTT - ( 29 Aug 2019 09:02 )  PTT:28.7 sec                            BLOOD SMEAR INTERPRETATION:       RADIOLOGY & ADDITIONAL STUDIES:

## 2019-09-01 LAB
BASOPHILS # BLD AUTO: 0.03 K/UL — SIGNIFICANT CHANGE UP (ref 0–0.2)
BASOPHILS NFR BLD AUTO: 0.7 % — SIGNIFICANT CHANGE UP (ref 0–1)
EOSINOPHIL # BLD AUTO: 0.08 K/UL — SIGNIFICANT CHANGE UP (ref 0–0.7)
EOSINOPHIL NFR BLD AUTO: 1.8 % — SIGNIFICANT CHANGE UP (ref 0–8)
HCT VFR BLD CALC: 30.4 % — LOW (ref 37–47)
HGB BLD-MCNC: 9.2 G/DL — LOW (ref 12–16)
IMM GRANULOCYTES NFR BLD AUTO: 4.6 % — HIGH (ref 0.1–0.3)
LYMPHOCYTES # BLD AUTO: 0.75 K/UL — LOW (ref 1.2–3.4)
LYMPHOCYTES # BLD AUTO: 17.1 % — LOW (ref 20.5–51.1)
MCHC RBC-ENTMCNC: 26.6 PG — LOW (ref 27–31)
MCHC RBC-ENTMCNC: 30.3 G/DL — LOW (ref 32–37)
MCV RBC AUTO: 87.9 FL — SIGNIFICANT CHANGE UP (ref 81–99)
MONOCYTES # BLD AUTO: 0.55 K/UL — SIGNIFICANT CHANGE UP (ref 0.1–0.6)
MONOCYTES NFR BLD AUTO: 12.6 % — HIGH (ref 1.7–9.3)
NEUTROPHILS # BLD AUTO: 2.77 K/UL — SIGNIFICANT CHANGE UP (ref 1.4–6.5)
NEUTROPHILS NFR BLD AUTO: 63.2 % — SIGNIFICANT CHANGE UP (ref 42.2–75.2)
NRBC # BLD: 0 /100 WBCS — SIGNIFICANT CHANGE UP (ref 0–0)
PLATELET # BLD AUTO: 50 K/UL — LOW (ref 130–400)
RBC # BLD: 3.46 M/UL — LOW (ref 4.2–5.4)
RBC # FLD: 15.2 % — HIGH (ref 11.5–14.5)
WBC # BLD: 4.38 K/UL — LOW (ref 4.8–10.8)
WBC # FLD AUTO: 4.38 K/UL — LOW (ref 4.8–10.8)

## 2019-09-01 PROCEDURE — 99232 SBSQ HOSP IP/OBS MODERATE 35: CPT

## 2019-09-01 RX ORDER — ZOLPIDEM TARTRATE 10 MG/1
5 TABLET ORAL AT BEDTIME
Refills: 0 | Status: DISCONTINUED | OUTPATIENT
Start: 2019-09-01 | End: 2019-09-04

## 2019-09-01 RX ORDER — ZOLPIDEM TARTRATE 10 MG/1
5 TABLET ORAL AT BEDTIME
Refills: 0 | Status: DISCONTINUED | OUTPATIENT
Start: 2019-09-01 | End: 2019-09-01

## 2019-09-01 RX ORDER — DEXAMETHASONE 0.5 MG/5ML
10 ELIXIR ORAL ONCE
Refills: 0 | Status: COMPLETED | OUTPATIENT
Start: 2019-09-01 | End: 2019-09-01

## 2019-09-01 RX ORDER — OXYCODONE HYDROCHLORIDE 5 MG/1
15 TABLET ORAL
Refills: 0 | Status: DISCONTINUED | OUTPATIENT
Start: 2019-09-01 | End: 2019-09-03

## 2019-09-01 RX ORDER — DEXAMETHASONE 0.5 MG/5ML
4 ELIXIR ORAL EVERY 6 HOURS
Refills: 0 | Status: DISCONTINUED | OUTPATIENT
Start: 2019-09-01 | End: 2019-09-04

## 2019-09-01 RX ORDER — DEXAMETHASONE 0.5 MG/5ML
10 ELIXIR ORAL ONCE
Refills: 0 | Status: DISCONTINUED | OUTPATIENT
Start: 2019-09-01 | End: 2019-09-01

## 2019-09-01 RX ORDER — DEXAMETHASONE 0.5 MG/5ML
4 ELIXIR ORAL EVERY 4 HOURS
Refills: 0 | Status: DISCONTINUED | OUTPATIENT
Start: 2019-09-01 | End: 2019-09-01

## 2019-09-01 RX ADMIN — BUPROPION HYDROCHLORIDE 150 MILLIGRAM(S): 150 TABLET, EXTENDED RELEASE ORAL at 22:23

## 2019-09-01 RX ADMIN — OXYCODONE HYDROCHLORIDE 60 MILLIGRAM(S): 5 TABLET ORAL at 05:50

## 2019-09-01 RX ADMIN — ZOLPIDEM TARTRATE 5 MILLIGRAM(S): 10 TABLET ORAL at 23:22

## 2019-09-01 RX ADMIN — Medication 1 MILLIGRAM(S): at 11:53

## 2019-09-01 RX ADMIN — Medication 30 MILLILITER(S): at 05:49

## 2019-09-01 RX ADMIN — OXYCODONE HYDROCHLORIDE 60 MILLIGRAM(S): 5 TABLET ORAL at 17:28

## 2019-09-01 RX ADMIN — SENNA PLUS 2 TABLET(S): 8.6 TABLET ORAL at 22:23

## 2019-09-01 RX ADMIN — Medication 100 MILLIGRAM(S): at 17:29

## 2019-09-01 RX ADMIN — Medication 4 MILLIGRAM(S): at 23:01

## 2019-09-01 RX ADMIN — Medication 30 MILLILITER(S): at 17:29

## 2019-09-01 RX ADMIN — Medication 102 MILLIGRAM(S): at 14:00

## 2019-09-01 RX ADMIN — BENZOCAINE AND MENTHOL 1 LOZENGE: 5; 1 LIQUID ORAL at 22:25

## 2019-09-01 RX ADMIN — PANTOPRAZOLE SODIUM 40 MILLIGRAM(S): 20 TABLET, DELAYED RELEASE ORAL at 17:29

## 2019-09-01 RX ADMIN — OXYCODONE HYDROCHLORIDE 15 MILLIGRAM(S): 5 TABLET ORAL at 08:52

## 2019-09-01 RX ADMIN — BENZOCAINE AND MENTHOL 1 LOZENGE: 5; 1 LIQUID ORAL at 05:49

## 2019-09-01 RX ADMIN — LORATADINE 10 MILLIGRAM(S): 10 TABLET ORAL at 11:53

## 2019-09-01 RX ADMIN — OXYCODONE HYDROCHLORIDE 15 MILLIGRAM(S): 5 TABLET ORAL at 15:11

## 2019-09-01 RX ADMIN — Medication 4 MILLIGRAM(S): at 17:29

## 2019-09-01 RX ADMIN — OXYCODONE HYDROCHLORIDE 15 MILLIGRAM(S): 5 TABLET ORAL at 00:00

## 2019-09-01 RX ADMIN — DIPHENHYDRAMINE HYDROCHLORIDE AND LIDOCAINE HYDROCHLORIDE AND ALUMINUM HYDROXIDE AND MAGNESIUM HYDRO 15 MILLILITER(S): KIT at 11:51

## 2019-09-01 RX ADMIN — OXYCODONE HYDROCHLORIDE 15 MILLIGRAM(S): 5 TABLET ORAL at 22:23

## 2019-09-01 RX ADMIN — BENZOCAINE AND MENTHOL 1 LOZENGE: 5; 1 LIQUID ORAL at 11:53

## 2019-09-01 RX ADMIN — Medication 30 MILLILITER(S): at 23:01

## 2019-09-01 RX ADMIN — ESCITALOPRAM OXALATE 20 MILLIGRAM(S): 10 TABLET, FILM COATED ORAL at 22:23

## 2019-09-01 RX ADMIN — DIPHENHYDRAMINE HYDROCHLORIDE AND LIDOCAINE HYDROCHLORIDE AND ALUMINUM HYDROXIDE AND MAGNESIUM HYDRO 15 MILLILITER(S): KIT at 17:28

## 2019-09-01 RX ADMIN — OXYCODONE HYDROCHLORIDE 15 MILLIGRAM(S): 5 TABLET ORAL at 23:00

## 2019-09-01 RX ADMIN — FLUCONAZOLE 200 MILLIGRAM(S): 150 TABLET ORAL at 11:52

## 2019-09-01 RX ADMIN — OXYCODONE HYDROCHLORIDE 15 MILLIGRAM(S): 5 TABLET ORAL at 03:39

## 2019-09-01 RX ADMIN — OXYCODONE HYDROCHLORIDE 15 MILLIGRAM(S): 5 TABLET ORAL at 18:42

## 2019-09-01 RX ADMIN — DIPHENHYDRAMINE HYDROCHLORIDE AND LIDOCAINE HYDROCHLORIDE AND ALUMINUM HYDROXIDE AND MAGNESIUM HYDRO 15 MILLILITER(S): KIT at 23:02

## 2019-09-01 RX ADMIN — DIPHENHYDRAMINE HYDROCHLORIDE AND LIDOCAINE HYDROCHLORIDE AND ALUMINUM HYDROXIDE AND MAGNESIUM HYDRO 15 MILLILITER(S): KIT at 08:52

## 2019-09-01 RX ADMIN — OXYCODONE HYDROCHLORIDE 15 MILLIGRAM(S): 5 TABLET ORAL at 15:45

## 2019-09-01 RX ADMIN — OXYCODONE HYDROCHLORIDE 15 MILLIGRAM(S): 5 TABLET ORAL at 11:56

## 2019-09-01 RX ADMIN — OXYCODONE HYDROCHLORIDE 15 MILLIGRAM(S): 5 TABLET ORAL at 09:30

## 2019-09-01 RX ADMIN — OXYCODONE HYDROCHLORIDE 15 MILLIGRAM(S): 5 TABLET ORAL at 03:09

## 2019-09-01 RX ADMIN — PANTOPRAZOLE SODIUM 40 MILLIGRAM(S): 20 TABLET, DELAYED RELEASE ORAL at 05:50

## 2019-09-01 RX ADMIN — Medication 30 MILLILITER(S): at 11:52

## 2019-09-01 RX ADMIN — ZOLPIDEM TARTRATE 5 MILLIGRAM(S): 10 TABLET ORAL at 22:23

## 2019-09-01 NOTE — PROGRESS NOTE ADULT - SUBJECTIVE AND OBJECTIVE BOX
Patient is a 44y old  Female who presents with a chief complaint of Difficulty swallowing (01 Sep 2019 12:43)      Subjective: Pt seen and examined , reports feeling better , but leg and abdominal pain are bothering her. Had MRI done yesterday.       Vital Signs Last 24 Hrs  T(C): 36.6 (01 Sep 2019 05:20), Max: 36.6 (01 Sep 2019 05:20)  T(F): 97.8 (01 Sep 2019 05:20), Max: 97.8 (01 Sep 2019 05:20)  HR: 72 (01 Sep 2019 05:20) (67 - 75)  BP: 133/83 (01 Sep 2019 05:20) (133/83 - 154/89)  BP(mean): --  RR: 18 (01 Sep 2019 05:50) (18 - 18)  SpO2: 93% (01 Sep 2019 10:27) (93% - 94%)    PHYSICAL EXAM  General: lying on bed , not in distress  HEENT: clear oropharynx, anicteric sclera  CV: normal S1/S2  Lungs: positive air movement b/l ant lungs,  Abdomen: soft tenderness to palpation in left lower quadrant.  Ext: no clubbing cyanosis or edema  Skin: no rashes and no petechiae  Neuro: alert and oriented X 4, no focal deficits    MEDICATIONS  (STANDING):  benzocaine 15 mG/menthol 3.6 mG (Sugar-Free) Lozenge 1 Lozenge Oral three times a day  buPROPion XL . 150 milliGRAM(s) Oral daily  chlorhexidine 4% Liquid 1 Application(s) Topical <User Schedule>  dexamethasone  Injectable 4 milliGRAM(s) IV Push every 6 hours  dexamethasone  IVPB 10 milliGRAM(s) IV Intermittent once  docusate sodium 100 milliGRAM(s) Oral two times a day  enoxaparin Injectable 40 milliGRAM(s) SubCutaneous at bedtime  escitalopram 20 milliGRAM(s) Oral at bedtime  FIRST- Mouthwash  BLM 15 milliLiter(s) Swish and Spit four times a day  fluconAZOLE   Tablet 200 milliGRAM(s) Oral daily  folic acid 1 milliGRAM(s) Oral daily  loratadine 10 milliGRAM(s) Oral daily  oxyCODONE  ER Tablet 60 milliGRAM(s) Oral every 12 hours  pantoprazole    Tablet 40 milliGRAM(s) Oral two times a day  Saliva Substitute (CAPHOSOL) 30 milliLiter(s) Swish and Spit four times a day  senna 2 Tablet(s) Oral at bedtime    MEDICATIONS  (PRN):  ibuprofen  Tablet. 200 milliGRAM(s) Oral every 4 hours PRN Mild Pain (1 - 3)  oxyCODONE    IR 15 milliGRAM(s) Oral every 3 hours PRN Severe Pain (7 - 10)      LABS:                          9.1    5.95  )-----------( 40       ( 31 Aug 2019 09:19 )             29.8         Mean Cell Volume : 86.9 fL  Mean Cell Hemoglobin : 26.5 pg  Mean Cell Hemoglobin Concentration : 30.5 g/dL  Auto Neutrophil # : 3.64 K/uL  Auto Lymphocyte # : 1.04 K/uL  Auto Monocyte # : 0.89 K/uL  Auto Eosinophil # : 0.10 K/uL  Auto Basophil # : 0.02 K/uL  Auto Neutrophil % : 61.1 %  Auto Lymphocyte % : 17.5 %  Auto Monocyte % : 15.0 %  Auto Eosinophil % : 1.7 %  Auto Basophil % : 0.3 %      Serial CBC's  08-31 @ 09:19  Hct-29.8 / Hgb-9.1 / Plat-40 / RBC-3.43 / WBC-5.95  Serial CBC's  08-30 @ 12:10  Hct-28.6 / Hgb-8.7 / Plat-87 / RBC-3.31 / WBC-9.42  Serial CBC's  08-29 @ 09:02  Hct-28.4 / Hgb-8.8 / Plat-137 / RBC-3.33 / WBC-6.82                                          BLOOD SMEAR INTERPRETATION:       RADIOLOGY & ADDITIONAL STUDIES:    < from: MR Head w/ IV Cont (08.31.19 @ 19:12) >  IMPRESSION:     Since MRI brain 5/23/2019:    Interval development of multiple new small enhancing lesion throughout   the parenchyma compatible with worsening metastatic disease. Mild   adjacent edema. No significant mass effect.    Several small likely hemorrhagic lesions without significant mass effect   likely representing treated lesions.    No definite evidence of leptomeningeal disease in the brain.      < end of copied text >  < from: MR Cervical Spine w/ IV Cont (08.31.19 @ 19:12) >  Impression:    No abnormal enhancement in the spinal canal.    Slight bone marrow signal abnormality in the white is process of C2 and   C3 at the left facets at C2-C3. Although indeterminate this may be   inflammatoryin nature. No additional area of bone marrow signal   abnormality.    Degenerative changes as above.      < end of copied text >

## 2019-09-01 NOTE — PROGRESS NOTE ADULT - ASSESSMENT
43 y/o F with h/o smoking- quit 4 years ago with Metastatic adenocarcinoma of the lung, PDL1 5%, EGFT, Alk, BRAF, KRAS, ROS1 negative diagnosed in 2018 s/p multiple lines of chemotherapy - 4 cycles-Carbo, Alimta and Keytruda in 2018, progressed and then received Taxterene/cyramza and then was given Gemzar in April 2019. She has received SRS to brain (in 2018 and also in May 2019).  Also received radiation to Lt 8th rib and flank area. She was referred to [facility] [place],(Dr. Irma Nuñez  was not eligible for clinical trial. Now she is on Alimta and Avastin (received this on Aug 20) and had received 5 radiations to the neck(completed Aug 14)     One day following chemo- pt started having pain on swallowing to both solids and liquids. No fever/ chills. No recent weight loss  Also c/o epigastric burning pain and discomfort     1) Metastatic Lung adenocarcinoma PDL1 5%, EGFT, Alk, BRAF, KRAS, ROS1 negative- progression with multiple lines of chemotherapy- now with leptomeningeal involvement   Currently on Alimta and Avastin received Aug 20  Also radiation to the neck -completed Aug 14th  c/w home dose of pain meds for her pain from metastasis   C/w folic acid as pt is on Avastin  MRI Lspine- leptomeningeal involvement   s/p LP on 8/29- negative for malignant cells.  - MRI brain did show new lesions  , MRI c spine did not show any enhancement . Discussed findings with pt . Will recommend staying in pt and will start decadron .   scheduled for radiation on Tuesday    2) Pain on swallowing 2/2 to thrush and mucositis / NEW ABDOMINAL PAIN :    s/p EGD-  non-erosive gastritis.  Oropharyngeal mucositis mostly radiation induced. - f/u biopsy results   c/w fluconazole(complete 10 day course) and lozenges and magic mouth wash   c/w home dose of pain meds   - WILL GET CT abd and pelvis.  - pain management eval.    3) Pancytopenia  / worsening thrombocytopenia likely 2/2 to chemotherapy but will check HIT panel    -counts pending from today .   s/p Neupogen  Transfuse if Hb<7 and Platelets less than 20k or if any active bleeding       DVT ppx- scd     Plan;  - monitor now inpt , rad-onc f/u a.

## 2019-09-01 NOTE — PROGRESS NOTE ADULT - ASSESSMENT
This is a 44 year old female with PMHx of tobacco abuse, metastatic adenocarcinoma of the lung s/p chemo 4 cycles Carbo, Alimta and Keytruda then Taxterene/cyramza and then Gemzar who presented post chemo treatment for pain on swallowing to both solids and liquids     Oral Thrush: Improving   -Immunocompromised on Active Chemotherapy   -Presenting with Odynophagia with both solids and Liquid, throat pain and Discomfort    -Pt continues to complain of Esophageal burning and discomfort   --Oral Trush resolved on exam  --S/P EGD: 8/28: Mucositis possibly from Radiation therapy and Non-ulcerative gastritis( bx taken)   -Symptoms Improving now able to tolerate Soft diet   -Continue with IV Fluconazole, lozenges and Mouth wash, Artificial Saliva      Metastatic Lung Adenocarcinoma  - Progressive despite multiple chemo regimens  - Last chemo: Alimta and Avastin on 8/20  - Radiation of neck completed on 8/14 and 8/30/19 ( in-house)   - Continue with home pain meds and folic acid   -Per Heme/OnC:  *Pt was scheduled to get this done output for LE numbness and pain     * MRI LS spine with and without contrast: Showed Leptomeningeal disease   -S/P Lumbar Puncture 8/29/19 with Neuroradiology  -F/up Cytopathology( heme/onc wants to wait for the results before discharge)  -Getting One dose of Radiation therapy to the neck: 8/30/19  -MR Brain: Interval development of multiple new small enhancing lesion throughout   the parenchyma compatible with worsening metastatic disease. Mild   adjacent edema. No significant mass effect.  MR Cervical and Thoracic spine: no new changes   Plan: started on Decadron 10mg stat and 4mg Q6  -F/up with radiation oncology and Pain management     Pancytopenia 2/2 to chemotherapy   -Asymptomatic    -Transfuse if Hb<7 and Platelets less than 20k or if any active bleeding     DVT ppx: Lovenox  GI ppx: Protonix  Full Code  Dispo: Home when stable

## 2019-09-01 NOTE — PROGRESS NOTE ADULT - SUBJECTIVE AND OBJECTIVE BOX
Patient is a 44y old  Female who presents with a chief complaint of Difficulty swallowing      Admitted to medicine with primary diagnosis of Oral thrush with possible Esophagal Candidiasis    Interval events: none     Today is hosp day 8  Patient is resting comfortably in bed  odynophagia Resolved   Now Tolerating Regular  diet with regular BM   Ambulates independently  Plan-F/up Rad/onc and pain management consult  D/C pending results of Cytopathology     PAST MEDICAL & SURGICAL HISTORY:  Lung cancer  Anxiety and depression  History of cholecystectomy      MEDICATIONS  (STANDING):  benzocaine 15 mG/menthol 3.6 mG (Sugar-Free) Lozenge 1 Lozenge Oral three times a day  buPROPion XL . 150 milliGRAM(s) Oral daily  chlorhexidine 4% Liquid 1 Application(s) Topical <User Schedule>  dexamethasone  Injectable 4 milliGRAM(s) IV Push every 6 hours  dexamethasone  IVPB 10 milliGRAM(s) IV Intermittent once  docusate sodium 100 milliGRAM(s) Oral two times a day  enoxaparin Injectable 40 milliGRAM(s) SubCutaneous at bedtime  escitalopram 20 milliGRAM(s) Oral at bedtime  FIRST- Mouthwash  BLM 15 milliLiter(s) Swish and Spit four times a day  fluconAZOLE   Tablet 200 milliGRAM(s) Oral daily  folic acid 1 milliGRAM(s) Oral daily  loratadine 10 milliGRAM(s) Oral daily  oxyCODONE  ER Tablet 60 milliGRAM(s) Oral every 12 hours  pantoprazole    Tablet 40 milliGRAM(s) Oral two times a day  Saliva Substitute (CAPHOSOL) 30 milliLiter(s) Swish and Spit four times a day  senna 2 Tablet(s) Oral at bedtime    MEDICATIONS  (PRN):  ibuprofen  Tablet. 200 milliGRAM(s) Oral every 4 hours PRN Mild Pain (1 - 3)  oxyCODONE    IR 15 milliGRAM(s) Oral every 3 hours PRN Severe Pain (7 - 10)          Vital Signs Last 24 Hrs  T(C): 36.6 (01 Sep 2019 05:20), Max: 36.6 (01 Sep 2019 05:20)  T(F): 97.8 (01 Sep 2019 05:20), Max: 97.8 (01 Sep 2019 05:20)  HR: 72 (01 Sep 2019 05:20) (67 - 75)  BP: 133/83 (01 Sep 2019 05:20) (133/83 - 154/89)  BP(mean): --  RR: 18 (01 Sep 2019 05:50) (18 - 18)  SpO2: 93% (01 Sep 2019 10:27) (93% - 94%)  CAPILLARY BLOOD GLUCOSE        I&O's Summary      Physical Exam:    -     --      General : NAD, resting comfortably in bed    -       Oral Cavity: Moist, Oral thrush resolved     -      Cardiac: S1/S2 appreciated RRR, no murmurs    -      Pulm: CTA b/l, no adventitious sounds    -      GI: Soft, NT, ND, + odynophagia improved     -      Musculoskeletal: Atraumatic, No LE edema B/L, No skin color changes    -      Neuro: AO x 4, nonfocal         Labs:                        9.1    5.95  )-----------( 40       ( 31 Aug 2019 09:19 )             29.8                                         Imaging:    ECG:

## 2019-09-02 LAB
ANION GAP SERPL CALC-SCNC: 15 MMOL/L — HIGH (ref 7–14)
BASOPHILS # BLD AUTO: 0.02 K/UL — SIGNIFICANT CHANGE UP (ref 0–0.2)
BASOPHILS NFR BLD AUTO: 0.5 % — SIGNIFICANT CHANGE UP (ref 0–1)
BUN SERPL-MCNC: 6 MG/DL — LOW (ref 10–20)
CALCIUM SERPL-MCNC: 8.9 MG/DL — SIGNIFICANT CHANGE UP (ref 8.5–10.1)
CHLORIDE SERPL-SCNC: 96 MMOL/L — LOW (ref 98–110)
CO2 SERPL-SCNC: 30 MMOL/L — SIGNIFICANT CHANGE UP (ref 17–32)
CREAT SERPL-MCNC: 0.6 MG/DL — LOW (ref 0.7–1.5)
EOSINOPHIL # BLD AUTO: 0 K/UL — SIGNIFICANT CHANGE UP (ref 0–0.7)
EOSINOPHIL NFR BLD AUTO: 0 % — SIGNIFICANT CHANGE UP (ref 0–8)
GLUCOSE SERPL-MCNC: 134 MG/DL — HIGH (ref 70–99)
HCT VFR BLD CALC: 31.8 % — LOW (ref 37–47)
HGB BLD-MCNC: 9.8 G/DL — LOW (ref 12–16)
IMM GRANULOCYTES NFR BLD AUTO: 7.7 % — HIGH (ref 0.1–0.3)
LYMPHOCYTES # BLD AUTO: 0.52 K/UL — LOW (ref 1.2–3.4)
LYMPHOCYTES # BLD AUTO: 13.7 % — LOW (ref 20.5–51.1)
MCHC RBC-ENTMCNC: 26.6 PG — LOW (ref 27–31)
MCHC RBC-ENTMCNC: 30.8 G/DL — LOW (ref 32–37)
MCV RBC AUTO: 86.2 FL — SIGNIFICANT CHANGE UP (ref 81–99)
MONOCYTES # BLD AUTO: 0.12 K/UL — SIGNIFICANT CHANGE UP (ref 0.1–0.6)
MONOCYTES NFR BLD AUTO: 3.2 % — SIGNIFICANT CHANGE UP (ref 1.7–9.3)
NEUTROPHILS # BLD AUTO: 2.84 K/UL — SIGNIFICANT CHANGE UP (ref 1.4–6.5)
NEUTROPHILS NFR BLD AUTO: 74.9 % — SIGNIFICANT CHANGE UP (ref 42.2–75.2)
NRBC # BLD: 0 /100 WBCS — SIGNIFICANT CHANGE UP (ref 0–0)
PLATELET # BLD AUTO: 45 K/UL — LOW (ref 130–400)
POTASSIUM SERPL-MCNC: 4.2 MMOL/L — SIGNIFICANT CHANGE UP (ref 3.5–5)
POTASSIUM SERPL-SCNC: 4.2 MMOL/L — SIGNIFICANT CHANGE UP (ref 3.5–5)
RBC # BLD: 3.69 M/UL — LOW (ref 4.2–5.4)
RBC # FLD: 14.9 % — HIGH (ref 11.5–14.5)
SODIUM SERPL-SCNC: 141 MMOL/L — SIGNIFICANT CHANGE UP (ref 135–146)
WBC # BLD: 3.79 K/UL — LOW (ref 4.8–10.8)
WBC # FLD AUTO: 3.79 K/UL — LOW (ref 4.8–10.8)

## 2019-09-02 PROCEDURE — 74177 CT ABD & PELVIS W/CONTRAST: CPT | Mod: 26

## 2019-09-02 PROCEDURE — 99232 SBSQ HOSP IP/OBS MODERATE 35: CPT

## 2019-09-02 RX ORDER — IOHEXOL 300 MG/ML
30 INJECTION, SOLUTION INTRAVENOUS ONCE
Refills: 0 | Status: COMPLETED | OUTPATIENT
Start: 2019-09-02 | End: 2019-09-02

## 2019-09-02 RX ORDER — OXYCODONE HYDROCHLORIDE 5 MG/1
60 TABLET ORAL EVERY 12 HOURS
Refills: 0 | Status: DISCONTINUED | OUTPATIENT
Start: 2019-09-02 | End: 2019-09-02

## 2019-09-02 RX ORDER — OXYCODONE HYDROCHLORIDE 5 MG/1
60 TABLET ORAL EVERY 12 HOURS
Refills: 0 | Status: DISCONTINUED | OUTPATIENT
Start: 2019-09-02 | End: 2019-09-03

## 2019-09-02 RX ADMIN — OXYCODONE HYDROCHLORIDE 15 MILLIGRAM(S): 5 TABLET ORAL at 19:35

## 2019-09-02 RX ADMIN — IOHEXOL 30 MILLILITER(S): 300 INJECTION, SOLUTION INTRAVENOUS at 16:13

## 2019-09-02 RX ADMIN — OXYCODONE HYDROCHLORIDE 15 MILLIGRAM(S): 5 TABLET ORAL at 04:30

## 2019-09-02 RX ADMIN — ZOLPIDEM TARTRATE 5 MILLIGRAM(S): 10 TABLET ORAL at 23:35

## 2019-09-02 RX ADMIN — Medication 30 MILLILITER(S): at 17:24

## 2019-09-02 RX ADMIN — OXYCODONE HYDROCHLORIDE 15 MILLIGRAM(S): 5 TABLET ORAL at 16:15

## 2019-09-02 RX ADMIN — BUPROPION HYDROCHLORIDE 150 MILLIGRAM(S): 150 TABLET, EXTENDED RELEASE ORAL at 21:03

## 2019-09-02 RX ADMIN — Medication 4 MILLIGRAM(S): at 06:39

## 2019-09-02 RX ADMIN — FLUCONAZOLE 200 MILLIGRAM(S): 150 TABLET ORAL at 13:21

## 2019-09-02 RX ADMIN — Medication 30 MILLILITER(S): at 23:10

## 2019-09-02 RX ADMIN — BENZOCAINE AND MENTHOL 1 LOZENGE: 5; 1 LIQUID ORAL at 06:41

## 2019-09-02 RX ADMIN — ESCITALOPRAM OXALATE 20 MILLIGRAM(S): 10 TABLET, FILM COATED ORAL at 21:03

## 2019-09-02 RX ADMIN — OXYCODONE HYDROCHLORIDE 60 MILLIGRAM(S): 5 TABLET ORAL at 06:40

## 2019-09-02 RX ADMIN — Medication 100 MILLIGRAM(S): at 17:23

## 2019-09-02 RX ADMIN — OXYCODONE HYDROCHLORIDE 15 MILLIGRAM(S): 5 TABLET ORAL at 10:23

## 2019-09-02 RX ADMIN — OXYCODONE HYDROCHLORIDE 60 MILLIGRAM(S): 5 TABLET ORAL at 19:14

## 2019-09-02 RX ADMIN — DIPHENHYDRAMINE HYDROCHLORIDE AND LIDOCAINE HYDROCHLORIDE AND ALUMINUM HYDROXIDE AND MAGNESIUM HYDRO 15 MILLILITER(S): KIT at 23:09

## 2019-09-02 RX ADMIN — OXYCODONE HYDROCHLORIDE 15 MILLIGRAM(S): 5 TABLET ORAL at 17:19

## 2019-09-02 RX ADMIN — Medication 4 MILLIGRAM(S): at 17:24

## 2019-09-02 RX ADMIN — OXYCODONE HYDROCHLORIDE 60 MILLIGRAM(S): 5 TABLET ORAL at 07:30

## 2019-09-02 RX ADMIN — DIPHENHYDRAMINE HYDROCHLORIDE AND LIDOCAINE HYDROCHLORIDE AND ALUMINUM HYDROXIDE AND MAGNESIUM HYDRO 15 MILLILITER(S): KIT at 13:09

## 2019-09-02 RX ADMIN — Medication 30 MILLILITER(S): at 06:39

## 2019-09-02 RX ADMIN — OXYCODONE HYDROCHLORIDE 15 MILLIGRAM(S): 5 TABLET ORAL at 21:01

## 2019-09-02 RX ADMIN — Medication 1 MILLIGRAM(S): at 13:09

## 2019-09-02 RX ADMIN — LORATADINE 10 MILLIGRAM(S): 10 TABLET ORAL at 13:09

## 2019-09-02 RX ADMIN — OXYCODONE HYDROCHLORIDE 15 MILLIGRAM(S): 5 TABLET ORAL at 14:30

## 2019-09-02 RX ADMIN — DIPHENHYDRAMINE HYDROCHLORIDE AND LIDOCAINE HYDROCHLORIDE AND ALUMINUM HYDROXIDE AND MAGNESIUM HYDRO 15 MILLILITER(S): KIT at 17:24

## 2019-09-02 RX ADMIN — OXYCODONE HYDROCHLORIDE 15 MILLIGRAM(S): 5 TABLET ORAL at 07:30

## 2019-09-02 RX ADMIN — SENNA PLUS 2 TABLET(S): 8.6 TABLET ORAL at 21:03

## 2019-09-02 RX ADMIN — Medication 4 MILLIGRAM(S): at 13:09

## 2019-09-02 RX ADMIN — Medication 4 MILLIGRAM(S): at 23:57

## 2019-09-02 RX ADMIN — OXYCODONE HYDROCHLORIDE 15 MILLIGRAM(S): 5 TABLET ORAL at 23:13

## 2019-09-02 RX ADMIN — PANTOPRAZOLE SODIUM 40 MILLIGRAM(S): 20 TABLET, DELAYED RELEASE ORAL at 17:24

## 2019-09-02 RX ADMIN — Medication 30 MILLILITER(S): at 13:21

## 2019-09-02 RX ADMIN — OXYCODONE HYDROCHLORIDE 60 MILLIGRAM(S): 5 TABLET ORAL at 17:53

## 2019-09-02 RX ADMIN — PANTOPRAZOLE SODIUM 40 MILLIGRAM(S): 20 TABLET, DELAYED RELEASE ORAL at 06:39

## 2019-09-02 RX ADMIN — OXYCODONE HYDROCHLORIDE 15 MILLIGRAM(S): 5 TABLET ORAL at 13:47

## 2019-09-02 RX ADMIN — BENZOCAINE AND MENTHOL 1 LOZENGE: 5; 1 LIQUID ORAL at 21:05

## 2019-09-02 RX ADMIN — OXYCODONE HYDROCHLORIDE 15 MILLIGRAM(S): 5 TABLET ORAL at 12:00

## 2019-09-02 NOTE — PROGRESS NOTE ADULT - SUBJECTIVE AND OBJECTIVE BOX
Patient is a 44y old  Female who presents with a chief complaint of Difficulty swallowing      Admitted to medicine with primary diagnosis of Oral thrush with possible Esophagal Candidiasis    Interval events: none     Today is hosp day 9  Patient is resting comfortably in bed  odynophagia Resolved   Tolerating Regular  diet with regular BM   Ambulates independently  Plan-F/up Rad/onc and pain management consult  D/C pending results of Cytopathology     PAST MEDICAL & SURGICAL HISTORY:  Lung cancer  Anxiety and depression  History of cholecystectomy      MEDICATIONS  (STANDING):  benzocaine 15 mG/menthol 3.6 mG (Sugar-Free) Lozenge 1 Lozenge Oral three times a day  buPROPion XL . 150 milliGRAM(s) Oral daily  chlorhexidine 4% Liquid 1 Application(s) Topical <User Schedule>  dexamethasone  Injectable 4 milliGRAM(s) IV Push every 6 hours  docusate sodium 100 milliGRAM(s) Oral two times a day  escitalopram 20 milliGRAM(s) Oral at bedtime  FIRST- Mouthwash  BLM 15 milliLiter(s) Swish and Spit four times a day  fluconAZOLE   Tablet 200 milliGRAM(s) Oral daily  folic acid 1 milliGRAM(s) Oral daily  iohexol 300 mG (iodine)/mL Oral Solution 30 milliLiter(s) Oral once  loratadine 10 milliGRAM(s) Oral daily  oxyCODONE  ER Tablet 60 milliGRAM(s) Oral every 12 hours  pantoprazole    Tablet 40 milliGRAM(s) Oral two times a day  Saliva Substitute (CAPHOSOL) 30 milliLiter(s) Swish and Spit four times a day  senna 2 Tablet(s) Oral at bedtime    MEDICATIONS  (PRN):  ibuprofen  Tablet. 200 milliGRAM(s) Oral every 4 hours PRN Mild Pain (1 - 3)  oxyCODONE    IR 15 milliGRAM(s) Oral every 3 hours PRN Severe Pain (7 - 10)  zolpidem 5 milliGRAM(s) Oral at bedtime PRN Insomnia          Vital Signs Last 24 Hrs  T(C): 36.1 (01 Sep 2019 21:50), Max: 36.8 (01 Sep 2019 14:14)  T(F): 96.9 (01 Sep 2019 21:50), Max: 98.2 (01 Sep 2019 14:14)  HR: 78 (01 Sep 2019 21:50) (78 - 78)  BP: 151/74 (01 Sep 2019 21:50) (139/86 - 151/74)  BP(mean): --  RR: 18 (01 Sep 2019 21:50) (18 - 18)  SpO2: 93% (02 Sep 2019 07:30) (93% - 93%)  CAPILLARY BLOOD GLUCOSE        I&O's Summary      Physical Exam:    -  - General : NAD, resting comfortably in bed    -       Oral Cavity: Moist, Oral thrush resolved     -      Cardiac: S1/S2 appreciated RRR, no murmurs    -      Pulm: CTA b/l, no adventitious sounds    -      GI: Soft, NT, ND, + odynophagia improved     -      Musculoskeletal: Atraumatic, No LE edema B/L, No skin color changes    -      Neuro: AO x 4, nonfocal         Labs:                        9.8    3.79  )-----------( 45       ( 02 Sep 2019 07:25 )             31.8             09-02    141  |  96<L>  |  6<L>  ----------------------------<  134<H>  4.2   |  30  |  0.6<L>    Ca    8.9      02 Sep 2019 07:25                            Imaging:    ECG:

## 2019-09-02 NOTE — PROGRESS NOTE ADULT - ASSESSMENT
This is a 44 year old female with PMHx of tobacco abuse, metastatic adenocarcinoma of the lung s/p chemo 4 cycles Carbo, Alimta and Keytruda then Taxterene/cyramza and then Gemzar who presented post chemo treatment for pain on swallowing to both solids and liquids     Oral Thrush: Improved   -Immunocompromised on Active Chemotherapy   -Presenting with Odynophagia with both solids and Liquid, throat pain and Discomfort    -Pt continues to complain of Esophageal burning and discomfort   --Oral Trush resolved on exam  --S/P EGD: 8/28: Mucositis possibly from Radiation therapy and Non-ulcerative gastritis( bx taken)   -Symptoms Improving now able to tolerate Soft diet   -Continue with IV Fluconazole, lozenges and Mouth wash, Artificial Saliva      Metastatic Lung Adenocarcinoma  - Progressive despite multiple chemo regimens  - Last chemo: Alimta and Avastin on 8/20  - Radiation of neck completed on 8/14 and 8/30/19 ( in-house)   - Continue with home pain meds and folic acid   -Per Heme/OnC:  *Pt was scheduled to get this done output for LE numbness and pain     * MRI LS spine with and without contrast: Showed Leptomeningeal disease   -S/P Lumbar Puncture 8/29/19 with Neuroradiology  -F/up Cytopathology( heme/onc wants to wait for the results before discharge)  -Getting One dose of Radiation therapy to the neck: 8/30/19  -MR Brain: Interval development of multiple new small enhancing lesion throughout   the parenchyma compatible with worsening metastatic disease. Mild   adjacent edema. No significant mass effect.  MR Cervical and Thoracic spine: no new changes   Plan: started on Decadron 10mg stat and 4mg Q6  -F/up with radiation oncology and Pain management   -F/up CT a/p: for Abdominal pain     Pancytopenia 2/2 to chemotherapy   -Asymptomatic     -Transfuse if Hb<7 and Platelets less than 20k or if any active bleeding     DVT ppx: Lovenox  GI ppx: Protonix  Full Code  Dispo: Home when stable

## 2019-09-02 NOTE — CHART NOTE - NSCHARTNOTEFT_GEN_A_CORE
Palliative care chart note - I did not see the patient today.  Recommendations based on chart review and discussion with providers.  Patient will be seen tomorrow.    Palliative care was consulted for acute on chronic pain management for Анна Arteaga today. She is a 44 year old woman with a history of metastatic adenocarcinoma of the lung s/p multiple cycles of chemotherapy who was admitted for dyspnea.  Patient has had progression of disease despite multiple regimens and completed radiation therapy of her neck in-house.  MRI of her spine showed leptomeningeal disease, and per discussion with the nurse over the phone, the patient has ongoing leg and abdominal/back pain.  At home, she takes Oxycontin 60mg BID and oxycodone 15mg PRN for her pain (iSTOP checked by me).  In house, she has been on Oxycontin 60mg BID and oxycodone 15mg q3h PRN and there has been difficulty controlling pain per discussion with the nurse. Per the nurse, pain relief with oxycodone only lasts about 45-60 minutes, and then returns.  The primary team is currently getting a CT to workup abdominal pain and recently started decadron.    In the last 24 hours, the patient received 6 PRNs of the oxycodone 15mg in addition to her standing Oxycontin, which is similar amounts of medication to the 24 hours proceedinig that.  In the last 24 hours, she received 210mg of oxycodone equivalents (120mg oxycontin + 90mg oxycodone).  Based on opioid use over the last 24-48 hours in the setting of cancer related pain and relatively poor controlled pain, dosing can likely be adjusted if deemed appropriate by the primary team.    Recommendations  -workup of pain and decadron per primary team  -oxycontin can be increased to 90mg q12h (hold for RR<10, sedation, confusion)  -frequency of PRN oxycodone 15mg can be increase to q2h PRN for dose finding (hold for RR<10, sedation, confusion)  -if there is evidence of constipation without evidence of bowel obstruction (having a bowel movement less than once a day), can increase senna to BID and add miralax 17g daily PRN    Palliative will follow and see the patient tomorrow. Please call  with any questions. Palliative care chart note - I did not see the patient today.  Recommendations based on chart review and discussion with providers.  Patient will be seen tomorrow.    Palliative care was consulted for acute on chronic pain management for Анна Arteaga today. She is a 44 year old woman with a history of metastatic adenocarcinoma of the lung s/p multiple cycles of chemotherapy who was admitted for dyspnea.  Patient has had progression of disease despite multiple regimens and completed radiation therapy of her neck in-house.  MRI of her spine showed leptomeningeal disease, and per discussion with the nurse over the phone, the patient has ongoing leg and abdominal/back pain.  At home, she takes Oxycontin 60mg BID and oxycodone 15mg PRN for her pain (iSTOP checked by me).  In house, she has been on Oxycontin 60mg BID and oxycodone 15mg q3h PRN and there has been difficulty controlling pain per discussion with the nurse. Per the nurse, pain relief with oxycodone only lasts about 45-60 minutes, and then returns.  The primary team is currently getting a CT to workup abdominal pain and recently started decadron.    In the last 24 hours, the patient received 6 PRNs of the oxycodone 15mg in addition to her standing Oxycontin, which is similar amounts of medication to the 24 hours proceedinig that.  In the last 24 hours, she received 210mg of oxycodone equivalents (120mg oxycontin + 90mg oxycodone).  Based on opioid use over the last 24-48 hours in the setting of cancer related pain and relatively poor controlled pain, dosing can likely be adjusted if deemed appropriate by the primary team.    Recommendations  -workup of pain and decadron per primary team  -frequency of PRN oxycodone 15mg can be increased to q2h PRN for dose finding (hold for RR<10, sedation, confusion)  -patient will likely need an increase of her long-acting oxycontin dose, but we can calculate a more accurate dose tomorrow after increasing the frequency of her PRN oxycodone dose today  -if there is evidence of constipation without evidence of bowel obstruction (having a bowel movement less than once a day), can increase senna to BID and add miralax 17g daily PRN    Palliative will follow and see the patient tomorrow. Please call  with any questions.

## 2019-09-02 NOTE — PROGRESS NOTE ADULT - ASSESSMENT
43 y/o F with h/o smoking- quit 4 years ago with Metastatic adenocarcinoma of the lung, PDL1 5%, EGFT, Alk, BRAF, KRAS, ROS1 negative diagnosed in 2018 s/p multiple lines of chemotherapy - 4 cycles-Carbo, Alimta and Keytruda in 2018, progressed and then received Taxterene/cyramza and then was given Gemzar in April 2019. She has received SRS to brain (in 2018 and also in May 2019).  Also received radiation to Lt 8th rib and flank area. She was referred to [facility] [place]Dr. Irma Nuñez  was not eligible for clinical trial. Now she is on Alimta and Avastin (received this on Aug 20) and had received 5 radiations to the neck(completed Aug 14)     One day following chemo- pt started having pain on swallowing to both solids and liquids. No fever/ chills. No recent weight loss  Also c/o epigastric burning pain and discomfort     1) Metastatic Lung adenocarcinoma PDL1 5%, EGFT, Alk, BRAF, KRAS, ROS1 negative- progression with multiple lines of chemotherapy- now with leptomeningeal involvement   Currently on Alimta and Avastin received Aug 20  Also radiation to the neck -completed Aug 14th  c/w home dose of pain meds for her pain from metastasis   C/w folic acid as pt is on Avastin  MRI Lspine- leptomeningeal involvement   s/p LP on 8/29- negative for malignant cells.  - MRI brain did show new lesions  , MRI c spine did not show any enhancement . Discussed findings with pt . on decadron .   scheduled for radiation on Tuesday    2) Pain on swallowing 2/2 to thrush and mucositis / NEW ABDOMINAL PAIN :    s/p EGD-  non-erosive gastritis.  Oropharyngeal mucositis mostly radiation induced. - f/u biopsy results   c/w fluconazole(complete 10 day course) and lozenges and magic mouth wash   c/w home dose of pain meds   - WILL GET CT abd and pelvis.  - pain management eval.    3) Pancytopenia  / worsening thrombocytopenia likely 2/2 to chemotherapy :  -platelets stable , will monitor .  - HIT panel pending   s/p Neupogen  Transfuse if Hb<7 and Platelets less than 20k or if any active bleeding       DVT ppx- scd     Plan;  - monitor now inpt , rad-onc f/u on Tuesday

## 2019-09-02 NOTE — PROGRESS NOTE ADULT - SUBJECTIVE AND OBJECTIVE BOX
Patient is a 44y old  Female who presents with a chief complaint of Difficulty swallowing (01 Sep 2019 12:54)      Subjective: Pt seen and examined, no acute events reported . Reports feeling weak while ambulating .       Vital Signs Last 24 Hrs  T(C): 36.1 (01 Sep 2019 21:50), Max: 36.8 (01 Sep 2019 14:14)  T(F): 96.9 (01 Sep 2019 21:50), Max: 98.2 (01 Sep 2019 14:14)  HR: 78 (01 Sep 2019 21:50) (78 - 78)  BP: 151/74 (01 Sep 2019 21:50) (139/86 - 151/74)  BP(mean): --  RR: 18 (01 Sep 2019 21:50) (18 - 18)  SpO2: 93% (02 Sep 2019 07:30) (93% - 93%)    PHYSICAL EXAM  General: lying on bed , NAD .   HEENT: clear oropharynx  CV: normal S1/S2   Lungs: positive air movement b/l ant lungs,clear to auscultation, no wheezes, no rales  Abdomen: soft tenderness to palpation in left lower quadrant , no hepatosplenomegaly  Ext: no clubbing cyanosis or edema  Skin: no rashes and no petechiae  Neuro: alert and oriented X 4, no focal deficits    MEDICATIONS  (STANDING):  benzocaine 15 mG/menthol 3.6 mG (Sugar-Free) Lozenge 1 Lozenge Oral three times a day  buPROPion XL . 150 milliGRAM(s) Oral daily  chlorhexidine 4% Liquid 1 Application(s) Topical <User Schedule>  dexamethasone  Injectable 4 milliGRAM(s) IV Push every 6 hours  docusate sodium 100 milliGRAM(s) Oral two times a day  escitalopram 20 milliGRAM(s) Oral at bedtime  FIRST- Mouthwash  BLM 15 milliLiter(s) Swish and Spit four times a day  fluconAZOLE   Tablet 200 milliGRAM(s) Oral daily  folic acid 1 milliGRAM(s) Oral daily  iohexol 300 mG (iodine)/mL Oral Solution 30 milliLiter(s) Oral once  loratadine 10 milliGRAM(s) Oral daily  oxyCODONE  ER Tablet 60 milliGRAM(s) Oral every 12 hours  pantoprazole    Tablet 40 milliGRAM(s) Oral two times a day  Saliva Substitute (CAPHOSOL) 30 milliLiter(s) Swish and Spit four times a day  senna 2 Tablet(s) Oral at bedtime    MEDICATIONS  (PRN):  ibuprofen  Tablet. 200 milliGRAM(s) Oral every 4 hours PRN Mild Pain (1 - 3)  oxyCODONE    IR 15 milliGRAM(s) Oral every 3 hours PRN Severe Pain (7 - 10)  zolpidem 5 milliGRAM(s) Oral at bedtime PRN Insomnia      LABS:                          9.8    3.79  )-----------( 45       ( 02 Sep 2019 07:25 )             31.8         Mean Cell Volume : 86.2 fL  Mean Cell Hemoglobin : 26.6 pg  Mean Cell Hemoglobin Concentration : 30.8 g/dL  Auto Neutrophil # : 2.84 K/uL  Auto Lymphocyte # : 0.52 K/uL  Auto Monocyte # : 0.12 K/uL  Auto Eosinophil # : 0.00 K/uL  Auto Basophil # : 0.02 K/uL  Auto Neutrophil % : 74.9 %  Auto Lymphocyte % : 13.7 %  Auto Monocyte % : 3.2 %  Auto Eosinophil % : 0.0 %  Auto Basophil % : 0.5 %      Serial CBC's  09-02 @ 07:25  Hct-31.8 / Hgb-9.8 / Plat-45 / RBC-3.69 / WBC-3.79  Serial CBC's  09-01 @ 11:48  Hct-30.4 / Hgb-9.2 / Plat-50 / RBC-3.46 / WBC-4.38  Serial CBC's  08-31 @ 09:19  Hct-29.8 / Hgb-9.1 / Plat-40 / RBC-3.43 / WBC-5.95  Serial CBC's  08-30 @ 12:10  Hct-28.6 / Hgb-8.7 / Plat-87 / RBC-3.31 / WBC-9.42      09-02    141  |  96<L>  |  6<L>  ----------------------------<  134<H>  4.2   |  30  |  0.6<L>    Ca    8.9      02 Sep 2019 07:25                                    BLOOD SMEAR INTERPRETATION:       RADIOLOGY & ADDITIONAL STUDIES:

## 2019-09-03 LAB
ANION GAP SERPL CALC-SCNC: 16 MMOL/L — HIGH (ref 7–14)
ANISOCYTOSIS BLD QL: SIGNIFICANT CHANGE UP
BASOPHILS # BLD AUTO: 0 K/UL — SIGNIFICANT CHANGE UP (ref 0–0.2)
BASOPHILS NFR BLD AUTO: 0 % — SIGNIFICANT CHANGE UP (ref 0–1)
BUN SERPL-MCNC: 11 MG/DL — SIGNIFICANT CHANGE UP (ref 10–20)
CALCIUM SERPL-MCNC: 8.7 MG/DL — SIGNIFICANT CHANGE UP (ref 8.5–10.1)
CHLORIDE SERPL-SCNC: 100 MMOL/L — SIGNIFICANT CHANGE UP (ref 98–110)
CO2 SERPL-SCNC: 25 MMOL/L — SIGNIFICANT CHANGE UP (ref 17–32)
CREAT SERPL-MCNC: 0.6 MG/DL — LOW (ref 0.7–1.5)
EOSINOPHIL # BLD AUTO: 0 K/UL — SIGNIFICANT CHANGE UP (ref 0–0.7)
EOSINOPHIL NFR BLD AUTO: 0 % — SIGNIFICANT CHANGE UP (ref 0–8)
GIANT PLATELETS BLD QL SMEAR: PRESENT — SIGNIFICANT CHANGE UP
GLUCOSE SERPL-MCNC: 130 MG/DL — HIGH (ref 70–99)
HCT VFR BLD CALC: 30.5 % — LOW (ref 37–47)
HGB BLD-MCNC: 9 G/DL — LOW (ref 12–16)
LYMPHOCYTES # BLD AUTO: 0.3 K/UL — LOW (ref 1.2–3.4)
LYMPHOCYTES # BLD AUTO: 6.5 % — LOW (ref 20.5–51.1)
MANUAL SMEAR VERIFICATION: SIGNIFICANT CHANGE UP
MCHC RBC-ENTMCNC: 26.2 PG — LOW (ref 27–31)
MCHC RBC-ENTMCNC: 29.5 G/DL — LOW (ref 32–37)
MCV RBC AUTO: 88.7 FL — SIGNIFICANT CHANGE UP (ref 81–99)
METAMYELOCYTES # FLD: 0.9 % — HIGH (ref 0–0)
MICROCYTES BLD QL: SIGNIFICANT CHANGE UP
MONOCYTES # BLD AUTO: 0.17 K/UL — SIGNIFICANT CHANGE UP (ref 0.1–0.6)
MONOCYTES NFR BLD AUTO: 3.7 % — SIGNIFICANT CHANGE UP (ref 1.7–9.3)
NEUTROPHILS # BLD AUTO: 4.04 K/UL — SIGNIFICANT CHANGE UP (ref 1.4–6.5)
NEUTROPHILS NFR BLD AUTO: 87 % — HIGH (ref 42.2–75.2)
PLAT MORPH BLD: NORMAL — SIGNIFICANT CHANGE UP
PLATELET # BLD AUTO: 60 K/UL — LOW (ref 130–400)
POLYCHROMASIA BLD QL SMEAR: SLIGHT — SIGNIFICANT CHANGE UP
POTASSIUM SERPL-MCNC: 4.2 MMOL/L — SIGNIFICANT CHANGE UP (ref 3.5–5)
POTASSIUM SERPL-SCNC: 4.2 MMOL/L — SIGNIFICANT CHANGE UP (ref 3.5–5)
PROMYELOCYTES # FLD: 1.9 % — HIGH (ref 0–0)
RBC # BLD: 3.44 M/UL — LOW (ref 4.2–5.4)
RBC # FLD: 15.6 % — HIGH (ref 11.5–14.5)
RBC BLD AUTO: NORMAL — SIGNIFICANT CHANGE UP
SODIUM SERPL-SCNC: 141 MMOL/L — SIGNIFICANT CHANGE UP (ref 135–146)
TOXIC GRANULES BLD QL SMEAR: PRESENT — SIGNIFICANT CHANGE UP
WBC # BLD: 4.64 K/UL — LOW (ref 4.8–10.8)
WBC # FLD AUTO: 4.64 K/UL — LOW (ref 4.8–10.8)

## 2019-09-03 PROCEDURE — 99223 1ST HOSP IP/OBS HIGH 75: CPT

## 2019-09-03 PROCEDURE — 99233 SBSQ HOSP IP/OBS HIGH 50: CPT

## 2019-09-03 RX ORDER — OXYCODONE HYDROCHLORIDE 5 MG/1
15 TABLET ORAL
Refills: 0 | Status: DISCONTINUED | OUTPATIENT
Start: 2019-09-03 | End: 2019-09-03

## 2019-09-03 RX ORDER — ZOLPIDEM TARTRATE 10 MG/1
5 TABLET ORAL ONCE
Refills: 0 | Status: DISCONTINUED | OUTPATIENT
Start: 2019-09-03 | End: 2019-09-03

## 2019-09-03 RX ORDER — CLONAZEPAM 1 MG
0.5 TABLET ORAL ONCE
Refills: 0 | Status: DISCONTINUED | OUTPATIENT
Start: 2019-09-03 | End: 2019-09-03

## 2019-09-03 RX ORDER — OXYCODONE HYDROCHLORIDE 5 MG/1
15 TABLET ORAL
Refills: 0 | Status: DISCONTINUED | OUTPATIENT
Start: 2019-09-03 | End: 2019-09-04

## 2019-09-03 RX ORDER — ZOLPIDEM TARTRATE 10 MG/1
5 TABLET ORAL ONCE
Refills: 0 | Status: DISCONTINUED | OUTPATIENT
Start: 2019-09-03 | End: 2019-09-04

## 2019-09-03 RX ORDER — OXYCODONE HYDROCHLORIDE 5 MG/1
90 TABLET ORAL EVERY 12 HOURS
Refills: 0 | Status: DISCONTINUED | OUTPATIENT
Start: 2019-09-03 | End: 2019-09-04

## 2019-09-03 RX ADMIN — LORATADINE 10 MILLIGRAM(S): 10 TABLET ORAL at 11:26

## 2019-09-03 RX ADMIN — OXYCODONE HYDROCHLORIDE 15 MILLIGRAM(S): 5 TABLET ORAL at 15:17

## 2019-09-03 RX ADMIN — Medication 4 MILLIGRAM(S): at 17:27

## 2019-09-03 RX ADMIN — Medication 100 MILLIGRAM(S): at 17:27

## 2019-09-03 RX ADMIN — ZOLPIDEM TARTRATE 5 MILLIGRAM(S): 10 TABLET ORAL at 23:00

## 2019-09-03 RX ADMIN — OXYCODONE HYDROCHLORIDE 90 MILLIGRAM(S): 5 TABLET ORAL at 18:15

## 2019-09-03 RX ADMIN — Medication 30 MILLILITER(S): at 17:27

## 2019-09-03 RX ADMIN — Medication 30 MILLILITER(S): at 11:27

## 2019-09-03 RX ADMIN — OXYCODONE HYDROCHLORIDE 60 MILLIGRAM(S): 5 TABLET ORAL at 05:26

## 2019-09-03 RX ADMIN — Medication 0.5 MILLIGRAM(S): at 18:15

## 2019-09-03 RX ADMIN — DIPHENHYDRAMINE HYDROCHLORIDE AND LIDOCAINE HYDROCHLORIDE AND ALUMINUM HYDROXIDE AND MAGNESIUM HYDRO 15 MILLILITER(S): KIT at 11:27

## 2019-09-03 RX ADMIN — Medication 100 MILLIGRAM(S): at 05:24

## 2019-09-03 RX ADMIN — Medication 30 MILLILITER(S): at 05:26

## 2019-09-03 RX ADMIN — OXYCODONE HYDROCHLORIDE 15 MILLIGRAM(S): 5 TABLET ORAL at 04:36

## 2019-09-03 RX ADMIN — PANTOPRAZOLE SODIUM 40 MILLIGRAM(S): 20 TABLET, DELAYED RELEASE ORAL at 05:24

## 2019-09-03 RX ADMIN — Medication 4 MILLIGRAM(S): at 11:27

## 2019-09-03 RX ADMIN — Medication 4 MILLIGRAM(S): at 05:24

## 2019-09-03 RX ADMIN — Medication 1 MILLIGRAM(S): at 11:26

## 2019-09-03 RX ADMIN — OXYCODONE HYDROCHLORIDE 90 MILLIGRAM(S): 5 TABLET ORAL at 17:31

## 2019-09-03 RX ADMIN — OXYCODONE HYDROCHLORIDE 15 MILLIGRAM(S): 5 TABLET ORAL at 08:28

## 2019-09-03 RX ADMIN — DIPHENHYDRAMINE HYDROCHLORIDE AND LIDOCAINE HYDROCHLORIDE AND ALUMINUM HYDROXIDE AND MAGNESIUM HYDRO 15 MILLILITER(S): KIT at 17:26

## 2019-09-03 RX ADMIN — OXYCODONE HYDROCHLORIDE 15 MILLIGRAM(S): 5 TABLET ORAL at 09:10

## 2019-09-03 RX ADMIN — DIPHENHYDRAMINE HYDROCHLORIDE AND LIDOCAINE HYDROCHLORIDE AND ALUMINUM HYDROXIDE AND MAGNESIUM HYDRO 15 MILLILITER(S): KIT at 23:02

## 2019-09-03 RX ADMIN — PANTOPRAZOLE SODIUM 40 MILLIGRAM(S): 20 TABLET, DELAYED RELEASE ORAL at 17:27

## 2019-09-03 RX ADMIN — OXYCODONE HYDROCHLORIDE 15 MILLIGRAM(S): 5 TABLET ORAL at 12:43

## 2019-09-03 RX ADMIN — Medication 30 MILLILITER(S): at 23:02

## 2019-09-03 RX ADMIN — OXYCODONE HYDROCHLORIDE 15 MILLIGRAM(S): 5 TABLET ORAL at 11:26

## 2019-09-03 RX ADMIN — OXYCODONE HYDROCHLORIDE 15 MILLIGRAM(S): 5 TABLET ORAL at 00:06

## 2019-09-03 RX ADMIN — OXYCODONE HYDROCHLORIDE 60 MILLIGRAM(S): 5 TABLET ORAL at 06:26

## 2019-09-03 RX ADMIN — OXYCODONE HYDROCHLORIDE 15 MILLIGRAM(S): 5 TABLET ORAL at 17:20

## 2019-09-03 RX ADMIN — BENZOCAINE AND MENTHOL 1 LOZENGE: 5; 1 LIQUID ORAL at 06:27

## 2019-09-03 RX ADMIN — CHLORHEXIDINE GLUCONATE 1 APPLICATION(S): 213 SOLUTION TOPICAL at 05:24

## 2019-09-03 RX ADMIN — ESCITALOPRAM OXALATE 20 MILLIGRAM(S): 10 TABLET, FILM COATED ORAL at 21:55

## 2019-09-03 RX ADMIN — BUPROPION HYDROCHLORIDE 150 MILLIGRAM(S): 150 TABLET, EXTENDED RELEASE ORAL at 21:55

## 2019-09-03 RX ADMIN — BENZOCAINE AND MENTHOL 1 LOZENGE: 5; 1 LIQUID ORAL at 21:55

## 2019-09-03 RX ADMIN — DIPHENHYDRAMINE HYDROCHLORIDE AND LIDOCAINE HYDROCHLORIDE AND ALUMINUM HYDROXIDE AND MAGNESIUM HYDRO 15 MILLILITER(S): KIT at 08:05

## 2019-09-03 RX ADMIN — Medication 4 MILLIGRAM(S): at 23:02

## 2019-09-03 RX ADMIN — ZOLPIDEM TARTRATE 5 MILLIGRAM(S): 10 TABLET ORAL at 00:37

## 2019-09-03 RX ADMIN — OXYCODONE HYDROCHLORIDE 15 MILLIGRAM(S): 5 TABLET ORAL at 23:01

## 2019-09-03 NOTE — PROGRESS NOTE ADULT - SUBJECTIVE AND OBJECTIVE BOX
Patient is a 44y old  Female who presents with a chief complaint of Difficulty swallowing (02 Sep 2019 12:26)      Subjective: No fever/chills. Her pain in the throat is better now  She is aware of her CT and MRI results       Vital Signs Last 24 Hrs  T(C): 36.3 (03 Sep 2019 04:53), Max: 37.1 (02 Sep 2019 14:59)  T(F): 97.4 (03 Sep 2019 04:53), Max: 98.7 (02 Sep 2019 14:59)  HR: 75 (03 Sep 2019 04:53) (62 - 75)  BP: 159/81 (03 Sep 2019 04:53) (135/68 - 159/81)  BP(mean): --  RR: 18 (03 Sep 2019 04:53) (18 - 18)  SpO2: --    PHYSICAL EXAM  General: adult in NAD  Neck: supple  CV: normal S1/S2  Lungs: positive air movement b/l ant lungs,clear to auscultation  Abdomen: soft non-tender   Ext: no edema  Neuro: alert and oriented X 4, no focal deficits    MEDICATIONS  (STANDING):  benzocaine 15 mG/menthol 3.6 mG (Sugar-Free) Lozenge 1 Lozenge Oral three times a day  buPROPion XL . 150 milliGRAM(s) Oral daily  chlorhexidine 4% Liquid 1 Application(s) Topical <User Schedule>  dexamethasone  Injectable 4 milliGRAM(s) IV Push every 6 hours  docusate sodium 100 milliGRAM(s) Oral two times a day  escitalopram 20 milliGRAM(s) Oral at bedtime  FIRST- Mouthwash  BLM 15 milliLiter(s) Swish and Spit four times a day  fluconAZOLE   Tablet 200 milliGRAM(s) Oral daily  folic acid 1 milliGRAM(s) Oral daily  loratadine 10 milliGRAM(s) Oral daily  oxyCODONE  ER Tablet 60 milliGRAM(s) Oral every 12 hours  pantoprazole    Tablet 40 milliGRAM(s) Oral two times a day  Saliva Substitute (CAPHOSOL) 30 milliLiter(s) Swish and Spit four times a day  senna 2 Tablet(s) Oral at bedtime    MEDICATIONS  (PRN):  ibuprofen  Tablet. 200 milliGRAM(s) Oral every 4 hours PRN Mild Pain (1 - 3)  oxyCODONE    IR 15 milliGRAM(s) Oral every 3 hours PRN Severe Pain (7 - 10)  zolpidem 5 milliGRAM(s) Oral at bedtime PRN Insomnia      LABS:                          9.0    4.64  )-----------( 60       ( 03 Sep 2019 06:31 )             30.5         Mean Cell Volume : 88.7 fL  Mean Cell Hemoglobin : 26.2 pg  Mean Cell Hemoglobin Concentration : 29.5 g/dL  Auto Neutrophil # : 4.04 K/uL  Auto Lymphocyte # : 0.30 K/uL  Auto Monocyte # : 0.17 K/uL  Auto Eosinophil # : 0.00 K/uL  Auto Basophil # : 0.00 K/uL  Auto Neutrophil % : 87.0 %  Auto Lymphocyte % : 6.5 %  Auto Monocyte % : 3.7 %  Auto Eosinophil % : 0.0 %  Auto Basophil % : 0.0 %      Serial CBC's  09-03 @ 06:31  Hct-30.5 / Hgb-9.0 / Plat-60 / RBC-3.44 / WBC-4.64  Serial CBC's  09-02 @ 07:25  Hct-31.8 / Hgb-9.8 / Plat-45 / RBC-3.69 / WBC-3.79  Serial CBC's  09-01 @ 11:48  Hct-30.4 / Hgb-9.2 / Plat-50 / RBC-3.46 / WBC-4.38  Serial CBC's  08-31 @ 09:19  Hct-29.8 / Hgb-9.1 / Plat-40 / RBC-3.43 / WBC-5.95  Serial CBC's  08-30 @ 12:10  Hct-28.6 / Hgb-8.7 / Plat-87 / RBC-3.31 / WBC-9.42      09-03    141  |  100  |  11  ----------------------------<  130<H>  4.2   |  25  |  0.6<L>    Ca    8.7      03 Sep 2019 06:31                                    BLOOD SMEAR INTERPRETATION:       RADIOLOGY & ADDITIONAL STUDIES:

## 2019-09-03 NOTE — CONSULT NOTE ADULT - SUBJECTIVE AND OBJECTIVE BOX
REQUESTED OF: DR MURO    Chart reviewed, Hospital Day 11  Lab tests, CT imaging, previous records, and previous notes reviewed by me    АННА ARTEAGA 44yFemale  HPI:  44 year old female with PMHx of Metastatic adenocarcinoma of the lung diagnosed in 2018 s/p multiple lines of chemotherapy, currently on Alimta and Avastin (received this on Aug 20) and had received 5 radiations to the neck (completed Aug 14), presenting due to difficulty swallowing and throat pain, to both solids and liquids. The pain extends to upper esophagus, sternal area, started yesterday, no associated fever, chills, abdominal pain. (24 Aug 2019 11:20)    Palliative care was consulted for acute on chronic pain management for Анна Arteaga today. She is a 44 year old woman with a history of metastatic adenocarcinoma of the lung s/p multiple cycles of chemotherapy who was admitted for dysphagia.  Patient has had a history of progression of cancer despite multiple regimens and she completed radiation therapy of her neck in-house.  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  She continues to receive radiation.     At home, she takes Oxycontin 60mg BID and oxycodone 15mg PRN for her pain (iSTOP checked by me).  In house, she has been on Oxycontin 60mg BID and oxycodone 15mg q3h PRN and there has been difficulty controlling pain, specifically in her legs and abdomen where she was found to have new lesions. The patient states she has been taking her home dose of oxycontin and oxycodone for several months without good control of her pain.  She usually takes her oxycodone q2h PRN at home, but states she has been unable to get it every 2 hours here.      At the time of interview, she stated she had no pain, but that pain comes and goes and significant worsens to a level that is not tolerable with movement. She is interested in adjustment of her medication.      PAST MEDICAL & SURGICAL HISTORY:  Lung cancer  Anxiety and depression  History of cholecystectomy    Focused Palliative Care Evaluation:                   Symptoms:                                      Pain: +abdominal, rib, leg pain                                     Dyspnea: none                                     N/V: none                                     Appetite: not assessed                                     Anxiety: not assessed                                     Other _____________________                     Support Devices:  None             PHYSICAL EXAM:      Constitutional:  NAD, sitting in bed; pleasant but somewhat anxious    Eyes: EOMI, no scleral icterus    ENMT:  MMM    Neck: no JVD    Respiratory:  CTABL, no wheezing, no accessory muscle use    Cardiovascular:  RRR, no MRG: no edema noted    Gastrointestinal: S/ND: BSx4    Neurological:  AAOx3; no focal deficitis on cranial nerve exam    Skin: warm/dry, no rashes    Psychiatric: mildly anxious        T(C): 36.6, Max: 36.8 (20:50)  HR: 67 (62 - 75)  BP: 155/77 (155/77 - 159/81)  RR: 18 (18 - 18)  SpO2: 94% (94% - 94%)      LABS/STUDIES:  09-03    141  |  100  |  11  ----------------------------<  130<H>  4.2   |  25  |  0.6<L>    Ca    8.7      03 Sep 2019 06:31                              9.0    4.64  )-----------( 60       ( 03 Sep 2019 06:31 )             30.5       MEDICATIONS  (STANDING):  benzocaine 15 mG/menthol 3.6 mG (Sugar-Free) Lozenge 1 Lozenge Oral three times a day  buPROPion XL . 150 milliGRAM(s) Oral daily  chlorhexidine 4% Liquid 1 Application(s) Topical <User Schedule>  dexamethasone  Injectable 4 milliGRAM(s) IV Push every 6 hours  docusate sodium 100 milliGRAM(s) Oral two times a day  escitalopram 20 milliGRAM(s) Oral at bedtime  FIRST- Mouthwash  BLM 15 milliLiter(s) Swish and Spit four times a day  folic acid 1 milliGRAM(s) Oral daily  loratadine 10 milliGRAM(s) Oral daily  oxyCODONE  ER Tablet 90 milliGRAM(s) Oral every 12 hours  pantoprazole    Tablet 40 milliGRAM(s) Oral two times a day  Saliva Substitute (CAPHOSOL) 30 milliLiter(s) Swish and Spit four times a day  senna 2 Tablet(s) Oral at bedtime    MEDICATIONS  (PRN):  ibuprofen  Tablet. 200 milliGRAM(s) Oral every 4 hours PRN Mild Pain (1 - 3)  oxyCODONE    IR 15 milliGRAM(s) Oral every 3 hours PRN Moderate Pain (4 - 6)  zolpidem 5 milliGRAM(s) Oral at bedtime PRN Insomnia    iStop:     This report was requested by: Cade Muro | Reference #: [id number]      Others' Prescriptions  Patient Name:	Анна Arteaga	YOB: 1975  Address:	[address] [place] [address]	Sex:	Female  Rx Written	Rx Dispensed	Drug	Quantity	Days Supply	Prescriber Name  08/13/2019	08/13/2019	oxycodone hcl 15 mg tablet	180	30	Caryn, Eve (NP)  08/09/2019	08/09/2019	oxycontin er 60 mg tablet	60	30	Caryn, Eve (NP)  08/01/2019	08/06/2019	clonazepam 0.5 mg tablet	30	30	[facility]  08/01/2019	08/06/2019	zolpidem tartrate 10 mg tablet	30	30	[facility]  07/10/2019	07/16/2019	oxycodone hcl 15 mg tablet    PPS  Level   50%       Note PPS = Palliative Performance Scale; (c)2001, [facility] Society       Range from 100% meaning Full ambulation/self-care/intake/Level of Consicous                                                                              to        10% meaning Bedbound/Unable to do any activity/extensive disease /Total Care/ No PO intake/ LOC=Full/drowsy/+/-confusion        (0% = death)                     Prior to acute illness, patient's functionality reportedly in pain, but able to take care of self

## 2019-09-03 NOTE — PROGRESS NOTE ADULT - SUBJECTIVE AND OBJECTIVE BOX
seen by physical therapy on 8/28    patient with increasing R hip pain  Walker delivered at bedside  Pain meds adjusted    Wants PT to teach ambulation with Cane  Also needs stair training    Will ask PT to see in AM    VN on DC

## 2019-09-03 NOTE — PROGRESS NOTE ADULT - SUBJECTIVE AND OBJECTIVE BOX
Patient is a 44y old  Female who presents with a chief complaint of Difficulty swallowing      Admitted to medicine with primary diagnosis of Oral thrush with possible Esophagal Candidiasis  -Found to have new Metastatic lesion in Liver, Brain, Right iliac wing  -Receiving Radiation therapy in-house for 5 days and will likely start Next round of Chemotherapy in -house     Interval events: none     Today is hosp day 10  Patient is resting comfortably in bed  Pain not well controlled pain management consulted   Tolerating Regular  diet with regular BM   Ambulates independently  Plan- Radiation therapy today    -F/up Rad/onc consult    PAST MEDICAL & SURGICAL HISTORY  PAST MEDICAL & SURGICAL HISTORY:  Lung cancer  Anxiety and depression  History of cholecystectomy      MEDICATIONS  (STANDING):  benzocaine 15 mG/menthol 3.6 mG (Sugar-Free) Lozenge 1 Lozenge Oral three times a day  buPROPion XL . 150 milliGRAM(s) Oral daily  chlorhexidine 4% Liquid 1 Application(s) Topical <User Schedule>  dexamethasone  Injectable 4 milliGRAM(s) IV Push every 6 hours  docusate sodium 100 milliGRAM(s) Oral two times a day  escitalopram 20 milliGRAM(s) Oral at bedtime  FIRST- Mouthwash  BLM 15 milliLiter(s) Swish and Spit four times a day  folic acid 1 milliGRAM(s) Oral daily  loratadine 10 milliGRAM(s) Oral daily  oxyCODONE  ER Tablet 60 milliGRAM(s) Oral every 12 hours  pantoprazole    Tablet 40 milliGRAM(s) Oral two times a day  Saliva Substitute (CAPHOSOL) 30 milliLiter(s) Swish and Spit four times a day  senna 2 Tablet(s) Oral at bedtime    MEDICATIONS  (PRN):  ibuprofen  Tablet. 200 milliGRAM(s) Oral every 4 hours PRN Mild Pain (1 - 3)  oxyCODONE    IR 15 milliGRAM(s) Oral every 3 hours PRN Moderate Pain (4 - 6)  zolpidem 5 milliGRAM(s) Oral at bedtime PRN Insomnia          Vital Signs Last 24 Hrs  T(C): 36.3 (03 Sep 2019 04:53), Max: 37.1 (02 Sep 2019 14:59)  T(F): 97.4 (03 Sep 2019 04:53), Max: 98.7 (02 Sep 2019 14:59)  HR: 75 (03 Sep 2019 04:53) (62 - 75)  BP: 159/81 (03 Sep 2019 04:53) (135/68 - 159/81)  BP(mean): --  RR: 18 (03 Sep 2019 04:53) (18 - 18)  SpO2: 94% (03 Sep 2019 07:30) (94% - 94%)  CAPILLARY BLOOD GLUCOSE        I&O's Summary      Physical Exam:    --  - General : NAD, resting comfortably in bed    -       Oral Cavity: Moist, Oral thrush resolved     -      Cardiac: S1/S2 appreciated RRR, no murmurs    -      Pulm: CTA b/l, no adventitious sounds    -      GI: Soft, NT, ND, + odynophagia resolved     -      Musculoskeletal: Atraumatic, No LE edema B/L, No skin color changes    -      Neuro: AO x 4, nonfocal         Labs:                        9.0    4.64  )-----------( 60       ( 03 Sep 2019 06:31 )             30.5             09-03    141  |  100  |  11  ----------------------------<  130<H>  4.2   |  25  |  0.6<L>    Ca    8.7      03 Sep 2019 06:31                  Imaging:    ECG:

## 2019-09-03 NOTE — CONSULT NOTE ADULT - ASSESSMENT
Consult Summary:  44 year old woman with a history of metastatic adenocarcinoma of the lung s/p multiple cycles of chemotherapy who was admitted for dysphagia.  Patient has had a history of progression of cancer despite multiple regimens and she completed radiation therapy of her neck in-house.  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  She continues to receive radiation. Palliative care consulted for pain management. Patient has required significant PRNs over the last 24 -48 hours with suboptimal pain control.    Morphine Equivalent Daily Dose (MEDD): 225mg oral morphine equivalents    Recommendations:    #Pain  Abdominal and leg pain in the setting of progression of malignancy.  Poor pain control  -recommend increasing oxycontin to 90mg q12h ATC  -recommend increasing frequency of oxycodone to 15mg q2h PRN  -radiation and chemotherapy per primary team    #Opioid induced constipation  Patient states she usually has a bowel movement every other day, and it has been the case here. She states the senna and miralax give her cramps, and prefers not to use it  -Patient on colace - colace has shown no real efficacy but can be continued if patient desires  -recommend bisacodyl suppository if no BM <48 hours    #Anxiety/Depression  -continue management per primary team    #Malaise/ACP  In the setting of metastatic lung adenocarcinoma.  Goals of care not desire as part of this consult. Patient receiving radiation and chemotherapy soon.  -Full Code  -Will try to complete HCP with patient this admission    Biopsychosocial evaluation to follow (SW documentation)      Please Call [phone] PRN

## 2019-09-03 NOTE — CHART NOTE - NSCHARTNOTEFT_GEN_A_CORE
Registered Dietitian Follow-Up     Patient Profile Reviewed                           Yes [x]   No []     Nutrition History Previously Obtained        Yes [x]  No []       Pertinent Subjective Information:  -Pt sitting comfortably in bed at time of assessment. Reports significant improvement in odynophagia and able to tolerate regular textured meals without discomfort. consuming ~75% or greater of meals at this time. Denies specific food intolerances at this time so no further nutrition interventions. continue current diet order.      Pertinent Medical Interventions:  (1) Odynophagia  2/2 odynophagia, resolved   (2) Pancytopenia 2/2 chemotherapy, stable   (3) Metastatic Lung adenocarcinoma now with leptomeningeal involvement, multiple lines of chemo   --s/p LP 8/29. Cytopathology: No Malignant cells   --s/p MR Brain: Interval development of multiple new small enhancing lesion throughout the parenchyma c/w worsening metastatic disease. Mild adjacent edema. No significant mass effect.  --CT A/P: new Lesion Right Illiacus wing and Right Hepatic lobe    --s/p RT 8/14 and 8/30. awaiting another round today     Diet order: Regular, GERD       Anthropometrics:  - Ht. 165.1cm   - Wt. 95.3kg (8/28)  --vs 96.7kg admit wt, relatively stable   - BMI 35  - IBW 56.8kg      Pertinent Lab Data: (9/3/19) WBC 4.64, RBC 3.44, H/H 9.0/30.5, Cr 0.6, glucose 130     Pertinent Meds: oxy, decadron, FIRST mouthwash, wellbutrin, colace, folic acid, protonix, senna     Physical Findings:  - Appearance: AAO   - GI function: none reported by pt +LBM 9/2  - Tubes:  - Oral/Mouth cavity:  esophageal burning and discomfort resolved per pt. tolerating most foods at this time, eats slow to reduce risk of discomfort   - Skin: no edema, skin intact      Nutrition Requirements  Weight Used: 56.8kg IBW     estimated calorie needs = ~8939-0498 kcal/day (30-35 kcal/kg IBW considering metastatic lung cancer on active treatment).  estimated protein needs = 68-85 g/day (1.2-1.5 g/kg IBW, reason mentioned above).   estimated fluid needs = 1mL/kcal     Nutrient Intake: meeting needs with increased PO intake at this time     [] Previous Nutrition Diagnosis: inadequate oral intake             [] Ongoing          [x] Resolved    [x] No nutrition related diagnosis at this time     Nutrition Intervention: meals and snacks, medical food supplements  Recommend:  1. Continue current diet order.   2. Continue to provide mouthwash prior to meals to optimize intake.      Goal/Expected Outcome: Pt to continue to tolerate 75% or greater of meals and snacks throughout LOS. Reassess in 7 days.       Indicator/Monitoring: diet order, energy intake, nutrition related labs, NFPF (PO tolerance), body composition.

## 2019-09-03 NOTE — PROGRESS NOTE ADULT - ATTENDING COMMENTS
Over the weekend MRI of the C, T spine and MRI of the brain were done.   No additional bulky leptomeningeal disease was noted. MRI of the brain however has revealed several new brain lesions. Steroids were restarted.   I have discussed with Анна re consideration of whole brain XRT, briefly discussed potential side effects, she wishes to go ahead, she will discuss this tomorrow with Dr. Gresham.   I have mentioned also at this point if Анна wanted to consider stopping therapy it is not unreasonable, she wishes to continue will all indicated therapeutic effort.   Goals of care have been discussed, Анна has designated a HCP, she also wishes for DNR and DNI will sign orders.   Will follow closely.

## 2019-09-03 NOTE — CHART NOTE - NSCHARTNOTEFT_GEN_A_CORE
Palliative Care Biopsychosocial Assessment:     Pt is a 45 y/o female admitted initially for thrush.  Pt has a PMHx of Metastatic Lung Cancer with leptomeningeal involvement, new brain lesions. metastatic adenocarcinoma of the lung diagnosed in  2018 s/p multiple lines of chemotherapy and receiving radiation.  Pt followed by Dr. Galvan.      Pt presents as alert and oriented x 4, aware of her diagnosis and her overall poor prognosis.  Palliative team consulted for symptom management.  Pt presents as coping adequately, wants to continue with ongoing treatment if offered.  Pt ambulated with walker, was independent prior to admission.  Has fiance in the community who provides support.     Palliative MD will make recommendations for symptom management, continue to follow  Pt has pain management doctor and psychiatrist in the community for depression/anxiety.  Address advance directives.         Patient Coping Status:       [ x  ]   coping well        [   ]    coping with  some difficulty       [   ]   difficulty coping     [   ]   other                                                       Patient Emotional Status:     [   ]   anxious         [   ]   depressed           [   ]  overwhelmed          [   ]   angry         [ x  ]accepting       [   ]   not accepting           [   ]   other     Patient Mental Status:      [  x ]   alert              [  x ]   oriented         [    ]   confused         [   ] lethargic         [   ]   non-responsive   [   ]   other     Advance Directives:     [   ]    Health Care Surrogate: Name:                             [   ]    Health Care Proxy: Name:   [   ]    MOLST  [   ]    Living Will  [   ]    DNR  [   ]    DNI    Patient Needs:     [   ]   Supportive Counseling                  [   ]   Family Meeting            [   ]    Education                           [x   ]   Advance Care Planning         Caregiver Name:   Caregiver needs:     [   ]   Supportive Counseling      [   ]   Family Conference      [   ]   Education    [   ]   Other     Referral:      [   ]   Community Resources         [   ]   Cancer Support Group     [   ]    Hospice       [   ]   Bereavement support     [   ]   Pastoral Care      [   ]  [facility] [place]       [   ]  Child Life Services     [   ]   Other                    Spectra #: [phone]

## 2019-09-03 NOTE — PROGRESS NOTE ADULT - ASSESSMENT
43 y/o F with h/o smoking- quit 4 years ago with Metastatic adenocarcinoma of the lung, PDL1 5%, EGFT, Alk, BRAF, KRAS, ROS1 negative diagnosed in 2018 s/p multiple lines of chemotherapy - 4 cycles-Carbo, Alimta and Keytruda in 2018, progressed and then received Taxterene/cyramza and then was given Gemzar in April 2019. She has received SRS to brain (in 2018 and also in May 2019).  Also received radiation to Lt 8th rib and flank area. She was referred to [facility],(Dr. Irma Nuñez  was not eligible for clinical trial. Now she is on Alimta and Avastin (received this on Aug 20) and had received 5 radiations to the neck(completed Aug 14) . One day following chemo- pt started having pain on swallowing to both solids and liquids. No fever/ chills. No recent weight loss. Also c/o epigastric burning pain and discomfort     1) Metastatic Lung adenocarcinoma PDL1 5%, EGFT, Alk, BRAF, KRAS, ROS1 negative- progression with multiple lines of chemotherapy- now with leptomeningeal involvement   s/p 1st dose of  Alimta and Avastin received Aug 20  Also radiation to the neck -completed Aug 14th  c/w pain meds for her pain from metastasis - as per recommendations from pain management   C/w folic acid as pt is on Avastin  MRI Lspine- leptomeningeal involvement   s/p LP on 8/29- negative for malignant cells.  - MRI brain did show new lesions  ,   MRI c spine and Tspine  did not show any enhancement   CT abdomen- new liver lesions .   scheduled for radiation today    2) Pain on swallowing 2/2 to thrush and mucositis / NEW ABDOMINAL PAIN :    s/p EGD-  non-erosive gastritis.  Oropharyngeal mucositis mostly radiation induced. - f/u biopsy results   s/p fluconazole(completed 10 day course)   c/w lozenges and magic mouth wash   c/w home dose of pain meds       3) Pancytopenia  / worsening thrombocytopenia likely 2/2 to chemotherapy :  Monitor platelets as it is low   s/p Neupogen  Transfuse if Hb<7 and Platelets less than 20k or if any active bleeding       DVT ppx- scd

## 2019-09-03 NOTE — PROGRESS NOTE ADULT - ASSESSMENT
This is a 44 year old female with PMHx of tobacco abuse, metastatic adenocarcinoma of the lung s/p chemo 4 cycles Carbo, Alimta and Keytruda then Taxterene/cyramza and then Gemzar who presented post chemo treatment for pain on swallowing to both solids and liquids     Oral Thrush with Odynopahgia: Resolved     Metastatic Lung Adenocarcinoma  - Progressive despite multiple chemo regimens  - Last chemo: Alimta and Avastin on 8/20  - Radiation of neck completed on 8/14   - Continue with home pain meds and folic acid   -Per Heme/OnC:  *Pt was scheduled to get this done output for LE numbness and pain     * MRI LS spine with and without contrast: Showed Leptomeningeal disease   -S/P Lumbar Puncture 8/29/19 with Neuroradiology  -Cytopathology: No Malignant cells   -MR Brain: Interval development of multiple new small enhancing lesion throughout   the parenchyma compatible with worsening metastatic disease. Mild   adjacent edema. No significant mass effect.  MR Cervical and Thoracic spine: no new changes  -CT A/P: new Lesion Right Illiacus wing and Right Hepatic lobe     *-F/up Radiation-onc Consult     -Evaluated by Pain management: pending recs  - S/P Radiation therapy 8/30/19 and Scheduled for another Today 9/3/19   -C/W decadron 4mg Q6   -May resume Chemotherapy In-house      Pancytopenia secondary  to chemotherapy   -Asymptomatic and Stable   -Transfuse if Hb<7 and Platelets less than 20k or if any active bleeding     DVT ppx: Lovenox  GI ppx: Protonix  Full Code  Dispo: Home when stable

## 2019-09-04 ENCOUNTER — TRANSCRIPTION ENCOUNTER (OUTPATIENT)
Age: 44
End: 2019-09-04

## 2019-09-04 ENCOUNTER — INBOUND DOCUMENT (OUTPATIENT)
Age: 44
End: 2019-09-04

## 2019-09-04 ENCOUNTER — OTHER (OUTPATIENT)
Age: 44
End: 2019-09-04

## 2019-09-04 ENCOUNTER — RX RENEWAL (OUTPATIENT)
Age: 44
End: 2019-09-04

## 2019-09-04 VITALS
DIASTOLIC BLOOD PRESSURE: 86 MMHG | TEMPERATURE: 98.5 F | HEART RATE: 83 BPM | HEIGHT: 64.96 IN | RESPIRATION RATE: 16 BRPM | SYSTOLIC BLOOD PRESSURE: 180 MMHG

## 2019-09-04 VITALS — SYSTOLIC BLOOD PRESSURE: 139 MMHG | HEART RATE: 67 BPM | DIASTOLIC BLOOD PRESSURE: 87 MMHG

## 2019-09-04 DIAGNOSIS — C79.51 SECONDARY MALIGNANT NEOPLASM OF BONE: ICD-10-CM

## 2019-09-04 DIAGNOSIS — C79.31 SECONDARY MALIGNANT NEOPLASM OF BRAIN: ICD-10-CM

## 2019-09-04 DIAGNOSIS — Z76.89 PERSONS ENCOUNTERING HEALTH SERVICES IN OTHER SPECIFIED CIRCUMSTANCES: ICD-10-CM

## 2019-09-04 LAB
ALBUMIN SERPL ELPH-MCNC: 4.1 G/DL — SIGNIFICANT CHANGE UP (ref 3.5–5.2)
ALP SERPL-CCNC: 111 U/L — SIGNIFICANT CHANGE UP (ref 30–115)
ALT FLD-CCNC: 9 U/L — SIGNIFICANT CHANGE UP (ref 0–41)
ANION GAP SERPL CALC-SCNC: 12 MMOL/L — SIGNIFICANT CHANGE UP (ref 7–14)
AST SERPL-CCNC: 11 U/L — SIGNIFICANT CHANGE UP (ref 0–41)
BASOPHILS # BLD AUTO: 0.01 K/UL — SIGNIFICANT CHANGE UP (ref 0–0.2)
BASOPHILS NFR BLD AUTO: 0.2 % — SIGNIFICANT CHANGE UP (ref 0–1)
BILIRUB SERPL-MCNC: 0.3 MG/DL — SIGNIFICANT CHANGE UP (ref 0.2–1.2)
BUN SERPL-MCNC: 16 MG/DL — SIGNIFICANT CHANGE UP (ref 10–20)
CALCIUM SERPL-MCNC: 8.8 MG/DL — SIGNIFICANT CHANGE UP (ref 8.5–10.1)
CHLORIDE SERPL-SCNC: 99 MMOL/L — SIGNIFICANT CHANGE UP (ref 98–110)
CO2 SERPL-SCNC: 30 MMOL/L — SIGNIFICANT CHANGE UP (ref 17–32)
CREAT SERPL-MCNC: 0.7 MG/DL — SIGNIFICANT CHANGE UP (ref 0.7–1.5)
EOSINOPHIL # BLD AUTO: 0 K/UL — SIGNIFICANT CHANGE UP (ref 0–0.7)
EOSINOPHIL NFR BLD AUTO: 0 % — SIGNIFICANT CHANGE UP (ref 0–8)
GLUCOSE SERPL-MCNC: 109 MG/DL — HIGH (ref 70–99)
HCT VFR BLD CALC: 30.1 % — LOW (ref 37–47)
HGB BLD-MCNC: 9.2 G/DL — LOW (ref 12–16)
IMM GRANULOCYTES NFR BLD AUTO: 5.8 % — HIGH (ref 0.1–0.3)
LYMPHOCYTES # BLD AUTO: 0.81 K/UL — LOW (ref 1.2–3.4)
LYMPHOCYTES # BLD AUTO: 13.3 % — LOW (ref 20.5–51.1)
MCHC RBC-ENTMCNC: 26.4 PG — LOW (ref 27–31)
MCHC RBC-ENTMCNC: 30.6 G/DL — LOW (ref 32–37)
MCV RBC AUTO: 86.2 FL — SIGNIFICANT CHANGE UP (ref 81–99)
MONOCYTES # BLD AUTO: 0.61 K/UL — HIGH (ref 0.1–0.6)
MONOCYTES NFR BLD AUTO: 10 % — HIGH (ref 1.7–9.3)
NEUTROPHILS # BLD AUTO: 4.3 K/UL — SIGNIFICANT CHANGE UP (ref 1.4–6.5)
NEUTROPHILS NFR BLD AUTO: 70.7 % — SIGNIFICANT CHANGE UP (ref 42.2–75.2)
NRBC # BLD: 0 /100 WBCS — SIGNIFICANT CHANGE UP (ref 0–0)
PLATELET # BLD AUTO: 101 K/UL — LOW (ref 130–400)
POTASSIUM SERPL-MCNC: 4.5 MMOL/L — SIGNIFICANT CHANGE UP (ref 3.5–5)
POTASSIUM SERPL-SCNC: 4.5 MMOL/L — SIGNIFICANT CHANGE UP (ref 3.5–5)
PROT SERPL-MCNC: 6.7 G/DL — SIGNIFICANT CHANGE UP (ref 6–8)
RBC # BLD: 3.49 M/UL — LOW (ref 4.2–5.4)
RBC # FLD: 15.9 % — HIGH (ref 11.5–14.5)
SODIUM SERPL-SCNC: 141 MMOL/L — SIGNIFICANT CHANGE UP (ref 135–146)
WBC # BLD: 6.08 K/UL — SIGNIFICANT CHANGE UP (ref 4.8–10.8)
WBC # FLD AUTO: 6.08 K/UL — SIGNIFICANT CHANGE UP (ref 4.8–10.8)

## 2019-09-04 PROCEDURE — 77290 THER RAD SIMULAJ FIELD CPLX: CPT | Mod: 26

## 2019-09-04 PROCEDURE — 77334 RADIATION TREATMENT AID(S): CPT | Mod: 26

## 2019-09-04 PROCEDURE — 99233 SBSQ HOSP IP/OBS HIGH 50: CPT

## 2019-09-04 RX ORDER — HYDROMORPHONE HYDROCHLORIDE 2 MG/ML
0.5 INJECTION INTRAMUSCULAR; INTRAVENOUS; SUBCUTANEOUS ONCE
Refills: 0 | Status: DISCONTINUED | OUTPATIENT
Start: 2019-09-04 | End: 2019-09-04

## 2019-09-04 RX ORDER — OXYCODONE HYDROCHLORIDE 5 MG/1
1 TABLET ORAL
Qty: 60 | Refills: 0
Start: 2019-09-04 | End: 2019-10-03

## 2019-09-04 RX ORDER — CLONAZEPAM 1 MG
0.5 TABLET ORAL ONCE
Refills: 0 | Status: DISCONTINUED | OUTPATIENT
Start: 2019-09-04 | End: 2019-09-04

## 2019-09-04 RX ORDER — OXYCODONE HYDROCHLORIDE 5 MG/1
1 TABLET ORAL
Qty: 0 | Refills: 0 | DISCHARGE

## 2019-09-04 RX ORDER — DEXAMETHASONE 0.5 MG/5ML
1 ELIXIR ORAL
Qty: 60 | Refills: 0
Start: 2019-09-04 | End: 2019-10-03

## 2019-09-04 RX ADMIN — Medication 30 MILLILITER(S): at 12:27

## 2019-09-04 RX ADMIN — Medication 100 MILLIGRAM(S): at 06:12

## 2019-09-04 RX ADMIN — LORATADINE 10 MILLIGRAM(S): 10 TABLET ORAL at 12:27

## 2019-09-04 RX ADMIN — DIPHENHYDRAMINE HYDROCHLORIDE AND LIDOCAINE HYDROCHLORIDE AND ALUMINUM HYDROXIDE AND MAGNESIUM HYDRO 15 MILLILITER(S): KIT at 12:27

## 2019-09-04 RX ADMIN — OXYCODONE HYDROCHLORIDE 15 MILLIGRAM(S): 5 TABLET ORAL at 11:15

## 2019-09-04 RX ADMIN — OXYCODONE HYDROCHLORIDE 15 MILLIGRAM(S): 5 TABLET ORAL at 07:51

## 2019-09-04 RX ADMIN — OXYCODONE HYDROCHLORIDE 15 MILLIGRAM(S): 5 TABLET ORAL at 04:11

## 2019-09-04 RX ADMIN — Medication 0.5 MILLIGRAM(S): at 15:54

## 2019-09-04 RX ADMIN — Medication 30 MILLILITER(S): at 06:13

## 2019-09-04 RX ADMIN — Medication 4 MILLIGRAM(S): at 12:27

## 2019-09-04 RX ADMIN — Medication 4 MILLIGRAM(S): at 06:12

## 2019-09-04 RX ADMIN — Medication 1 MILLIGRAM(S): at 12:27

## 2019-09-04 RX ADMIN — OXYCODONE HYDROCHLORIDE 90 MILLIGRAM(S): 5 TABLET ORAL at 06:17

## 2019-09-04 RX ADMIN — PANTOPRAZOLE SODIUM 40 MILLIGRAM(S): 20 TABLET, DELAYED RELEASE ORAL at 06:12

## 2019-09-04 RX ADMIN — OXYCODONE HYDROCHLORIDE 15 MILLIGRAM(S): 5 TABLET ORAL at 00:01

## 2019-09-04 RX ADMIN — OXYCODONE HYDROCHLORIDE 90 MILLIGRAM(S): 5 TABLET ORAL at 07:17

## 2019-09-04 RX ADMIN — BENZOCAINE AND MENTHOL 1 LOZENGE: 5; 1 LIQUID ORAL at 06:19

## 2019-09-04 RX ADMIN — ZOLPIDEM TARTRATE 5 MILLIGRAM(S): 10 TABLET ORAL at 00:04

## 2019-09-04 RX ADMIN — OXYCODONE HYDROCHLORIDE 15 MILLIGRAM(S): 5 TABLET ORAL at 14:02

## 2019-09-04 RX ADMIN — OXYCODONE HYDROCHLORIDE 15 MILLIGRAM(S): 5 TABLET ORAL at 06:11

## 2019-09-04 RX ADMIN — DIPHENHYDRAMINE HYDROCHLORIDE AND LIDOCAINE HYDROCHLORIDE AND ALUMINUM HYDROXIDE AND MAGNESIUM HYDRO 15 MILLILITER(S): KIT at 07:52

## 2019-09-04 NOTE — REASON FOR VISIT
[Routine On-Treatment] : a routine on-treatment visit for [Bone Metastasis] : bone metastasis [Family Member] : family member

## 2019-09-04 NOTE — DISCHARGE NOTE NURSING/CASE MANAGEMENT/SOCIAL WORK - PATIENT PORTAL LINK FT
You can access the FollowMyHealth Patient Portal offered by [facility] by registering at the following website: [email or web address]. By joining [facility]’s FollowMyHealth portal, you will also be able to view your health information using other applications (apps) compatible with our system.

## 2019-09-04 NOTE — DISEASE MANAGEMENT
[Clinical] : TNM Stage: c [FreeTextEntry4] : Adenocarcinoma of the lung [TTNM] : 3 [NTNM] : 2 [MTNM] : 1 [IV] : IV [de-identified] : 1200 cGy [de-identified] : 2000 cGy [de-identified] : L spine

## 2019-09-04 NOTE — DISCHARGE NOTE PROVIDER - NSDCCPCAREPLAN_GEN_ALL_CORE_FT
PRINCIPAL DISCHARGE DIAGNOSIS  Diagnosis: Oral thrush  Assessment and Plan of Treatment: Reseolved with fluconazole      SECONDARY DISCHARGE DIAGNOSES  Diagnosis: Stage IV adenocarcinoma of lung  Assessment and Plan of Treatment: Pls follow-up with your oncologist for continued care with chemo and radiation

## 2019-09-04 NOTE — DISCHARGE NOTE PROVIDER - HOSPITAL COURSE
This is a 44 year old female with PMHx of tobacco abuse, metastatic adenocarcinoma of the lung s/p chemo 4 cycles Carbo, Alimta and Keytruda then Taxterene/cyramza and then Gemzar who presented post chemo treatment for pain on swallowing to both solids and liquids         Oral Thrush with Odynopahgia: Resolved     EGD: BIOPSY_ Duodenum, biopsy:- Chronic duodenitis, moderately severe.- No intraepithelial lymphocytosis seen.     Stomach: mild  Chronic gastritis    -Completed 10day course of Fluconazole with improvement         Metastatic Lung Adenocarcinoma    -On chemo and Radiation therapy     MRI LS spine was done as patient was complaining of  b/L numbness: Showed Leptomeningeal disease     -S/P Lumbar Puncture 8/29/19 with Neuroradiology    -Cytopathology: No Malignant cells     -MR Brain: Interval development of multiple new small enhancing lesion throughout     the parenchyma compatible with worsening metastatic disease with Mild adjacent edema. No significant mass effect.    -Started on Decadron    -CT A/P: was done for Abdominal Piain: new Lesion Right Illiacus wing and Right Hepatic lobe          Stable for D/C with O/P f/up for chemo and Radiation

## 2019-09-04 NOTE — DISCHARGE NOTE PROVIDER - CARE PROVIDER_API CALL
Em Galvan)  HematologyOncology; Internal Medicine; Medical Oncology  [address] [place] [address]  Phone: [phone]  Fax: [phone]  Follow Up Time:

## 2019-09-04 NOTE — DISCHARGE NOTE PROVIDER - NSDCFUSCHEDAPPT_GEN_ALL_CORE_FT
REMEDIOS DENTON ; 09/10/2019 ; [facility] HemOnc [facility]C REMEDIOS Lee ; 09/10/2019 ; [facility] Chemo & Infus [facility] Kaiden DUARTE

## 2019-09-04 NOTE — PROGRESS NOTE ADULT - ASSESSMENT
Consult Summary:  44 year old woman with a history of metastatic adenocarcinoma of the lung s/p multiple cycles of chemotherapy who was admitted for dysphagia.  Patient has had a history of progression of cancer despite multiple regimens and she completed radiation therapy of her neck in-house.  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  She continues to receive radiation. Palliative care consulted for pain management. Patient to be discharged today    Morphine Equivalent Daily Dose (MEDD): 360mg oral morphine equivalents    Recommendations:    #Pain  Abdominal and leg pain in the setting of progression of malignancy.  Fewer PRNs over 24 hours  -continue oxycontin 90mg q12h ATC  -continue oxycodone to 15mg q2h PRN  -radiation and chemotherapy per primary team  -patient to follow up with Dr. Galvan for symptom management as outpatient    #Opioid induced constipation  Patient states she usually has a bowel movement every other day, and it has been the case here. She states the senna and miralax give her cramps, and prefers not to use it  -Patient on colace - colace has shown no real efficacy but can be continued if patient desires  -recommend bisacodyl suppository if no BM <48 hours    #Anxiety/Depression  -continue management per primary team    #Malaise/ACP  In the setting of metastatic lung adenocarcinoma.  Goals of care not desired as part of this consult. Patient receiving radiation, and chemotherapy soon.  Discharge today  -MOLST filled out today    Biopsychosocial evaluation to follow ( documentation)    [phone]

## 2019-09-04 NOTE — PROGRESS NOTE ADULT - PROVIDER SPECIALTY LIST ADULT
Gastroenterology
Gastroenterology
Heme/Onc
Internal Medicine
Palliative Care
Physiatry
Internal Medicine

## 2019-09-04 NOTE — PROGRESS NOTE ADULT - REASON FOR ADMISSION
Difficulty swallowing
Pain during swallowing and inability to eat due to pain
Difficulty swallowing

## 2019-09-04 NOTE — PROGRESS NOTE ADULT - SUBJECTIVE AND OBJECTIVE BOX
Brief HPI  44 year old woman with a history of metastatic adenocarcinoma of the lung s/p multiple cycles of chemotherapy who was admitted for dysphagia.  Patient has had a history of progression of cancer despite multiple regimens and she completed radiation therapy of her neck in-house.  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  She continues to receive radiation. Palliative care consulted for pain management.     Interval History  -Patient started on oxycontin 90mg q12h yesterday  -She used 4 PRNs over 24 hours  -She states that she hasn't noticed any significant pain changes or lethargy since starting the medication  -She is being discharged later today      PHYSICAL EXAM    T(C): , Max: 37.1 (05:16)  T(F): 96.9  HR: 67 (67 - 80)  BP: 139/87 (138/76 - 186/95)  RR: 17 (16 - 18)  SpO2: 97% (97% - 97%)    Constitutional:  NAD, sitting in bed; pleasant  Eyes: EOMI, no scleral icterus  ENMT:  MMM  Neck: no JVD  Respiratory:  CTABL, no wheezing, no accessory muscle use  Cardiovascular:  RRR, no MRG: no edema noted  Gastrointestinal: S/ND: BSx4  Neurological:  AAOx3; no focal deficits on cranial nerve exam  Skin: warm/dry, no rashes  Psychiatric: less anxious today    LABS:                          9.2    6.08  )-----------( 101      ( 04 Sep 2019 07:14 )             30.1                                                                                      09-04    141  |  99  |  16  ----------------------------<  109<H>  4.5   |  30  |  0.7    Ca    8.8      04 Sep 2019 07:14    TPro  6.7  /  Alb  4.1  /  TBili  0.3  /  DBili  x   /  AST  11  /  ALT  9   /  AlkPhos  111  09-04                                                      MEDICATIONS  (STANDING):  benzocaine 15 mG/menthol 3.6 mG (Sugar-Free) Lozenge 1 Lozenge Oral three times a day  buPROPion XL . 150 milliGRAM(s) Oral daily  chlorhexidine 4% Liquid 1 Application(s) Topical <User Schedule>  dexamethasone  Injectable 4 milliGRAM(s) IV Push every 6 hours  docusate sodium 100 milliGRAM(s) Oral two times a day  escitalopram 20 milliGRAM(s) Oral at bedtime  FIRST- Mouthwash  BLM 15 milliLiter(s) Swish and Spit four times a day  folic acid 1 milliGRAM(s) Oral daily  HYDROmorphone  Injectable 0.5 milliGRAM(s) IV Push once  loratadine 10 milliGRAM(s) Oral daily  oxyCODONE  ER Tablet 90 milliGRAM(s) Oral every 12 hours  pantoprazole    Tablet 40 milliGRAM(s) Oral two times a day  Saliva Substitute (CAPHOSOL) 30 milliLiter(s) Swish and Spit four times a day  senna 2 Tablet(s) Oral at bedtime    MEDICATIONS  (PRN):  ibuprofen  Tablet. 200 milliGRAM(s) Oral every 4 hours PRN Mild Pain (1 - 3)  oxyCODONE    IR 15 milliGRAM(s) Oral every 3 hours PRN Moderate Pain (4 - 6)  zolpidem 5 milliGRAM(s) Oral at bedtime PRN Insomnia

## 2019-09-04 NOTE — PROGRESS NOTE ADULT - SUBJECTIVE AND OBJECTIVE BOX
Patient is a 44y old  Female who presents with a chief complaint of Difficulty swallowing (03 Sep 2019 17:47)      Subjective: No new events  She is upset about her imaging findings, but told me this morning that she is going to stay strong and fight this through       Vital Signs Last 24 Hrs  T(C): 37.1 (04 Sep 2019 05:16), Max: 37.1 (04 Sep 2019 05:16)  T(F): 98.8 (04 Sep 2019 05:16), Max: 98.8 (04 Sep 2019 05:16)  HR: 80 (04 Sep 2019 05:16) (67 - 80)  BP: 138/76 (03 Sep 2019 19:23) (138/76 - 155/77)  BP(mean): --  RR: 18 (04 Sep 2019 05:16) (16 - 18)  SpO2: --    PHYSICAL EXAM  General: adult in NAD  CV: normal S1/S2  Lungs: positive air movement b/l ant lungs,clear to auscultation  Abdomen: soft non-tender   Ext: no edema  Neuro: alert and oriented X 4, no focal deficits    MEDICATIONS  (STANDING):  benzocaine 15 mG/menthol 3.6 mG (Sugar-Free) Lozenge 1 Lozenge Oral three times a day  buPROPion XL . 150 milliGRAM(s) Oral daily  chlorhexidine 4% Liquid 1 Application(s) Topical <User Schedule>  dexamethasone  Injectable 4 milliGRAM(s) IV Push every 6 hours  docusate sodium 100 milliGRAM(s) Oral two times a day  escitalopram 20 milliGRAM(s) Oral at bedtime  FIRST- Mouthwash  BLM 15 milliLiter(s) Swish and Spit four times a day  folic acid 1 milliGRAM(s) Oral daily  loratadine 10 milliGRAM(s) Oral daily  oxyCODONE  ER Tablet 90 milliGRAM(s) Oral every 12 hours  pantoprazole    Tablet 40 milliGRAM(s) Oral two times a day  Saliva Substitute (CAPHOSOL) 30 milliLiter(s) Swish and Spit four times a day  senna 2 Tablet(s) Oral at bedtime    MEDICATIONS  (PRN):  ibuprofen  Tablet. 200 milliGRAM(s) Oral every 4 hours PRN Mild Pain (1 - 3)  oxyCODONE    IR 15 milliGRAM(s) Oral every 3 hours PRN Moderate Pain (4 - 6)  zolpidem 5 milliGRAM(s) Oral at bedtime PRN Insomnia      LABS:                          9.2    6.08  )-----------( 101      ( 04 Sep 2019 07:14 )             30.1         Mean Cell Volume : 86.2 fL  Mean Cell Hemoglobin : 26.4 pg  Mean Cell Hemoglobin Concentration : 30.6 g/dL  Auto Neutrophil # : 4.30 K/uL  Auto Lymphocyte # : 0.81 K/uL  Auto Monocyte # : 0.61 K/uL  Auto Eosinophil # : 0.00 K/uL  Auto Basophil # : 0.01 K/uL  Auto Neutrophil % : 70.7 %  Auto Lymphocyte % : 13.3 %  Auto Monocyte % : 10.0 %  Auto Eosinophil % : 0.0 %  Auto Basophil % : 0.2 %      Serial CBC's  09-04 @ 07:14  Hct-30.1 / Hgb-9.2 / Plat-101 / RBC-3.49 / WBC-6.08  Serial CBC's  09-03 @ 06:31  Hct-30.5 / Hgb-9.0 / Plat-60 / RBC-3.44 / WBC-4.64  Serial CBC's  09-02 @ 07:25  Hct-31.8 / Hgb-9.8 / Plat-45 / RBC-3.69 / WBC-3.79  Serial CBC's  09-01 @ 11:48  Hct-30.4 / Hgb-9.2 / Plat-50 / RBC-3.46 / WBC-4.38      09-04    141  |  99  |  16  ----------------------------<  109<H>  4.5   |  30  |  0.7    Ca    8.8      04 Sep 2019 07:14    TPro  6.7  /  Alb  4.1  /  TBili  0.3  /  DBili  x   /  AST  11  /  ALT  9   /  AlkPhos  111  09-04                                  BLOOD SMEAR INTERPRETATION:       RADIOLOGY & ADDITIONAL STUDIES:

## 2019-09-04 NOTE — VITALS
[Maximal Pain Intensity: 8/10] : 8/10 [Least Pain Intensity: 1/10] : 1/10 [Pain Location: ___] : Pain Location: [unfilled] [Pain Description/Quality: ___] : Pain description/quality: [unfilled] [Opioid] : opioid [80: Normal activity with effort; some signs or symptoms of disease.] : 80: Normal activity with effort; some signs or symptoms of disease.

## 2019-09-04 NOTE — PROGRESS NOTE ADULT - ASSESSMENT
43 y/o F with h/o smoking- quit 4 years ago with Metastatic adenocarcinoma of the lung, PDL1 5%, EGFT, Alk, BRAF, KRAS, ROS1 negative diagnosed in 2018 s/p multiple lines of chemotherapy - 4 cycles-Carbo, Alimta and Keytruda in 2018, progressed and then received Taxterene/cyramza and then was given Gemzar in April 2019. She has received SRS to brain (in 2018 and also in May 2019).  Also received radiation to Lt 8th rib and flank area. She was referred to [facility] [place],(Dr. Irma Nuñez  was not eligible for clinical trial. Now she is on Alimta and Avastin (received this on Aug 20) and had received 5 radiations to the neck(completed Aug 14) . One day following chemo- pt started having pain on swallowing to both solids and liquids. No fever/ chills. No recent weight loss. Also c/o epigastric burning pain and discomfort     1) Metastatic Lung adenocarcinoma PDL1 5%, EGFT, Alk, BRAF, KRAS, ROS1 negative- progression with multiple lines of chemotherapy  MRI brain shows new brain lesions   CT abdomen -  new liver lesions .   s/p 1st dose of  Alimta and Avastin received Aug 20  Also radiation to the neck -completed Aug 14th  c/w pain meds for her pain from metastasis - as per recommendations from pain management   C/w folic acid as pt is on Avastin  MRI Lspine- leptomeningeal involvement   s/p LP on 8/29- negative for malignant cells.  MRI c spine and Tspine  did not show any enhancement   She is already on steroids c/w same for her brain mets   Palliative care c/s appreciated- f/u recs- pain meds adjusted  Radiation onc follow up Dr Gresham- she might need whole brain radiation  Will plan dispo depending on what [facility] says    2) Pain on swallowing 2/2 to thrush and mucositis / NEW ABDOMINAL PAIN :  s/p EGD-  non-erosive gastritis.  Oropharyngeal mucositis mostly radiation induced. -   BIOPSY_ Duodenum, biopsy:- Chronic duodenitis, moderately severe.- No intraepithelial lymphocytosis seen.  2. Stomach, biopsy:- Chronic gastritis, mild.    s/p fluconazole(completed 10 day course)   c/w lozenges and magic mouth wash   c/w home dose of pain meds   c/w protonix 40 mg q12h    3) Pancytopenia  / worsening thrombocytopenia likely 2/2 to chemotherapy :  s/p Neupogen  Transfuse if Hb<7 and Platelets less than 20k or if any active bleeding   Monitor CBC    DVT ppx- scd   [facility]

## 2019-09-05 PROCEDURE — 77334 RADIATION TREATMENT AID(S): CPT | Mod: 26

## 2019-09-05 PROCEDURE — 77280 THER RAD SIMULAJ FIELD SMPL: CPT | Mod: 26

## 2019-09-05 PROCEDURE — 77307 TELETHX ISODOSE PLAN CPLX: CPT | Mod: 26

## 2019-09-05 PROCEDURE — 77262 THER RADIOLOGY TX PLNG INTRM: CPT

## 2019-09-06 ENCOUNTER — RX RENEWAL (OUTPATIENT)
Age: 44
End: 2019-09-06

## 2019-09-06 NOTE — REASON FOR VISIT
[Routine On-Treatment] : a routine on-treatment visit for [Brain Metastasis] : brain metastasis [Bone Metastasis] : bone metastasis [Family Member] : family member

## 2019-09-09 ENCOUNTER — OTHER (OUTPATIENT)
Age: 44
End: 2019-09-09

## 2019-09-09 ENCOUNTER — RX RENEWAL (OUTPATIENT)
Age: 44
End: 2019-09-09

## 2019-09-09 VITALS
TEMPERATURE: 98.5 F | DIASTOLIC BLOOD PRESSURE: 74 MMHG | WEIGHT: 198 LBS | HEART RATE: 92 BPM | RESPIRATION RATE: 16 BRPM | BODY MASS INDEX: 32.99 KG/M2 | SYSTOLIC BLOOD PRESSURE: 129 MMHG

## 2019-09-09 RX ORDER — DEXAMETHASONE 1 MG/1
1 TABLET ORAL
Qty: 120 | Refills: 3 | Status: DISCONTINUED | COMMUNITY
Start: 2018-12-17 | End: 2019-09-09

## 2019-09-09 RX ORDER — OXYCODONE HYDROCHLORIDE 40 MG/1
40 TABLET, FILM COATED, EXTENDED RELEASE ORAL
Qty: 60 | Refills: 0 | Status: DISCONTINUED | COMMUNITY
Start: 2018-07-25 | End: 2019-09-09

## 2019-09-09 RX ORDER — CLONAZEPAM 0.5 MG/1
0.5 TABLET ORAL
Refills: 0 | Status: ACTIVE | COMMUNITY

## 2019-09-09 RX ORDER — ESOMEPRAZOLE MAGNESIUM 20 MG/1
20 CAPSULE, DELAYED RELEASE ORAL
Refills: 0 | Status: DISCONTINUED | COMMUNITY
End: 2019-09-09

## 2019-09-09 RX ORDER — OXYCODONE HYDROCHLORIDE 15 MG/1
15 TABLET ORAL EVERY 4 HOURS
Qty: 180 | Refills: 0 | Status: DISCONTINUED | COMMUNITY
Start: 2018-07-20 | End: 2019-09-09

## 2019-09-09 RX ORDER — PANTOPRAZOLE 40 MG/1
40 TABLET, DELAYED RELEASE ORAL DAILY
Qty: 30 | Refills: 3 | Status: DISCONTINUED | COMMUNITY
Start: 2019-03-01 | End: 2019-09-09

## 2019-09-09 NOTE — HISTORY OF PRESENT ILLNESS
[FreeTextEntry1] : 9/9/19 Nursing: Pt denies N/V or HA. Currently on Decadron 4mg twice daily. Ambulating steady with cane.\par \par MD Note:  Her pain med requirement is escalating due to right hip pain.  She is taking Oxycontin 80mg BID and Oxycodone 15mg Q2hrs.   No headaches

## 2019-09-09 NOTE — DISEASE MANAGEMENT
[Clinical] : TNM Stage: c [IV] : IV [FreeTextEntry4] : Adenocarcinoma of the lung [TTNM] : 3 [NTNM] : 2 [MTNM] : 1 [de-identified] : 500cGy [de-identified] : 2500 cGy [de-identified] : Whole brain

## 2019-09-09 NOTE — PHYSICAL EXAM
[Normal] : well developed, well nourished, in no acute distress [de-identified] : Right neck mass is quite flat now.  [de-identified] : No scalp reaction

## 2019-09-09 NOTE — VITALS
[Maximal Pain Intensity: 9/10] : 9/10 [Least Pain Intensity: 1/10] : 1/10 [Pain Location: ___] : Pain Location: [unfilled] [Opioid] : opioid [80: Normal activity with effort; some signs or symptoms of disease.] : 80: Normal activity with effort; some signs or symptoms of disease.

## 2019-09-10 ENCOUNTER — APPOINTMENT (OUTPATIENT)
Dept: HEMATOLOGY ONCOLOGY | Facility: CLINIC | Age: 44
End: 2019-09-10

## 2019-09-10 ENCOUNTER — APPOINTMENT (OUTPATIENT)
Dept: INFUSION THERAPY | Facility: CLINIC | Age: 44
End: 2019-09-10

## 2019-09-10 DIAGNOSIS — R13.10 DYSPHAGIA, UNSPECIFIED: ICD-10-CM

## 2019-09-10 DIAGNOSIS — T40.2X5A ADVERSE EFFECT OF OTHER OPIOIDS, INITIAL ENCOUNTER: ICD-10-CM

## 2019-09-10 DIAGNOSIS — D61.810 ANTINEOPLASTIC CHEMOTHERAPY INDUCED PANCYTOPENIA: ICD-10-CM
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
## 2019-09-10 DIAGNOSIS — C79.51 SECONDARY MALIGNANT NEOPLASM OF BONE: ICD-10-CM

## 2019-09-10 DIAGNOSIS — K12.33 ORAL MUCOSITIS (ULCERATIVE) DUE TO RADIATION: ICD-10-CM

## 2019-09-10 DIAGNOSIS — C79.31 SECONDARY MALIGNANT NEOPLASM OF BRAIN: ICD-10-CM
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
## 2019-09-10 DIAGNOSIS — F32.9 MAJOR DEPRESSIVE DISORDER, SINGLE EPISODE, UNSPECIFIED: ICD-10-CM

## 2019-09-10 DIAGNOSIS — G03.8 MENINGITIS DUE TO OTHER SPECIFIED CAUSES: ICD-10-CM
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
## 2019-09-10 DIAGNOSIS — C78.7 SECONDARY MALIGNANT NEOPLASM OF LIVER AND INTRAHEPATIC BILE DUCT: ICD-10-CM

## 2019-09-10 DIAGNOSIS — K29.80 DUODENITIS WITHOUT BLEEDING: ICD-10-CM

## 2019-09-10 DIAGNOSIS — K29.70 GASTRITIS, UNSPECIFIED, WITHOUT BLEEDING: ICD-10-CM

## 2019-09-10 DIAGNOSIS — F41.9 ANXIETY DISORDER, UNSPECIFIED: ICD-10-CM

## 2019-09-10 DIAGNOSIS — K59.03 DRUG INDUCED CONSTIPATION: ICD-10-CM

## 2019-09-10 DIAGNOSIS — G89.3 NEOPLASM RELATED PAIN (ACUTE) (CHRONIC): ICD-10-CM

## 2019-09-10 DIAGNOSIS — Z87.891 PERSONAL HISTORY OF NICOTINE DEPENDENCE: ICD-10-CM

## 2019-09-10 DIAGNOSIS — B37.0 CANDIDAL STOMATITIS: ICD-10-CM

## 2019-09-10 DIAGNOSIS — C34.90 MALIGNANT NEOPLASM OF UNSPECIFIED PART OF UNSPECIFIED BRONCHUS OR LUNG: ICD-10-CM

## 2019-09-10 DIAGNOSIS — E53.8 DEFICIENCY OF OTHER SPECIFIED B GROUP VITAMINS: ICD-10-CM

## 2019-09-12 ENCOUNTER — APPOINTMENT (OUTPATIENT)
Dept: RADIATION ONCOLOGY | Facility: CLINIC | Age: 44
End: 2019-09-12

## 2019-09-12 PROCEDURE — 77290 THER RAD SIMULAJ FIELD CPLX: CPT | Mod: 26

## 2019-09-13 PROCEDURE — 77280 THER RAD SIMULAJ FIELD SMPL: CPT | Mod: 26

## 2019-09-13 PROCEDURE — 77334 RADIATION TREATMENT AID(S): CPT | Mod: 26

## 2019-09-13 PROCEDURE — 77307 TELETHX ISODOSE PLAN CPLX: CPT | Mod: 26

## 2019-09-13 NOTE — REASON FOR VISIT
[Routine On-Treatment] : a routine on-treatment visit for [Bone Metastasis] : bone metastasis [Brain Metastasis] : brain metastasis [Family Member] : family member

## 2019-09-16 ENCOUNTER — OTHER (OUTPATIENT)
Age: 44
End: 2019-09-16

## 2019-09-16 VITALS
SYSTOLIC BLOOD PRESSURE: 127 MMHG | DIASTOLIC BLOOD PRESSURE: 80 MMHG | BODY MASS INDEX: 32.66 KG/M2 | RESPIRATION RATE: 16 BRPM | WEIGHT: 196 LBS | TEMPERATURE: 98.6 F | HEART RATE: 86 BPM

## 2019-09-16 PROCEDURE — 77427 RADIATION TX MANAGEMENT X5: CPT

## 2019-09-16 NOTE — VITALS
[Maximal Pain Intensity: 10/10] : 10/10 [Least Pain Intensity: 1/10] : 1/10 [Pain Location: ___] : Pain Location: [unfilled] [Opioid] : opioid [80: Normal activity with effort; some signs or symptoms of disease.] : 80: Normal activity with effort; some signs or symptoms of disease.

## 2019-09-16 NOTE — HISTORY OF PRESENT ILLNESS
[FreeTextEntry1] : 9/16/19 Nursing: Denies Brock, N/V. Decadron 4mg q12. No changes in vision or mental status. Ambulating with cane. 1st treatment today to right iliac wing. C/o pain in right hip, left back and ribs. Taking Oxycontin and oxycodone for breakthrough. Will follow up with pain management 9/30/19, will try and move appt to a sooner date. . \par \par MD Note: She has multiple painful areas.  her right iliac wing and now upper back (left scapula region) are the most prominent.  No headaches.  On Decadron

## 2019-09-16 NOTE — PHYSICAL EXAM
[General Appearance - Well Developed] : well developed [General Appearance - Well Nourished] : well nourished [General Appearance - Alert] : alert [de-identified] : Uncomfortable from pain [de-identified] : No skin reaction in radiation field.  No desquamation

## 2019-09-16 NOTE — DISEASE MANAGEMENT
[Clinical] : TNM Stage: c [IV] : IV [TTNM] : 3 [FreeTextEntry4] : Adenocarcinoma of the lung [NTNM] : 2 [MTNM] : 1 [de-identified] : 5017cGy [de-identified] : Whole brain  (Right Iliac WincGy/2000cGy) [de-identified] : 2500 cGy

## 2019-09-20 ENCOUNTER — OUTPATIENT (OUTPATIENT)
Dept: OUTPATIENT SERVICES | Facility: HOSPITAL | Age: 44
LOS: 1 days | Discharge: HOME | End: 2019-09-20
Payer: MEDICAID

## 2019-09-20 DIAGNOSIS — C79.51 SECONDARY MALIGNANT NEOPLASM OF BONE: ICD-10-CM

## 2019-09-20 DIAGNOSIS — Z90.49 ACQUIRED ABSENCE OF OTHER SPECIFIED PARTS OF DIGESTIVE TRACT: Chronic | ICD-10-CM

## 2019-09-20 DIAGNOSIS — C79.31 SECONDARY MALIGNANT NEOPLASM OF BRAIN: ICD-10-CM

## 2019-09-20 DIAGNOSIS — C34.12 MALIGNANT NEOPLASM OF UPPER LOBE, LEFT BRONCHUS OR LUNG: ICD-10-CM

## 2019-09-20 PROCEDURE — 77262 THER RADIOLOGY TX PLNG INTRM: CPT

## 2019-09-25 ENCOUNTER — OTHER (OUTPATIENT)
Age: 44
End: 2019-09-25

## 2019-10-02 ENCOUNTER — APPOINTMENT (OUTPATIENT)
Dept: HEMATOLOGY ONCOLOGY | Facility: CLINIC | Age: 44
End: 2019-10-02
Payer: MEDICAID

## 2019-10-02 ENCOUNTER — LABORATORY RESULT (OUTPATIENT)
Age: 44
End: 2019-10-02

## 2019-10-02 VITALS
DIASTOLIC BLOOD PRESSURE: 73 MMHG | HEART RATE: 97 BPM | HEIGHT: 66 IN | BODY MASS INDEX: 31.66 KG/M2 | WEIGHT: 197 LBS | RESPIRATION RATE: 18 BRPM | SYSTOLIC BLOOD PRESSURE: 132 MMHG | TEMPERATURE: 97.2 F

## 2019-10-02 PROCEDURE — 99215 OFFICE O/P EST HI 40 MIN: CPT

## 2019-10-03 LAB
ALBUMIN SERPL ELPH-MCNC: 3.2 G/DL
ALP BLD-CCNC: 88 U/L
ALT SERPL-CCNC: 8 U/L
ANION GAP SERPL CALC-SCNC: 15 MMOL/L
AST SERPL-CCNC: 11 U/L
BILIRUB SERPL-MCNC: 0.3 MG/DL
BUN SERPL-MCNC: 8 MG/DL
CALCIUM SERPL-MCNC: 8.6 MG/DL
CHLORIDE SERPL-SCNC: 94 MMOL/L
CO2 SERPL-SCNC: 29 MMOL/L
CREAT SERPL-MCNC: 0.6 MG/DL
GLUCOSE SERPL-MCNC: 86 MG/DL
HCT VFR BLD CALC: 27.5 %
HGB BLD-MCNC: 8.4 G/DL
LDH SERPL-CCNC: 343
MCHC RBC-ENTMCNC: 26.1 PG
MCHC RBC-ENTMCNC: 30.5 G/DL
MCV RBC AUTO: 85.4 FL
PLATELET # BLD AUTO: 114 K/UL
PMV BLD: 9.7 FL
POTASSIUM SERPL-SCNC: 3.3 MMOL/L
PROT SERPL-MCNC: 6.3 G/DL
RBC # BLD: 3.22 M/UL
RBC # FLD: 16.7 %
SODIUM SERPL-SCNC: 138 MMOL/L
WBC # FLD AUTO: 4.64 K/UL

## 2019-10-04 NOTE — CONSULT LETTER
[Dear  ___] : Dear  [unfilled], [Consult Letter:] : I had the pleasure of evaluating your patient, [unfilled]. [( Thank you for referring [unfilled] for consultation for _____ )] : Thank you for referring [unfilled] for consultation for [unfilled] [Please see my note below.] : Please see my note below. [Consult Closing:] : Thank you very much for allowing me to participate in the care of this patient.  If you have any questions, please do not hesitate to contact me. [Sincerely,] : Sincerely, [DrDank  ___] : Dr. GUEVARA [DrDank ___] : Dr. GUEVARA [FreeTextEntry3] : Em Galvan MD

## 2019-10-04 NOTE — PHYSICAL EXAM
[Restricted in physically strenuous activity but ambulatory and able to carry out work of a light or sedentary nature] : Status 1- Restricted in physically strenuous activity but ambulatory and able to carry out work of a light or sedentary nature, e.g., light house work, office work [Normal] : affect appropriate [de-identified] : B/L lower extremity edema mostly resolved  [de-identified] : cushingoid  [de-identified] : L flank pain, 8th rib, improved but present [de-identified] : palpable R cervical node is larger on exam today, tender to palpation

## 2019-10-04 NOTE — ASSESSMENT
[FreeTextEntry1] : Metastatic adenocarcinoma of the lung, PDL1 5%, EGFT, Alk, BRAF, KRAS, ROS1 negative\par --Recieved cycle 1 of Carbo Alimta in the hospital on 7/6/2018-\par --Recieved cycle 2 on 7/27/2018 with Keytruda, no adverse effects \par --Started on B12 7/2018--\par --Taking folic acid daily\par --Reports she may require some dental work, will be in touch with dentist re Xgeva clearance\par --Brain lesion s/p SRS in 1/2019\par --Next generation sequencing revealed no targetable mutations\par --Restaging PET CT on 10/9/2018 reveals positive response to therapy \par --Completed 4th cycle of Carbo/Alimta/Keytruda on 9/14/2018\par --Deescalated to single agent Keytruda on 10/19/2018, she has missed appointments for few doses\par --PET on 12/19 with evidence of progression, R neck biopsy on 12/18/2018 confirming met adenoca, Next generation sequencing repeat revealed no targetable mutations \par --Switched therapy to Taxotere/Cyramza on 12/19/2018, Cyramza was added to cycle 2 on 1/16/2019, tolerated first cycles without major difficulty, reports occasional mild epistaxis \par --S/P cycle 3 on 2/27/2019, cycle 4 was delayed 2/2 emotional stress and pending ED evaluation to r/o DVT/PE\par --Restaging PET CT 3/18/2019 is mostly stable some SUVs appear higher (? 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 Gresham, s/p SRS to the new brain lesion\par --PET CT on 7/8/2019 with evidence of overt progression, [place] [facility] plan to start on trial 8/6/2019, unable to start criteria of inclusion changed \par --Referred back to XRT for L flank pain, 2/2 rib lesion, completed \par --D/C Gemzar on 7/10/2019\par --Echo heart on 5/1/2019 EF of 50%, no pericardial effusion \par --Obtained 2nd opinion at [facility] [place] Irma Nuñez \par --MRI L-spine 8/27/2019 -- bulky leptomeningeal disease, radiated, T and C spine were without acute findings on 8/31/2019. CSF negative of 8/29/2019 \par --MRI brain on 8/31/2019 - multiple new brain mets, some appeared hemorrhagic, s/p whole brain XRT \par --Restart Alimta, hold Avastin plan for week of 10/7/2019 \par \par Metastatic disease to brain, asymptomatic\par --MRI brain on 12/17/2018 reveals new solitary brain met\par --MRI brain on 5/23/2019 reveals new solitary brain met\par --Recieved SRS 1/2019 and again 6/2019\par --Progression on 8/31/2019, multiple brain mets, s/p WB XRT \par \par Cancer related pain, will increase as needed\par --Requiring OxyContin 80 BID, will escalate to 120mg BID, discussed with Dr. Cutler, pain management \par --Oxycodone 20mg q4 for breakthrough\par --Following with Rad Onc \par --Established with pain mgt \par \par Insomnia\par --Ambien\par \par Follow up in 2-3 weeks, all questions were answered at length. \par I have explained to her that her disease unfortunately will not be cured, all therapeutic effort is palliative in nature\par \par \par

## 2019-10-04 NOTE — REVIEW OF SYSTEMS
[Lower Ext Edema] : lower extremity edema [Negative] : Allergic/Immunologic [Recent Change In Weight] : ~T recent weight change [Chest Pain] : no chest pain [Palpitations] : no palpitations [FreeTextEntry3] : some weight loss  [Leg Claudication] : no intermittent leg claudication [FreeTextEntry5] : some chest discomfort  [FreeTextEntry9] : low back/hip pain, L rib pain  [FreeTextEntry4] : right neck RICHARD, appx 1.5cm decreased in size on exam today

## 2019-10-04 NOTE — HISTORY OF PRESENT ILLNESS
[de-identified] : Анна is a lewis 26 yo lady with past medical history significant for anxiety and depression, who has significant smoking history who presented to [facility] on 6/26/2018 with CC of leg pain. She also on further questioning revealed that she has had non-productive cough without associated SOB or CP for approximately 3 months prior. She denies any weight loss or neurological symptoms prior to presentation. CXR reveled LUIS opacity, CT scan of the chest was then done. Based on it's results she was transferred to [place], further work up was as follows. \par \par CT chest on 6/26/2018 revealed Since November 24, 2015:  1.  There is a new large left upper lobe mass measuring 6.8 x 5.7 x 7 cm  extending to mediastinum with multiple mediastinal lymph nodes as  described above. Additionally, there are multiple new hypodense hepatic  metastatic lesions. 2.  4 mm left upper lobe pulmonary nodule, metastatic etiology\par \par CT A/P on 6/27/2018 revealed Multifocal liver masses consistent with metastatic disease.  Ill-defined 0.7 cm splenic hypodensity suspicious for a metastatic focus.  Multifocal bilateral renal hypoattenuating lesions measuring up to 1.1 cm  in the right interpolar region. Cannot exclude metastasis disease within  the kidneys.\par \par MRI Brain on 6/27/2018 reveled Single enhancing nodule/lesion within a sulcus adjacent to the right  occipital lobe measuring 4 x 4 x 5 mm. Findings highly suspicious for  metastatic disease. Follow-up to 6 most may be helpful for further  evaluation if clinically indicated.\par \par Bone scan on 6/28/2018 revealed Multiple definite bony abnormalities which by pattern of distribution are  consistent with metastatic bone disease.  These include bilateral pubic inferior rami, left superior pubic ramus,  bilateral iliac bones and left 4th costovertebral junction.  Abnormal uptake corresponding to left lung index tumor.\par \par On 6/28/2018 she underwent liver biopsy, not enough tissue for extended molecular testing was obtained, it revealed adenocarcinoma of the lung with 5% PDL1 expression. \par Repeat liver biopsy on 7/2/2018 confirmed the diagnosis of lung adenocarcinoma with negative PDL1 expression, negative EGFR, Alk, BRAF and KRAS. \par \par She received first dose of Carbo/Alimta in the hospital on 7/6/2018 and tolerated it very well.  [de-identified] : 8/15/2018;Анна presents for follow up today. She has tolerated 2nd cycle of Carbo/Alimta with addition of Keytruda without any difficulty. She reports ongoing pain in her R hip, she states narcotics are helpful but she may require a dose increase. She is interested in Rad Onc evaluation. She reports she needs dental appointment and may require some dental work. Will hold off on Keytruda for now. She otherwise offer no complaints today. \par \par 9/12/2018: Анна is doing great, still c/o some back pain, requiring pain meds. Denies SOB, WALKER, headaches, weight loss. She is for cycle 4 of Carbo/Alimta/Keytruda on 9/14/2018. She has met with Dr. Gresham and has her appointment for simulation later today. PET CT was not done yet. \par \par 10/17/2018: Анна presents for follow up today, she is feeling well, has no complaints, she is still requiring pain medication for cancer related pain management. Restaging PET CT on 10/4/2018 reveals  FDG avid left upper lobe hilar 5.6 x 4.1 cm mass, decreased in size  compared to chest CT June 2018 (previously 7.0 x 6.8 cm)., max SUV 33.5.   Left hilar FDG avid lymph node, max SUV 8.9 (image 194).  Suspicious FDG avid 1.4 cm right level 2 cervical lymph node, max SUV  20.3 (image 239). Consider tissue sampling.  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  No current FDG evidence of osseous metastatic disease.\par Images were personally reviewed by myself and discussed with Анна and her boyfriend at length. \par The concerning LN in the R neck was never imaged prior, this is not likely representative of disease progression, overall PET CT is indicative of positive response to therapy. We will continue with single agent Keytruda. Анна reports no side effects of Keytruda to date. \par Анна also reports loss of menstrual periods, will check beta HCG and LH, FSH and estradiol today. She was advised on barrier protection during intercourse. \par \par 11/14/2018: Анна feels well today and reports no complaints, back pain has improved, she was not taking long acting pain meds for 2 weeks. She has missed her Keytruda appointment last week. I have reiterated the importance of compliance with therapy. I have again spoke with Анна re importance of safe sex. She has the script for brain MRI  but she has not done the study just yet she will schedule.\par \par 12/5/2018: Анна feels well today, he reports no systemic side effects to therapy, she reports that she noticed that the righ side of her neck is swollen and has been swollen for about 2-3 weeks. On exam this is finding is consistent with worsening adenopathy in the area where FDG avid node was described prior. i have expressed to Анна that I am concerned of possibility of progression. I advise that she undergoes prompt restaging including PET CT and MRI of the brain as well as R neck biopsy. She will receive Keytruda today. \par \par 12/26/2018: Анна continues to feel well, the lump in her right neck has become slightly smaller. She has completed full restaging. MRI of the brain done on 12/17/2018 that revealed  In comparison with the prior MRI of the brain dated June 27, 2018:  The previously described lesion in the focus adjacent to the right  occipital lobe is not identified and likely represented artifact.  Interval development of a ring-enhancing lesion (0.7 x 0.8 x 0.7 cm) in  the anterior inferior aspect of the right frontal lobe consistent with  cerebral metastasis. There is a moderate amount of surrounding edema. She remains asymptomatic. She was since started on Decadron 2mg BID daily and was referred back to Dr. Gresham, she was mapped for SRS of the brain lesion this AM. \par PET CT on 12/19/2018 revealed  COMPARISON : 10/4/2018.  4 new sites of pathologic FDG uptake in the abdomen and pelvis consistent  with biologic tumor activity.  Fluctuating SUV values within the head and neck and thorax without any  new sites of disease. Incidentally, the primary mass shows a 21% decrease  in SUV max, now 26.3 but is anatomically increased in size measuring 6.3  x 5.5 cm, previously 4.3 x 3.8 cm. \par R neck biopsy on 12/18/2018 revealed metastatic adenocarcinoma. Next generation sequencing was again requested. \par All images were personally reviewed by myself and discussed with patient. \par She has completed carbo/alimta not even 3 months back, i have explained to her that switch of therapy is warranted in my opinion. I will offer her Taxotere with Cyramza, Cyramza will be administered with cycle 2 2/2 scheduling issues. \par Side effects of therapy were discussed at length, including cytopenias, allergic reactions, skin rashes, neuropathy, VTE, perforation to GI tract, prolinuria, elevated BP. \par She is in agreement to proceed ASAP.  \par \par 1/16/2019: Анна started on Taxotere on 12/26/2018, she had a minor infusion reaction and had to run the drug longer, she was able to complete without complications. She also had recent SBRT to the solitary brain lesion. She reports minor headache after procedure and continues on Decadron at 2mg BID. She has gained some weight. She will be receiving 2nd dose of Taxotere along with first dose of Cyramza today. Side effects of Cyramza, including cardiovascular compilations, bleeding, hypertension and proteinuria were discussed. \par \par 2/6/2019: Анна feels well today. Denies any pulmonary symptoms, denies excessive pain. Denies new neurological symptoms. On exam her right cervical adenopathy is improved. She has gained some weight. She reports occasional nose bleeds that stop spontaneously since she started on Cyramza, she also reports some discomfort in her mouth, no overt mouth sores. Epistaxis is likely related to Cyramza, will observe closely. \par \par 2/27/2019: Анна's only complaint today is reflux exacerbation, she will double up to Nexium 40mg daily and use TUMs and Maalox. She reports she is emotional when she is taking steroids. She will go down on post chemo Decadron to 2mg BID and will only premedicate with 1 dose of Decadron prior to chemo on the day before. She reports no neuropathy, pain is controlled, no pulmonary or neurological symptoms. Proceed with cycle 3 of Taxotere with Cyramza. \par \par 3/21/2019: Анна presents today for follow up, she is very emotional, tearful She has not been feeling right, reports discomfort in her chest, her legs have been swollen, she is taking intermittent Lasix. She is contemplating potentially moving to [place], [place], where her myrna has family. She is also interested in potentially [place] [facility] specialist name was provided for her. I am concerned that she has legs swelling and chest discomfort and suggested ED evaluation to R/O DVT/PE, she is agreeable to go. Will hold Taxotere/Cyramza today. \par PET CT on 3/18/2019 revealed Since December 19, 2018:\par \par 1. No new sites of pathologic FDG uptake.\par \par 2. FDG avid left upper lobe mass currently measuring 7.6 x 5.6 cm with \par max SUV 29.4, previously 8.4 x 7.6 cm with prior max SUV 26.3 (12% \par increase).\par \par 3. FDG avid right cervical level II lymph node currently measure 2.4 x \par 1.9 cm with max SUV 23.8, previously measuring 3 x 2.2 cm with prior max \par SUV 20.2 (18% increase).\par \par 4. Previously described left hilar FDG avid lymph node no longer clearly \par delineated; possibly contiguous with FDG avid lung mass or resolved.\par \par 5. Increased size and FDG uptake within peripancreatic 1.5 cm short axis \par lymph node with max SUV 25.2, previously 1 cm short axis with max SUV \par 16.3 (55% increase).  Additional previously described intra-abdominal FDG \par avid lymphadenopathy have otherwise decreased in FDG uptake, catalogued \par above.\par Images were personally reviewed by myself and discussed with Анна. \par \par 4/17/2019: Анна was evaluated for PE/DVT in the hospital. CTA of the chest on 3/21/2019 revealed Interval occlusion of the left upper lobe anterior pulmonary artery \par branch related to the lung mass. Other pulmonary arteries appear widely patent. No evidence of right heart strain.\par Doppler on 3/21/2019 revealed no DVT in B/L LE. \par She was urgently referred for ad Onc evaluation and has completed radiation to the chest. She reports that chest discomfort that she was experiencing prior has significantly improved, she reports mild cough and occasional nausea. Her R neck lump is bigger on exam now, Анна reports that it is not as big or painful as last week. There is no overt progression on prior images and neck lump may be showing fluctuation in size 2/2 prior immunotherapy exposure. Анна wishes to hold off on restaging PET CT for now. She has been experiencing a significant amount of swelling since starting on Taxotere, therefore we will plan to switch her over to Gemzar at this time. Side effects including, but not limited to, occasional pulmonary and liver toxicity were discussed. \par \par 5/15/2019: Анна is doing well, reports no pain or SOB, she is less swollen and reports having lost few pounds, she is happy about it. She is tolerating Gemzar, her WBC is borderline today, will dose reduce slightly today to 900mg/m2, this is W2 of cycle 2. She [place] [facility] [place], additional; genetic studies are being done on tissue. Case was discussed with Dr. Irma Nuñez. She is post-due for MRI of the brain, she will schedule. \par \par 5/29/2018: MRI of the brain on 5/23/2019 revealed Since MRI brain 12/16/2018: \par 1. Interval development of a new rim-enhancing subcentimeter lesion in the \par right cerebellar hemisphere with small amount of surrounding edema \par compatible with metastatic disease given history. \par 2. Resolution of the previously identified right frontal lobe enhancing \par lesion and surrounding edema. No additional enhancing lesion on the current \par exam. \par Images were personally reviewed with myself and Анна as well as with Dr. Gresham. Анна will see Dr. Gresham for consideration of SRS to the new brain lesion. She will start steroids if recommended by Dr. Gresham. We will plan to put Gemzar temporarily on hold to complete radiation. \par Анна is currently asymptomatic and reports no neurological symptoms.  \par \par 6/19/2019: Анна is here for follow up. She was seen by Dr. Gresham and received SRS to the new brain lesion, she is weaning of steroids. I would like to offer her restaging with PET CT. For now she wishes to resume Gemzar. She has no major side effects relating to chemotherapy. \par \par 7/10/2019: Анна present for follow up. Restaging PET CT was done on 7/8/2019 and revealed \par IMPRESSION: \par 1. Since March 18, 2019, multiple new sites of pathologic FDG uptake, \par compatible with increased biologic tumor activity. \par 2. Specifically, several new FDG avid osseous metastases, with max SUV 17.5 \par within a new lytic lesion within the anterior left 8th rib. \par 3. Several new FDG avid subcutaneous soft tissue nodules, with max SUV 14.7 \par within a 7 mm subcutaneous nodule along the left flank. \par 4. Several new FDG avid peritoneal and extraperitoneal soft tissue nodules, \par with max SUV 10.4 within a new 1.9 cm right-sided extraperitoneal nodule \par along the right flank. \par 5. New FDG avid right adrenal gland nodule, with max SUV 15.8. \par 6. New focal FDG uptake along the right hemidiaphragm, without definite CT \par correlate, with max SUV 10.4. \par 7. Multiple new and increased FDG avid lymph nodes, with max SUV 20.0 within \par a 1.9 cm left para-aortic node. \par 8. Slightly decreased FDG uptake within a centrally necrotic left upper \par lobe/left hilar mass, with max SUV 16.1 (previously 29.4, which reflects a \par 45% decrease); a new FDG avid airspace opacity within the superior segment \par of the left lower lobe is suspicious for postobstructive pneumonia. \par 9. Stable FDG uptake within right cervical adenopathy, with max SUV 23.7. \par Images were personally reviewed by myself and discussed with patient. I have explained to Анна that these findings represent disease progression and given extensive conventional therapy effort, i would lie her to follow up with [facility] [place] this [place] for consideration of trial options. I will reach out to [facility] on her behalf. At this point we will plan to discontinue Gemzar.\par Additionally she is starting to complain of worsening pain in her left flank, likely correlating to anterior L 7th rib lesion. will send back to XRT for consideration of palliative XRT to the rib. \par \par 7/31/2019 Анна overall is feeling OK, she has completed palliative radiation to L 8th rib, with some control to pain, pain is not controlled completely, she is requiring pain meds. She was seen by Dr. Nuñez at [facility] and appears to be a candidate for clinical trial, scheduled to start on 8/6/2019. \par She reports increased size and tenderness in R-sided cervical node. and well as new sciatica symptoms. \par We have discussed that palliative XRT to R neck may interfere with trial participation eligibility, so we will hold off. \par MRI of L-spine was ordered [id number]. \par \par 10/2/2019: Анна present after a hiatus. Since her last visit I have received a call from Dr. Nuñez, eligibility criteria for trial has changed and Анна was unable to participate. She was referred for palliative XRT to R neck She was started on palliative Alimta and Avastin and received 1 dose on 8/20/2019. \par On 8/24/2019 she was admitted with dysphagia. Had negative EGD on 8/28/2019  \par MRI of L-spine on 8/27/2019 revealed \par 1. Enhancing (extramedullary, intradural) nodules within the spinal canal \par suspicious for leptomeningeal metastatic disease given history. \par 2. The largest measures 1.2 cm at the L4 level. Additional 3 mm nodule at \par the L3 level, and possible punctate enhancing focus at the T11 level. \par 3. Overall mild degenerative changes. \par 4. Please note there is a transitional vertebral body with lumbarization of \par the S1 segment. The counting method should be verified prior to any spinal \par procedures. \par This was at the time discussed with Radiology, XRT and patient. \par Spinal tap on 8/29/2019 was negative for malignant cells. \par On 8/31/2019 MRI of C, T spine and brain were also done these revealed \par No acute findings in C or T spine, indeterminant of C2 and C3. \par  Since MRI brain 5/23/2019: \par Interval development of multiple new small enhancing lesion throughout the \par parenchyma compatible with worsening metastatic disease. Mild adjacent \par edema. No significant mass effect. \par Several small likely hemorrhagic lesions without significant mass effect \par likely representing treated lesions. \par No definite evidence of leptomeningeal disease in the brain. \par She received palliative whole brain radiation as well as radiation to L-spine. \par On 9/2/2019 she also had a CT of A/P that revealed \par Since 7/8/2019: \par \par Right lobe hypodensities as detailed above are suggestive of new sites of \par metastatic disease. \par Interval growth of a perihepatic soft tissue nodule measuring 1.9 x 2.8 cm \par on series 3 image 44, previously measuring only 1.7 x 1.7 cm. Multiple \par other omental metastatic nodules are unchanged in size. \par Abdominopelvic lymphadenopathy is unchanged. \par Interval increase in size for osteolytic metastatic lesion within the right \par iliac wing. \par New partially visualized small to moderate left pleural effusion. \par Today she retains PFS of 2, however reports increased requirement for pain medications. She has seen Dr. Cutler of Pain Management, who is planning for intrathecal pump insertion evaluation. She will also be requiring higher dose of OxyContin, will increase to 120mg BID, and continue on the same dose of breakthrough. This was discussed with Dr. Cutler. \par Will plan to restart her on systemic therapy, however at this point will hold Avastin 2/2 plan for intrathecal pump insertion and hemorrhagic component reported to metastatic brain disease. Will plat to restart on Alimta alone for now, option of switching to Navelbine was also entertained.

## 2019-10-07 ENCOUNTER — OUTPATIENT (OUTPATIENT)
Dept: OUTPATIENT SERVICES | Facility: HOSPITAL | Age: 44
LOS: 1 days | Discharge: HOME | End: 2019-10-07
Payer: MEDICAID

## 2019-10-07 DIAGNOSIS — Z90.49 ACQUIRED ABSENCE OF OTHER SPECIFIED PARTS OF DIGESTIVE TRACT: Chronic | ICD-10-CM

## 2019-10-07 DIAGNOSIS — F06.31 MOOD DISORDER DUE TO KNOWN PHYSIOLOGICAL CONDITION WITH DEPRESSIVE FEATURES: ICD-10-CM

## 2019-10-07 PROCEDURE — 99214 OFFICE O/P EST MOD 30 MIN: CPT | Mod: GC

## 2019-10-09 ENCOUNTER — OUTPATIENT (OUTPATIENT)
Dept: OUTPATIENT SERVICES | Facility: HOSPITAL | Age: 44
LOS: 1 days | Discharge: HOME | End: 2019-10-09

## 2019-10-09 ENCOUNTER — APPOINTMENT (OUTPATIENT)
Dept: HEMATOLOGY ONCOLOGY | Facility: CLINIC | Age: 44
End: 2019-10-09
Payer: SUBSIDIZED

## 2019-10-09 ENCOUNTER — APPOINTMENT (OUTPATIENT)
Dept: INFUSION THERAPY | Facility: CLINIC | Age: 44
End: 2019-10-09

## 2019-10-09 ENCOUNTER — LABORATORY RESULT (OUTPATIENT)
Age: 44
End: 2019-10-09

## 2019-10-09 VITALS
HEIGHT: 66 IN | BODY MASS INDEX: 30.7 KG/M2 | WEIGHT: 191 LBS | TEMPERATURE: 98.4 F | SYSTOLIC BLOOD PRESSURE: 126 MMHG | DIASTOLIC BLOOD PRESSURE: 67 MMHG | HEART RATE: 113 BPM | RESPIRATION RATE: 14 BRPM

## 2019-10-09 DIAGNOSIS — Z90.49 ACQUIRED ABSENCE OF OTHER SPECIFIED PARTS OF DIGESTIVE TRACT: Chronic | ICD-10-CM

## 2019-10-09 DIAGNOSIS — Z51.11 ENCOUNTER FOR ANTINEOPLASTIC CHEMOTHERAPY: ICD-10-CM

## 2019-10-09 DIAGNOSIS — G89.3 NEOPLASM RELATED PAIN (ACUTE) (CHRONIC): ICD-10-CM

## 2019-10-09 PROCEDURE — 99213 OFFICE O/P EST LOW 20 MIN: CPT

## 2019-10-09 RX ORDER — PREGABALIN 225 MG/1
1000 CAPSULE ORAL ONCE
Refills: 0 | Status: COMPLETED | OUTPATIENT
Start: 2019-10-09 | End: 2019-10-09

## 2019-10-09 RX ORDER — PEMETREXED 500 MG/20ML
980 INJECTION, SOLUTION, CONCENTRATE INTRAVENOUS ONCE
Refills: 0 | Status: COMPLETED | OUTPATIENT
Start: 2019-10-09 | End: 2019-10-09

## 2019-10-09 RX ORDER — DEXAMETHASONE 0.5 MG/5ML
12 ELIXIR ORAL ONCE
Refills: 0 | Status: COMPLETED | OUTPATIENT
Start: 2019-10-09 | End: 2019-10-09

## 2019-10-09 RX ORDER — DEXAMETHASONE 0.5 MG/5ML
12 ELIXIR ORAL ONCE
Refills: 0 | Status: DISCONTINUED | OUTPATIENT
Start: 2019-10-09 | End: 2019-10-09

## 2019-10-09 RX ADMIN — Medication 12 MILLIGRAM(S): at 12:10

## 2019-10-09 RX ADMIN — Medication 183 MILLIGRAM(S): at 11:50

## 2019-10-09 RX ADMIN — PEMETREXED 980 MILLIGRAM(S): 500 INJECTION, SOLUTION, CONCENTRATE INTRAVENOUS at 12:30

## 2019-10-09 RX ADMIN — PREGABALIN 1000 MICROGRAM(S): 225 CAPSULE ORAL at 11:50

## 2019-10-09 RX ADMIN — PEMETREXED 278.4 MILLIGRAM(S): 500 INJECTION, SOLUTION, CONCENTRATE INTRAVENOUS at 12:04

## 2019-10-09 NOTE — ASSESSMENT
[FreeTextEntry1] : Metastatic adenocarcinoma of the lung, PDL1 5%, EGFT, Alk, BRAF, KRAS, ROS1 negative\par --Recieved cycle 1 of Carbo Alimta in the hospital on 7/6/2018-\par --Recieved cycle 2 on 7/27/2018 with Keytruda, no adverse effects \par --Started on B12 7/2018--\par --Taking folic acid daily\par --Reports she may require some dental work, will be in touch with dentist re Xgeva clearance\par --Brain lesion s/p SRS in 1/2019\par --Next generation sequencing revealed no targetable mutations\par --Restaging PET CT on 10/9/2018 reveals positive response to therapy \par --Completed 4th cycle of Carbo/Alimta/Keytruda on 9/14/2018\par --Deescalated to single agent Keytruda on 10/19/2018, she has missed appointments for few doses\par --PET on 12/19 with evidence of progression, R neck biopsy on 12/18/2018 confirming met adenoca, Next generation sequencing repeat revealed no targetable mutations \par --Switched therapy to Taxotere/Cyramza on 12/19/2018, Cyramza was added to cycle 2 on 1/16/2019, tolerated first cycles without major difficulty, reports occasional mild epistaxis \par --S/P cycle 3 on 2/27/2019, cycle 4 was delayed 2/2 emotional stress and pending ED evaluation to r/o DVT/PE\par --Restaging PET CT 3/18/2019 is mostly stable some SUVs appear higher (? 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 Gresham, s/p SRS to the new brain lesion\par --PET CT on 7/8/2019 with evidence of overt progression, referred back back to [facility], plan to start on trial 8/6/2019, unable to start criteria of inclusion changed \par --Referred back to XRT for L flank pain, 2/2 rib lesion, completed \par --D/C Gemzar on 7/10/2019\par --Echo heart on 5/1/2019 EF of 50%, no pericardial effusion \par --Obtained 2nd opinion at [facility] [place]. Irma Nuñez \par --MRI L-spine 8/27/2019 -- bulky leptomeningeal disease, radiated, T and C spine were without acute findings on 8/31/2019. CSF negative of 8/29/2019 \par --MRI brain on 8/31/2019 - multiple new brain mets, some appeared hemorrhagic, s/p whole brain XRT \par --Restart Alimta, hold Avastin on 10/9/2019, resume B12 shots and folic acid\par \par Metastatic disease to brain, asymptomatic\par --MRI brain on 12/17/2018 reveals new solitary brain met\par --MRI brain on 5/23/2019 reveals new solitary brain met\par --Recieved SRS 1/2019 and again 6/2019\par --Progression on 8/31/2019, multiple brain mets, s/p WB XRT \par \par Cancer related pain, will increase as needed\par --Requiring OxyContin 80 BID, escalated to 120mg BID, discussed with Dr. Cutler, pain management \par --Oxycodone 20mg q4 for breakthrough\par --Following with Rad Onc \par --Established with pain mgt \par \par Insomnia\par --Ambien\par \par Follow up in 2-3 weeks, all questions were answered at length. \par I have explained to her that her disease unfortunately will not be cured, all therapeutic effort is palliative in nature\par \par \par

## 2019-10-09 NOTE — REVIEW OF SYSTEMS
[Recent Change In Weight] : ~T recent weight change [Lower Ext Edema] : lower extremity edema [Negative] : Allergic/Immunologic [Chest Pain] : no chest pain [Palpitations] : no palpitations [Leg Claudication] : no intermittent leg claudication [FreeTextEntry3] : some weight loss  [FreeTextEntry4] : right neck RICHARD, appx 1.5cm decreased in size on exam today [FreeTextEntry5] : some chest discomfort  [FreeTextEntry9] : low back/hip pain, L rib pain

## 2019-10-09 NOTE — HISTORY OF PRESENT ILLNESS
[de-identified] : Анна is a lewis 24 yo lady with past medical history significant for anxiety and depression, who has significant smoking history who presented to [facility] on 6/26/2018 with CC of leg pain. She also on further questioning revealed that she has had non-productive cough without associated SOB or CP for approximately 3 months prior. She denies any weight loss or neurological symptoms prior to presentation. CXR reveled LUIS opacity, CT scan of the chest was then done. Based on it's results she was transferred to [place], further work up was as follows. \par \par CT chest on 6/26/2018 revealed Since November 24, 2015:  1.  There is a new large left upper lobe mass measuring 6.8 x 5.7 x 7 cm  extending to mediastinum with multiple mediastinal lymph nodes as  described above. Additionally, there are multiple new hypodense hepatic  metastatic lesions. 2.  4 mm left upper lobe pulmonary nodule, metastatic etiology\par \par CT A/P on 6/27/2018 revealed Multifocal liver masses consistent with metastatic disease.  Ill-defined 0.7 cm splenic hypodensity suspicious for a metastatic focus.  Multifocal bilateral renal hypoattenuating lesions measuring up to 1.1 cm  in the right interpolar region. Cannot exclude metastasis disease within  the kidneys.\par \par MRI Brain on 6/27/2018 reveled Single enhancing nodule/lesion within a sulcus adjacent to the right  occipital lobe measuring 4 x 4 x 5 mm. Findings highly suspicious for  metastatic disease. Follow-up to 6 most may be helpful for further  evaluation if clinically indicated.\par \par Bone scan on 6/28/2018 revealed Multiple definite bony abnormalities which by pattern of distribution are  consistent with metastatic bone disease.  These include bilateral pubic inferior rami, left superior pubic ramus,  bilateral iliac bones and left 4th costovertebral junction.  Abnormal uptake corresponding to left lung index tumor.\par \par On 6/28/2018 she underwent liver biopsy, not enough tissue for extended molecular testing was obtained, it revealed adenocarcinoma of the lung with 5% PDL1 expression. \par Repeat liver biopsy on 7/2/2018 confirmed the diagnosis of lung adenocarcinoma with negative PDL1 expression, negative EGFR, Alk, BRAF and KRAS. \par \par She received first dose of Carbo/Alimta in the hospital on 7/6/2018 and tolerated it very well.  [de-identified] : 8/15/2018;Анна presents for follow up today. She has tolerated 2nd cycle of Carbo/Alimta with addition of Keytruda without any difficulty. She reports ongoing pain in her R hip, she states narcotics are helpful but she may require a dose increase. She is interested in Rad Onc evaluation. She reports she needs dental appointment and may require some dental work. Will hold off on Keytruda for now. She otherwise offer no complaints today. \par \par 9/12/2018: Анна is doing great, still c/o some back pain, requiring pain meds. Denies SOB, WALKER, headaches, weight loss. She is for cycle 4 of Carbo/Alimta/Keytruda on 9/14/2018. She has met with Dr. Gresham and has her appointment for simulation later today. PET CT was not done yet. \par \par 10/17/2018: Анна presents for follow up today, she is feeling well, has no complaints, she is still requiring pain medication for cancer related pain management. Restaging PET CT on 10/4/2018 reveals  FDG avid left upper lobe hilar 5.6 x 4.1 cm mass, decreased in size  compared to chest CT June 2018 (previously 7.0 x 6.8 cm)., max SUV 33.5.   Left hilar FDG avid lymph node, max SUV 8.9 (image 194).  Suspicious FDG avid 1.4 cm right level 2 cervical lymph node, max SUV  20.3 (image 239). Consider tissue sampling.  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  No current FDG evidence of osseous metastatic disease.\par Images were personally reviewed by myself and discussed with Анна and her boyfriend at length. \par The concerning LN in the R neck was never imaged prior, this is not likely representative of disease progression, overall PET CT is indicative of positive response to therapy. We will continue with single agent Keytruda. Анна reports no side effects of Keytruda to date. \par Анна also reports loss of menstrual periods, will check beta HCG and LH, FSH and estradiol today. She was advised on barrier protection during intercourse. \par \par 11/14/2018: Анна feels well today and reports no complaints, back pain has improved, she was not taking long acting pain meds for 2 weeks. She has missed her Keytruda appointment last week. I have reiterated the importance of compliance with therapy. I have again spoke with Анна re importance of safe sex. She has the script for brain MRI  but she has not done the study just yet she will schedule.\par \par 12/5/2018: Анна feels well today, he reports no systemic side effects to therapy, she reports that she noticed that the righ side of her neck is swollen and has been swollen for about 2-3 weeks. On exam this is finding is consistent with worsening adenopathy in the area where FDG avid node was described prior. i have expressed to Анна that I am concerned of possibility of progression. I advise that she undergoes prompt restaging including PET CT and MRI of the brain as well as R neck biopsy. She will receive Keytruda today. \par \par 12/26/2018: Анна continues to feel well, the lump in her right neck has become slightly smaller. She has completed full restaging. MRI of the brain done on 12/17/2018 that revealed  In comparison with the prior MRI of the brain dated June 27, 2018:  The previously described lesion in the focus adjacent to the right  occipital lobe is not identified and likely represented artifact.  Interval development of a ring-enhancing lesion (0.7 x 0.8 x 0.7 cm) in  the anterior inferior aspect of the right frontal lobe consistent with  cerebral metastasis. There is a moderate amount of surrounding edema. She remains asymptomatic. She was since started on Decadron 2mg BID daily and was referred back to Dr. Gresham, she was mapped for SRS of the brain lesion this AM. \par PET CT on 12/19/2018 revealed  COMPARISON : 10/4/2018.  4 new sites of pathologic FDG uptake in the abdomen and pelvis consistent  with biologic tumor activity.  Fluctuating SUV values within the head and neck and thorax without any  new sites of disease. Incidentally, the primary mass shows a 21% decrease  in SUV max, now 26.3 but is anatomically increased in size measuring 6.3  x 5.5 cm, previously 4.3 x 3.8 cm. \par R neck biopsy on 12/18/2018 revealed metastatic adenocarcinoma. Next generation sequencing was again requested. \par All images were personally reviewed by myself and discussed with patient. \par She has completed carbo/alimta not even 3 months back, i have explained to her that switch of therapy is warranted in my opinion. I will offer her Taxotere with Cyramza, Cyramza will be administered with cycle 2 2/2 scheduling issues. \par Side effects of therapy were discussed at length, including cytopenias, allergic reactions, skin rashes, neuropathy, VTE, perforation to GI tract, prolinuria, elevated BP. \par She is in agreement to proceed ASAP.  \par \par 1/16/2019: Анна started on Taxotere on 12/26/2018, she had a minor infusion reaction and had to run the drug longer, she was able to complete without complications. She also had recent SBRT to the solitary brain lesion. She reports minor headache after procedure and continues on Decadron at 2mg BID. She has gained some weight. She will be receiving 2nd dose of Taxotere along with first dose of Cyramza today. Side effects of Cyramza, including cardiovascular compilations, bleeding, hypertension and proteinuria were discussed. \par \par 2/6/2019: Анна feels well today. Denies any pulmonary symptoms, denies excessive pain. Denies new neurological symptoms. On exam her right cervical adenopathy is improved. She has gained some weight. She reports occasional nose bleeds that stop spontaneously since she started on Cyramza, she also reports some discomfort in her mouth, no overt mouth sores. Epistaxis is likely related to Cyramza, will observe closely. \par \par 2/27/2019: Анна's only complaint today is reflux exacerbation, she will double up to Nexium 40mg daily and use TUMs and Maalox. She reports she is emotional when she is taking steroids. She will go down on post chemo Decadron to 2mg BID and will only premedicate with 1 dose of Decadron prior to chemo on the day before. She reports no neuropathy, pain is controlled, no pulmonary or neurological symptoms. Proceed with cycle 3 of Taxotere with Cyramza. \par \par 3/21/2019: Анна presents today for follow up, she is very emotional, tearful She has not been feeling right, reports discomfort in her chest, her legs have been swollen, she is taking intermittent Lasix. She is contemplating potentially moving to [place], [place], where her myrna has family. She is also interested in potentially obtaining second opinion at [facility]. Lung cancer specialist name was provided for her. I am concerned that she has legs swelling and chest discomfort and suggested ED evaluation to R/O DVT/PE, she is agreeable to go. Will hold Taxotere/Cyramza today. \par PET CT on 3/18/2019 revealed Since December 19, 2018:\par \par 1. No new sites of pathologic FDG uptake.\par \par 2. FDG avid left upper lobe mass currently measuring 7.6 x 5.6 cm with \par max SUV 29.4, previously 8.4 x 7.6 cm with prior max SUV 26.3 (12% \par increase).\par \par 3. FDG avid right cervical level II lymph node currently measure 2.4 x \par 1.9 cm with max SUV 23.8, previously measuring 3 x 2.2 cm with prior max \par SUV 20.2 (18% increase).\par \par 4. Previously described left hilar FDG avid lymph node no longer clearly \par delineated; possibly contiguous with FDG avid lung mass or resolved.\par \par 5. Increased size and FDG uptake within peripancreatic 1.5 cm short axis \par lymph node with max SUV 25.2, previously 1 cm short axis with max SUV \par 16.3 (55% increase).  Additional previously described intra-abdominal FDG \par avid lymphadenopathy have otherwise decreased in FDG uptake, catalogued \par above.\par Images were personally reviewed by myself and discussed with Анна. \par \par 4/17/2019: Анна was evaluated for PE/DVT in the hospital. CTA of the chest on 3/21/2019 revealed Interval occlusion of the left upper lobe anterior pulmonary artery \par branch related to the lung mass. Other pulmonary arteries appear widely patent. No evidence of right heart strain.\par Doppler on 3/21/2019 revealed no DVT in B/L LE. \par She was urgently referred for ad Onc evaluation and has completed radiation to the chest. She reports that chest discomfort that she was experiencing prior has significantly improved, she reports mild cough and occasional nausea. Her R neck lump is bigger on exam now, Анна reports that it is not as big or painful as last week. There is no overt progression on prior images and neck lump may be showing fluctuation in size 2/2 prior immunotherapy exposure. Анна wishes to hold off on restaging PET CT for now. She has been experiencing a significant amount of swelling since starting on Taxotere, therefore we will plan to switch her over to Gemzar at this time. Side effects including, but not limited to, occasional pulmonary and liver toxicity were discussed. \par \par 5/15/2019: Анна is doing well, reports no pain or SOB, she is less swollen and reports having lost few pounds, she is happy about it. She is tolerating Gemzar, her WBC is borderline today, will dose reduce slightly today to 900mg/m2, this is W2 of cycle 2. She was seen at [facility] for second opinion, additional; genetic studies are being done on tissue. Case was discussed with Dr. Irma Nuñez. She is post-due for MRI of the brain, she will schedule. \par \par 5/29/2018: MRI of the brain on 5/23/2019 revealed Since MRI brain 12/16/2018: \par 1. Interval development of a new rim-enhancing subcentimeter lesion in the \par right cerebellar hemisphere with small amount of surrounding edema \par compatible with metastatic disease given history. \par 2. Resolution of the previously identified right frontal lobe enhancing \par lesion and surrounding edema. No additional enhancing lesion on the current \par exam. \par Images were personally reviewed with myself and Анна as well as with Dr. Gresham. Анна will see Dr. Gresham for consideration of SRS to the new brain lesion. She will start steroids if recommended by Dr. Gresham. We will plan to put Gemzar temporarily on hold to complete radiation. \par Анна is currently asymptomatic and reports no neurological symptoms.  \par \par 6/19/2019: Анна is here for follow up. She was seen by Dr. Gresham and received SRS to the new brain lesion, she is weaning of steroids. I would like to offer her restaging with PET CT. For now she wishes to resume Gemzar. She has no major side effects relating to chemotherapy. \par \par 7/10/2019: Анна present for follow up. Restaging PET CT was done on 7/8/2019 and revealed \par IMPRESSION: \par 1. Since March 18, 2019, multiple new sites of pathologic FDG uptake, \par compatible with increased biologic tumor activity. \par 2. Specifically, several new FDG avid osseous metastases, with max SUV 17.5 \par within a new lytic lesion within the anterior left 8th rib. \par 3. Several new FDG avid subcutaneous soft tissue nodules, with max SUV 14.7 \par within a 7 mm subcutaneous nodule along the left flank. \par 4. Several new FDG avid peritoneal and extraperitoneal soft tissue nodules, \par with max SUV 10.4 within a new 1.9 cm right-sided extraperitoneal nodule \par along the right flank. \par 5. New FDG avid right adrenal gland nodule, with max SUV 15.8. \par 6. New focal FDG uptake along the right hemidiaphragm, without definite CT \par correlate, with max SUV 10.4. \par 7. Multiple new and increased FDG avid lymph nodes, with max SUV 20.0 within \par a 1.9 cm left para-aortic node. \par 8. Slightly decreased FDG uptake within a centrally necrotic left upper \par lobe/left hilar mass, with max SUV 16.1 (previously 29.4, which reflects a \par 45% decrease); a new FDG avid airspace opacity within the superior segment \par of the left lower lobe is suspicious for postobstructive pneumonia. \par 9. Stable FDG uptake within right cervical adenopathy, with max SUV 23.7. \par Images were personally reviewed by myself and discussed with patient. I have explained to Анна that these findings represent disease progression and given extensive conventional therapy effort, i would lie her to follow up with [facility] at this point for consideration of trial options. I will reach out to [facility] on her behalf. At this point we will plan to discontinue Gemzar.\par Additionally she is starting to complain of worsening pain in her left flank, likely correlating to anterior L 7th rib lesion. will send back to XRT for consideration of palliative XRT to the rib. \par \par 7/31/2019 Анна overall is feeling OK, she has completed palliative radiation to L 8th rib, with some control to pain, pain is not controlled completely, she is requiring pain meds. She was seen by Dr. Nuñez at [facility] and appears to be a candidate for clinical trial, scheduled to start on 8/6/2019. \par She reports increased size and tenderness in R-sided cervical node. and well as new sciatica symptoms. \par We have discussed that palliative XRT to R neck may interfere with trial participation eligibility, so we will hold off. \par MRI of L-spine was ordered [id number]. \par \par 10/2/2019: нАна present after a hiatus. Since her last visit I have received a call from Dr. Nuñez, eligibility criteria for trial has changed and Анна was unable to participate. She was referred for palliative XRT to R neck She was started on palliative Alimta and Avastin and received 1 dose on 8/20/2019. \par On 8/24/2019 she was admitted with dysphagia. Had negative EGD on 8/28/2019  \par MRI of L-spine on 8/27/2019 revealed \par 1. Enhancing (extramedullary, intradural) nodules within the spinal canal \par suspicious for leptomeningeal metastatic disease given history. \par 2. The largest measures 1.2 cm at the L4 level. Additional 3 mm nodule at \par the L3 level, and possible punctate enhancing focus at the T11 level. \par 3. Overall mild degenerative changes. \par 4. Please note there is a transitional vertebral body with lumbarization of \par the S1 segment. The counting method should be verified prior to any spinal \par procedures. \par This was at the time discussed with Radiology, XRT and patient. \par Spinal tap on 8/29/2019 was negative for malignant cells. \par On 8/31/2019 MRI of C, T spine and brain were also done these revealed \par No acute findings in C or T spine, indeterminant of C2 and C3. \par  Since MRI brain 5/23/2019: \par Interval development of multiple new small enhancing lesion throughout the \par parenchyma compatible with worsening metastatic disease. Mild adjacent \par edema. No significant mass effect. \par Several small likely hemorrhagic lesions without significant mass effect \par likely representing treated lesions. \par No definite evidence of leptomeningeal disease in the brain. \par She received palliative whole brain radiation as well as radiation to L-spine. \par On 9/2/2019 she also had a CT of A/P that revealed \par Since 7/8/2019: \par \par Right lobe hypodensities as detailed above are suggestive of new sites of \par metastatic disease. \par Interval growth of a perihepatic soft tissue nodule measuring 1.9 x 2.8 cm \par on series 3 image 44, previously measuring only 1.7 x 1.7 cm. Multiple \par other omental metastatic nodules are unchanged in size. \par Abdominopelvic lymphadenopathy is unchanged. \par Interval increase in size for osteolytic metastatic lesion within the right \par iliac wing. \par New partially visualized small to moderate left pleural effusion. \par Today she retains PFS of 2, however reports increased requirement for pain medications. She has seen Dr. Cutler of Pain Management, who is planning for intrathecal pump insertion evaluation. She will also be requiring higher dose of OxyContin, will increase to 120mg BID, and continue on the same dose of breakthrough. This was discussed with Dr. Cutler. \par Will plan to restart her on systemic therapy, however at this point will hold Avastin 2/2 plan for intrathecal pump insertion and hemorrhagic component reported to metastatic brain disease. Will plat to restart on Alimta alone for now, option of switching to Navelbine was also entertained. \par \par 10/9/2019: Анна reports she stared on Oxycontin 120mg BID and pain is much better controlled, she is following with Dr. Cutler for consideration of intrathecal pump insertion. Will hold off on Avastin until pump is in place. She will resume Alimta today. \par She has continued weight loss, reports no other major complaints today. She is off the steroids.

## 2019-10-09 NOTE — PHYSICAL EXAM
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

## 2019-10-09 NOTE — CONSULT LETTER
[Dear  ___] : Dear  [unfilled], [( Thank you for referring [unfilled] for consultation for _____ )] : Thank you for referring [unfilled] for consultation for [unfilled] [Consult Letter:] : I had the pleasure of evaluating your patient, [unfilled]. [Please see my note below.] : Please see my note below. [Sincerely,] : Sincerely, [Consult Closing:] : Thank you very much for allowing me to participate in the care of this patient.  If you have any questions, please do not hesitate to contact me. [DrDank  ___] : Dr. GUEVARA [DrDank ___] : Dr. GUEVARA [FreeTextEntry3] : Em Galvan MD

## 2019-10-10 DIAGNOSIS — Z51.11 ENCOUNTER FOR ANTINEOPLASTIC CHEMOTHERAPY: ICD-10-CM

## 2019-10-10 DIAGNOSIS — C79.31 SECONDARY MALIGNANT NEOPLASM OF BRAIN: ICD-10-CM

## 2019-10-10 DIAGNOSIS — G89.3 NEOPLASM RELATED PAIN (ACUTE) (CHRONIC): ICD-10-CM

## 2019-10-10 DIAGNOSIS — C79.51 SECONDARY MALIGNANT NEOPLASM OF BONE: ICD-10-CM

## 2019-10-10 DIAGNOSIS — C34.90 MALIGNANT NEOPLASM OF UNSPECIFIED PART OF UNSPECIFIED BRONCHUS OR LUNG: ICD-10-CM

## 2019-10-10 LAB
HCT VFR BLD CALC: 33 %
HGB BLD-MCNC: 9.9 G/DL
MCHC RBC-ENTMCNC: 25.7 PG
MCHC RBC-ENTMCNC: 30 G/DL
MCV RBC AUTO: 85.7 FL
PLATELET # BLD AUTO: 125 K/UL
PMV BLD: 10.4 FL
RBC # BLD: 3.85 M/UL
RBC # FLD: 16.6 %
WBC # FLD AUTO: 5.53 K/UL

## 2019-10-16 ENCOUNTER — APPOINTMENT (OUTPATIENT)
Dept: RADIATION ONCOLOGY | Facility: CLINIC | Age: 44
End: 2019-10-16
Payer: MEDICAID

## 2019-10-16 VITALS
DIASTOLIC BLOOD PRESSURE: 67 MMHG | BODY MASS INDEX: 30.43 KG/M2 | TEMPERATURE: 97.2 F | HEART RATE: 109 BPM | OXYGEN SATURATION: 98 % | SYSTOLIC BLOOD PRESSURE: 120 MMHG | WEIGHT: 188.5 LBS | RESPIRATION RATE: 18 BRPM

## 2019-10-16 DIAGNOSIS — C79.51 MALIGNANT NEOPLASM OF UNSPECIFIED PART OF UNSPECIFIED BRONCHUS OR LUNG: ICD-10-CM

## 2019-10-16 DIAGNOSIS — C34.90 MALIGNANT NEOPLASM OF UNSPECIFIED PART OF UNSPECIFIED BRONCHUS OR LUNG: ICD-10-CM

## 2019-10-16 DIAGNOSIS — C79.31 MALIGNANT NEOPLASM OF UNSPECIFIED PART OF UNSPECIFIED BRONCHUS OR LUNG: ICD-10-CM

## 2019-10-16 PROCEDURE — 99024 POSTOP FOLLOW-UP VISIT: CPT

## 2019-10-16 RX ORDER — DEXAMETHASONE 4 MG/1
4 TABLET ORAL
Qty: 30 | Refills: 4 | Status: DISCONTINUED | COMMUNITY
Start: 2018-07-24 | End: 2019-10-16

## 2019-10-16 RX ORDER — BENZOCAINE 200 MG/G
20 LIQUID DENTAL; ORAL; PERIODONTAL
Qty: 3 | Refills: 3 | Status: DISCONTINUED | COMMUNITY
Start: 2019-02-06 | End: 2019-10-16

## 2019-10-16 NOTE — DISEASE MANAGEMENT
[Clinical] : TNM Stage: c [IV] : IV [FreeTextEntry4] : Adenocarcinoma of the lung [TTNM] : 3 [NTNM] : 2 [MTNM] : 1

## 2019-10-16 NOTE — HISTORY OF PRESENT ILLNESS
[FreeTextEntry1] : REMEDIOS DENTON returns in follow up visit.  As you know, REMEDIOS DENTON is a 44 year old with metastatic lung cancer.  We have treated multiple areas.  She was here for whole brain radiation and right iliac wing radiation last.  In the interim, she has gone back on chemotherapy.  She is tolerating it okay.  She still has hip, back, and rib pain. She is seeing Dr. Cutler for pain management.

## 2019-10-16 NOTE — PHYSICAL EXAM
[Normal] : normoactive bowel sounds, soft and nontender, no hepatosplenomegaly or masses appreciated [de-identified] : Right cervical neck adenopathy is smaller [de-identified] : Palpable left scapular, right hip, and left anterior rib pain.

## 2019-10-16 NOTE — LETTER CLOSING
[Consult Closing:] : Thank you for allowing me to participate in the care of this patient.  If you have any questions, please do not hesitate to contact me. [Sincerely yours,] : Sincerely yours, [FreeTextEntry3] : Zainab Gresham M.D. \par \par Electronically proofread and signed by:  Zainab Gresham MD\par Attending, Department of Radiation Medicine\par [facility]\par \par CC: Dr. Harris Lincoln ;  Dr. Cutler

## 2019-10-16 NOTE — LETTER GREETING
[Dear  ___] : Dear  [unfilled], [Follow-Up] : Your patient, [unfilled] was seen in my office today for follow-up [Please see my note below.] : Please see my note below.

## 2019-10-18 ENCOUNTER — RX RENEWAL (OUTPATIENT)
Age: 44
End: 2019-10-18

## 2019-10-18 RX ORDER — PANTOPRAZOLE 40 MG/1
40 TABLET, DELAYED RELEASE ORAL
Qty: 60 | Refills: 0 | Status: ACTIVE | COMMUNITY
Start: 2019-04-29 | End: 1900-01-01

## 2019-10-22 ENCOUNTER — RX RENEWAL (OUTPATIENT)
Age: 44
End: 2019-10-22

## 2019-10-22 RX ORDER — FUROSEMIDE 20 MG/1
20 TABLET ORAL
Qty: 30 | Refills: 0 | Status: COMPLETED | COMMUNITY
Start: 2019-03-21 | End: 2019-11-21

## 2019-10-30 ENCOUNTER — APPOINTMENT (OUTPATIENT)
Dept: INFUSION THERAPY | Facility: CLINIC | Age: 44
End: 2019-10-30

## 2019-10-30 ENCOUNTER — APPOINTMENT (OUTPATIENT)
Dept: HEMATOLOGY ONCOLOGY | Facility: CLINIC | Age: 44
End: 2019-10-30

## 2019-11-01 PROCEDURE — G9005: CPT

## 2019-11-06 ENCOUNTER — APPOINTMENT (OUTPATIENT)
Dept: HEMATOLOGY ONCOLOGY | Facility: CLINIC | Age: 44
End: 2019-11-06

## 2019-11-06 ENCOUNTER — APPOINTMENT (OUTPATIENT)
Dept: INFUSION THERAPY | Facility: CLINIC | Age: 44
End: 2019-11-06

## 2019-11-07 ENCOUNTER — RX RENEWAL (OUTPATIENT)
Age: 44
End: 2019-11-07

## 2019-11-07 RX ORDER — OXYCODONE HYDROCHLORIDE 80 MG/1
80 TABLET, FILM COATED, EXTENDED RELEASE ORAL
Qty: 60 | Refills: 0 | Status: ACTIVE | COMMUNITY
Start: 2018-07-20 | End: 1900-01-01

## 2019-11-07 RX ORDER — OXYCODONE HYDROCHLORIDE 40 MG/1
40 TABLET, FILM COATED, EXTENDED RELEASE ORAL
Qty: 60 | Refills: 0 | Status: ACTIVE | COMMUNITY
Start: 2019-10-02 | End: 1900-01-01

## 2019-11-07 RX ORDER — OXYCODONE 20 MG/1
20 TABLET ORAL EVERY 4 HOURS
Qty: 180 | Refills: 0 | Status: ACTIVE | COMMUNITY
Start: 2019-09-09 | End: 1900-01-01

## 2019-11-10 ENCOUNTER — INPATIENT (INPATIENT)
Facility: HOSPITAL | Age: 44
LOS: 14 days | End: 2019-11-25
Attending: INTERNAL MEDICINE | Admitting: INTERNAL MEDICINE
Payer: MEDICAID

## 2019-11-10 VITALS
HEART RATE: 104 BPM | OXYGEN SATURATION: 94 % | RESPIRATION RATE: 20 BRPM | SYSTOLIC BLOOD PRESSURE: 118 MMHG | DIASTOLIC BLOOD PRESSURE: 63 MMHG | TEMPERATURE: 98 F

## 2019-11-10 DIAGNOSIS — Z90.49 ACQUIRED ABSENCE OF OTHER SPECIFIED PARTS OF DIGESTIVE TRACT: Chronic | ICD-10-CM

## 2019-11-10 LAB
ALBUMIN SERPL ELPH-MCNC: 3.1 G/DL — LOW (ref 3.5–5.2)
ALP SERPL-CCNC: 203 U/L — HIGH (ref 30–115)
ALT FLD-CCNC: 22 U/L — SIGNIFICANT CHANGE UP (ref 0–41)
ANION GAP SERPL CALC-SCNC: 29 MMOL/L — HIGH (ref 7–14)
APTT BLD: 31.7 SEC — SIGNIFICANT CHANGE UP (ref 27–39.2)
AST SERPL-CCNC: 37 U/L — SIGNIFICANT CHANGE UP (ref 0–41)
BASE EXCESS BLDV CALC-SCNC: 11.5 MMOL/L — HIGH (ref -2–2)
BASOPHILS # BLD AUTO: 0.02 K/UL — SIGNIFICANT CHANGE UP (ref 0–0.2)
BASOPHILS NFR BLD AUTO: 0.1 % — SIGNIFICANT CHANGE UP (ref 0–1)
BILIRUB SERPL-MCNC: 0.4 MG/DL — SIGNIFICANT CHANGE UP (ref 0.2–1.2)
BLD GP AB SCN SERPL QL: SIGNIFICANT CHANGE UP
BUN SERPL-MCNC: 31 MG/DL — HIGH (ref 10–20)
CA-I SERPL-SCNC: 1.06 MMOL/L — LOW (ref 1.12–1.3)
CALCIUM SERPL-MCNC: 8.8 MG/DL — SIGNIFICANT CHANGE UP (ref 8.5–10.1)
CHLORIDE SERPL-SCNC: 80 MMOL/L — LOW (ref 98–110)
CO2 SERPL-SCNC: 24 MMOL/L — SIGNIFICANT CHANGE UP (ref 17–32)
CREAT SERPL-MCNC: 1.9 MG/DL — HIGH (ref 0.7–1.5)
EOSINOPHIL # BLD AUTO: 0.02 K/UL — SIGNIFICANT CHANGE UP (ref 0–0.7)
EOSINOPHIL NFR BLD AUTO: 0.1 % — SIGNIFICANT CHANGE UP (ref 0–8)
GAS PNL BLDV: 132 MMOL/L — LOW (ref 136–145)
GAS PNL BLDV: SIGNIFICANT CHANGE UP
GLUCOSE SERPL-MCNC: 142 MG/DL — HIGH (ref 70–99)
HCG SERPL QL: NEGATIVE — SIGNIFICANT CHANGE UP
HCO3 BLDV-SCNC: 37 MMOL/L — HIGH (ref 22–29)
HCT VFR BLD CALC: 19.6 % — LOW (ref 37–47)
HCT VFR BLDA CALC: 27.2 % — LOW (ref 34–44)
HGB BLD CALC-MCNC: 8.9 G/DL — LOW (ref 14–18)
HGB BLD-MCNC: 5.9 G/DL — CRITICAL LOW (ref 12–16)
IMM GRANULOCYTES NFR BLD AUTO: 2.2 % — HIGH (ref 0.1–0.3)
INR BLD: 3.17 RATIO — HIGH (ref 0.65–1.3)
LACTATE BLDV-MCNC: 2.5 MMOL/L — HIGH (ref 0.5–1.6)
LYMPHOCYTES # BLD AUTO: 0.88 K/UL — LOW (ref 1.2–3.4)
LYMPHOCYTES # BLD AUTO: 4.8 % — LOW (ref 20.5–51.1)
MAGNESIUM SERPL-MCNC: 2.3 MG/DL — SIGNIFICANT CHANGE UP (ref 1.8–2.4)
MCHC RBC-ENTMCNC: 27.1 PG — SIGNIFICANT CHANGE UP (ref 27–31)
MCHC RBC-ENTMCNC: 30.1 G/DL — LOW (ref 32–37)
MCV RBC AUTO: 89.9 FL — SIGNIFICANT CHANGE UP (ref 81–99)
MONOCYTES # BLD AUTO: 1.18 K/UL — HIGH (ref 0.1–0.6)
MONOCYTES NFR BLD AUTO: 6.4 % — SIGNIFICANT CHANGE UP (ref 1.7–9.3)
NEUTROPHILS # BLD AUTO: 15.87 K/UL — HIGH (ref 1.4–6.5)
NEUTROPHILS NFR BLD AUTO: 86.4 % — HIGH (ref 42.2–75.2)
NRBC # BLD: 0 /100 WBCS — SIGNIFICANT CHANGE UP (ref 0–0)
PCO2 BLDV: 54 MMHG — HIGH (ref 41–51)
PH BLDV: 7.44 — HIGH (ref 7.26–7.43)
PLATELET # BLD AUTO: 134 K/UL — SIGNIFICANT CHANGE UP (ref 130–400)
PO2 BLDV: 24 MMHG — SIGNIFICANT CHANGE UP (ref 20–40)
POTASSIUM BLDV-SCNC: 2 MMOL/L — LOW (ref 3.3–5.6)
POTASSIUM SERPL-MCNC: 2.3 MMOL/L — CRITICAL LOW (ref 3.5–5)
POTASSIUM SERPL-SCNC: 2.3 MMOL/L — CRITICAL LOW (ref 3.5–5)
PROT SERPL-MCNC: 6.2 G/DL — SIGNIFICANT CHANGE UP (ref 6–8)
PROTHROM AB SERPL-ACNC: 36 SEC — HIGH (ref 9.95–12.87)
RBC # BLD: 2.18 M/UL — LOW (ref 4.2–5.4)
RBC # FLD: 24.7 % — HIGH (ref 11.5–14.5)
SAO2 % BLDV: 32 % — SIGNIFICANT CHANGE UP
SODIUM SERPL-SCNC: 133 MMOL/L — LOW (ref 135–146)
TROPONIN T SERPL-MCNC: <0.01 NG/ML — SIGNIFICANT CHANGE UP
WBC # BLD: 18.37 K/UL — HIGH (ref 4.8–10.8)
WBC # FLD AUTO: 18.37 K/UL — HIGH (ref 4.8–10.8)

## 2019-11-10 PROCEDURE — 99285 EMERGENCY DEPT VISIT HI MDM: CPT

## 2019-11-10 PROCEDURE — 93010 ELECTROCARDIOGRAM REPORT: CPT

## 2019-11-10 PROCEDURE — 71045 X-RAY EXAM CHEST 1 VIEW: CPT | Mod: 26

## 2019-11-10 RX ORDER — CHLORHEXIDINE GLUCONATE 213 G/1000ML
1 SOLUTION TOPICAL
Refills: 0 | Status: DISCONTINUED | OUTPATIENT
Start: 2019-11-10 | End: 2019-11-25

## 2019-11-10 RX ORDER — IPRATROPIUM/ALBUTEROL SULFATE 18-103MCG
3 AEROSOL WITH ADAPTER (GRAM) INHALATION EVERY 6 HOURS
Refills: 0 | Status: DISCONTINUED | OUTPATIENT
Start: 2019-11-10 | End: 2019-11-25

## 2019-11-10 RX ORDER — POTASSIUM CHLORIDE 20 MEQ
40 PACKET (EA) ORAL ONCE
Refills: 0 | Status: COMPLETED | OUTPATIENT
Start: 2019-11-10 | End: 2019-11-10

## 2019-11-10 RX ORDER — OXYCODONE HYDROCHLORIDE 5 MG/1
0 TABLET ORAL
Qty: 0 | Refills: 0 | DISCHARGE

## 2019-11-10 RX ORDER — ENOXAPARIN SODIUM 100 MG/ML
40 INJECTION SUBCUTANEOUS AT BEDTIME
Refills: 0 | Status: DISCONTINUED | OUTPATIENT
Start: 2019-11-10 | End: 2019-11-20

## 2019-11-10 RX ORDER — POTASSIUM CHLORIDE 20 MEQ
20 PACKET (EA) ORAL ONCE
Refills: 0 | Status: COMPLETED | OUTPATIENT
Start: 2019-11-10 | End: 2019-11-10

## 2019-11-10 RX ORDER — CEFTRIAXONE 500 MG/1
1000 INJECTION, POWDER, FOR SOLUTION INTRAMUSCULAR; INTRAVENOUS ONCE
Refills: 0 | Status: COMPLETED | OUTPATIENT
Start: 2019-11-10 | End: 2019-11-10

## 2019-11-10 RX ORDER — SENNA PLUS 8.6 MG/1
1 TABLET ORAL AT BEDTIME
Refills: 0 | Status: DISCONTINUED | OUTPATIENT
Start: 2019-11-10 | End: 2019-11-18

## 2019-11-10 RX ORDER — CEFTRIAXONE 500 MG/1
1000 INJECTION, POWDER, FOR SOLUTION INTRAMUSCULAR; INTRAVENOUS EVERY 24 HOURS
Refills: 0 | Status: DISCONTINUED | OUTPATIENT
Start: 2019-11-11 | End: 2019-11-12

## 2019-11-10 RX ORDER — OXYCODONE HYDROCHLORIDE 5 MG/1
20 TABLET ORAL ONCE
Refills: 0 | Status: DISCONTINUED | OUTPATIENT
Start: 2019-11-10 | End: 2019-11-10

## 2019-11-10 RX ORDER — SODIUM CHLORIDE 9 MG/ML
1000 INJECTION, SOLUTION INTRAVENOUS
Refills: 0 | Status: DISCONTINUED | OUTPATIENT
Start: 2019-11-10 | End: 2019-11-10

## 2019-11-10 RX ORDER — FOLIC ACID 0.8 MG
1 TABLET ORAL DAILY
Refills: 0 | Status: DISCONTINUED | OUTPATIENT
Start: 2019-11-10 | End: 2019-11-21

## 2019-11-10 RX ORDER — SODIUM CHLORIDE 9 MG/ML
1000 INJECTION INTRAMUSCULAR; INTRAVENOUS; SUBCUTANEOUS
Refills: 0 | Status: DISCONTINUED | OUTPATIENT
Start: 2019-11-10 | End: 2019-11-13

## 2019-11-10 RX ORDER — ALBUTEROL 90 UG/1
2.5 AEROSOL, METERED ORAL EVERY 6 HOURS
Refills: 0 | Status: DISCONTINUED | OUTPATIENT
Start: 2019-11-10 | End: 2019-11-25

## 2019-11-10 RX ORDER — OXYCODONE HYDROCHLORIDE 5 MG/1
80 TABLET ORAL EVERY 12 HOURS
Refills: 0 | Status: DISCONTINUED | OUTPATIENT
Start: 2019-11-10 | End: 2019-11-12

## 2019-11-10 RX ORDER — POLYETHYLENE GLYCOL 3350 17 G/17G
17 POWDER, FOR SOLUTION ORAL AT BEDTIME
Refills: 0 | Status: DISCONTINUED | OUTPATIENT
Start: 2019-11-10 | End: 2019-11-25

## 2019-11-10 RX ORDER — OXYCODONE HYDROCHLORIDE 5 MG/1
1 TABLET ORAL
Qty: 0 | Refills: 0 | DISCHARGE

## 2019-11-10 RX ORDER — INFLUENZA VIRUS VACCINE 15; 15; 15; 15 UG/.5ML; UG/.5ML; UG/.5ML; UG/.5ML
0.5 SUSPENSION INTRAMUSCULAR ONCE
Refills: 0 | Status: DISCONTINUED | OUTPATIENT
Start: 2019-11-10 | End: 2019-11-21

## 2019-11-10 RX ORDER — PANTOPRAZOLE SODIUM 20 MG/1
40 TABLET, DELAYED RELEASE ORAL EVERY 12 HOURS
Refills: 0 | Status: DISCONTINUED | OUTPATIENT
Start: 2019-11-10 | End: 2019-11-25

## 2019-11-10 RX ORDER — ESCITALOPRAM OXALATE 10 MG/1
20 TABLET, FILM COATED ORAL DAILY
Refills: 0 | Status: DISCONTINUED | OUTPATIENT
Start: 2019-11-10 | End: 2019-11-12

## 2019-11-10 RX ORDER — BUPROPION HYDROCHLORIDE 150 MG/1
300 TABLET, EXTENDED RELEASE ORAL DAILY
Refills: 0 | Status: DISCONTINUED | OUTPATIENT
Start: 2019-11-10 | End: 2019-11-12

## 2019-11-10 RX ORDER — SODIUM CHLORIDE 9 MG/ML
1000 INJECTION INTRAMUSCULAR; INTRAVENOUS; SUBCUTANEOUS ONCE
Refills: 0 | Status: COMPLETED | OUTPATIENT
Start: 2019-11-10 | End: 2019-11-10

## 2019-11-10 RX ORDER — SODIUM CHLORIDE 9 MG/ML
1000 INJECTION, SOLUTION INTRAVENOUS ONCE
Refills: 0 | Status: DISCONTINUED | OUTPATIENT
Start: 2019-11-10 | End: 2019-11-10

## 2019-11-10 RX ORDER — OXYCODONE HYDROCHLORIDE 5 MG/1
20 TABLET ORAL EVERY 4 HOURS
Refills: 0 | Status: DISCONTINUED | OUTPATIENT
Start: 2019-11-10 | End: 2019-11-15

## 2019-11-10 RX ADMIN — Medication 40 MILLIEQUIVALENT(S): at 16:51

## 2019-11-10 RX ADMIN — SODIUM CHLORIDE 75 MILLILITER(S): 9 INJECTION INTRAMUSCULAR; INTRAVENOUS; SUBCUTANEOUS at 17:44

## 2019-11-10 RX ADMIN — Medication 100 MILLIGRAM(S): at 15:27

## 2019-11-10 RX ADMIN — PANTOPRAZOLE SODIUM 40 MILLIGRAM(S): 20 TABLET, DELAYED RELEASE ORAL at 17:36

## 2019-11-10 RX ADMIN — CEFTRIAXONE 100 MILLIGRAM(S): 500 INJECTION, POWDER, FOR SOLUTION INTRAMUSCULAR; INTRAVENOUS at 16:22

## 2019-11-10 RX ADMIN — OXYCODONE HYDROCHLORIDE 20 MILLIGRAM(S): 5 TABLET ORAL at 15:27

## 2019-11-10 RX ADMIN — Medication 50 MILLIEQUIVALENT(S): at 16:51

## 2019-11-10 RX ADMIN — SODIUM CHLORIDE 1000 MILLILITER(S): 9 INJECTION INTRAMUSCULAR; INTRAVENOUS; SUBCUTANEOUS at 16:21

## 2019-11-10 RX ADMIN — OXYCODONE HYDROCHLORIDE 80 MILLIGRAM(S): 5 TABLET ORAL at 17:35

## 2019-11-10 RX ADMIN — OXYCODONE HYDROCHLORIDE 20 MILLIGRAM(S): 5 TABLET ORAL at 18:21

## 2019-11-10 RX ADMIN — ENOXAPARIN SODIUM 40 MILLIGRAM(S): 100 INJECTION SUBCUTANEOUS at 22:05

## 2019-11-10 NOTE — H&P ADULT - NSHPPHYSICALEXAM_GEN_ALL_CORE
PHYSICAL EXAM:  GENERAL: Lethargic, AAO x 4, 44y F, sick appearing, pale  HEAD:  Atraumatic, Normocephalic  EYES: EOMI, conjunctiva clear and sclera white  NECK: Supple, No JVD  CHEST/LUNG: Unable to appreciate left lung sound; Rt lung CTA; No wheeze; No crackles; No accessory muscles used  HEART: Regular rate and rhythm; No murmurs;   ABDOMEN: Soft, Nontender, Nondistended; Bowel sounds present; No guarding  EXTREMITIES:  2+ Peripheral Pulses, No cyanosis or edema  NEUROLOGY: non-focal

## 2019-11-10 NOTE — H&P ADULT - ASSESSMENT
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     # SOB   - DDx: sympatomatic anemia, progression of Lung Ca, pneumonia and less likely COPD exacerbation   - transfuse 2U pRBC  - duoneb q6h and albuterol PRN    # Acute on chronic normocytic anemia  - likely secondary to ACD vs myelosuppression secondary to chemotherapy and radiation therapy   - transfuse 2U pRBC  - iron studies, if ferritin low, consider iron infusion  - keep active type and screen  - transfuse PRN to maintain Hgb > 7    # JAZMIN likely prerenal secondary to decreased PO intake and anemia   - baseline creatinine ~0.7   - today creatinine, Serum: 1.9 mg/dL (11.10.19 @ 12:30)  - c/w IVF at 75 cc/hr   - trend BMP     # Hypokalemia   - repleted  - trend BMP and replete lytes PRN     # Metastatic lung Ca to brain s/p chemo and radiation therapy  # Cancer pain  - hemo/onc consult  - OP heme/onc follow-up  - c/w home pain regimen w/ oxycodone 80 mg q12h and 20 mg q4h PRN     # Suspected folic acid deficiency  - c/w Folic acid     Diet: Regular diet  GI ppx: Protonix q12h   DVT ppx: Lovenox 40 mg qD  Activity: Increase as tolerated  Code status: Full Code ( X ) / DNR (  ) / DNI (  )  Disposition: from home, requires blood transfusion 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     # SOB   - DDx: sympatomatic anemia, progression of Lung Ca, pneumonia and less likely COPD exacerbation   - elevated WBC and tachycardia r/o pneumonia  - given leukocytosis and tachycardia and opacity on CXR, will give abx with ceftriaxone   - transfuse 2U pRBC  - duoneb q6h and albuterol PRN    # Acute on chronic normocytic anemia  - likely secondary to ACD vs myelosuppression secondary to chemotherapy and radiation therapy   - transfuse 2U pRBC  - iron studies, if ferritin low, consider iron infusion  - keep active type and screen  - transfuse PRN to maintain Hgb > 7    # JAZMIN likely prerenal secondary to decreased PO intake and anemia   - baseline creatinine ~0.7   - today creatinine, Serum: 1.9 mg/dL (11.10.19 @ 12:30)  - c/w IVF at 75 cc/hr   - trend BMP     # Hypokalemia   - repleted  - trend BMP and replete lytes PRN     # Metastatic lung Ca to brain s/p chemo and radiation therapy  # Cancer pain  - hemo/onc consult  - OP heme/onc follow-up  - c/w home pain regimen w/ oxycodone 80 mg q12h and 20 mg q4h PRN     # Suspected folic acid deficiency  - c/w Folic acid     Diet: Regular diet  GI ppx: Protonix q12h   DVT ppx: Lovenox 40 mg qD  Activity: Increase as tolerated  Code status: Full Code ( X ) / DNR (  ) / DNI (  )  Disposition: from home, requires blood transfusion

## 2019-11-10 NOTE — ED PROVIDER NOTE - PHYSICAL EXAMINATION
VITAL SIGNS: I have reviewed nursing notes and confirm.  CONSTITUTIONAL: middle-aged f, nad  SKIN: Skin exam is warm and dry, no acute rash.  HEAD: Normocephalic; atraumatic.  EYES: PERRL, EOM intact; conjunctiva and sclera clear.  ENT: No nasal discharge; airway clear.  NECK: Supple; non tender.  CARD: S1, S2 normal; no murmurs, gallops, or rubs. tachy 100s reg rhythm.  RESP: coarse BS bl, no wheezes/rales  ABD: Normal bowel sounds; soft; non-distended; non-tender; no hepatosplenomegaly; no rgr.  BACK: non-tender, no CVAT  EXT: Normal ROM. No clubbing, cyanosis or edema. DPI.  NEURO: Alert, oriented. Grossly unremarkable. No focal deficits.  PSYCH: Cooperative, appropriate.

## 2019-11-10 NOTE — ED PROVIDER NOTE - CLINICAL SUMMARY MEDICAL DECISION MAKING FREE TEXT BOX
stage iv lung CA w/mets on chemo, c/o incr sob - anemia, hypokalemia w/prolonged qtc, LUIS lung mass w/incr surrounding opacity - transfused, K repleted, ceftriaxone (macrolide/quinolones held 2/2 prolonged qtc) - d/w Hem/Onc, admitted for further mgmt

## 2019-11-10 NOTE — H&P ADULT - HISTORY OF PRESENT ILLNESS
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 She recently returned from her trip to [place] and during her trip, her SOB had been progressively getting worse. Upon returning from her trip, she decided to present to ED for further workup. Pt also states she has been having productive cough w/ brownish sputum. There is no relieving or excaerbating factor for her SOB and cough. Also admits to decreased appetite. Denies fever or chill but does state her  recently recovered from a cold. Pt also denies any melena or hematchezia and denies epigastric pain. Her last chemotherapy (alimata and avasta) was done on 10/9/2019.     Vital Signs Last 24 Hrs  T(C): 36.2 (10 Nov 2019 15:50), Max: 36.8 (10 Nov 2019 11:04)  T(F): 97.1 (10 Nov 2019 15:50), Max: 98.3 (10 Nov 2019 11:04)  HR: 99 (10 Nov 2019 15:50) (99 - 104)  BP: 112/60 (10 Nov 2019 15:50) (112/60 - 118/63)  BP(mean): --  RR: 20 (10 Nov 2019 11:04) (20 - 20)  SpO2: 94% (10 Nov 2019 11:04) (94% - 94%)    In ED, pt was found to have Hgb of 5.9, K of 2.9 and elevated Cr. In ED, pt was given 2U of pRBC. Pt is admitted for symptomatic anemia.

## 2019-11-10 NOTE — ED ADULT NURSE NOTE - NSIMPLEMENTINTERV_GEN_ALL_ED
Implemented All Fall with Harm Risk Interventions:  [place] to call system. Call bell, personal items and telephone within reach. Instruct patient to call for assistance. Room bathroom lighting operational. Non-slip footwear when patient is off stretcher. Physically safe environment: no spills, clutter or unnecessary equipment. Stretcher in lowest position, wheels locked, appropriate side rails in place. Provide visual cue, wrist band, yellow gown, etc. Monitor gait and stability. Monitor for mental status changes and reorient to person, place, and time. Review medications for side effects contributing to fall risk. Reinforce activity limits and safety measures with patient and family. Provide visual clues: red socks.

## 2019-11-10 NOTE — ED ADULT NURSE NOTE - OBJECTIVE STATEMENT
Pt presented with c/o sob and cough x few days. Hx lung CA, last chemo tx last month. Denies productive cough/n/v/fevers. Alert and oriented x3. No obvious trauma or deformities noted.

## 2019-11-10 NOTE — ED PROVIDER NOTE - OBJECTIVE STATEMENT
44y f h/o stage iv lung CA w/mets incl brain on dex & chemo last session appx 1 mo ago p/w incr sob x sev days. Accomp by cough. Recent plane travel to [place], came back few days ago. No f/c, ha, neck pain or stiffness, nvd, abd pain, flank pain, urinary sx, rash, leg pain or swelling. Hem/Onc [place] / no PMD.

## 2019-11-10 NOTE — H&P ADULT - NSHPREVIEWOFSYSTEMS_GEN_ALL_CORE
REVIEW OF SYSTEMS:  CONSTITUTIONAL: Admits to weakness. Denies fevers or chills  EYES/ENT: No visual changes;  No vertigo or throat pain   NECK: No pain or stiffness  RESPIRATORY: Admits to productive cough and SOB. Denies wheezing, hemoptysis  CARDIOVASCULAR: No chest pain or palpitations  GASTROINTESTINAL: No abdominal or epigastric pain. No nausea, vomiting, or hematemesis; No diarrhea or constipation. No melena or hematochezia.  GENITOURINARY: No dysuria, frequency or hematuria  NEUROLOGICAL: No numbness or weakness  SKIN: No itching, rashes

## 2019-11-10 NOTE — ED PROVIDER NOTE - CARE PLAN
Principal Discharge DX:	Hypokalemia  Secondary Diagnosis:	Lung cancer  Secondary Diagnosis:	Anemia  Secondary Diagnosis:	Pneumonia  Secondary Diagnosis:	JAZMIN (acute kidney injury)

## 2019-11-11 LAB
ALBUMIN SERPL ELPH-MCNC: 2.7 G/DL — LOW (ref 3.5–5.2)
ALP SERPL-CCNC: 182 U/L — HIGH (ref 30–115)
ALT FLD-CCNC: 19 U/L — SIGNIFICANT CHANGE UP (ref 0–41)
ANION GAP SERPL CALC-SCNC: 17 MMOL/L — HIGH (ref 7–14)
ANION GAP SERPL CALC-SCNC: 19 MMOL/L — HIGH (ref 7–14)
ANION GAP SERPL CALC-SCNC: 21 MMOL/L — HIGH (ref 7–14)
ANION GAP SERPL CALC-SCNC: 21 MMOL/L — HIGH (ref 7–14)
AST SERPL-CCNC: 35 U/L — SIGNIFICANT CHANGE UP (ref 0–41)
BILIRUB SERPL-MCNC: 0.4 MG/DL — SIGNIFICANT CHANGE UP (ref 0.2–1.2)
BUN SERPL-MCNC: 14 MG/DL — SIGNIFICANT CHANGE UP (ref 10–20)
BUN SERPL-MCNC: 15 MG/DL — SIGNIFICANT CHANGE UP (ref 10–20)
BUN SERPL-MCNC: 22 MG/DL — HIGH (ref 10–20)
BUN SERPL-MCNC: 32 MG/DL — HIGH (ref 10–20)
CALCIUM SERPL-MCNC: 7.9 MG/DL — LOW (ref 8.5–10.1)
CALCIUM SERPL-MCNC: 8 MG/DL — LOW (ref 8.5–10.1)
CALCIUM SERPL-MCNC: 8 MG/DL — LOW (ref 8.5–10.1)
CALCIUM SERPL-MCNC: 8.1 MG/DL — LOW (ref 8.5–10.1)
CHLORIDE SERPL-SCNC: 88 MMOL/L — LOW (ref 98–110)
CHLORIDE SERPL-SCNC: 92 MMOL/L — LOW (ref 98–110)
CHLORIDE SERPL-SCNC: 94 MMOL/L — LOW (ref 98–110)
CHLORIDE SERPL-SCNC: 95 MMOL/L — LOW (ref 98–110)
CO2 SERPL-SCNC: 23 MMOL/L — SIGNIFICANT CHANGE UP (ref 17–32)
CO2 SERPL-SCNC: 23 MMOL/L — SIGNIFICANT CHANGE UP (ref 17–32)
CO2 SERPL-SCNC: 24 MMOL/L — SIGNIFICANT CHANGE UP (ref 17–32)
CO2 SERPL-SCNC: 25 MMOL/L — SIGNIFICANT CHANGE UP (ref 17–32)
CREAT SERPL-MCNC: 0.6 MG/DL — LOW (ref 0.7–1.5)
CREAT SERPL-MCNC: 0.7 MG/DL — SIGNIFICANT CHANGE UP (ref 0.7–1.5)
CREAT SERPL-MCNC: 1 MG/DL — SIGNIFICANT CHANGE UP (ref 0.7–1.5)
CREAT SERPL-MCNC: 1.6 MG/DL — HIGH (ref 0.7–1.5)
FERRITIN SERPL-MCNC: 1062 NG/ML — HIGH (ref 15–150)
GLUCOSE SERPL-MCNC: 106 MG/DL — HIGH (ref 70–99)
GLUCOSE SERPL-MCNC: 77 MG/DL — SIGNIFICANT CHANGE UP (ref 70–99)
GLUCOSE SERPL-MCNC: 94 MG/DL — SIGNIFICANT CHANGE UP (ref 70–99)
GLUCOSE SERPL-MCNC: 94 MG/DL — SIGNIFICANT CHANGE UP (ref 70–99)
HCT VFR BLD CALC: 26.8 % — LOW (ref 37–47)
HGB BLD-MCNC: 8.4 G/DL — LOW (ref 12–16)
MCHC RBC-ENTMCNC: 27.3 PG — SIGNIFICANT CHANGE UP (ref 27–31)
MCHC RBC-ENTMCNC: 31.3 G/DL — LOW (ref 32–37)
MCV RBC AUTO: 87 FL — SIGNIFICANT CHANGE UP (ref 81–99)
NRBC # BLD: 0 /100 WBCS — SIGNIFICANT CHANGE UP (ref 0–0)
PLATELET # BLD AUTO: 100 K/UL — LOW (ref 130–400)
POTASSIUM SERPL-MCNC: 2.3 MMOL/L — CRITICAL LOW (ref 3.5–5)
POTASSIUM SERPL-MCNC: 2.4 MMOL/L — CRITICAL LOW (ref 3.5–5)
POTASSIUM SERPL-MCNC: 2.6 MMOL/L — CRITICAL LOW (ref 3.5–5)
POTASSIUM SERPL-MCNC: 3.4 MMOL/L — LOW (ref 3.5–5)
POTASSIUM SERPL-SCNC: 2.3 MMOL/L — CRITICAL LOW (ref 3.5–5)
POTASSIUM SERPL-SCNC: 2.4 MMOL/L — CRITICAL LOW (ref 3.5–5)
POTASSIUM SERPL-SCNC: 2.6 MMOL/L — CRITICAL LOW (ref 3.5–5)
POTASSIUM SERPL-SCNC: 3.4 MMOL/L — LOW (ref 3.5–5)
PROT SERPL-MCNC: 5.5 G/DL — LOW (ref 6–8)
RBC # BLD: 3.08 M/UL — LOW (ref 4.2–5.4)
RBC # FLD: 19.4 % — HIGH (ref 11.5–14.5)
SODIUM SERPL-SCNC: 134 MMOL/L — LOW (ref 135–146)
SODIUM SERPL-SCNC: 135 MMOL/L — SIGNIFICANT CHANGE UP (ref 135–146)
SODIUM SERPL-SCNC: 136 MMOL/L — SIGNIFICANT CHANGE UP (ref 135–146)
SODIUM SERPL-SCNC: 137 MMOL/L — SIGNIFICANT CHANGE UP (ref 135–146)
WBC # BLD: 13.83 K/UL — HIGH (ref 4.8–10.8)
WBC # FLD AUTO: 13.83 K/UL — HIGH (ref 4.8–10.8)

## 2019-11-11 PROCEDURE — 99232 SBSQ HOSP IP/OBS MODERATE 35: CPT

## 2019-11-11 PROCEDURE — 93970 EXTREMITY STUDY: CPT | Mod: 26

## 2019-11-11 RX ORDER — POTASSIUM CHLORIDE 20 MEQ
20 PACKET (EA) ORAL
Refills: 0 | Status: COMPLETED | OUTPATIENT
Start: 2019-11-11 | End: 2019-11-11

## 2019-11-11 RX ORDER — POTASSIUM CHLORIDE 20 MEQ
10 PACKET (EA) ORAL
Refills: 0 | Status: DISCONTINUED | OUTPATIENT
Start: 2019-11-11 | End: 2019-11-11

## 2019-11-11 RX ORDER — POTASSIUM CHLORIDE 20 MEQ
20 PACKET (EA) ORAL
Refills: 0 | Status: DISCONTINUED | OUTPATIENT
Start: 2019-11-11 | End: 2019-11-11

## 2019-11-11 RX ORDER — POTASSIUM CHLORIDE 20 MEQ
40 PACKET (EA) ORAL ONCE
Refills: 0 | Status: COMPLETED | OUTPATIENT
Start: 2019-11-11 | End: 2019-11-11

## 2019-11-11 RX ORDER — MAGNESIUM SULFATE 500 MG/ML
2 VIAL (ML) INJECTION ONCE
Refills: 0 | Status: DISCONTINUED | OUTPATIENT
Start: 2019-11-11 | End: 2019-11-11

## 2019-11-11 RX ORDER — POTASSIUM CHLORIDE 20 MEQ
20 PACKET (EA) ORAL ONCE
Refills: 0 | Status: COMPLETED | OUTPATIENT
Start: 2019-11-11 | End: 2019-11-11

## 2019-11-11 RX ORDER — POTASSIUM CHLORIDE 20 MEQ
40 PACKET (EA) ORAL EVERY 4 HOURS
Refills: 0 | Status: COMPLETED | OUTPATIENT
Start: 2019-11-11 | End: 2019-11-11

## 2019-11-11 RX ORDER — POTASSIUM CHLORIDE 20 MEQ
40 PACKET (EA) ORAL EVERY 4 HOURS
Refills: 0 | Status: DISCONTINUED | OUTPATIENT
Start: 2019-11-11 | End: 2019-11-11

## 2019-11-11 RX ADMIN — Medication 100 MILLIGRAM(S): at 21:31

## 2019-11-11 RX ADMIN — BUPROPION HYDROCHLORIDE 300 MILLIGRAM(S): 150 TABLET, EXTENDED RELEASE ORAL at 11:25

## 2019-11-11 RX ADMIN — OXYCODONE HYDROCHLORIDE 80 MILLIGRAM(S): 5 TABLET ORAL at 17:31

## 2019-11-11 RX ADMIN — OXYCODONE HYDROCHLORIDE 20 MILLIGRAM(S): 5 TABLET ORAL at 15:30

## 2019-11-11 RX ADMIN — SODIUM CHLORIDE 75 MILLILITER(S): 9 INJECTION INTRAMUSCULAR; INTRAVENOUS; SUBCUTANEOUS at 13:45

## 2019-11-11 RX ADMIN — CEFTRIAXONE 100 MILLIGRAM(S): 500 INJECTION, POWDER, FOR SOLUTION INTRAMUSCULAR; INTRAVENOUS at 17:32

## 2019-11-11 RX ADMIN — PANTOPRAZOLE SODIUM 40 MILLIGRAM(S): 20 TABLET, DELAYED RELEASE ORAL at 17:31

## 2019-11-11 RX ADMIN — Medication 50 MILLIEQUIVALENT(S): at 09:18

## 2019-11-11 RX ADMIN — PANTOPRAZOLE SODIUM 40 MILLIGRAM(S): 20 TABLET, DELAYED RELEASE ORAL at 06:02

## 2019-11-11 RX ADMIN — Medication 50 MILLIEQUIVALENT(S): at 09:19

## 2019-11-11 RX ADMIN — OXYCODONE HYDROCHLORIDE 20 MILLIGRAM(S): 5 TABLET ORAL at 23:52

## 2019-11-11 RX ADMIN — OXYCODONE HYDROCHLORIDE 80 MILLIGRAM(S): 5 TABLET ORAL at 06:02

## 2019-11-11 RX ADMIN — Medication 3 MILLILITER(S): at 09:19

## 2019-11-11 RX ADMIN — ESCITALOPRAM OXALATE 20 MILLIGRAM(S): 10 TABLET, FILM COATED ORAL at 11:25

## 2019-11-11 RX ADMIN — Medication 50 MILLIEQUIVALENT(S): at 22:04

## 2019-11-11 RX ADMIN — Medication 50 MILLIEQUIVALENT(S): at 22:12

## 2019-11-11 RX ADMIN — POLYETHYLENE GLYCOL 3350 17 GRAM(S): 17 POWDER, FOR SOLUTION ORAL at 22:05

## 2019-11-11 RX ADMIN — Medication 40 MILLIEQUIVALENT(S): at 13:45

## 2019-11-11 RX ADMIN — Medication 50 MILLIEQUIVALENT(S): at 06:03

## 2019-11-11 RX ADMIN — SENNA PLUS 1 TABLET(S): 8.6 TABLET ORAL at 21:31

## 2019-11-11 RX ADMIN — Medication 3 MILLILITER(S): at 13:45

## 2019-11-11 RX ADMIN — OXYCODONE HYDROCHLORIDE 20 MILLIGRAM(S): 5 TABLET ORAL at 04:07

## 2019-11-11 RX ADMIN — OXYCODONE HYDROCHLORIDE 20 MILLIGRAM(S): 5 TABLET ORAL at 11:06

## 2019-11-11 RX ADMIN — Medication 1 MILLIGRAM(S): at 11:25

## 2019-11-11 RX ADMIN — Medication 40 MILLIEQUIVALENT(S): at 09:18

## 2019-11-11 RX ADMIN — Medication 50 MILLIEQUIVALENT(S): at 02:26

## 2019-11-11 RX ADMIN — Medication 100 MILLIGRAM(S): at 14:35

## 2019-11-11 RX ADMIN — ENOXAPARIN SODIUM 40 MILLIGRAM(S): 100 INJECTION SUBCUTANEOUS at 22:04

## 2019-11-11 RX ADMIN — Medication 3 MILLILITER(S): at 23:08

## 2019-11-11 RX ADMIN — Medication 40 MILLIEQUIVALENT(S): at 23:57

## 2019-11-11 NOTE — PROGRESS NOTE ADULT - ASSESSMENT
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 s/p chemo and rad therapy, anxiety, depression and significant smoking history presents to the hospital with complaining of SOB, cough and fatigue    # SOB and fatigue - probably related to her anemia and PNA  - CXR- Lt UL mass with superimposed consolidation  - c/w- duoneb q6h and albuterol PRN  c/w ceftriaxone  Will send Blood CX    # Acute on chronic normocytic anemia likely secondary to Acute on chronic disease vs BM suppression 2/2 to chemotherapy and radiation therapy   s/p 2U pRBC and HB improved to above 8  f/u iron studies, ferritin, B12 and folate  - keep active type and screen  - transfuse PRN to maintain Hgb > 7    # JAZMIN likely prerenal secondary to decreased PO intake- improving with ivf  - c/w IVF at 75 cc/hr   - trend BMP     # Hypokalemia - repleted  - Repeat BMP today evening and replete lytes     #Metastatic adenocarcinoma of the lung, PDL1 5%, EGFT, Alk, BRAF, KRAS, ROS1 negative with brain mets and MRI lspine- leptomeningeal disease s/p spinal tap- CSF- no malignant cells  Received Alimta- 10/9/19    # Cancer related pain- c/w home pain regimen w/ oxycodone 80 mg q12h and 20 mg q4h PRN     # Elevated INR- not on AC  f/u Pt/PTT tomorrow     Diet: Regular diet  DVT ppx: Lovenox 40 mg qD  Activity: Increase as tolerated  Code status: Full Code   From home

## 2019-11-11 NOTE — PROGRESS NOTE ADULT - SUBJECTIVE AND OBJECTIVE BOX
Patient is a 44y old  Female who presents with a chief complaint of     Subjective: No fever/chills  She feels slightly better after getting 2 unit PRBC  But is still fatigued. c/o cough with yellow sputum      Vital Signs Last 24 Hrs  T(C): 37.3 (11 Nov 2019 07:37), Max: 37.4 (11 Nov 2019 00:14)  T(F): 99.1 (11 Nov 2019 07:37), Max: 99.3 (11 Nov 2019 00:14)  HR: 88 (11 Nov 2019 07:37) (88 - 104)  BP: 131/66 (11 Nov 2019 07:37) (108/57 - 132/75)  BP(mean): --  RR: 18 (11 Nov 2019 07:37) (18 - 20)  SpO2: 98% (11 Nov 2019 07:37) (91% - 98%)    PHYSICAL EXAM  General: adult in NAD  HEENT: clear oropharynx  Neck: supple  CV: normal S1/S2   Lungs: positive air movement b/l ant lungs. No wheeze  Abdomen: soft non-tender   Ext: no  edema  Skin: no rashes and no petechiae  Neuro: alert and oriented X 4, no focal deficits    MEDICATIONS  (STANDING):  albuterol/ipratropium for Nebulization 3 milliLiter(s) Nebulizer every 6 hours  buPROPion XL . 300 milliGRAM(s) Oral daily  cefTRIAXone   IVPB 1000 milliGRAM(s) IV Intermittent every 24 hours  chlorhexidine 4% Liquid 1 Application(s) Topical <User Schedule>  enoxaparin Injectable 40 milliGRAM(s) SubCutaneous at bedtime  escitalopram 20 milliGRAM(s) Oral daily  folic acid 1 milliGRAM(s) Oral daily  influenza   Vaccine 0.5 milliLiter(s) IntraMuscular once  oxyCODONE  ER Tablet 80 milliGRAM(s) Oral every 12 hours  pantoprazole    Tablet 40 milliGRAM(s) Oral every 12 hours  polyethylene glycol 3350 17 Gram(s) Oral at bedtime  potassium chloride   Powder 40 milliEquivalent(s) Oral every 4 hours  potassium chloride  20 mEq/100 mL IVPB 20 milliEquivalent(s) IV Intermittent every 2 hours  potassium chloride  20 mEq/100 mL IVPB 20 milliEquivalent(s) IV Intermittent once  senna 8.6 milliGRAM(s) Oral Tablet - Peds 1 Tablet(s) Oral at bedtime  sodium chloride 0.9%. 1000 milliLiter(s) (75 mL/Hr) IV Continuous <Continuous>    MEDICATIONS  (PRN):  ALBUTerol    0.083% 2.5 milliGRAM(s) Nebulizer every 6 hours PRN Bronchospasm  benzonatate 100 milliGRAM(s) Oral three times a day PRN Cough  oxyCODONE   IR Oral Tab/Cap - Peds 20 milliGRAM(s) Oral every 4 hours PRN Severe Pain (7 - 10)      LABS:                          8.4    13.83 )-----------( 100      ( 11 Nov 2019 00:44 )             26.8         Mean Cell Volume : 87.0 fL  Mean Cell Hemoglobin : 27.3 pg  Mean Cell Hemoglobin Concentration : 31.3 g/dL  Auto Neutrophil # : x  Auto Lymphocyte # : x  Auto Monocyte # : x  Auto Eosinophil # : x  Auto Basophil # : x  Auto Neutrophil % : x  Auto Lymphocyte % : x  Auto Monocyte % : x  Auto Eosinophil % : x  Auto Basophil % : x      Serial CBC's  11-11 @ 00:44  Hct-26.8 / Hgb-8.4 / Plat-100 / RBC-3.08 / WBC-13.83  Serial CBC's  11-10 @ 12:30  Hct-19.6 / Hgb-5.9 / Plat-134 / RBC-2.18 / WBC-18.37      11-11    134<L>  |  88<L>  |  32<H>  ----------------------------<  94  2.3<LL>   |  25  |  1.6<H>    Ca    7.9<L>      11 Nov 2019 00:44  Mg     2.3     11-10    TPro  6.2  /  Alb  3.1<L>  /  TBili  0.4  /  DBili  x   /  AST  37  /  ALT  22  /  AlkPhos  203<H>  11-10      PT/INR - ( 10 Nov 2019 14:50 )   PT: 36.00 sec;   INR: 3.17 ratio         PTT - ( 10 Nov 2019 14:50 )  PTT:31.7 sec    < from: Xray Chest 1 View-PORTABLE IMMEDIATE (11.10.19 @ 14:57) >  Left upper lobe mass with increased left lung atelectasis. Superimposed   consolidation cannot be excluded.      < end of copied text >

## 2019-11-11 NOTE — PROGRESS NOTE ADULT - ASSESSMENT
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 post-obstructive pneumonia  - CXR: Left upper lobe mass with possible opacity  - Continue Nebs and monitor saturation level  - Continue ceftriaxone for now; follow up cultures  - Stable condition at this time    Acute-on-chronic normocytic anemia; secondary to chronic disease vs. BM suppression (Chemo/Rad)  - s/p 2units pRBC ; Goal Hgb > 8  - Follow up iron studies and B12/B9   - T+S    Acute kidney injury; Likely pre-renal due to decreased PO intake  - Continue IVF and monitor    Hypokalemia  - Replete and monitor    Hx of Metastatic adenocarcinoma of the lung; Leptomeningeal disease s/p spinal tap (w/o malignant cells)  - Received Alimta (10/9/2019)  - Follow up Heme/Onc recommendations  - Pain control w/ home regimen; Oxycodone 80mg Q12H and 20mg Q4H PRN    Elevated INR  - Unclear etiology; Follow up labs in AM

## 2019-11-11 NOTE — PROGRESS NOTE ADULT - SUBJECTIVE AND OBJECTIVE BOX
DAILY PROGRESS NOTE  ===========================================================    Patient Information:  REMEDIOS DENTON  /  44y  /  Female  /  MRN#: [id number]    Hospital Day: 1d     |:::::::::::::::::::::::::::| SUBJECTIVE |:::::::::::::::::::::::::::|    OVERNIGHT EVENTS: None reported.  TODAY: Patient was seen today at bedside.    |:::::::::::::::::::::::::::| OBJECTIVE |:::::::::::::::::::::::::::|    VITAL SIGNS: Last 24 Hours  T(F): 99.1 (11 Nov 2019 07:37), Max: 99.3 (11 Nov 2019 00:14)  HR: 88 (11 Nov 2019 07:37) (88 - 100)  BP: 131/66 (11 Nov 2019 07:37) (108/57 - 132/75)  RR: 18 (11 Nov 2019 07:37) (18 - 18)  SpO2: 98% (11 Nov 2019 07:37) (91% - 98%)    PHYSICAL EXAM:  GENERAL:   Awake, alert; NAD.  HEENT:  Head NC/AT; Conjunctivae pink, Sclera anicteric.  CARDIO:   Regular rate; Regular rhythm; S1 & S2.  RESP:   Significantly decreased on the left with no abnormal sounds on the right.  GI:   Soft; NT/ND; BS; No guarding; No rebound tenderness.  EXT:   No edema in LE.  NEURO:   PERRL.  SKIN:   Intact.    LAB RESULTS:                        8.4    13.83 )-----------( 100      ( 11 Nov 2019 00:44 )             26.8     136  |  92<L>  |  22<H>  ----------------------------<  77  2.4<LL>   |  23  |  1.0    Ca      8.0<L>        Mg     2.3         TPro  5.5<L>  /  Alb  2.7<L>  /  TBili  0.4  /  DBili  x   /  AST  35  /  ALT  19  /  AlkPhos  182<H>  11-11    PT/INR - ( 10 Nov 2019 14:50 )   PT: 36.00 sec;   INR: 3.17 ratio    PTT - ( 10 Nov 2019 14:50 )  PTT:31.7 sec    CARDIAC MARKERS ( 10 Nov 2019 12:30 )  x     / <0.01 ng/mL / x     / x     / x        MICROBIOLOGY:  NTR    RADIOLOGY:  Noted; Follow up Duplex US of LE    ALLERGIES:  dust (Sneezing; Rhinorrhea)  Erythromycin Base (Other)    HOME MEDICATIONS:  Advil Liquigel 200 mg oral capsule: 1 cap(s) orally every 4 hours, As Needed - for mild pain (10 Nov 2019 16:33)  Allegra 180 mg oral tablet: 1 tab(s) orally once a day (10 Nov 2019 16:33)  benzonatate 100 mg oral capsule: 1 cap(s) orally 3 times a day (10 Nov 2019 16:33)  buPROPion 300 mg/24 hours (XL) oral tablet, extended release: 1 tab(s) orally every 24 hours (10 Nov 2019 16:33)  Colace 100 mg oral capsule: 1 cap(s) orally 2 times a day (10 Nov 2019 16:33)  escitalopram 20 mg oral tablet: 1 tab(s) orally once a day (at bedtime) (10 Nov 2019 16:33)  KlonoPIN 0.5 mg oral tablet: 1 tab(s) orally 3 times a day, As Needed (10 Nov 2019 16:33)  oxyCODONE 20 mg oral tablet: 1 tab(s) orally every 4 hours -Severe Pain (7 - 10) as needed MDD:4 tablets (10 Nov 2019 16:33)  prochlorperazine 10 mg oral tablet: 1 tab(s) orally every 6 hours, As Needed (10 Nov 2019 16:33)  Protonix 40 mg oral delayed release tablet: 1 tab(s) orally 2 times a day (10 Nov 2019 16:33)  Senna 8.6 mg oral tablet: 1 tab(s) orally once a day (at bedtime) (10 Nov 2019 16:33)    ===========================================================

## 2019-11-12 LAB
ALBUMIN SERPL ELPH-MCNC: 2.6 G/DL — LOW (ref 3.5–5.2)
ALBUMIN SERPL ELPH-MCNC: 2.6 G/DL — LOW (ref 3.5–5.2)
ALP SERPL-CCNC: 164 U/L — HIGH (ref 30–115)
ALP SERPL-CCNC: 183 U/L — HIGH (ref 30–115)
ALT FLD-CCNC: 18 U/L — SIGNIFICANT CHANGE UP (ref 0–41)
ALT FLD-CCNC: 18 U/L — SIGNIFICANT CHANGE UP (ref 0–41)
ANION GAP SERPL CALC-SCNC: 15 MMOL/L — HIGH (ref 7–14)
ANION GAP SERPL CALC-SCNC: 17 MMOL/L — HIGH (ref 7–14)
APTT BLD: 36.4 SEC — SIGNIFICANT CHANGE UP (ref 27–39.2)
AST SERPL-CCNC: 33 U/L — SIGNIFICANT CHANGE UP (ref 0–41)
AST SERPL-CCNC: 33 U/L — SIGNIFICANT CHANGE UP (ref 0–41)
BASOPHILS # BLD AUTO: 0.03 K/UL — SIGNIFICANT CHANGE UP (ref 0–0.2)
BASOPHILS NFR BLD AUTO: 0.2 % — SIGNIFICANT CHANGE UP (ref 0–1)
BILIRUB SERPL-MCNC: 0.6 MG/DL — SIGNIFICANT CHANGE UP (ref 0.2–1.2)
BILIRUB SERPL-MCNC: 0.6 MG/DL — SIGNIFICANT CHANGE UP (ref 0.2–1.2)
BUN SERPL-MCNC: 5 MG/DL — LOW (ref 10–20)
BUN SERPL-MCNC: 9 MG/DL — LOW (ref 10–20)
CALCIUM SERPL-MCNC: 8.4 MG/DL — LOW (ref 8.5–10.1)
CALCIUM SERPL-MCNC: 8.4 MG/DL — LOW (ref 8.5–10.1)
CHLORIDE SERPL-SCNC: 96 MMOL/L — LOW (ref 98–110)
CHLORIDE SERPL-SCNC: 97 MMOL/L — LOW (ref 98–110)
CO2 SERPL-SCNC: 24 MMOL/L — SIGNIFICANT CHANGE UP (ref 17–32)
CO2 SERPL-SCNC: 25 MMOL/L — SIGNIFICANT CHANGE UP (ref 17–32)
CREAT SERPL-MCNC: 0.5 MG/DL — LOW (ref 0.7–1.5)
CREAT SERPL-MCNC: 0.5 MG/DL — LOW (ref 0.7–1.5)
EOSINOPHIL # BLD AUTO: 0 K/UL — SIGNIFICANT CHANGE UP (ref 0–0.7)
EOSINOPHIL NFR BLD AUTO: 0 % — SIGNIFICANT CHANGE UP (ref 0–8)
FOLATE SERPL-MCNC: 9.4 NG/ML — SIGNIFICANT CHANGE UP
GLUCOSE SERPL-MCNC: 110 MG/DL — HIGH (ref 70–99)
GLUCOSE SERPL-MCNC: 98 MG/DL — SIGNIFICANT CHANGE UP (ref 70–99)
HCT VFR BLD CALC: 25.1 % — LOW (ref 37–47)
HGB BLD-MCNC: 8 G/DL — LOW (ref 12–16)
IMM GRANULOCYTES NFR BLD AUTO: 1.4 % — HIGH (ref 0.1–0.3)
INR BLD: 2.62 RATIO — HIGH (ref 0.65–1.3)
LYMPHOCYTES # BLD AUTO: 0.38 K/UL — LOW (ref 1.2–3.4)
LYMPHOCYTES # BLD AUTO: 2.8 % — LOW (ref 20.5–51.1)
MAGNESIUM SERPL-MCNC: 1.6 MG/DL — LOW (ref 1.8–2.4)
MAGNESIUM SERPL-MCNC: 1.7 MG/DL — LOW (ref 1.8–2.4)
MCHC RBC-ENTMCNC: 28.2 PG — SIGNIFICANT CHANGE UP (ref 27–31)
MCHC RBC-ENTMCNC: 31.9 G/DL — LOW (ref 32–37)
MCV RBC AUTO: 88.4 FL — SIGNIFICANT CHANGE UP (ref 81–99)
MONOCYTES # BLD AUTO: 0.83 K/UL — HIGH (ref 0.1–0.6)
MONOCYTES NFR BLD AUTO: 6 % — SIGNIFICANT CHANGE UP (ref 1.7–9.3)
NEUTROPHILS # BLD AUTO: 12.33 K/UL — HIGH (ref 1.4–6.5)
NEUTROPHILS NFR BLD AUTO: 89.6 % — HIGH (ref 42.2–75.2)
NRBC # BLD: 0 /100 WBCS — SIGNIFICANT CHANGE UP (ref 0–0)
PLATELET # BLD AUTO: 100 K/UL — LOW (ref 130–400)
POTASSIUM SERPL-MCNC: 2.9 MMOL/L — LOW (ref 3.5–5)
POTASSIUM SERPL-MCNC: 3.5 MMOL/L — SIGNIFICANT CHANGE UP (ref 3.5–5)
POTASSIUM SERPL-SCNC: 2.9 MMOL/L — LOW (ref 3.5–5)
POTASSIUM SERPL-SCNC: 3.5 MMOL/L — SIGNIFICANT CHANGE UP (ref 3.5–5)
PROT SERPL-MCNC: 5.3 G/DL — LOW (ref 6–8)
PROT SERPL-MCNC: 5.6 G/DL — LOW (ref 6–8)
PROTHROM AB SERPL-ACNC: 29.8 SEC — HIGH (ref 9.95–12.87)
RBC # BLD: 2.84 M/UL — LOW (ref 4.2–5.4)
RBC # FLD: 21 % — HIGH (ref 11.5–14.5)
SODIUM SERPL-SCNC: 135 MMOL/L — SIGNIFICANT CHANGE UP (ref 135–146)
SODIUM SERPL-SCNC: 139 MMOL/L — SIGNIFICANT CHANGE UP (ref 135–146)
VIT B12 SERPL-MCNC: >2000 PG/ML — HIGH (ref 232–1245)
WBC # BLD: 13.76 K/UL — HIGH (ref 4.8–10.8)
WBC # FLD AUTO: 13.76 K/UL — HIGH (ref 4.8–10.8)

## 2019-11-12 PROCEDURE — 70552 MRI BRAIN STEM W/DYE: CPT | Mod: 26

## 2019-11-12 PROCEDURE — 93010 ELECTROCARDIOGRAM REPORT: CPT

## 2019-11-12 PROCEDURE — 99231 SBSQ HOSP IP/OBS SF/LOW 25: CPT

## 2019-11-12 RX ORDER — CLONAZEPAM 1 MG
0.5 TABLET ORAL THREE TIMES A DAY
Refills: 0 | Status: DISCONTINUED | OUTPATIENT
Start: 2019-11-12 | End: 2019-11-19

## 2019-11-12 RX ORDER — BUPROPION HYDROCHLORIDE 150 MG/1
150 TABLET, EXTENDED RELEASE ORAL AT BEDTIME
Refills: 0 | Status: DISCONTINUED | OUTPATIENT
Start: 2019-11-12 | End: 2019-11-25

## 2019-11-12 RX ORDER — CEFEPIME 1 G/1
2000 INJECTION, POWDER, FOR SOLUTION INTRAMUSCULAR; INTRAVENOUS ONCE
Refills: 0 | Status: COMPLETED | OUTPATIENT
Start: 2019-11-12 | End: 2019-11-12

## 2019-11-12 RX ORDER — CLONAZEPAM 1 MG
1 TABLET ORAL
Qty: 0 | Refills: 0 | DISCHARGE

## 2019-11-12 RX ORDER — PANTOPRAZOLE SODIUM 20 MG/1
1 TABLET, DELAYED RELEASE ORAL
Qty: 0 | Refills: 0 | DISCHARGE

## 2019-11-12 RX ORDER — MAGNESIUM SULFATE 500 MG/ML
2 VIAL (ML) INJECTION EVERY 4 HOURS
Refills: 0 | Status: DISCONTINUED | OUTPATIENT
Start: 2019-11-12 | End: 2019-11-12

## 2019-11-12 RX ORDER — POTASSIUM CHLORIDE 20 MEQ
20 PACKET (EA) ORAL
Refills: 0 | Status: COMPLETED | OUTPATIENT
Start: 2019-11-12 | End: 2019-11-12

## 2019-11-12 RX ORDER — IBUPROFEN 200 MG
1 TABLET ORAL
Qty: 0 | Refills: 0 | DISCHARGE

## 2019-11-12 RX ORDER — POTASSIUM CHLORIDE 20 MEQ
40 PACKET (EA) ORAL EVERY 4 HOURS
Refills: 0 | Status: DISCONTINUED | OUTPATIENT
Start: 2019-11-12 | End: 2019-11-12

## 2019-11-12 RX ORDER — MAGNESIUM SULFATE 500 MG/ML
2 VIAL (ML) INJECTION ONCE
Refills: 0 | Status: COMPLETED | OUTPATIENT
Start: 2019-11-12 | End: 2019-11-12

## 2019-11-12 RX ORDER — OXYCODONE HYDROCHLORIDE 5 MG/1
120 TABLET ORAL EVERY 12 HOURS
Refills: 0 | Status: DISCONTINUED | OUTPATIENT
Start: 2019-11-12 | End: 2019-11-15

## 2019-11-12 RX ORDER — FEXOFENADINE HCL 30 MG
1 TABLET ORAL
Qty: 0 | Refills: 0 | DISCHARGE

## 2019-11-12 RX ORDER — CLONAZEPAM 1 MG
0.5 TABLET ORAL THREE TIMES A DAY
Refills: 0 | Status: DISCONTINUED | OUTPATIENT
Start: 2019-11-12 | End: 2019-11-12

## 2019-11-12 RX ORDER — DOCUSATE SODIUM 100 MG
1 CAPSULE ORAL
Qty: 0 | Refills: 0 | DISCHARGE

## 2019-11-12 RX ORDER — CEFEPIME 1 G/1
INJECTION, POWDER, FOR SOLUTION INTRAMUSCULAR; INTRAVENOUS
Refills: 0 | Status: DISCONTINUED | OUTPATIENT
Start: 2019-11-12 | End: 2019-11-19

## 2019-11-12 RX ORDER — CEFEPIME 1 G/1
2000 INJECTION, POWDER, FOR SOLUTION INTRAMUSCULAR; INTRAVENOUS EVERY 8 HOURS
Refills: 0 | Status: DISCONTINUED | OUTPATIENT
Start: 2019-11-12 | End: 2019-11-19

## 2019-11-12 RX ORDER — CLONAZEPAM 1 MG
0.5 TABLET ORAL ONCE
Refills: 0 | Status: DISCONTINUED | OUTPATIENT
Start: 2019-11-12 | End: 2019-11-12

## 2019-11-12 RX ORDER — OXYCODONE HYDROCHLORIDE 5 MG/1
120 TABLET ORAL
Qty: 0 | Refills: 0 | DISCHARGE

## 2019-11-12 RX ORDER — PROCHLORPERAZINE MALEATE 5 MG
1 TABLET ORAL
Qty: 0 | Refills: 0 | DISCHARGE

## 2019-11-12 RX ORDER — POTASSIUM CHLORIDE 20 MEQ
20 PACKET (EA) ORAL
Refills: 0 | Status: DISCONTINUED | OUTPATIENT
Start: 2019-11-12 | End: 2019-11-12

## 2019-11-12 RX ORDER — ESCITALOPRAM OXALATE 10 MG/1
20 TABLET, FILM COATED ORAL AT BEDTIME
Refills: 0 | Status: DISCONTINUED | OUTPATIENT
Start: 2019-11-12 | End: 2019-11-25

## 2019-11-12 RX ORDER — SENNA PLUS 8.6 MG/1
1 TABLET ORAL
Qty: 0 | Refills: 0 | DISCHARGE

## 2019-11-12 RX ORDER — OXYCODONE HYDROCHLORIDE 5 MG/1
1 TABLET ORAL
Qty: 0 | Refills: 0 | DISCHARGE

## 2019-11-12 RX ORDER — BUPROPION HYDROCHLORIDE 150 MG/1
1 TABLET, EXTENDED RELEASE ORAL
Qty: 0 | Refills: 0 | DISCHARGE

## 2019-11-12 RX ADMIN — SODIUM CHLORIDE 75 MILLILITER(S): 9 INJECTION INTRAMUSCULAR; INTRAVENOUS; SUBCUTANEOUS at 04:51

## 2019-11-12 RX ADMIN — Medication 0.5 MILLIGRAM(S): at 06:36

## 2019-11-12 RX ADMIN — Medication 0.5 MILLIGRAM(S): at 17:45

## 2019-11-12 RX ADMIN — OXYCODONE HYDROCHLORIDE 80 MILLIGRAM(S): 5 TABLET ORAL at 05:24

## 2019-11-12 RX ADMIN — OXYCODONE HYDROCHLORIDE 20 MILLIGRAM(S): 5 TABLET ORAL at 21:48

## 2019-11-12 RX ADMIN — BUPROPION HYDROCHLORIDE 150 MILLIGRAM(S): 150 TABLET, EXTENDED RELEASE ORAL at 21:38

## 2019-11-12 RX ADMIN — ESCITALOPRAM OXALATE 20 MILLIGRAM(S): 10 TABLET, FILM COATED ORAL at 21:38

## 2019-11-12 RX ADMIN — Medication 1 MILLIGRAM(S): at 12:00

## 2019-11-12 RX ADMIN — ENOXAPARIN SODIUM 40 MILLIGRAM(S): 100 INJECTION SUBCUTANEOUS at 21:38

## 2019-11-12 RX ADMIN — PANTOPRAZOLE SODIUM 40 MILLIGRAM(S): 20 TABLET, DELAYED RELEASE ORAL at 17:45

## 2019-11-12 RX ADMIN — CEFEPIME 100 MILLIGRAM(S): 1 INJECTION, POWDER, FOR SOLUTION INTRAMUSCULAR; INTRAVENOUS at 21:20

## 2019-11-12 RX ADMIN — Medication 50 MILLIEQUIVALENT(S): at 11:02

## 2019-11-12 RX ADMIN — PANTOPRAZOLE SODIUM 40 MILLIGRAM(S): 20 TABLET, DELAYED RELEASE ORAL at 05:24

## 2019-11-12 RX ADMIN — Medication 50 MILLIEQUIVALENT(S): at 15:30

## 2019-11-12 RX ADMIN — Medication 3 MILLILITER(S): at 21:23

## 2019-11-12 RX ADMIN — Medication 3 MILLILITER(S): at 13:08

## 2019-11-12 RX ADMIN — SENNA PLUS 1 TABLET(S): 8.6 TABLET ORAL at 21:38

## 2019-11-12 RX ADMIN — OXYCODONE HYDROCHLORIDE 120 MILLIGRAM(S): 5 TABLET ORAL at 17:45

## 2019-11-12 RX ADMIN — Medication 50 GRAM(S): at 18:41

## 2019-11-12 RX ADMIN — Medication 50 MILLIEQUIVALENT(S): at 13:09

## 2019-11-12 NOTE — PROGRESS NOTE ADULT - ASSESSMENT
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 post-obstructive pneumonia  - CXR: Left upper lobe mass with possible opacity  - Continue Nebs and monitor saturation level  - Continue ceftriaxone for now; follow up cultures  - Stable condition at this time  - f/u blood cx  - LE duplex shows no DVT    Acute-on-chronic normocytic anemia; secondary to chronic disease vs. BM suppression (Chemo/Rad)  - s/p 2units pRBC ; Goal Hgb > 8  - Follow up iron studies and B12/B9 , ferittin > 1000  - T+S    Acute kidney injury; Likely pre-renal due to decreased PO intake  - Continue IVF @ 75 cc/hr  and monitor    Hypokalemia  - Replete and monitor    Hx of Metastatic adenocarcinoma of the lung; Leptomeningeal disease s/p spinal tap (w/o malignant cells)  - Received Alimta (10/9/2019)  - Follow up Heme/Onc recommendations  - Pain control w/ home regimen; Oxycodone 120mg Q12H and 20mg Q4H PRN  - f/u MRI brain    Elevated INR  - Unclear etiology; Follow up labs in AM  - INR trending down  - will f/u    Diet: Regular diet  DVT ppx: Lovenox 40 mg qD  Activity: Increase as tolerated  Code status: DNR/DNI- MOLST form signed

## 2019-11-12 NOTE — DIETITIAN INITIAL EVALUATION ADULT. - OTHER INFO
Pt presented to ED c/o SOB, cough, fatigue; possibly d/t symptomatic anemia vs post-obstructive PNA. Pt hx stage IV adenocarcinoma of lungs mets to brain s/p chemo and radiation therapy; last chemo 10/9/19. S/P MRI brain, pt is slightly confused and slow neurologically, difficulty in coordination.

## 2019-11-12 NOTE — CHART NOTE - NSCHARTNOTEFT_GEN_A_CORE
Upon Nutritional Assessment by the Registered Dietitian your patient was determined to meet criteria / has evidence of the following diagnosis/diagnoses:          [ ]  Mild Protein Calorie Malnutrition        [ ]  Moderate Protein Calorie Malnutrition        [x] Severe Protein Calorie Malnutrition        [ ] Unspecified Protein Calorie Malnutrition        [ ] Underweight / BMI <19        [ ] Morbid Obesity / BMI > 40    Malnutrition severe PCM in setting of chronic illness.   Etiology poor appetite; in setting of chemo/radiation, mets lung CA.   Signs/Symptoms PO intake <50% of estimated needs x2-3 months, 9% wt loss x2-3 months.     Findings as based on:  •  Comprehensive nutrition assessment and consultation  UBW: 213# 8/26. CBW: 193#    Treatment:    The following diet has been recommended:  1. Continue regular diet.   2. D/C Ensure supplements as pt dislikes/refuses. RD discussed possible PO supplements at home that pt may prefer.  3. Consider appetite stimulant if medically feasible.   --Diet education provided to pt's spouse, to f/u with education for pt (was asleep during assessment)    PROVIDER Section:     By signing this assessment you are acknowledging and agree with the diagnosis/diagnoses assigned by the Registered Dietitian    Comments:

## 2019-11-12 NOTE — PROGRESS NOTE ADULT - SUBJECTIVE AND OBJECTIVE BOX
SUBJECTIVE:    Patient is a 44y old Female who presents with a chief complaint of fatigue, SOB (12 Nov 2019 08:14)    Currently admitted to medicine with the primary diagnosis of Hypokalemia     Today is hospital day 2d. This morning she is resting comfortably in bed and reports no new issues or overnight events.     PAST MEDICAL & SURGICAL HISTORY  Lung cancer  Anxiety and depression  History of cholecystectomy    SOCIAL HISTORY:  Negative for smoking/alcohol/drug use.     ALLERGIES:  dust (Sneezing; Rhinorrhea)  Erythromycin Base (Other)    MEDICATIONS:  STANDING MEDICATIONS  albuterol/ipratropium for Nebulization 3 milliLiter(s) Nebulizer every 6 hours  buPROPion XL . 150 milliGRAM(s) Oral at bedtime  cefTRIAXone   IVPB 1000 milliGRAM(s) IV Intermittent every 24 hours  chlorhexidine 4% Liquid 1 Application(s) Topical <User Schedule>  enoxaparin Injectable 40 milliGRAM(s) SubCutaneous at bedtime  escitalopram 20 milliGRAM(s) Oral at bedtime  folic acid 1 milliGRAM(s) Oral daily  influenza   Vaccine 0.5 milliLiter(s) IntraMuscular once  magnesium sulfate  IVPB 2 Gram(s) IV Intermittent once  oxyCODONE  ER Tablet 120 milliGRAM(s) Oral every 12 hours  pantoprazole    Tablet 40 milliGRAM(s) Oral every 12 hours  polyethylene glycol 3350 17 Gram(s) Oral at bedtime  senna 8.6 milliGRAM(s) Oral Tablet - Peds 1 Tablet(s) Oral at bedtime  sodium chloride 0.9%. 1000 milliLiter(s) IV Continuous <Continuous>    PRN MEDICATIONS  ALBUTerol    0.083% 2.5 milliGRAM(s) Nebulizer every 6 hours PRN  benzonatate 100 milliGRAM(s) Oral three times a day PRN  clonazePAM  Tablet 0.5 milliGRAM(s) Oral three times a day PRN  oxyCODONE   IR Oral Tab/Cap - Peds 20 milliGRAM(s) Oral every 4 hours PRN    VITALS:   T(F): 97.9  HR: 111  BP: 140/80  RR: 20  SpO2: 96%    LABS:                        8.0    13.76 )-----------( 100      ( 12 Nov 2019 07:16 )             25.1     11-12    139  |  97<L>  |  9<L>  ----------------------------<  110<H>  2.9<L>   |  25  |  0.5<L>    Ca    8.4<L>      12 Nov 2019 07:16  Mg     1.7     11-12    TPro  5.6<L>  /  Alb  2.6<L>  /  TBili  0.6  /  DBili  x   /  AST  33  /  ALT  18  /  AlkPhos  183<H>  11-12    PT/INR - ( 12 Nov 2019 07:16 )   PT: 29.80 sec;   INR: 2.62 ratio         PTT - ( 12 Nov 2019 07:16 )  PTT:36.4 sec              RADIOLOGY:  < from: MR Head w/ IV Cont (11.12.19 @ 10:20) >  IMPRESSION:    Since MRI brain 8/31/2019:    1. Exam is limited by motion artifact particularly the postcontrast   images which makes comparison of the enhancing lesions to the prior study   difficult.    2. Multiple scattered enhancing subcentimeter lesions again identified. A   few of the lesions are slightly increased in size as described above.   Several of the lesions demonstrate a more rim-enhancing pattern and   central hypointensity since the prior study. Small amount of edema   adjacent to several of the lesions which is slightly increased.    3. Left frontal lobe lesion now demonstrates internal blood products and   is slightly decreased in size.    < end of copied text >    < from: [facility] Duplex Lower Ext Vein Scan, Bilat (11.11.19 @ 09:56) >    Impression:    No evidence of deep venous thrombosis or superficial thrombophlebitis in   bilateral lowerextremities.    < end of copied text >    PHYSICAL EXAM:  GEN:  NAD  HEENT: clear oropharynx  Neck: supple  CV: normal S1/S2   Lungs: positive air movement b/l ant lungs  Abdomen: soft non-tender   Ext: no edema  Neuro: alert and oriented X 4  UE and LE 5/5 strength and sensation intact b/l. However she is slower and has difficulty with coordination

## 2019-11-12 NOTE — PROGRESS NOTE ADULT - SUBJECTIVE AND OBJECTIVE BOX
Patient is a 44y old  Female who presents with a chief complaint of Dyspnea (11 Nov 2019 13:17)      Subjective: No fever/chills  She is c/o SOB and is requiring 2-3 liter oxygen  She is neurologically slow than usual       Vital Signs Last 24 Hrs  T(C): 36.2 (12 Nov 2019 05:00), Max: 37.5 (11 Nov 2019 22:01)  T(F): 97.1 (12 Nov 2019 05:00), Max: 99.5 (11 Nov 2019 22:01)  HR: 101 (12 Nov 2019 05:00) (95 - 101)  BP: 118/62 (12 Nov 2019 05:00) (113/64 - 118/62)  BP(mean): --  RR: 18 (12 Nov 2019 05:00) (18 - 18)  SpO2: 96% (11 Nov 2019 22:03) (89% - 96%)    PHYSICAL EXAM  General: adult in NAD  HEENT: clear oropharynx  Neck: supple  CV: normal S1/S2   Lungs: positive air movement b/l ant lungs  Abdomen: soft non-tender   Ext: no edema  Neuro: alert and oriented X 4  UE and LE 5/5 strength and sensation intact b/l. However she is slower and has difficulty with coordination    MEDICATIONS  (STANDING):  albuterol/ipratropium for Nebulization 3 milliLiter(s) Nebulizer every 6 hours  buPROPion XL . 150 milliGRAM(s) Oral at bedtime  cefTRIAXone   IVPB 1000 milliGRAM(s) IV Intermittent every 24 hours  chlorhexidine 4% Liquid 1 Application(s) Topical <User Schedule>  enoxaparin Injectable 40 milliGRAM(s) SubCutaneous at bedtime  escitalopram 20 milliGRAM(s) Oral at bedtime  folic acid 1 milliGRAM(s) Oral daily  influenza   Vaccine 0.5 milliLiter(s) IntraMuscular once  oxyCODONE  ER Tablet 120 milliGRAM(s) Oral every 12 hours  pantoprazole    Tablet 40 milliGRAM(s) Oral every 12 hours  polyethylene glycol 3350 17 Gram(s) Oral at bedtime  senna 8.6 milliGRAM(s) Oral Tablet - Peds 1 Tablet(s) Oral at bedtime  sodium chloride 0.9%. 1000 milliLiter(s) (75 mL/Hr) IV Continuous <Continuous>    MEDICATIONS  (PRN):  ALBUTerol    0.083% 2.5 milliGRAM(s) Nebulizer every 6 hours PRN Bronchospasm  benzonatate 100 milliGRAM(s) Oral three times a day PRN Cough  oxyCODONE   IR Oral Tab/Cap - Peds 20 milliGRAM(s) Oral every 4 hours PRN Severe Pain (7 - 10)      LABS:                          8.4    13.83 )-----------( 100      ( 11 Nov 2019 00:44 )             26.8         Mean Cell Volume : 87.0 fL  Mean Cell Hemoglobin : 27.3 pg  Mean Cell Hemoglobin Concentration : 31.3 g/dL  Auto Neutrophil # : x  Auto Lymphocyte # : x  Auto Monocyte # : x  Auto Eosinophil # : x  Auto Basophil # : x  Auto Neutrophil % : x  Auto Lymphocyte % : x  Auto Monocyte % : x  Auto Eosinophil % : x  Auto Basophil % : x      Serial CBC's  11-11 @ 00:44  Hct-26.8 / Hgb-8.4 / Plat-100 / RBC-3.08 / WBC-13.83  Serial CBC's  11-10 @ 12:30  Hct-19.6 / Hgb-5.9 / Plat-134 / RBC-2.18 / WBC-18.37      11-11    137  |  94<L>  |  14  ----------------------------<  94  2.6<LL>   |  24  |  0.6<L>    Ca    8.1<L>      11 Nov 2019 20:52  Mg     2.3     11-10    TPro  5.5<L>  /  Alb  2.7<L>  /  TBili  0.4  /  DBili  x   /  AST  35  /  ALT  19  /  AlkPhos  182<H>  11-11      PT/INR - ( 10 Nov 2019 14:50 )   PT: 36.00 sec;   INR: 3.17 ratio         PTT - ( 10 Nov 2019 14:50 )  PTT:31.7 sec    Ferritin, Serum: 1062 ng/mL (11-11 @ 08:16)                          BLOOD SMEAR INTERPRETATION:       RADIOLOGY & ADDITIONAL STUDIES:

## 2019-11-12 NOTE — DIETITIAN INITIAL EVALUATION ADULT. - ENERGY NEEDS
estimated energy needs: 1835-2140kcal (MSJx1.2-1.4AF) obese bmi considered, PCM, mets lung CA   estimated protein needs: 74-90g (1.2-1.5g/kgIBW vs 1.0g/kgABW) as above  estimated fluid needs: per LIP

## 2019-11-12 NOTE — PROGRESS NOTE ADULT - ASSESSMENT
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 s/p chemo and rad therapy, anxiety, depression and significant smoking history presents to the hospital with complaining of SOB, cough and fatigue    # SOB and fatigue - probably related to her anemia and PNA and underlying malignancy   - CXR- Lt UL mass with superimposed consolidation  - c/w- duoneb q6h and albuterol PRN  c/w ceftriaxone  f/u blood cx  LE duplex- b/l- NO DVT    # Acute on chronic normocytic anemia likely secondary to Acute on chronic disease vs BM suppression 2/2 to chemotherapy and radiation therapy   s/p 2U pRBC and HB improved to above 8  Ferritin>1000  f/u iron studies, , B12 and folate  - keep active type and screen  - transfuse PRN to maintain Hgb > 7    # JAZMIN likely prerenal secondary to decreased PO intake- improved with ivf  - c/w IVF at 75 cc/hr   - trend BMP     #Persistent  Hypokalemia - repleted  - Repeat BMP today and replete lytes     #Metastatic adenocarcinoma of the lung, PDL1 5%, EGFT, Alk, BRAF, KRAS, ROS1 negative with brain mets and MRI L-spine- leptomeningeal disease s/p spinal tap- CSF- no malignant cells  Received Alimta- 10/9/19  She is slightly confused and slow neurologically and has difficulty in coordination. Will get MRI brain with contrast    # Cancer related pain- c/w home pain regimen w/ oxycodone 120 mg q12h and 20 mg q4h PRN     # Elevated INR- not on AC  f/u PT/PTT today    Diet: Regular diet  DVT ppx: Lovenox 40 mg qD  Activity: Increase as tolerated  Code status: She signed MOLST form last admission- DNR/DNI    Plan today  Please obtain her last MOLST from med records   MRI brain with contrast

## 2019-11-12 NOTE — DIETITIAN INITIAL EVALUATION ADULT. - FACTORS AFF FOOD INTAKE
Pt is asleep in bed, s/p MRI today. Pt's  at bedside, obtained nutr hx from him. Pt has been eating more during admission than usual. Says pt normally does not even consume 1 meal daily. Pt has no appetite but does try to force food down. Will not drink ensure supplements. Discussed possibilities of appetite stimulant-  is very interested. NKFA. Denies chew/swallow difficulty. No prior vit/min supplementation.

## 2019-11-12 NOTE — DIETITIAN INITIAL EVALUATION ADULT. - DIET TYPE
Continue regular diet. d/c ensure supplements as pt dislikes. reviewed possible PO supplements at home (other brands pt may enjoy). Consider starting pt on appetite stimulant. To provide diet ed upon f/u when pt awake (provided to pt's spouse).

## 2019-11-13 ENCOUNTER — APPOINTMENT (OUTPATIENT)
Dept: HEMATOLOGY ONCOLOGY | Facility: CLINIC | Age: 44
End: 2019-11-13

## 2019-11-13 ENCOUNTER — APPOINTMENT (OUTPATIENT)
Dept: INFUSION THERAPY | Facility: CLINIC | Age: 44
End: 2019-11-13

## 2019-11-13 LAB
ALBUMIN SERPL ELPH-MCNC: 2.6 G/DL — LOW (ref 3.5–5.2)
ALP SERPL-CCNC: 156 U/L — HIGH (ref 30–115)
ALT FLD-CCNC: 17 U/L — SIGNIFICANT CHANGE UP (ref 0–41)
ANION GAP SERPL CALC-SCNC: 14 MMOL/L — SIGNIFICANT CHANGE UP (ref 7–14)
ANION GAP SERPL CALC-SCNC: 16 MMOL/L — HIGH (ref 7–14)
APTT BLD: 34.5 SEC — SIGNIFICANT CHANGE UP (ref 27–39.2)
AST SERPL-CCNC: 29 U/L — SIGNIFICANT CHANGE UP (ref 0–41)
BASOPHILS # BLD AUTO: 0.03 K/UL — SIGNIFICANT CHANGE UP (ref 0–0.2)
BASOPHILS NFR BLD AUTO: 0.2 % — SIGNIFICANT CHANGE UP (ref 0–1)
BILIRUB SERPL-MCNC: 0.7 MG/DL — SIGNIFICANT CHANGE UP (ref 0.2–1.2)
BUN SERPL-MCNC: 5 MG/DL — LOW (ref 10–20)
BUN SERPL-MCNC: 6 MG/DL — LOW (ref 10–20)
CALCIUM SERPL-MCNC: 8.4 MG/DL — LOW (ref 8.5–10.1)
CALCIUM SERPL-MCNC: 8.6 MG/DL — SIGNIFICANT CHANGE UP (ref 8.5–10.1)
CHLORIDE SERPL-SCNC: 96 MMOL/L — LOW (ref 98–110)
CHLORIDE SERPL-SCNC: 99 MMOL/L — SIGNIFICANT CHANGE UP (ref 98–110)
CO2 SERPL-SCNC: 25 MMOL/L — SIGNIFICANT CHANGE UP (ref 17–32)
CO2 SERPL-SCNC: 26 MMOL/L — SIGNIFICANT CHANGE UP (ref 17–32)
CREAT SERPL-MCNC: <0.5 MG/DL — LOW (ref 0.7–1.5)
CREAT SERPL-MCNC: <0.5 MG/DL — LOW (ref 0.7–1.5)
EOSINOPHIL # BLD AUTO: 0.01 K/UL — SIGNIFICANT CHANGE UP (ref 0–0.7)
EOSINOPHIL NFR BLD AUTO: 0.1 % — SIGNIFICANT CHANGE UP (ref 0–8)
GLUCOSE SERPL-MCNC: 106 MG/DL — HIGH (ref 70–99)
GLUCOSE SERPL-MCNC: 88 MG/DL — SIGNIFICANT CHANGE UP (ref 70–99)
HCT VFR BLD CALC: 25.3 % — LOW (ref 37–47)
HGB BLD-MCNC: 7.8 G/DL — LOW (ref 12–16)
IMM GRANULOCYTES NFR BLD AUTO: 1.4 % — HIGH (ref 0.1–0.3)
INR BLD: 3.09 RATIO — HIGH (ref 0.65–1.3)
LYMPHOCYTES # BLD AUTO: 0.45 K/UL — LOW (ref 1.2–3.4)
LYMPHOCYTES # BLD AUTO: 2.9 % — LOW (ref 20.5–51.1)
MAGNESIUM SERPL-MCNC: 1.8 MG/DL — SIGNIFICANT CHANGE UP (ref 1.8–2.4)
MCHC RBC-ENTMCNC: 27.9 PG — SIGNIFICANT CHANGE UP (ref 27–31)
MCHC RBC-ENTMCNC: 30.8 G/DL — LOW (ref 32–37)
MCV RBC AUTO: 90.4 FL — SIGNIFICANT CHANGE UP (ref 81–99)
MONOCYTES # BLD AUTO: 0.88 K/UL — HIGH (ref 0.1–0.6)
MONOCYTES NFR BLD AUTO: 5.7 % — SIGNIFICANT CHANGE UP (ref 1.7–9.3)
NEUTROPHILS # BLD AUTO: 13.74 K/UL — HIGH (ref 1.4–6.5)
NEUTROPHILS NFR BLD AUTO: 89.7 % — HIGH (ref 42.2–75.2)
NRBC # BLD: 0 /100 WBCS — SIGNIFICANT CHANGE UP (ref 0–0)
PLATELET # BLD AUTO: 99 K/UL — LOW (ref 130–400)
POTASSIUM SERPL-MCNC: 3.3 MMOL/L — LOW (ref 3.5–5)
POTASSIUM SERPL-MCNC: 3.3 MMOL/L — LOW (ref 3.5–5)
POTASSIUM SERPL-SCNC: 3.3 MMOL/L — LOW (ref 3.5–5)
POTASSIUM SERPL-SCNC: 3.3 MMOL/L — LOW (ref 3.5–5)
PROT SERPL-MCNC: 5.2 G/DL — LOW (ref 6–8)
PROTHROM AB SERPL-ACNC: 35.1 SEC — HIGH (ref 9.95–12.87)
RBC # BLD: 2.8 M/UL — LOW (ref 4.2–5.4)
RBC # FLD: 21.3 % — HIGH (ref 11.5–14.5)
SODIUM SERPL-SCNC: 137 MMOL/L — SIGNIFICANT CHANGE UP (ref 135–146)
SODIUM SERPL-SCNC: 139 MMOL/L — SIGNIFICANT CHANGE UP (ref 135–146)
WBC # BLD: 15.32 K/UL — HIGH (ref 4.8–10.8)
WBC # FLD AUTO: 15.32 K/UL — HIGH (ref 4.8–10.8)

## 2019-11-13 PROCEDURE — 99233 SBSQ HOSP IP/OBS HIGH 50: CPT

## 2019-11-13 RX ORDER — POTASSIUM CHLORIDE 20 MEQ
20 PACKET (EA) ORAL
Refills: 0 | Status: COMPLETED | OUTPATIENT
Start: 2019-11-13 | End: 2019-11-13

## 2019-11-13 RX ORDER — DEXAMETHASONE 0.5 MG/5ML
4 ELIXIR ORAL
Refills: 0 | Status: DISCONTINUED | OUTPATIENT
Start: 2019-11-13 | End: 2019-11-20

## 2019-11-13 RX ORDER — SODIUM CHLORIDE 9 MG/ML
500 INJECTION, SOLUTION INTRAVENOUS ONCE
Refills: 0 | Status: COMPLETED | OUTPATIENT
Start: 2019-11-13 | End: 2019-11-13

## 2019-11-13 RX ORDER — IBUPROFEN 200 MG
600 TABLET ORAL ONCE
Refills: 0 | Status: COMPLETED | OUTPATIENT
Start: 2019-11-13 | End: 2019-11-13

## 2019-11-13 RX ADMIN — CEFEPIME 100 MILLIGRAM(S): 1 INJECTION, POWDER, FOR SOLUTION INTRAMUSCULAR; INTRAVENOUS at 22:30

## 2019-11-13 RX ADMIN — Medication 600 MILLIGRAM(S): at 06:44

## 2019-11-13 RX ADMIN — Medication 4 MILLIGRAM(S): at 23:11

## 2019-11-13 RX ADMIN — ESCITALOPRAM OXALATE 20 MILLIGRAM(S): 10 TABLET, FILM COATED ORAL at 21:34

## 2019-11-13 RX ADMIN — OXYCODONE HYDROCHLORIDE 20 MILLIGRAM(S): 5 TABLET ORAL at 13:15

## 2019-11-13 RX ADMIN — OXYCODONE HYDROCHLORIDE 20 MILLIGRAM(S): 5 TABLET ORAL at 23:00

## 2019-11-13 RX ADMIN — PANTOPRAZOLE SODIUM 40 MILLIGRAM(S): 20 TABLET, DELAYED RELEASE ORAL at 17:03

## 2019-11-13 RX ADMIN — PANTOPRAZOLE SODIUM 40 MILLIGRAM(S): 20 TABLET, DELAYED RELEASE ORAL at 05:59

## 2019-11-13 RX ADMIN — Medication 3 MILLILITER(S): at 08:16

## 2019-11-13 RX ADMIN — OXYCODONE HYDROCHLORIDE 120 MILLIGRAM(S): 5 TABLET ORAL at 18:36

## 2019-11-13 RX ADMIN — Medication 4 MILLIGRAM(S): at 17:03

## 2019-11-13 RX ADMIN — Medication 50 MILLIEQUIVALENT(S): at 19:51

## 2019-11-13 RX ADMIN — ENOXAPARIN SODIUM 40 MILLIGRAM(S): 100 INJECTION SUBCUTANEOUS at 21:34

## 2019-11-13 RX ADMIN — SENNA PLUS 1 TABLET(S): 8.6 TABLET ORAL at 21:34

## 2019-11-13 RX ADMIN — Medication 3 MILLILITER(S): at 14:20

## 2019-11-13 RX ADMIN — Medication 1 MILLIGRAM(S): at 12:39

## 2019-11-13 RX ADMIN — Medication 50 MILLIEQUIVALENT(S): at 11:00

## 2019-11-13 RX ADMIN — BUPROPION HYDROCHLORIDE 150 MILLIGRAM(S): 150 TABLET, EXTENDED RELEASE ORAL at 21:35

## 2019-11-13 RX ADMIN — Medication 50 MILLIEQUIVALENT(S): at 17:01

## 2019-11-13 RX ADMIN — Medication 4 MILLIGRAM(S): at 10:53

## 2019-11-13 RX ADMIN — Medication 3 MILLILITER(S): at 20:58

## 2019-11-13 RX ADMIN — OXYCODONE HYDROCHLORIDE 20 MILLIGRAM(S): 5 TABLET ORAL at 13:45

## 2019-11-13 RX ADMIN — CEFEPIME 100 MILLIGRAM(S): 1 INJECTION, POWDER, FOR SOLUTION INTRAMUSCULAR; INTRAVENOUS at 05:58

## 2019-11-13 RX ADMIN — OXYCODONE HYDROCHLORIDE 120 MILLIGRAM(S): 5 TABLET ORAL at 18:01

## 2019-11-13 RX ADMIN — SODIUM CHLORIDE 1000 MILLILITER(S): 9 INJECTION, SOLUTION INTRAVENOUS at 09:51

## 2019-11-13 RX ADMIN — OXYCODONE HYDROCHLORIDE 120 MILLIGRAM(S): 5 TABLET ORAL at 05:59

## 2019-11-13 NOTE — PROGRESS NOTE ADULT - ASSESSMENT
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 s/p chemo and rad therapy, anxiety, depression and significant smoking history presents to the hospital with complaining of SOB, cough and fatigue    # SOB and fatigue - probably related to her anemia and PNA and underlying malignancy   - CXR- Lt UL mass with superimposed consolidation   blood cx- no growth  LE duplex- b/l- NO DVT   c/w- duoneb q6h and albuterol PRN  c/w cefepime     # Acute on chronic normocytic anemia likely secondary to Acute on chronic disease vs BM suppression 2/2 to chemotherapy and radiation therapy   s/p 2U pRBC and HB improved to above 8  Ferritin>1000, B12 and folate normal   f/u iron studies  - keep active type and screen  - transfuse PRN to maintain Hgb > 7    # JAZMIN likely prerenal secondary to decreased PO intake- improved with ivf  - c/w IVF at 75 cc/hr  as patient has poor appetite. Can stop after this bag and monitor   - trend BMP     #Persistent  Hypokalemia - repleted  - Repeat BMP today and replete lytes     #Metastatic adenocarcinoma of the lung, PDL1 5%, EGFT, Alk, BRAF, KRAS, ROS1 negative with brain mets and MRI L-spine- leptomeningeal disease s/p spinal tap- CSF- no malignant cells  Received Alimta- 10/9/19  She is slightly confused and slow neurologically and has difficulty in coordination.   MRI brain shows worsening mets with edema  Will start her on decadron  Palliative care eval placed     # Cancer related pain- c/w home pain regimen w/ oxycodone 120 mg q12h and 20 mg q4h PRN     # Elevated INR/PT: Probably related to Vit K deficiency from poor appetite  No signs of bleeding     Diet: Regular diet  DVT ppx: Lovenox 40 mg qD  Code status: DNR/DNI    Plan today  f/u labs and replete lytes if low  Palliative care C/s placed  MRI results discussed with  and started her on steroids   Overall poor prognosis   Discussed with attending   Please transfer patient to 3B whenever bed is available /or to a private room

## 2019-11-13 NOTE — PROGRESS NOTE ADULT - SUBJECTIVE AND OBJECTIVE BOX
SUBJECTIVE:    Patient is a 44y old Female who presents with a chief complaint of Weakness (13 Nov 2019 08:35)    Currently admitted to medicine with the primary diagnosis of Hypokalemia     Today is hospital day 3d. This morning she is resting comfortably in bed and reports no new issues or overnight events.     PAST MEDICAL & SURGICAL HISTORY  Lung cancer  Anxiety and depression  History of cholecystectomy    SOCIAL HISTORY:  Negative for smoking/alcohol/drug use.     ALLERGIES:  dust (Sneezing; Rhinorrhea)  Erythromycin Base (Other)    MEDICATIONS:  STANDING MEDICATIONS  albuterol/ipratropium for Nebulization 3 milliLiter(s) Nebulizer every 6 hours  buPROPion XL . 150 milliGRAM(s) Oral at bedtime  cefepime   IVPB      cefepime   IVPB 2000 milliGRAM(s) IV Intermittent every 8 hours  chlorhexidine 4% Liquid 1 Application(s) Topical <User Schedule>  dexAMETHasone     Tablet 4 milliGRAM(s) Oral four times a day  enoxaparin Injectable 40 milliGRAM(s) SubCutaneous at bedtime  escitalopram 20 milliGRAM(s) Oral at bedtime  folic acid 1 milliGRAM(s) Oral daily  influenza   Vaccine 0.5 milliLiter(s) IntraMuscular once  oxyCODONE  ER Tablet 120 milliGRAM(s) Oral every 12 hours  pantoprazole    Tablet 40 milliGRAM(s) Oral every 12 hours  polyethylene glycol 3350 17 Gram(s) Oral at bedtime  potassium chloride  20 mEq/100 mL IVPB 20 milliEquivalent(s) IV Intermittent every 2 hours  senna 8.6 milliGRAM(s) Oral Tablet - Peds 1 Tablet(s) Oral at bedtime  sodium chloride 0.9%. 1000 milliLiter(s) IV Continuous <Continuous>    PRN MEDICATIONS  ALBUTerol    0.083% 2.5 milliGRAM(s) Nebulizer every 6 hours PRN  benzonatate 100 milliGRAM(s) Oral three times a day PRN  clonazePAM  Tablet 0.5 milliGRAM(s) Oral three times a day PRN  oxyCODONE   IR Oral Tab/Cap - Peds 20 milliGRAM(s) Oral every 4 hours PRN    VITALS:   T(F): 97.8  HR: 115  BP: 120/72  RR: 19  SpO2: 99%    LABS:                        7.8    15.32 )-----------( 99       ( 13 Nov 2019 08:17 )             25.3     11-13    137  |  96<L>  |  6<L>  ----------------------------<  106<H>  3.3<L>   |  25  |  <0.5<L>    Ca    8.6      13 Nov 2019 08:17  Mg     1.8     11-13    TPro  5.2<L>  /  Alb  2.6<L>  /  TBili  0.7  /  DBili  x   /  AST  29  /  ALT  17  /  AlkPhos  156<H>  11-13    PT/INR - ( 13 Nov 2019 08:17 )   PT: 35.10 sec;   INR: 3.09 ratio         PTT - ( 13 Nov 2019 08:17 )  PTT:34.5 sec          Culture - Blood (collected 11 Nov 2019 17:54)  Source: .Blood None  Preliminary Report (12 Nov 2019 23:00):    No growth to date.    Culture - Blood (collected 11 Nov 2019 17:54)  Source: .Blood None  Preliminary Report (12 Nov 2019 23:00):    No growth to date.          RADIOLOGY:  < from: MR Head w/ IV Cont (11.12.19 @ 10:20) >    1. Exam is limited by motion artifact particularly the postcontrast   images which makes comparison of the enhancing lesions to the prior study   difficult.    2. Multiple scattered enhancing subcentimeter lesions again identified. A   few of the lesions are slightly increased in size as described above.   Several of the lesions demonstrate a more rim-enhancing pattern and   central hypointensity since the prior study. Small amount of edema   adjacent to several of the lesions which is slightly increased.    3. Left frontal lobe lesion now demonstrates internal blood products and   is slightly decreased in size.      < end of copied text >    PHYSICAL EXAM:  General: adult in NAD  HEENT: clear oropharynx  Neck: supple  CV: normal S1/S2   Lungs: positive air movement b/l ant lungs. No wheeze appreciated  Abdomen: soft non-tender   Ext: no edema  Neuro: alert and oriented X 4  UE and LE 5/5 strength and sensation intact b/l. However she is slower and has difficulty with coordination

## 2019-11-13 NOTE — PROGRESS NOTE ADULT - SUBJECTIVE AND OBJECTIVE BOX
Patient is a 44y old  Female who presents with a chief complaint of SOB and cough (12 Nov 2019 15:51)      Subjective: No fever/chills  She is still confused  She is on 2-3 liter oxygen  Her pain is controlled and she slept well last night       Vital Signs Last 24 Hrs  T(C): 36.6 (13 Nov 2019 08:00), Max: 37.4 (12 Nov 2019 20:38)  T(F): 97.8 (13 Nov 2019 08:00), Max: 99.3 (12 Nov 2019 20:38)  HR: 115 (13 Nov 2019 08:00) (105 - 127)  BP: 120/72 (13 Nov 2019 08:00) (114/75 - 140/80)  BP(mean): 90 (13 Nov 2019 08:00) (90 - 90)  RR: 19 (13 Nov 2019 08:00) (18 - 20)  SpO2: 99% (13 Nov 2019 08:00) (97% - 99%)    PHYSICAL EXAM  General: adult in NAD  HEENT: clear oropharynx  Neck: supple  CV: normal S1/S2   Lungs: positive air movement b/l ant lungs. No wheeze appreciated  Abdomen: soft non-tender   Ext: no edema  Neuro: alert and oriented X 4  UE and LE 5/5 strength and sensation intact b/l. However she is slower and has difficulty with coordination    MEDICATIONS  (STANDING):  albuterol/ipratropium for Nebulization 3 milliLiter(s) Nebulizer every 6 hours  buPROPion XL . 150 milliGRAM(s) Oral at bedtime  cefepime   IVPB      cefepime   IVPB 2000 milliGRAM(s) IV Intermittent every 8 hours  chlorhexidine 4% Liquid 1 Application(s) Topical <User Schedule>  enoxaparin Injectable 40 milliGRAM(s) SubCutaneous at bedtime  escitalopram 20 milliGRAM(s) Oral at bedtime  folic acid 1 milliGRAM(s) Oral daily  influenza   Vaccine 0.5 milliLiter(s) IntraMuscular once  oxyCODONE  ER Tablet 120 milliGRAM(s) Oral every 12 hours  pantoprazole    Tablet 40 milliGRAM(s) Oral every 12 hours  polyethylene glycol 3350 17 Gram(s) Oral at bedtime  senna 8.6 milliGRAM(s) Oral Tablet - Peds 1 Tablet(s) Oral at bedtime  sodium chloride 0.9%. 1000 milliLiter(s) (75 mL/Hr) IV Continuous <Continuous>    MEDICATIONS  (PRN):  ALBUTerol    0.083% 2.5 milliGRAM(s) Nebulizer every 6 hours PRN Bronchospasm  benzonatate 100 milliGRAM(s) Oral three times a day PRN Cough  clonazePAM  Tablet 0.5 milliGRAM(s) Oral three times a day PRN anxiety  oxyCODONE   IR Oral Tab/Cap - Peds 20 milliGRAM(s) Oral every 4 hours PRN Severe Pain (7 - 10)      LABS:                          8.0    13.76 )-----------( 100      ( 12 Nov 2019 07:16 )             25.1         Mean Cell Volume : 88.4 fL  Mean Cell Hemoglobin : 28.2 pg  Mean Cell Hemoglobin Concentration : 31.9 g/dL  Auto Neutrophil # : 12.33 K/uL  Auto Lymphocyte # : 0.38 K/uL  Auto Monocyte # : 0.83 K/uL  Auto Eosinophil # : 0.00 K/uL  Auto Basophil # : 0.03 K/uL  Auto Neutrophil % : 89.6 %  Auto Lymphocyte % : 2.8 %  Auto Monocyte % : 6.0 %  Auto Eosinophil % : 0.0 %  Auto Basophil % : 0.2 %      Serial CBC's  11-12 @ 07:16  Hct-25.1 / Hgb-8.0 / Plat-100 / RBC-2.84 / WBC-13.76  Serial CBC's  11-11 @ 00:44  Hct-26.8 / Hgb-8.4 / Plat-100 / RBC-3.08 / WBC-13.83  Serial CBC's  11-10 @ 12:30  Hct-19.6 / Hgb-5.9 / Plat-134 / RBC-2.18 / WBC-18.37      11-12    139  |  99  |  5<L>  ----------------------------<  88  3.3<L>   |  26  |  <0.5<L>    Ca    8.4<L>      12 Nov 2019 22:45  Mg     1.6     11-12    TPro  5.3<L>  /  Alb  2.6<L>  /  TBili  0.6  /  DBili  x   /  AST  33  /  ALT  18  /  AlkPhos  164<H>  11-12      PT/INR - ( 12 Nov 2019 07:16 )   PT: 29.80 sec;   INR: 2.62 ratio         PTT - ( 12 Nov 2019 07:16 )  PTT:36.4 sec    Vitamin B12, Serum: >2000 pg/mL (11-12 @ 07:16)  Folate, Serum: 9.4 ng/mL (11-12 @ 07:16)  Ferritin, Serum: 1062 ng/mL (11-11 @ 08:16)      Culture - Blood (collected 11 Nov 2019 17:54)  Source: .Blood None  Preliminary Report (12 Nov 2019 23:00):    No growth to date.    Culture - Blood (collected 11 Nov 2019 17:54)  Source: .Blood None  Preliminary Report (12 Nov 2019 23:00):    No growth to date.            BLOOD SMEAR INTERPRETATION:       RADIOLOGY & ADDITIONAL STUDIES:  < from: MR Head w/ IV Cont (11.12.19 @ 10:20) >   Exam is limited by motion artifact particularly the postcontrast   images which makes comparison of the enhancing lesions to the prior study   difficult.    2. Multiple scattered enhancing subcentimeter lesions again identified. A   few of the lesions are slightly increased in size as described above.   Several of the lesions demonstrate a more rim-enhancing pattern and   central hypointensity since the prior study. Small amount of edema   adjacent to several of the lesions which is slightly increased.    3. Left frontal lobe lesion now demonstrates internal blood products and   is slightly decreased in size.    < end of copied text >

## 2019-11-13 NOTE — PROGRESS NOTE ADULT - ASSESSMENT
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 post-obstructive pneumonia  - CXR: Left upper lobe mass with possible opacity  - Continue Nebs and monitor saturation level  - C/w cefepime  - Stable condition at this time  - f/u blood cx- no growth  - LE duplex shows no DVT    Acute-on-chronic normocytic anemia; secondary to chronic disease vs. BM suppression (Chemo/Rad)  - s/p 2units pRBC ; Goal Hgb > 8  - Follow up iron studies and   - B12/folate normal , ferittin > 1000  - T+S    Acute kidney injury; Likely pre-renal due to decreased PO intake  - Continue IVF @ 75 cc/hr  and monitor    Hypokalemia  - Replete and monitor    Hx of Metastatic adenocarcinoma of the lung; Leptomeningeal disease s/p spinal tap (w/o malignant cells)  - Received Alimta (10/9/2019)  - Follow up Heme/Onc recommendations  - Pain control w/ home regimen; Oxycodone 120mg Q12H and 20mg Q4H PRN  - MRI brain shows worsening mets, with edema, started on steroids  - f/u heme/onc    Elevated INR  - Unclear etiology; Follow up labs in AM  - INR trending down  - will f/u    Diet: Regular diet  DVT ppx: Lovenox 40 mg qD  Activity: Increase as tolerated  Code status: DNR/DNI- MOLST form signed

## 2019-11-14 LAB
ANION GAP SERPL CALC-SCNC: 16 MMOL/L — HIGH (ref 7–14)
BUN SERPL-MCNC: 8 MG/DL — LOW (ref 10–20)
CALCIUM SERPL-MCNC: 8.2 MG/DL — LOW (ref 8.5–10.1)
CHLORIDE SERPL-SCNC: 97 MMOL/L — LOW (ref 98–110)
CO2 SERPL-SCNC: 25 MMOL/L — SIGNIFICANT CHANGE UP (ref 17–32)
CREAT SERPL-MCNC: <0.5 MG/DL — LOW (ref 0.7–1.5)
GLUCOSE SERPL-MCNC: 117 MG/DL — HIGH (ref 70–99)
POTASSIUM SERPL-MCNC: 3.9 MMOL/L — SIGNIFICANT CHANGE UP (ref 3.5–5)
POTASSIUM SERPL-SCNC: 3.9 MMOL/L — SIGNIFICANT CHANGE UP (ref 3.5–5)
SODIUM SERPL-SCNC: 138 MMOL/L — SIGNIFICANT CHANGE UP (ref 135–146)

## 2019-11-14 PROCEDURE — 99231 SBSQ HOSP IP/OBS SF/LOW 25: CPT

## 2019-11-14 RX ORDER — OXYCODONE HYDROCHLORIDE 5 MG/1
5 TABLET ORAL ONCE
Refills: 0 | Status: DISCONTINUED | OUTPATIENT
Start: 2019-11-14 | End: 2019-11-14

## 2019-11-14 RX ORDER — LANOLIN ALCOHOL/MO/W.PET/CERES
5 CREAM (GRAM) TOPICAL AT BEDTIME
Refills: 0 | Status: DISCONTINUED | OUTPATIENT
Start: 2019-11-14 | End: 2019-11-25

## 2019-11-14 RX ADMIN — CEFEPIME 100 MILLIGRAM(S): 1 INJECTION, POWDER, FOR SOLUTION INTRAMUSCULAR; INTRAVENOUS at 21:39

## 2019-11-14 RX ADMIN — OXYCODONE HYDROCHLORIDE 20 MILLIGRAM(S): 5 TABLET ORAL at 04:30

## 2019-11-14 RX ADMIN — OXYCODONE HYDROCHLORIDE 5 MILLIGRAM(S): 5 TABLET ORAL at 16:14

## 2019-11-14 RX ADMIN — SENNA PLUS 1 TABLET(S): 8.6 TABLET ORAL at 21:39

## 2019-11-14 RX ADMIN — Medication 3 MILLILITER(S): at 14:25

## 2019-11-14 RX ADMIN — OXYCODONE HYDROCHLORIDE 120 MILLIGRAM(S): 5 TABLET ORAL at 06:46

## 2019-11-14 RX ADMIN — OXYCODONE HYDROCHLORIDE 120 MILLIGRAM(S): 5 TABLET ORAL at 18:03

## 2019-11-14 RX ADMIN — OXYCODONE HYDROCHLORIDE 120 MILLIGRAM(S): 5 TABLET ORAL at 06:47

## 2019-11-14 RX ADMIN — Medication 1 MILLIGRAM(S): at 13:36

## 2019-11-14 RX ADMIN — OXYCODONE HYDROCHLORIDE 5 MILLIGRAM(S): 5 TABLET ORAL at 17:15

## 2019-11-14 RX ADMIN — PANTOPRAZOLE SODIUM 40 MILLIGRAM(S): 20 TABLET, DELAYED RELEASE ORAL at 06:46

## 2019-11-14 RX ADMIN — Medication 100 MILLIGRAM(S): at 14:07

## 2019-11-14 RX ADMIN — Medication 4 MILLIGRAM(S): at 06:46

## 2019-11-14 RX ADMIN — Medication 5 MILLIGRAM(S): at 21:39

## 2019-11-14 RX ADMIN — Medication 3 MILLILITER(S): at 19:56

## 2019-11-14 RX ADMIN — OXYCODONE HYDROCHLORIDE 20 MILLIGRAM(S): 5 TABLET ORAL at 00:48

## 2019-11-14 RX ADMIN — OXYCODONE HYDROCHLORIDE 20 MILLIGRAM(S): 5 TABLET ORAL at 23:40

## 2019-11-14 RX ADMIN — Medication 4 MILLIGRAM(S): at 17:30

## 2019-11-14 RX ADMIN — OXYCODONE HYDROCHLORIDE 20 MILLIGRAM(S): 5 TABLET ORAL at 13:34

## 2019-11-14 RX ADMIN — PANTOPRAZOLE SODIUM 40 MILLIGRAM(S): 20 TABLET, DELAYED RELEASE ORAL at 17:30

## 2019-11-14 RX ADMIN — Medication 4 MILLIGRAM(S): at 21:46

## 2019-11-14 RX ADMIN — CEFEPIME 100 MILLIGRAM(S): 1 INJECTION, POWDER, FOR SOLUTION INTRAMUSCULAR; INTRAVENOUS at 13:36

## 2019-11-14 RX ADMIN — BUPROPION HYDROCHLORIDE 150 MILLIGRAM(S): 150 TABLET, EXTENDED RELEASE ORAL at 21:40

## 2019-11-14 RX ADMIN — ENOXAPARIN SODIUM 40 MILLIGRAM(S): 100 INJECTION SUBCUTANEOUS at 21:40

## 2019-11-14 RX ADMIN — CEFEPIME 100 MILLIGRAM(S): 1 INJECTION, POWDER, FOR SOLUTION INTRAMUSCULAR; INTRAVENOUS at 06:46

## 2019-11-14 RX ADMIN — OXYCODONE HYDROCHLORIDE 120 MILLIGRAM(S): 5 TABLET ORAL at 17:30

## 2019-11-14 RX ADMIN — Medication 3 MILLILITER(S): at 09:29

## 2019-11-14 RX ADMIN — ESCITALOPRAM OXALATE 20 MILLIGRAM(S): 10 TABLET, FILM COATED ORAL at 21:40

## 2019-11-14 RX ADMIN — OXYCODONE HYDROCHLORIDE 20 MILLIGRAM(S): 5 TABLET ORAL at 03:38

## 2019-11-14 RX ADMIN — OXYCODONE HYDROCHLORIDE 20 MILLIGRAM(S): 5 TABLET ORAL at 14:30

## 2019-11-14 RX ADMIN — Medication 4 MILLIGRAM(S): at 13:36

## 2019-11-14 NOTE — PROGRESS NOTE ADULT - SUBJECTIVE AND OBJECTIVE BOX
SUBJECTIVE:    Patient is a 44y old Female who presents with a chief complaint of Weakness (13 Nov 2019 08:35)    Currently admitted to medicine with the primary diagnosis of Hypokalemia     Today is hospital day 4d. This morning she is resting comfortably in bed and reports no new issues or overnight events.     PAST MEDICAL & SURGICAL HISTORY  Lung cancer  Anxiety and depression  History of cholecystectomy    SOCIAL HISTORY:  Negative for smoking/alcohol/drug use.     ALLERGIES:  dust (Sneezing; Rhinorrhea)  Erythromycin Base (Other)    MEDICATIONS:  STANDING MEDICATIONS  albuterol/ipratropium for Nebulization 3 milliLiter(s) Nebulizer every 6 hours  buPROPion XL . 150 milliGRAM(s) Oral at bedtime  cefepime   IVPB      cefepime   IVPB 2000 milliGRAM(s) IV Intermittent every 8 hours  chlorhexidine 4% Liquid 1 Application(s) Topical <User Schedule>  dexAMETHasone     Tablet 4 milliGRAM(s) Oral four times a day  enoxaparin Injectable 40 milliGRAM(s) SubCutaneous at bedtime  escitalopram 20 milliGRAM(s) Oral at bedtime  folic acid 1 milliGRAM(s) Oral daily  influenza   Vaccine 0.5 milliLiter(s) IntraMuscular once  oxyCODONE  ER Tablet 120 milliGRAM(s) Oral every 12 hours  pantoprazole    Tablet 40 milliGRAM(s) Oral every 12 hours  polyethylene glycol 3350 17 Gram(s) Oral at bedtime  senna 8.6 milliGRAM(s) Oral Tablet - Peds 1 Tablet(s) Oral at bedtime    PRN MEDICATIONS  ALBUTerol    0.083% 2.5 milliGRAM(s) Nebulizer every 6 hours PRN  benzonatate 100 milliGRAM(s) Oral three times a day PRN  clonazePAM  Tablet 0.5 milliGRAM(s) Oral three times a day PRN  oxyCODONE   IR Oral Tab/Cap - Peds 20 milliGRAM(s) Oral every 4 hours PRN    VITALS:   T(F): 97  HR: 104  BP: 121/72  RR: 18  SpO2: 95%    LABS:                        7.8    15.32 )-----------( 99       ( 13 Nov 2019 08:17 )             25.3     11-14    138  |  97<L>  |  8<L>  ----------------------------<  117<H>  3.9   |  25  |  <0.5<L>    Ca    8.2<L>      14 Nov 2019 11:40  Mg     1.8     11-13    TPro  5.2<L>  /  Alb  2.6<L>  /  TBili  0.7  /  DBili  x   /  AST  29  /  ALT  17  /  AlkPhos  156<H>  11-13    PT/INR - ( 13 Nov 2019 08:17 )   PT: 35.10 sec;   INR: 3.09 ratio         PTT - ( 13 Nov 2019 08:17 )  PTT:34.5 sec          Culture - Blood (collected 11 Nov 2019 17:54)  Source: .Blood None  Preliminary Report (12 Nov 2019 23:00):    No growth to date.    Culture - Blood (collected 11 Nov 2019 17:54)  Source: .Blood None  Preliminary Report (12 Nov 2019 23:00):    No growth to date.          RADIOLOGY:    < from: MR Head w/ IV Cont (11.12.19 @ 10:20) >  1. Exam is limited by motion artifact particularly the postcontrast   images which makes comparison of the enhancing lesions to the prior study   difficult.    2. Multiple scattered enhancing subcentimeter lesions again identified. A   few of the lesions are slightly increased in size as described above.   Several of the lesions demonstrate a more rim-enhancing pattern and   central hypointensity since the prior study. Small amount of edema   adjacent to several of the lesions which is slightly increased.    3. Left frontal lobe lesion now demonstrates internal blood products and   is slightly decreased in size.    < end of copied text >    < from: Xray Chest 1 View-PORTABLE IMMEDIATE (11.10.19 @ 14:57) >  Left upper lobe mass with increased left lung atelectasis. Superimposed   consolidation cannot be excluded.    < end of copied text >      PHYSICAL EXAM:  GEN: No acute distress  LUNGS: Clear to auscultation bilaterally   HEART: Regular  ABD: Soft, non-tender, non-distended.   NEURO: UE and LE 5/5 strength and sensation intact b/l. However she is slower and has difficulty with coordination    Intravenous access:   NG tube:   Meyer Catheter:

## 2019-11-14 NOTE — PROGRESS NOTE ADULT - ASSESSMENT
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 Patient now DNR/DNI due to grave prognosis from worsening metastatic adenocarcinoma of the lung.    #Dyspnea/Fatigue   - due to Symptomatic anemia vs. post-obstructive pneumonia  - CXR: Left upper lobe mass with possible opacity  - Continue Nebs and monitor saturation level  - C/w cefepime for now  - Blood cx- no growth  - LE duplex shows no DVT    #Acute-on-chronic normocytic anemia; secondary to chronic disease vs. 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)  - Received Alimta (10/9/2019)  - Pain control w/ home regimen; Oxycodone ER 120mg Q12H and Oxycodone IR 20mg Q4H PRN  - MRI brain shows worsening mets, with edema, started on Dexamethasone  - Palliative care and hospice consults ordered    #Elevated INR  - Unclear etiology, could be related to Vit K deficiency from poor appetite  - INR fluctuating up and down  - ALP elevated too but trending down    Diet: Regular diet  DVT ppx: Lovenox 40 mg qD  Activity: Increase as tolerated  Code status: DNR/DNI- MOLST form signed

## 2019-11-14 NOTE — PROGRESS NOTE ADULT - ASSESSMENT
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 s/p chemo and rad therapy, anxiety, depression and significant smoking history presents to the hospital with complaining of SOB, cough and fatigue    # PNA on Abx  - CXR- Lt UL mass with superimposed consolidation   blood cx- no growth  LE duplex- b/l- NO DVT   c/w- duoneb q6h and albuterol PRN  c/w cefepime - until hospice eval     # Acute on chronic normocytic anemia likely secondary to Acute on chronic disease vs BM suppression 2/2 to chemotherapy and radiation therapy   # JAZMIN likely prerenal secondary to decreased PO intake- improved with ivf  # Hypokalemia due to poor PO intake  # Cancer related pain- c/w home pain regimen w/ oxycodone 120 mg q12h and 20 mg q4h PRN   # Elevated INR/PT: Probably related to Vit K deficiency from poor appetite        #Metastatic adenocarcinoma of the lung, PDL1 5%, EGFT, Alk, BRAF, KRAS, ROS1 negative with brain mets and MRI L-spine- leptomeningeal disease s/p spinal tap- CSF- no malignant cells  Received Alimta- 10/9/19  MRI brain shows worsening mets with edema  c/w decadron  Palliative care and hospice eval placed  Family wants to take her to [facility] and she is on 2-3 liter oxygen, will need to discuss how that is going to work out     No need for anymore blood draws  Very poor prognosis   Diet: Regular diet  Code status: DNR/DNI  Comfort measures    Discussed with cousin at bedside  Discussed with med resident

## 2019-11-14 NOTE — PROGRESS NOTE ADULT - SUBJECTIVE AND OBJECTIVE BOX
Patient is a 44y old  Female who presents with a chief complaint of Weakness (13 Nov 2019 08:35)      Subjective: Анна is sleeping as she just got her meds  Cousin is at bedside who stated she slept well most of the night  She was not in distress  Did not eat dinner      Vital Signs Last 24 Hrs  T(C): 36.1 (14 Nov 2019 05:26), Max: 36.6 (13 Nov 2019 13:02)  T(F): 97 (14 Nov 2019 05:26), Max: 97.9 (13 Nov 2019 13:02)  HR: 98 (14 Nov 2019 05:26) (94 - 117)  BP: 121/72 (14 Nov 2019 05:26) (104/66 - 121/72)  BP(mean): --  RR: 18 (14 Nov 2019 05:26) (18 - 20)  SpO2: 94% (13 Nov 2019 23:18) (94% - 96%)    PHYSICAL EXAM  General: No distress  Neck: supple  Neuro: sleeping  Did not do full exam as pt was sleeping     MEDICATIONS  (STANDING):  albuterol/ipratropium for Nebulization 3 milliLiter(s) Nebulizer every 6 hours  buPROPion XL . 150 milliGRAM(s) Oral at bedtime  cefepime   IVPB      cefepime   IVPB 2000 milliGRAM(s) IV Intermittent every 8 hours  chlorhexidine 4% Liquid 1 Application(s) Topical <User Schedule>  dexAMETHasone     Tablet 4 milliGRAM(s) Oral four times a day  enoxaparin Injectable 40 milliGRAM(s) SubCutaneous at bedtime  escitalopram 20 milliGRAM(s) Oral at bedtime  folic acid 1 milliGRAM(s) Oral daily  influenza   Vaccine 0.5 milliLiter(s) IntraMuscular once  oxyCODONE  ER Tablet 120 milliGRAM(s) Oral every 12 hours  pantoprazole    Tablet 40 milliGRAM(s) Oral every 12 hours  polyethylene glycol 3350 17 Gram(s) Oral at bedtime  senna 8.6 milliGRAM(s) Oral Tablet - Peds 1 Tablet(s) Oral at bedtime    MEDICATIONS  (PRN):  ALBUTerol    0.083% 2.5 milliGRAM(s) Nebulizer every 6 hours PRN Bronchospasm  benzonatate 100 milliGRAM(s) Oral three times a day PRN Cough  clonazePAM  Tablet 0.5 milliGRAM(s) Oral three times a day PRN anxiety  oxyCODONE   IR Oral Tab/Cap - Peds 20 milliGRAM(s) Oral every 4 hours PRN Severe Pain (7 - 10)      LABS:                          7.8    15.32 )-----------( 99       ( 13 Nov 2019 08:17 )             25.3         Mean Cell Volume : 90.4 fL  Mean Cell Hemoglobin : 27.9 pg  Mean Cell Hemoglobin Concentration : 30.8 g/dL  Auto Neutrophil # : 13.74 K/uL  Auto Lymphocyte # : 0.45 K/uL  Auto Monocyte # : 0.88 K/uL  Auto Eosinophil # : 0.01 K/uL  Auto Basophil # : 0.03 K/uL  Auto Neutrophil % : 89.7 %  Auto Lymphocyte % : 2.9 %  Auto Monocyte % : 5.7 %  Auto Eosinophil % : 0.1 %  Auto Basophil % : 0.2 %      Serial CBC's  11-13 @ 08:17  Hct-25.3 / Hgb-7.8 / Plat-99 / RBC-2.80 / WBC-15.32  Serial CBC's  11-12 @ 07:16  Hct-25.1 / Hgb-8.0 / Plat-100 / RBC-2.84 / WBC-13.76  Serial CBC's  11-11 @ 00:44  Hct-26.8 / Hgb-8.4 / Plat-100 / RBC-3.08 / WBC-13.83  Serial CBC's  11-10 @ 12:30  Hct-19.6 / Hgb-5.9 / Plat-134 / RBC-2.18 / WBC-18.37      11-13    137  |  96<L>  |  6<L>  ----------------------------<  106<H>  3.3<L>   |  25  |  <0.5<L>    Ca    8.6      13 Nov 2019 08:17  Mg     1.8     11-13    TPro  5.2<L>  /  Alb  2.6<L>  /  TBili  0.7  /  DBili  x   /  AST  29  /  ALT  17  /  AlkPhos  156<H>  11-13      PT/INR - ( 13 Nov 2019 08:17 )   PT: 35.10 sec;   INR: 3.09 ratio         PTT - ( 13 Nov 2019 08:17 )  PTT:34.5 sec    Vitamin B12, Serum: >2000 pg/mL (11-12 @ 07:16)  Folate, Serum: 9.4 ng/mL (11-12 @ 07:16)  Ferritin, Serum: 1062 ng/mL (11-11 @ 08:16)                    Culture - Blood (collected 11 Nov 2019 17:54)  Source: .Blood None  Preliminary Report (12 Nov 2019 23:00):    No growth to date.    Culture - Blood (collected 11 Nov 2019 17:54)  Source: .Blood None  Preliminary Report (12 Nov 2019 23:00):    No growth to date.            BLOOD SMEAR INTERPRETATION:       RADIOLOGY & ADDITIONAL STUDIES:

## 2019-11-15 LAB
ANION GAP SERPL CALC-SCNC: 17 MMOL/L — HIGH (ref 7–14)
BLD GP AB SCN SERPL QL: SIGNIFICANT CHANGE UP
BUN SERPL-MCNC: 8 MG/DL — LOW (ref 10–20)
CALCIUM SERPL-MCNC: 8.4 MG/DL — LOW (ref 8.5–10.1)
CHLORIDE SERPL-SCNC: 96 MMOL/L — LOW (ref 98–110)
CO2 SERPL-SCNC: 26 MMOL/L — SIGNIFICANT CHANGE UP (ref 17–32)
CREAT SERPL-MCNC: 0.5 MG/DL — LOW (ref 0.7–1.5)
GLUCOSE SERPL-MCNC: 98 MG/DL — SIGNIFICANT CHANGE UP (ref 70–99)
POTASSIUM SERPL-MCNC: 3.8 MMOL/L — SIGNIFICANT CHANGE UP (ref 3.5–5)
POTASSIUM SERPL-SCNC: 3.8 MMOL/L — SIGNIFICANT CHANGE UP (ref 3.5–5)
SODIUM SERPL-SCNC: 139 MMOL/L — SIGNIFICANT CHANGE UP (ref 135–146)

## 2019-11-15 PROCEDURE — 99232 SBSQ HOSP IP/OBS MODERATE 35: CPT

## 2019-11-15 PROCEDURE — 99221 1ST HOSP IP/OBS SF/LOW 40: CPT

## 2019-11-15 RX ORDER — DOCUSATE SODIUM 100 MG
100 CAPSULE ORAL THREE TIMES A DAY
Refills: 0 | Status: DISCONTINUED | OUTPATIENT
Start: 2019-11-15 | End: 2019-11-18

## 2019-11-15 RX ORDER — HYDROMORPHONE HYDROCHLORIDE 2 MG/ML
2 INJECTION INTRAMUSCULAR; INTRAVENOUS; SUBCUTANEOUS ONCE
Refills: 0 | Status: DISCONTINUED | OUTPATIENT
Start: 2019-11-15 | End: 2019-11-15

## 2019-11-15 RX ORDER — HALOPERIDOL DECANOATE 100 MG/ML
0.25 INJECTION INTRAMUSCULAR EVERY 12 HOURS
Refills: 0 | Status: DISCONTINUED | OUTPATIENT
Start: 2019-11-15 | End: 2019-11-25

## 2019-11-15 RX ORDER — FENTANYL CITRATE 50 UG/ML
2 INJECTION INTRAVENOUS
Refills: 0 | Status: DISCONTINUED | OUTPATIENT
Start: 2019-11-15 | End: 2019-11-17

## 2019-11-15 RX ORDER — HALOPERIDOL DECANOATE 100 MG/ML
0.25 INJECTION INTRAMUSCULAR ONCE
Refills: 0 | Status: COMPLETED | OUTPATIENT
Start: 2019-11-15 | End: 2019-11-15

## 2019-11-15 RX ORDER — MORPHINE SULFATE 50 MG/1
15 CAPSULE, EXTENDED RELEASE ORAL EVERY 6 HOURS
Refills: 0 | Status: DISCONTINUED | OUTPATIENT
Start: 2019-11-15 | End: 2019-11-15

## 2019-11-15 RX ORDER — HYDROMORPHONE HYDROCHLORIDE 2 MG/ML
2 INJECTION INTRAMUSCULAR; INTRAVENOUS; SUBCUTANEOUS EVERY 4 HOURS
Refills: 0 | Status: DISCONTINUED | OUTPATIENT
Start: 2019-11-15 | End: 2019-11-16

## 2019-11-15 RX ORDER — HYDROMORPHONE HYDROCHLORIDE 2 MG/ML
2 INJECTION INTRAMUSCULAR; INTRAVENOUS; SUBCUTANEOUS
Refills: 0 | Status: DISCONTINUED | OUTPATIENT
Start: 2019-11-15 | End: 2019-11-17

## 2019-11-15 RX ORDER — SENNA PLUS 8.6 MG/1
2 TABLET ORAL EVERY 12 HOURS
Refills: 0 | Status: DISCONTINUED | OUTPATIENT
Start: 2019-11-15 | End: 2019-11-25

## 2019-11-15 RX ORDER — OXYCODONE HYDROCHLORIDE 5 MG/1
15 TABLET ORAL ONCE
Refills: 0 | Status: DISCONTINUED | OUTPATIENT
Start: 2019-11-15 | End: 2019-11-15

## 2019-11-15 RX ADMIN — OXYCODONE HYDROCHLORIDE 20 MILLIGRAM(S): 5 TABLET ORAL at 02:45

## 2019-11-15 RX ADMIN — Medication 3 MILLILITER(S): at 20:18

## 2019-11-15 RX ADMIN — PANTOPRAZOLE SODIUM 40 MILLIGRAM(S): 20 TABLET, DELAYED RELEASE ORAL at 17:58

## 2019-11-15 RX ADMIN — ENOXAPARIN SODIUM 40 MILLIGRAM(S): 100 INJECTION SUBCUTANEOUS at 22:02

## 2019-11-15 RX ADMIN — OXYCODONE HYDROCHLORIDE 20 MILLIGRAM(S): 5 TABLET ORAL at 04:12

## 2019-11-15 RX ADMIN — HYDROMORPHONE HYDROCHLORIDE 2 MILLIGRAM(S): 2 INJECTION INTRAMUSCULAR; INTRAVENOUS; SUBCUTANEOUS at 18:24

## 2019-11-15 RX ADMIN — CHLORHEXIDINE GLUCONATE 1 APPLICATION(S): 213 SOLUTION TOPICAL at 05:47

## 2019-11-15 RX ADMIN — HYDROMORPHONE HYDROCHLORIDE 2 MILLIGRAM(S): 2 INJECTION INTRAMUSCULAR; INTRAVENOUS; SUBCUTANEOUS at 23:44

## 2019-11-15 RX ADMIN — OXYCODONE HYDROCHLORIDE 20 MILLIGRAM(S): 5 TABLET ORAL at 03:46

## 2019-11-15 RX ADMIN — Medication 1 MILLIGRAM(S): at 11:20

## 2019-11-15 RX ADMIN — OXYCODONE HYDROCHLORIDE 20 MILLIGRAM(S): 5 TABLET ORAL at 08:28

## 2019-11-15 RX ADMIN — OXYCODONE HYDROCHLORIDE 20 MILLIGRAM(S): 5 TABLET ORAL at 12:30

## 2019-11-15 RX ADMIN — CEFEPIME 100 MILLIGRAM(S): 1 INJECTION, POWDER, FOR SOLUTION INTRAMUSCULAR; INTRAVENOUS at 22:00

## 2019-11-15 RX ADMIN — OXYCODONE HYDROCHLORIDE 120 MILLIGRAM(S): 5 TABLET ORAL at 05:49

## 2019-11-15 RX ADMIN — HYDROMORPHONE HYDROCHLORIDE 2 MILLIGRAM(S): 2 INJECTION INTRAMUSCULAR; INTRAVENOUS; SUBCUTANEOUS at 22:02

## 2019-11-15 RX ADMIN — OXYCODONE HYDROCHLORIDE 120 MILLIGRAM(S): 5 TABLET ORAL at 06:26

## 2019-11-15 RX ADMIN — PANTOPRAZOLE SODIUM 40 MILLIGRAM(S): 20 TABLET, DELAYED RELEASE ORAL at 05:47

## 2019-11-15 RX ADMIN — ESCITALOPRAM OXALATE 20 MILLIGRAM(S): 10 TABLET, FILM COATED ORAL at 22:02

## 2019-11-15 RX ADMIN — HYDROMORPHONE HYDROCHLORIDE 2 MILLIGRAM(S): 2 INJECTION INTRAMUSCULAR; INTRAVENOUS; SUBCUTANEOUS at 17:59

## 2019-11-15 RX ADMIN — MORPHINE SULFATE 15 MILLIGRAM(S): 50 CAPSULE, EXTENDED RELEASE ORAL at 19:40

## 2019-11-15 RX ADMIN — HYDROMORPHONE HYDROCHLORIDE 2 MILLIGRAM(S): 2 INJECTION INTRAMUSCULAR; INTRAVENOUS; SUBCUTANEOUS at 14:37

## 2019-11-15 RX ADMIN — HALOPERIDOL DECANOATE 0.25 MILLIGRAM(S): 100 INJECTION INTRAMUSCULAR at 16:09

## 2019-11-15 RX ADMIN — HYDROMORPHONE HYDROCHLORIDE 2 MILLIGRAM(S): 2 INJECTION INTRAMUSCULAR; INTRAVENOUS; SUBCUTANEOUS at 15:41

## 2019-11-15 RX ADMIN — Medication 0.5 MILLIGRAM(S): at 17:58

## 2019-11-15 RX ADMIN — Medication 4 MILLIGRAM(S): at 17:57

## 2019-11-15 RX ADMIN — Medication 0.5 MILLIGRAM(S): at 07:56

## 2019-11-15 RX ADMIN — Medication 1 ENEMA: at 16:50

## 2019-11-15 RX ADMIN — FENTANYL CITRATE 2 PATCH: 50 INJECTION INTRAVENOUS at 18:07

## 2019-11-15 RX ADMIN — OXYCODONE HYDROCHLORIDE 20 MILLIGRAM(S): 5 TABLET ORAL at 11:21

## 2019-11-15 RX ADMIN — MORPHINE SULFATE 15 MILLIGRAM(S): 50 CAPSULE, EXTENDED RELEASE ORAL at 20:21

## 2019-11-15 RX ADMIN — Medication 4 MILLIGRAM(S): at 23:42

## 2019-11-15 RX ADMIN — Medication 4 MILLIGRAM(S): at 05:47

## 2019-11-15 RX ADMIN — CEFEPIME 100 MILLIGRAM(S): 1 INJECTION, POWDER, FOR SOLUTION INTRAMUSCULAR; INTRAVENOUS at 13:15

## 2019-11-15 RX ADMIN — Medication 0.5 MILLIGRAM(S): at 12:42

## 2019-11-15 RX ADMIN — HYDROMORPHONE HYDROCHLORIDE 2 MILLIGRAM(S): 2 INJECTION INTRAMUSCULAR; INTRAVENOUS; SUBCUTANEOUS at 22:20

## 2019-11-15 RX ADMIN — OXYCODONE HYDROCHLORIDE 20 MILLIGRAM(S): 5 TABLET ORAL at 09:30

## 2019-11-15 RX ADMIN — Medication 5 MILLIGRAM(S): at 22:02

## 2019-11-15 RX ADMIN — Medication 3 MILLILITER(S): at 09:25

## 2019-11-15 RX ADMIN — Medication 3 MILLILITER(S): at 13:55

## 2019-11-15 RX ADMIN — BUPROPION HYDROCHLORIDE 150 MILLIGRAM(S): 150 TABLET, EXTENDED RELEASE ORAL at 22:02

## 2019-11-15 RX ADMIN — Medication 4 MILLIGRAM(S): at 11:20

## 2019-11-15 RX ADMIN — FENTANYL CITRATE 2 PATCH: 50 INJECTION INTRAVENOUS at 16:10

## 2019-11-15 RX ADMIN — CEFEPIME 100 MILLIGRAM(S): 1 INJECTION, POWDER, FOR SOLUTION INTRAMUSCULAR; INTRAVENOUS at 05:46

## 2019-11-15 NOTE — CONSULT NOTE ADULT - ATTENDING COMMENTS
Pt seen with poorly controlled pain and high opioid demand. Case d/w Dr. holland    Recs as above    We will follow

## 2019-11-15 NOTE — CONSULT NOTE ADULT - ASSESSMENT
Consult Summary: Pt is a 44 yr old female with metastatic lung ca to the brain. Pt recently returned from a trip to [place] and was more SOB. Pt previously did not use O2, no on continuous O2. Pt met with oncologist and decided to have hospice care. Pt has been managed in the community for pain by Dr Gomez. He was going to place an intrathecal pump because pt is taking high doses of oral oxycontin. She did not tolerate Methadone well (nodded out but did not have pain relief).    Palliative NP, MD,and SW met with pt and her spouse. We reviewed overall condition and symptoms. Pt's  is her caregiver. Dr Gomez no longer going to place pump due to her declining medical condition. He requested palliative care take over her pain management in the hospital. Plan for hospice in [place] and if not able to go there will do hospice in [place].     Discussed Fentanyl patch for pain which pt and spouse want to try. Discussed PRN Dilaudid, first dose given w good effect. Pt very restless, gets PRN Klonopin and antidepressants. Discussed low dose Haldol for restlessness.      Morphine Equivalent Daily Dose (MEDD): 420mg/24 hrs    Recommendations:   DNR/DNI  Hospice planning  Fentanyl 200mcg Patch Q 72 HRS  Dilaudid 2mg IV Q 4 HRS x 12 hrs   then same dose PRN  Haldol 0.25mg SL Q 12 HRS   Enema MA Q 24 HRS PRN  bowel regimen        Please Call [phone] PRN 24/7 palliative care service Consult Summary: Pt is a 44 yr old female with metastatic lung ca to the brain. Pt recently returned from a trip to [place] and was more SOB. Pt previously did not use O2, no on continuous O2. Pt met with oncologist and decided to have hospice care. Pt has been managed in the community for pain by Dr Cutler. He was going to place an intrathecal pump because pt is taking high doses of oral oxycontin. She did not tolerate Methadone well (nodded out but did not have pain relief).    Palliative NP, MD,and SW met with pt and her spouse. We reviewed overall condition and symptoms. Pt's  is her caregiver. Dr Cutler no longer going to place pump due to her declining medical condition. He requested palliative care take over her pain management in the hospital. Plan for hospice in [place] and if not able to go there will do hospice in [place].     Discussed Fentanyl patch for pain which pt and spouse want to try. Discussed PRN Dilaudid, first dose given w good effect. Pt very restless, gets PRN Klonopin and antidepressants. Discussed low dose Haldol for restlessness.      Morphine Equivalent Daily Dose (MEDD): 420mg/24 hrs    Recommendations:   DNR/DNI  Hospice planning  Fentanyl 200mcg Patch Q 72 HRS  Dilaudid 2mg IV Q 4 HRS x 12 hrs   dc Oxycontin and oxycodone orders   then same dose PRN  Haldol 0.25mg SL Q 12 HRS   Enema AK Q 24 HRS PRN  bowel regimen        Please Call [phone] PRN 24/7 palliative care service

## 2019-11-15 NOTE — CONSULT NOTE ADULT - SUBJECTIVE AND OBJECTIVE BOX
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 She recently returned from her trip to [place] and during her trip, her SOB had been progressively getting worse. Upon returning from her trip, she decided to present to ED for further workup. Pt also states she has been having productive cough w/ brownish sputum. There is no relieving or excaerbating factor for her SOB and cough. Also admits to decreased appetite. Denies fever or chill but does state her  recently recovered from a cold. Pt also denies any melena or hematchezia and denies epigastric pain. Her last chemotherapy (alimata and avasta) was done on 10/9/2019.     Vital Signs Last 24 Hrs  T(C): 36.2 (10 Nov 2019 15:50), Max: 36.8 (10 Nov 2019 11:04)  T(F): 97.1 (10 Nov 2019 15:50), Max: 98.3 (10 Nov 2019 11:04)  HR: 99 (10 Nov 2019 15:50) (99 - 104)  BP: 112/60 (10 Nov 2019 15:50) (112/60 - 118/63)  BP(mean): --  RR: 20 (10 Nov 2019 11:04) (20 - 20)  SpO2: 94% (10 Nov 2019 11:04) (94% - 94%)    In ED, pt was found to have Hgb of 5.9, K of 2.9 and elevated Cr. In ED, pt was given 2U of pRBC. Pt is admitted for symptomatic anemia. (10 Nov 2019 16:16)        PAST MEDICAL & SURGICAL HISTORY:  Lung cancer  Anxiety and depression  History of cholecystectomy      Subjective and Objective:  Today,  Discussed with Dr Galvan, medical resident    Focused Palliative Care Evaluation:                   Symptoms:                                      Pain yes                                     Dyspnea mild                                     N/V none                                      Appetite decreased                                     Anxiety yes                                     Other _____________________                     Support Devices:              PHYSICAL EXAM:      Constitutional: pt appears chronically ill    Respiratory: noted with tachypnea RR 20-22, no accessory muscle use    Cardiovascular: (-) JVD    Gastrointestinal: soft some tenderness noted generally and bloating    Extremities: no edema    Neurological: a+ o x 3    Psychiatric: pt restless, and anxious        T(C): 35.4, Max: 35.9 (21:02)  HR: 117 (91 - 117)  BP: 145/97 (121/77 - 145/97)  RR: 18 (18 - 18)  SpO2: 94% (94% - 94%)      LABS/STUDIES:  11-15    139  |  96<L>  |  8<L>  ----------------------------<  98  3.8   |  26  |  0.5<L>    Ca    8.4<L>      15 Nov 2019 11:55            MEDICATIONS  (STANDING):  albuterol/ipratropium for Nebulization 3 milliLiter(s) Nebulizer every 6 hours  buPROPion XL . 150 milliGRAM(s) Oral at bedtime  cefepime   IVPB      cefepime   IVPB 2000 milliGRAM(s) IV Intermittent every 8 hours  chlorhexidine 4% Liquid 1 Application(s) Topical <User Schedule>  dexAMETHasone     Tablet 4 milliGRAM(s) Oral four times a day  enoxaparin Injectable 40 milliGRAM(s) SubCutaneous at bedtime  escitalopram 20 milliGRAM(s) Oral at bedtime  fentaNYL   Patch 100 MICROgram(s)/Hr 2 Patch Transdermal every 72 hours  folic acid 1 milliGRAM(s) Oral daily  haloperidol    Concentrate 0.25 milliGRAM(s) Oral every 12 hours  haloperidol    Concentrate 0.25 milliGRAM(s) Oral once  HYDROmorphone  Injectable 2 milliGRAM(s) IV Push every 4 hours  influenza   Vaccine 0.5 milliLiter(s) IntraMuscular once  melatonin 5 milliGRAM(s) Oral at bedtime  pantoprazole    Tablet 40 milliGRAM(s) Oral every 12 hours  polyethylene glycol 3350 17 Gram(s) Oral at bedtime  senna 8.6 milliGRAM(s) Oral Tablet - Peds 1 Tablet(s) Oral at bedtime    MEDICATIONS  (PRN):  ALBUTerol    0.083% 2.5 milliGRAM(s) Nebulizer every 6 hours PRN Bronchospasm  benzonatate 100 milliGRAM(s) Oral three times a day PRN Cough  clonazePAM  Tablet 0.5 milliGRAM(s) Oral three times a day PRN anxiety  HYDROmorphone  Injectable 2 milliGRAM(s) IV Push every 1 hour PRN Severe Pain (7 - 10)  saline laxative (FLEET) Rectal Enema 1 Enema Rectal daily PRN Constipation          iStop: Reference #: [id number] - no meds prescribed       PPS  Level    60%       Note PPS = Palliative Performance Scale; (c)2001, [facility] H[facility]ice Society       Range from 100% meaning Full ambulation/self-care/intake/Level of Consicous                                                                              to        10% meaning Bedbound/Unable to do any activity/extensive disease /Total Care/ No PO intake/ LOC=Full/drowsy/+/-confusion        (0% = death)                     Prior to acute illness, patient's functionality reportedly 80%

## 2019-11-15 NOTE — PROGRESS NOTE ADULT - SUBJECTIVE AND OBJECTIVE BOX
Patient is a 44y old  Female who presents with a chief complaint of Weakness (13 Nov 2019 08:35)      Subjective: She had some pain during when she was placed on bed pan  However she received her pain meds and looks comfortable now  She is also more awake and lucid now      Vital Signs Last 24 Hrs  T(C): 35.8 (15 Nov 2019 05:00), Max: 36.3 (14 Nov 2019 14:05)  T(F): 96.4 (15 Nov 2019 05:00), Max: 97.4 (14 Nov 2019 14:05)  HR: 99 (15 Nov 2019 07:25) (91 - 109)  BP: 142/84 (15 Nov 2019 05:00) (117/77 - 142/84)  BP(mean): --  RR: 18 (15 Nov 2019 05:00) (18 - 18)  SpO2: 94% (15 Nov 2019 07:25) (94% - 94%)    PHYSICAL EXAM  General: adult in NAD  Neck: supple  CV: normal S1/S2   Lungs: b/l air entry+  Abdomen: soft non-tender   Ext: moves all extremities   Neuro: awake and alert today       MEDICATIONS  (STANDING):  albuterol/ipratropium for Nebulization 3 milliLiter(s) Nebulizer every 6 hours  buPROPion XL . 150 milliGRAM(s) Oral at bedtime  cefepime   IVPB      cefepime   IVPB 2000 milliGRAM(s) IV Intermittent every 8 hours  chlorhexidine 4% Liquid 1 Application(s) Topical <User Schedule>  dexAMETHasone     Tablet 4 milliGRAM(s) Oral four times a day  enoxaparin Injectable 40 milliGRAM(s) SubCutaneous at bedtime  escitalopram 20 milliGRAM(s) Oral at bedtime  folic acid 1 milliGRAM(s) Oral daily  influenza   Vaccine 0.5 milliLiter(s) IntraMuscular once  melatonin 5 milliGRAM(s) Oral at bedtime  oxyCODONE  ER Tablet 120 milliGRAM(s) Oral every 12 hours  pantoprazole    Tablet 40 milliGRAM(s) Oral every 12 hours  polyethylene glycol 3350 17 Gram(s) Oral at bedtime  senna 8.6 milliGRAM(s) Oral Tablet - Peds 1 Tablet(s) Oral at bedtime    MEDICATIONS  (PRN):  ALBUTerol    0.083% 2.5 milliGRAM(s) Nebulizer every 6 hours PRN Bronchospasm  benzonatate 100 milliGRAM(s) Oral three times a day PRN Cough  clonazePAM  Tablet 0.5 milliGRAM(s) Oral three times a day PRN anxiety  oxyCODONE   IR Oral Tab/Cap - Peds 20 milliGRAM(s) Oral every 4 hours PRN Severe Pain (7 - 10)      LABS:            Serial CBC's  11-13 @ 08:17  Hct-25.3 / Hgb-7.8 / Plat-99 / RBC-2.80 / WBC-15.32  Serial CBC's  11-12 @ 07:16  Hct-25.1 / Hgb-8.0 / Plat-100 / RBC-2.84 / WBC-13.76      11-14    138  |  97<L>  |  8<L>  ----------------------------<  117<H>  3.9   |  25  |  <0.5<L>    Ca    8.2<L>      14 Nov 2019 11:40            Vitamin B12, Serum: >2000 pg/mL (11-12 @ 07:16)  Folate, Serum: 9.4 ng/mL (11-12 @ 07:16)  Ferritin, Serum: 1062 ng/mL (11-11 @ 08:16)                          BLOOD SMEAR INTERPRETATION:       RADIOLOGY & ADDITIONAL STUDIES:

## 2019-11-15 NOTE — PROGRESS NOTE ADULT - SUBJECTIVE AND OBJECTIVE BOX
SUBJECTIVE:    Patient is a 44y old Female who presents with a chief complaint of Weakness (13 Nov 2019 08:35)    Currently admitted to medicine with the primary diagnosis of Hypokalemia     Today is hospital day 5d. This morning she is resting comfortably in bed and reports no new issues or overnight events.     PAST MEDICAL & SURGICAL HISTORY  Lung cancer  Anxiety and depression  History of cholecystectomy    SOCIAL HISTORY:  Negative for smoking/alcohol/drug use.     ALLERGIES:  dust (Sneezing; Rhinorrhea)  Erythromycin Base (Other)    MEDICATIONS:  STANDING MEDICATIONS  albuterol/ipratropium for Nebulization 3 milliLiter(s) Nebulizer every 6 hours  buPROPion XL . 150 milliGRAM(s) Oral at bedtime  cefepime   IVPB      cefepime   IVPB 2000 milliGRAM(s) IV Intermittent every 8 hours  chlorhexidine 4% Liquid 1 Application(s) Topical <User Schedule>  dexAMETHasone     Tablet 4 milliGRAM(s) Oral four times a day  enoxaparin Injectable 40 milliGRAM(s) SubCutaneous at bedtime  escitalopram 20 milliGRAM(s) Oral at bedtime  folic acid 1 milliGRAM(s) Oral daily  influenza   Vaccine 0.5 milliLiter(s) IntraMuscular once  melatonin 5 milliGRAM(s) Oral at bedtime  oxyCODONE  ER Tablet 120 milliGRAM(s) Oral every 12 hours  pantoprazole    Tablet 40 milliGRAM(s) Oral every 12 hours  polyethylene glycol 3350 17 Gram(s) Oral at bedtime  senna 8.6 milliGRAM(s) Oral Tablet - Peds 1 Tablet(s) Oral at bedtime    PRN MEDICATIONS  ALBUTerol    0.083% 2.5 milliGRAM(s) Nebulizer every 6 hours PRN  benzonatate 100 milliGRAM(s) Oral three times a day PRN  clonazePAM  Tablet 0.5 milliGRAM(s) Oral three times a day PRN  oxyCODONE   IR Oral Tab/Cap - Peds 20 milliGRAM(s) Oral every 4 hours PRN    VITALS:   T(F): 96.4  HR: 99  BP: 142/84  RR: 18  SpO2: 94%    LABS:    11-14    138  |  97<L>  |  8<L>  ----------------------------<  117<H>  3.9   |  25  |  <0.5<L>    Ca    8.2<L>      14 Nov 2019 11:40                    RADIOLOGY:    PHYSICAL EXAM:  GEN: No acute distress  LUNGS: Clear to auscultation bilaterally   HEART: Regular  ABD: Soft, non-tender, non-distended.  EXT: NC/NC/NE/2+PP/SCALES/Skin Intact.   NEURO: AAOX3    Intravenous access:   NG tube:   Meyer Catheter: SUBJECTIVE:    Patient is a 44y old Female who presents with a chief complaint of Weakness (13 Nov 2019 08:35)    Currently admitted to medicine with the primary diagnosis of Hypokalemia     Today is hospital day 5d. This morning she is resting comfortably in bed and reports no new issues or overnight events.     PAST MEDICAL & SURGICAL HISTORY  Lung cancer  Anxiety and depression  History of cholecystectomy    SOCIAL HISTORY:  Negative for smoking/alcohol/drug use.     ALLERGIES:  dust (Sneezing; Rhinorrhea)  Erythromycin Base (Other)    MEDICATIONS:  STANDING MEDICATIONS  albuterol/ipratropium for Nebulization 3 milliLiter(s) Nebulizer every 6 hours  buPROPion XL . 150 milliGRAM(s) Oral at bedtime  cefepime   IVPB      cefepime   IVPB 2000 milliGRAM(s) IV Intermittent every 8 hours  chlorhexidine 4% Liquid 1 Application(s) Topical <User Schedule>  dexAMETHasone     Tablet 4 milliGRAM(s) Oral four times a day  enoxaparin Injectable 40 milliGRAM(s) SubCutaneous at bedtime  escitalopram 20 milliGRAM(s) Oral at bedtime  folic acid 1 milliGRAM(s) Oral daily  influenza   Vaccine 0.5 milliLiter(s) IntraMuscular once  melatonin 5 milliGRAM(s) Oral at bedtime  oxyCODONE  ER Tablet 120 milliGRAM(s) Oral every 12 hours  pantoprazole    Tablet 40 milliGRAM(s) Oral every 12 hours  polyethylene glycol 3350 17 Gram(s) Oral at bedtime  senna 8.6 milliGRAM(s) Oral Tablet - Peds 1 Tablet(s) Oral at bedtime    PRN MEDICATIONS  ALBUTerol    0.083% 2.5 milliGRAM(s) Nebulizer every 6 hours PRN  benzonatate 100 milliGRAM(s) Oral three times a day PRN  clonazePAM  Tablet 0.5 milliGRAM(s) Oral three times a day PRN  oxyCODONE   IR Oral Tab/Cap - Peds 20 milliGRAM(s) Oral every 4 hours PRN    VITALS:   T(F): 96.4  HR: 99  BP: 142/84  RR: 18  SpO2: 94%    LABS:    11-14    138  |  97<L>  |  8<L>  ----------------------------<  117<H>  3.9   |  25  |  <0.5<L>    Ca    8.2<L>      14 Nov 2019 11:40                    RADIOLOGY:    PHYSICAL EXAM:  GEN: No acute distress  LUNGS: Clear to auscultation bilaterally   HEART: Regular  ABD: Soft, non-tender, non-distended.   NEURO: UE and LE 5/5 strength and sensation intact b/l. However she is slower and has difficulty with coordination

## 2019-11-15 NOTE — PROGRESS NOTE ADULT - ASSESSMENT
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 brain s/p chemo and rad therapy, anxiety, depression and significant smoking history presents to the hospital with complaining of SOB, cough and fatigue    # PNA on Abx  - CXR- Lt UL mass with superimposed consolidation   blood cx- no growth  LE duplex- b/l- NO DVT   c/w- duoneb q6h and albuterol PRN  c/w cefepime for now    # Acute on chronic normocytic anemia likely secondary to Acute on chronic disease vs BM suppression 2/2 to chemotherapy and radiation therapy   # JAZMIN likely prerenal secondary to decreased PO intake- improved with ivf  # Hypokalemia due to poor PO intake  # Cancer related pain- c/w home pain regimen w/ oxycodone 120 mg q12h and 20 mg q4h PRN   # Elevated INR/PT: Probably related to Vit K deficiency from poor appetite        #Metastatic adenocarcinoma of the lung, PDL1 5%, EGFT, Alk, BRAF, KRAS, ROS1 negative with brain mets and MRI L-spine- leptomeningeal disease s/p spinal tap- CSF- no malignant cells  Received Alimta- 10/9/19  MRI brain shows worsening mets with edema  c/w decadron  Family met with hospice. Her disposition plan is complicated, as they want to take her to [place] as her family is there. Hospice and social work team working on her dispo       Very poor prognosis   Diet: Regular diet/comfort food   Code status: DNR/DNI  Comfort measures    Discussed with cousin at bedside  Discussed with med resident

## 2019-11-15 NOTE — CHART NOTE - NSCHARTNOTEFT_GEN_A_CORE
Palliative Care Biopsychosocial Assessment:    Patient is a 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 Palliative care consulted for pain management.    Palliative care team met with the patient and spouse at bedside. Patient is A&Ox4, however, has difficulty communicating 2/2 pain.  Patient resides with spouse and is originally from [facility]. Patient has had a hospice consult and the goal is for the patient to return to [place], [place] to pursue hospice care with her family.  Patient has a good support system.  Plan to assist with pain management and provide support.    Palliative care team will remain available as needed.       Patient Coping Status:       [   ]   coping well        [  x ]    coping with  some difficulty       [   ]   difficulty coping     [   ]   other                                                       Patient Emotional Status:     [ x  ]   anxious         [ x  ]   depressed           [   ]  overwhelmed          [   ]   angry         [   ]accepting       [   ]   not accepting           [   ]   other     Patient Mental Status:      [ x  ]   alert              [  x ]   oriented         [    ]   confused         [   ] lethargic         [   ]   non-responsive   [   ]   other     Advance Directives:     [ xx  ]    Health Care Surrogate: Name:      Chris Trivedi                        [   ]    Health Care Proxy: Name:   [   ]    MOLST  [   ]    Living Will  [ xx  ]    DNR  [ xx  ]    DNI    Patient Needs:     [ xx  ]   Supportive Counseling                  [   ]   Family Meeting            [   ]    Education                           [  xx ]   Advance Care Planning         Caregiver Name: Chris Trivedi   Caregiver needs:     [  xx ]   Supportive Counseling      [   ]   Family Conference      [   ]   Education    [   ]   Other     Referral:      [   ]   Community Resources         [   ]   Cancer Support Group     [  xx ]    Hospice       [   ]   Bereavement support     [   ]   Pastoral Care      [   ]  [facility] [place]       [   ]  Child Life Services     [ xx  ]   Other: TBD                     Spectra #: [phone]

## 2019-11-15 NOTE — CHART NOTE - NSCHARTNOTEFT_GEN_A_CORE
Registered Dietitian Follow-Up     Patient Profile Reviewed                           Yes [x]   No []     Nutrition History Previously Obtained        Yes [x]  No []       Pertinent Subjective Information:  -Pt laying comfortably in bed, cousin at bedside. Reports appetite poor since complicated medical course with poor prognosis. Today, pt doesn't feel like eating stating "there is a lot going on". Observed untouched lunch tray. Originally, pt reported disliking Ensure but now accepting supplement. States that she does not like strawberry or vanilla, chocolate only, so RD delivered 5 supplements to pt bedside for pt to have in case flavor not delivered. Continue with PO and supplement diet order at this time as pt awaiting hospice c/s. Recommend holding further aggressive nutrition interventions at this time. will reassess need for further interventions s/p hospice c/s.      Pertinent Medical Interventions:  1. PNA on abx  --s/p L upper lung mass with superimposed consolidation  2. Hypokalemia d/t poor PO intake   3. 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 s/p spinal tap- CSF with no malignant cells  --s/p MRI brain indicating worsening mets with edema   --heme/onc recommending hospice/palliative comfort care at this time d/t very poor prognosis. consults pending      Diet order: Regular + Ensure Enlive TID      Anthropometrics:  - Ht. 165.1cm   - Wt. 87.5kg (11/11), no new wt   - BMI 32.1  - IBW 56.8kg      Pertinent Lab Data: (11/15/19) Cl 96, BUN 8, Cr 0.5, Ca 8.4      Pertinent Meds: lovenox, abx, decadron, wellbutrin, oxy, albuterol, folic acid, protonix, miralax, senna      Physical Findings:  - Appearance: AAO  - GI function: none reported at this time. LBM 11/15   - Tubes:  - Oral/Mouth cavity: difficulty chewing tough food. requesting mechanical soft   - Skin: 1+ edema b/l feet.      Nutrition Requirements  Weight Used: 87.5kg needs continued from RD initial assessment      estimated energy needs: 1835-2140kcal ([id number]) obese bmi considered, PCM, mets lung CA   estimated protein needs: 74-90g (1.2-1.5g/kgIBW vs 1.0g/kgABW) as above  estimated fluid needs: per LIP     Nutrient Intake: meeting <50% needs      [] Previous Nutrition Diagnosis: severe PCM             [x] Ongoing          [] Resolved     Nutrition Intervention: meals and snacks, medical food supplements  Recommend:  1. Adjust diet order to mechanical soft. Continue Ensure Enlive TID (chocolate only) as per pt request. RD will continue to follow GOC decision to assess need for further nutrition interventions.     Goal/Expected Outcome: Pt to tolerate 75% or more of meals and supplements or GOC decision to be made. f/u in 3 days.      Indicator/Monitoring: diet order, energy intake, nutrition related labs, body composition, NFPF (PO tolerance, GOC)

## 2019-11-16 LAB
CULTURE RESULTS: SIGNIFICANT CHANGE UP
CULTURE RESULTS: SIGNIFICANT CHANGE UP
SPECIMEN SOURCE: SIGNIFICANT CHANGE UP
SPECIMEN SOURCE: SIGNIFICANT CHANGE UP

## 2019-11-16 PROCEDURE — 99231 SBSQ HOSP IP/OBS SF/LOW 25: CPT

## 2019-11-16 RX ADMIN — HYDROMORPHONE HYDROCHLORIDE 2 MILLIGRAM(S): 2 INJECTION INTRAMUSCULAR; INTRAVENOUS; SUBCUTANEOUS at 11:00

## 2019-11-16 RX ADMIN — CEFEPIME 100 MILLIGRAM(S): 1 INJECTION, POWDER, FOR SOLUTION INTRAMUSCULAR; INTRAVENOUS at 13:35

## 2019-11-16 RX ADMIN — SENNA PLUS 2 TABLET(S): 8.6 TABLET ORAL at 05:31

## 2019-11-16 RX ADMIN — HYDROMORPHONE HYDROCHLORIDE 2 MILLIGRAM(S): 2 INJECTION INTRAMUSCULAR; INTRAVENOUS; SUBCUTANEOUS at 17:56

## 2019-11-16 RX ADMIN — PANTOPRAZOLE SODIUM 40 MILLIGRAM(S): 20 TABLET, DELAYED RELEASE ORAL at 17:26

## 2019-11-16 RX ADMIN — SENNA PLUS 2 TABLET(S): 8.6 TABLET ORAL at 17:26

## 2019-11-16 RX ADMIN — BUPROPION HYDROCHLORIDE 150 MILLIGRAM(S): 150 TABLET, EXTENDED RELEASE ORAL at 21:36

## 2019-11-16 RX ADMIN — HYDROMORPHONE HYDROCHLORIDE 2 MILLIGRAM(S): 2 INJECTION INTRAMUSCULAR; INTRAVENOUS; SUBCUTANEOUS at 22:36

## 2019-11-16 RX ADMIN — HYDROMORPHONE HYDROCHLORIDE 2 MILLIGRAM(S): 2 INJECTION INTRAMUSCULAR; INTRAVENOUS; SUBCUTANEOUS at 22:21

## 2019-11-16 RX ADMIN — Medication 5 MILLIGRAM(S): at 21:36

## 2019-11-16 RX ADMIN — HYDROMORPHONE HYDROCHLORIDE 2 MILLIGRAM(S): 2 INJECTION INTRAMUSCULAR; INTRAVENOUS; SUBCUTANEOUS at 17:26

## 2019-11-16 RX ADMIN — HALOPERIDOL DECANOATE 0.25 MILLIGRAM(S): 100 INJECTION INTRAMUSCULAR at 05:52

## 2019-11-16 RX ADMIN — Medication 3 MILLILITER(S): at 08:19

## 2019-11-16 RX ADMIN — Medication 100 MILLIGRAM(S): at 21:36

## 2019-11-16 RX ADMIN — CEFEPIME 100 MILLIGRAM(S): 1 INJECTION, POWDER, FOR SOLUTION INTRAMUSCULAR; INTRAVENOUS at 05:30

## 2019-11-16 RX ADMIN — Medication 4 MILLIGRAM(S): at 05:31

## 2019-11-16 RX ADMIN — Medication 100 MILLIGRAM(S): at 13:35

## 2019-11-16 RX ADMIN — Medication 100 MILLIGRAM(S): at 05:31

## 2019-11-16 RX ADMIN — HYDROMORPHONE HYDROCHLORIDE 2 MILLIGRAM(S): 2 INJECTION INTRAMUSCULAR; INTRAVENOUS; SUBCUTANEOUS at 03:18

## 2019-11-16 RX ADMIN — HYDROMORPHONE HYDROCHLORIDE 2 MILLIGRAM(S): 2 INJECTION INTRAMUSCULAR; INTRAVENOUS; SUBCUTANEOUS at 19:44

## 2019-11-16 RX ADMIN — HYDROMORPHONE HYDROCHLORIDE 2 MILLIGRAM(S): 2 INJECTION INTRAMUSCULAR; INTRAVENOUS; SUBCUTANEOUS at 10:21

## 2019-11-16 RX ADMIN — SENNA PLUS 1 TABLET(S): 8.6 TABLET ORAL at 21:36

## 2019-11-16 RX ADMIN — ESCITALOPRAM OXALATE 20 MILLIGRAM(S): 10 TABLET, FILM COATED ORAL at 21:36

## 2019-11-16 RX ADMIN — Medication 4 MILLIGRAM(S): at 23:19

## 2019-11-16 RX ADMIN — HYDROMORPHONE HYDROCHLORIDE 2 MILLIGRAM(S): 2 INJECTION INTRAMUSCULAR; INTRAVENOUS; SUBCUTANEOUS at 02:18

## 2019-11-16 RX ADMIN — FENTANYL CITRATE 2 PATCH: 50 INJECTION INTRAVENOUS at 06:04

## 2019-11-16 RX ADMIN — HALOPERIDOL DECANOATE 0.25 MILLIGRAM(S): 100 INJECTION INTRAMUSCULAR at 17:26

## 2019-11-16 RX ADMIN — HYDROMORPHONE HYDROCHLORIDE 2 MILLIGRAM(S): 2 INJECTION INTRAMUSCULAR; INTRAVENOUS; SUBCUTANEOUS at 00:44

## 2019-11-16 RX ADMIN — Medication 4 MILLIGRAM(S): at 12:34

## 2019-11-16 RX ADMIN — PANTOPRAZOLE SODIUM 40 MILLIGRAM(S): 20 TABLET, DELAYED RELEASE ORAL at 05:30

## 2019-11-16 RX ADMIN — Medication 4 MILLIGRAM(S): at 17:26

## 2019-11-16 RX ADMIN — HYDROMORPHONE HYDROCHLORIDE 2 MILLIGRAM(S): 2 INJECTION INTRAMUSCULAR; INTRAVENOUS; SUBCUTANEOUS at 13:00

## 2019-11-16 RX ADMIN — Medication 3 MILLILITER(S): at 13:40

## 2019-11-16 RX ADMIN — ENOXAPARIN SODIUM 40 MILLIGRAM(S): 100 INJECTION SUBCUTANEOUS at 21:36

## 2019-11-16 RX ADMIN — CEFEPIME 100 MILLIGRAM(S): 1 INJECTION, POWDER, FOR SOLUTION INTRAMUSCULAR; INTRAVENOUS at 21:36

## 2019-11-16 RX ADMIN — HYDROMORPHONE HYDROCHLORIDE 2 MILLIGRAM(S): 2 INJECTION INTRAMUSCULAR; INTRAVENOUS; SUBCUTANEOUS at 14:36

## 2019-11-16 RX ADMIN — FENTANYL CITRATE 2 PATCH: 50 INJECTION INTRAVENOUS at 20:04

## 2019-11-16 RX ADMIN — HYDROMORPHONE HYDROCHLORIDE 2 MILLIGRAM(S): 2 INJECTION INTRAMUSCULAR; INTRAVENOUS; SUBCUTANEOUS at 20:00

## 2019-11-16 RX ADMIN — Medication 1 MILLIGRAM(S): at 12:34

## 2019-11-16 RX ADMIN — HYDROMORPHONE HYDROCHLORIDE 2 MILLIGRAM(S): 2 INJECTION INTRAMUSCULAR; INTRAVENOUS; SUBCUTANEOUS at 12:34

## 2019-11-16 RX ADMIN — HYDROMORPHONE HYDROCHLORIDE 2 MILLIGRAM(S): 2 INJECTION INTRAMUSCULAR; INTRAVENOUS; SUBCUTANEOUS at 06:04

## 2019-11-16 RX ADMIN — Medication 3 MILLILITER(S): at 19:58

## 2019-11-16 RX ADMIN — HYDROMORPHONE HYDROCHLORIDE 2 MILLIGRAM(S): 2 INJECTION INTRAMUSCULAR; INTRAVENOUS; SUBCUTANEOUS at 05:34

## 2019-11-16 NOTE — PROGRESS NOTE ADULT - ASSESSMENT
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 s/p chemo and rad therapy, anxiety, depression and significant smoking history presents to the hospital with complaining of SOB, cough and fatigue    # PNA on Abx, reports cough is worsening .  - CXR- Lt UL mass with superimposed consolidation   blood cx- no growth  LE duplex- b/l- NO DVT   c/w- duoneb q6h and albuterol PRN  c/w cefepime for now    # Acute on chronic normocytic anemia likely secondary to Acute on chronic disease vs BM suppression 2/2 to chemotherapy and radiation therapy   # JAZMIN likely prerenal secondary to decreased PO intake- improved with ivf , resolved now  # Hypokalemia due to poor PO intake  # Cancer related pain- c/w home pain regimen w/ oxycodone 120 mg q12h and 20 mg q4h PRN   # Elevated INR/PT: Probably related to Vit K deficiency from poor appetite        #Metastatic adenocarcinoma of the lung, PDL1 5%, EGFT, Alk, BRAF, KRAS, ROS1 negative with brain mets and MRI L-spine- leptomeningeal disease s/p spinal tap- CSF- no malignant cells  Received Alimta- 10/9/19  MRI brain shows worsening mets with edema  c/w decadron  Family met with hospice. Her disposition plan is complicated, as they want to take her to [place] as her family is there. Hospice and social work team working on her dispo       Very poor prognosis   Diet: comfort food   Code status: DNR/DNI  Comfort measures    Discussed with cousin at bedside

## 2019-11-16 NOTE — PROGRESS NOTE ADULT - SUBJECTIVE AND OBJECTIVE BOX
Patient is a 44y old  Female who presents with a chief complaint of SOB (15 Nov 2019 15:49)      Subjective: Pt seen and examined , awake but lethargic and falls asleep during conversation. Reports cough .       Vital Signs Last 24 Hrs  T(C): 35.4 (16 Nov 2019 05:25), Max: 35.4 (15 Nov 2019 14:26)  T(F): 95.7 (16 Nov 2019 05:25), Max: 95.7 (15 Nov 2019 14:26)  HR: 106 (16 Nov 2019 05:25) (106 - 122)  BP: 140/90 (16 Nov 2019 05:25) (140/90 - 159/89)  BP(mean): --  RR: 18 (16 Nov 2019 05:25) (18 - 18)  SpO2: 94% (16 Nov 2019 00:15) (94% - 94%)    PHYSICAL EXAM  General: lying on bed Nad   Neck: supple  CV: normal S1/S2   Lungs: b/l air entry+  Abdomen: soft non-tender   Ext: moves all extremities   Neuro: awake and responding to commands has been having cough     MEDICATIONS  (STANDING):  albuterol/ipratropium for Nebulization 3 milliLiter(s) Nebulizer every 6 hours  buPROPion XL . 150 milliGRAM(s) Oral at bedtime  cefepime   IVPB      cefepime   IVPB 2000 milliGRAM(s) IV Intermittent every 8 hours  chlorhexidine 4% Liquid 1 Application(s) Topical <User Schedule>  dexAMETHasone     Tablet 4 milliGRAM(s) Oral four times a day  docusate sodium 100 milliGRAM(s) Oral three times a day  enoxaparin Injectable 40 milliGRAM(s) SubCutaneous at bedtime  escitalopram 20 milliGRAM(s) Oral at bedtime  fentaNYL   Patch 100 MICROgram(s)/Hr 2 Patch Transdermal every 72 hours  folic acid 1 milliGRAM(s) Oral daily  haloperidol    Concentrate 0.25 milliGRAM(s) Oral every 12 hours  HYDROmorphone  Injectable 2 milliGRAM(s) IV Push every 4 hours  influenza   Vaccine 0.5 milliLiter(s) IntraMuscular once  melatonin 5 milliGRAM(s) Oral at bedtime  pantoprazole    Tablet 40 milliGRAM(s) Oral every 12 hours  polyethylene glycol 3350 17 Gram(s) Oral at bedtime  senna 2 Tablet(s) Oral every 12 hours  senna 8.6 milliGRAM(s) Oral Tablet - Peds 1 Tablet(s) Oral at bedtime    MEDICATIONS  (PRN):  ALBUTerol    0.083% 2.5 milliGRAM(s) Nebulizer every 6 hours PRN Bronchospasm  benzonatate 100 milliGRAM(s) Oral three times a day PRN Cough  clonazePAM  Tablet 0.5 milliGRAM(s) Oral three times a day PRN anxiety  HYDROmorphone  Injectable 2 milliGRAM(s) IV Push every 1 hour PRN Severe Pain (7 - 10)  saline laxative (FLEET) Rectal Enema 1 Enema Rectal daily PRN Constipation      LABS:            Serial CBC's  11-13 @ 08:17  Hct-25.3 / Hgb-7.8 / Plat-99 / RBC-2.80 / WBC-15.32      11-15    139  |  96<L>  |  8<L>  ----------------------------<  98  3.8   |  26  |  0.5<L>    Ca    8.4<L>      15 Nov 2019 11:55            Vitamin B12, Serum: >2000 pg/mL (11-12 @ 07:16)  Folate, Serum: 9.4 ng/mL (11-12 @ 07:16)  Ferritin, Serum: 1062 ng/mL (11-11 @ 08:16)                          BLOOD SMEAR INTERPRETATION:       RADIOLOGY & ADDITIONAL STUDIES:

## 2019-11-17 LAB
ALBUMIN SERPL ELPH-MCNC: 2.8 G/DL — LOW (ref 3.5–5.2)
ALP SERPL-CCNC: 146 U/L — HIGH (ref 30–115)
ALT FLD-CCNC: 20 U/L — SIGNIFICANT CHANGE UP (ref 0–41)
ANION GAP SERPL CALC-SCNC: 20 MMOL/L — HIGH (ref 7–14)
AST SERPL-CCNC: 30 U/L — SIGNIFICANT CHANGE UP (ref 0–41)
BILIRUB SERPL-MCNC: 0.7 MG/DL — SIGNIFICANT CHANGE UP (ref 0.2–1.2)
BUN SERPL-MCNC: 9 MG/DL — LOW (ref 10–20)
CALCIUM SERPL-MCNC: 8.7 MG/DL — SIGNIFICANT CHANGE UP (ref 8.5–10.1)
CHLORIDE SERPL-SCNC: 93 MMOL/L — LOW (ref 98–110)
CO2 SERPL-SCNC: 24 MMOL/L — SIGNIFICANT CHANGE UP (ref 17–32)
CREAT SERPL-MCNC: <0.5 MG/DL — LOW (ref 0.7–1.5)
GLUCOSE SERPL-MCNC: 101 MG/DL — HIGH (ref 70–99)
POTASSIUM SERPL-MCNC: 3.4 MMOL/L — LOW (ref 3.5–5)
POTASSIUM SERPL-SCNC: 3.4 MMOL/L — LOW (ref 3.5–5)
PROT SERPL-MCNC: 5.6 G/DL — LOW (ref 6–8)
SODIUM SERPL-SCNC: 137 MMOL/L — SIGNIFICANT CHANGE UP (ref 135–146)

## 2019-11-17 PROCEDURE — 99231 SBSQ HOSP IP/OBS SF/LOW 25: CPT

## 2019-11-17 RX ORDER — FENTANYL CITRATE 50 UG/ML
2 INJECTION INTRAVENOUS
Refills: 0 | Status: DISCONTINUED | OUTPATIENT
Start: 2019-11-17 | End: 2019-11-18

## 2019-11-17 RX ORDER — POTASSIUM CHLORIDE 20 MEQ
40 PACKET (EA) ORAL ONCE
Refills: 0 | Status: COMPLETED | OUTPATIENT
Start: 2019-11-17 | End: 2019-11-18

## 2019-11-17 RX ORDER — HYDROMORPHONE HYDROCHLORIDE 2 MG/ML
4 INJECTION INTRAMUSCULAR; INTRAVENOUS; SUBCUTANEOUS
Refills: 0 | Status: DISCONTINUED | OUTPATIENT
Start: 2019-11-17 | End: 2019-11-18

## 2019-11-17 RX ORDER — POTASSIUM CHLORIDE 20 MEQ
10 PACKET (EA) ORAL
Refills: 0 | Status: DISCONTINUED | OUTPATIENT
Start: 2019-11-17 | End: 2019-11-17

## 2019-11-17 RX ORDER — HYDROMORPHONE HYDROCHLORIDE 2 MG/ML
4 INJECTION INTRAMUSCULAR; INTRAVENOUS; SUBCUTANEOUS ONCE
Refills: 0 | Status: DISCONTINUED | OUTPATIENT
Start: 2019-11-17 | End: 2019-11-17

## 2019-11-17 RX ADMIN — Medication 4 MILLIGRAM(S): at 05:36

## 2019-11-17 RX ADMIN — SENNA PLUS 2 TABLET(S): 8.6 TABLET ORAL at 05:36

## 2019-11-17 RX ADMIN — PANTOPRAZOLE SODIUM 40 MILLIGRAM(S): 20 TABLET, DELAYED RELEASE ORAL at 05:36

## 2019-11-17 RX ADMIN — HYDROMORPHONE HYDROCHLORIDE 4 MILLIGRAM(S): 2 INJECTION INTRAMUSCULAR; INTRAVENOUS; SUBCUTANEOUS at 18:06

## 2019-11-17 RX ADMIN — Medication 3 MILLILITER(S): at 14:05

## 2019-11-17 RX ADMIN — HYDROMORPHONE HYDROCHLORIDE 2 MILLIGRAM(S): 2 INJECTION INTRAMUSCULAR; INTRAVENOUS; SUBCUTANEOUS at 13:35

## 2019-11-17 RX ADMIN — BUPROPION HYDROCHLORIDE 150 MILLIGRAM(S): 150 TABLET, EXTENDED RELEASE ORAL at 21:04

## 2019-11-17 RX ADMIN — HYDROMORPHONE HYDROCHLORIDE 2 MILLIGRAM(S): 2 INJECTION INTRAMUSCULAR; INTRAVENOUS; SUBCUTANEOUS at 15:15

## 2019-11-17 RX ADMIN — HYDROMORPHONE HYDROCHLORIDE 2 MILLIGRAM(S): 2 INJECTION INTRAMUSCULAR; INTRAVENOUS; SUBCUTANEOUS at 10:17

## 2019-11-17 RX ADMIN — CEFEPIME 100 MILLIGRAM(S): 1 INJECTION, POWDER, FOR SOLUTION INTRAMUSCULAR; INTRAVENOUS at 21:04

## 2019-11-17 RX ADMIN — HYDROMORPHONE HYDROCHLORIDE 2 MILLIGRAM(S): 2 INJECTION INTRAMUSCULAR; INTRAVENOUS; SUBCUTANEOUS at 09:31

## 2019-11-17 RX ADMIN — HYDROMORPHONE HYDROCHLORIDE 2 MILLIGRAM(S): 2 INJECTION INTRAMUSCULAR; INTRAVENOUS; SUBCUTANEOUS at 15:13

## 2019-11-17 RX ADMIN — HYDROMORPHONE HYDROCHLORIDE 4 MILLIGRAM(S): 2 INJECTION INTRAMUSCULAR; INTRAVENOUS; SUBCUTANEOUS at 23:03

## 2019-11-17 RX ADMIN — HYDROMORPHONE HYDROCHLORIDE 2 MILLIGRAM(S): 2 INJECTION INTRAMUSCULAR; INTRAVENOUS; SUBCUTANEOUS at 11:05

## 2019-11-17 RX ADMIN — Medication 100 MILLIGRAM(S): at 05:36

## 2019-11-17 RX ADMIN — PANTOPRAZOLE SODIUM 40 MILLIGRAM(S): 20 TABLET, DELAYED RELEASE ORAL at 17:11

## 2019-11-17 RX ADMIN — SENNA PLUS 1 TABLET(S): 8.6 TABLET ORAL at 21:04

## 2019-11-17 RX ADMIN — HYDROMORPHONE HYDROCHLORIDE 2 MILLIGRAM(S): 2 INJECTION INTRAMUSCULAR; INTRAVENOUS; SUBCUTANEOUS at 07:43

## 2019-11-17 RX ADMIN — CEFEPIME 100 MILLIGRAM(S): 1 INJECTION, POWDER, FOR SOLUTION INTRAMUSCULAR; INTRAVENOUS at 13:37

## 2019-11-17 RX ADMIN — HYDROMORPHONE HYDROCHLORIDE 2 MILLIGRAM(S): 2 INJECTION INTRAMUSCULAR; INTRAVENOUS; SUBCUTANEOUS at 05:50

## 2019-11-17 RX ADMIN — HYDROMORPHONE HYDROCHLORIDE 4 MILLIGRAM(S): 2 INJECTION INTRAMUSCULAR; INTRAVENOUS; SUBCUTANEOUS at 17:10

## 2019-11-17 RX ADMIN — FENTANYL CITRATE 2 PATCH: 50 INJECTION INTRAVENOUS at 19:37

## 2019-11-17 RX ADMIN — Medication 4 MILLIGRAM(S): at 23:05

## 2019-11-17 RX ADMIN — ENOXAPARIN SODIUM 40 MILLIGRAM(S): 100 INJECTION SUBCUTANEOUS at 21:06

## 2019-11-17 RX ADMIN — HYDROMORPHONE HYDROCHLORIDE 2 MILLIGRAM(S): 2 INJECTION INTRAMUSCULAR; INTRAVENOUS; SUBCUTANEOUS at 04:16

## 2019-11-17 RX ADMIN — SENNA PLUS 2 TABLET(S): 8.6 TABLET ORAL at 17:11

## 2019-11-17 RX ADMIN — HYDROMORPHONE HYDROCHLORIDE 2 MILLIGRAM(S): 2 INJECTION INTRAMUSCULAR; INTRAVENOUS; SUBCUTANEOUS at 01:05

## 2019-11-17 RX ADMIN — Medication 100 MILLIGRAM(S): at 13:37

## 2019-11-17 RX ADMIN — CEFEPIME 100 MILLIGRAM(S): 1 INJECTION, POWDER, FOR SOLUTION INTRAMUSCULAR; INTRAVENOUS at 05:36

## 2019-11-17 RX ADMIN — HYDROMORPHONE HYDROCHLORIDE 2 MILLIGRAM(S): 2 INJECTION INTRAMUSCULAR; INTRAVENOUS; SUBCUTANEOUS at 00:50

## 2019-11-17 RX ADMIN — Medication 3 MILLILITER(S): at 07:49

## 2019-11-17 RX ADMIN — HALOPERIDOL DECANOATE 0.25 MILLIGRAM(S): 100 INJECTION INTRAMUSCULAR at 05:37

## 2019-11-17 RX ADMIN — HYDROMORPHONE HYDROCHLORIDE 2 MILLIGRAM(S): 2 INJECTION INTRAMUSCULAR; INTRAVENOUS; SUBCUTANEOUS at 12:19

## 2019-11-17 RX ADMIN — ESCITALOPRAM OXALATE 20 MILLIGRAM(S): 10 TABLET, FILM COATED ORAL at 21:04

## 2019-11-17 RX ADMIN — HYDROMORPHONE HYDROCHLORIDE 2 MILLIGRAM(S): 2 INJECTION INTRAMUSCULAR; INTRAVENOUS; SUBCUTANEOUS at 04:31

## 2019-11-17 RX ADMIN — HYDROMORPHONE HYDROCHLORIDE 2 MILLIGRAM(S): 2 INJECTION INTRAMUSCULAR; INTRAVENOUS; SUBCUTANEOUS at 12:20

## 2019-11-17 RX ADMIN — Medication 100 MILLIGRAM(S): at 21:05

## 2019-11-17 RX ADMIN — HALOPERIDOL DECANOATE 0.25 MILLIGRAM(S): 100 INJECTION INTRAMUSCULAR at 17:12

## 2019-11-17 RX ADMIN — Medication 5 MILLIGRAM(S): at 21:04

## 2019-11-17 RX ADMIN — FENTANYL CITRATE 2 PATCH: 50 INJECTION INTRAVENOUS at 04:16

## 2019-11-17 RX ADMIN — HYDROMORPHONE HYDROCHLORIDE 2 MILLIGRAM(S): 2 INJECTION INTRAMUSCULAR; INTRAVENOUS; SUBCUTANEOUS at 09:00

## 2019-11-17 RX ADMIN — HYDROMORPHONE HYDROCHLORIDE 4 MILLIGRAM(S): 2 INJECTION INTRAMUSCULAR; INTRAVENOUS; SUBCUTANEOUS at 21:05

## 2019-11-17 RX ADMIN — Medication 4 MILLIGRAM(S): at 17:12

## 2019-11-17 RX ADMIN — FENTANYL CITRATE 2 PATCH: 50 INJECTION INTRAVENOUS at 07:00

## 2019-11-17 RX ADMIN — CHLORHEXIDINE GLUCONATE 1 APPLICATION(S): 213 SOLUTION TOPICAL at 05:38

## 2019-11-17 RX ADMIN — HYDROMORPHONE HYDROCHLORIDE 2 MILLIGRAM(S): 2 INJECTION INTRAMUSCULAR; INTRAVENOUS; SUBCUTANEOUS at 13:37

## 2019-11-17 RX ADMIN — HYDROMORPHONE HYDROCHLORIDE 4 MILLIGRAM(S): 2 INJECTION INTRAMUSCULAR; INTRAVENOUS; SUBCUTANEOUS at 23:27

## 2019-11-17 RX ADMIN — Medication 1 MILLIGRAM(S): at 11:08

## 2019-11-17 RX ADMIN — HYDROMORPHONE HYDROCHLORIDE 2 MILLIGRAM(S): 2 INJECTION INTRAMUSCULAR; INTRAVENOUS; SUBCUTANEOUS at 05:35

## 2019-11-17 RX ADMIN — POLYETHYLENE GLYCOL 3350 17 GRAM(S): 17 POWDER, FOR SOLUTION ORAL at 21:05

## 2019-11-17 RX ADMIN — Medication 4 MILLIGRAM(S): at 11:07

## 2019-11-17 NOTE — PROGRESS NOTE ADULT - SUBJECTIVE AND OBJECTIVE BOX
Patient is a 44y old  Female who presents with a chief complaint of SOB (15 Nov 2019 15:49)      Subjective: Pt seen , lying on bed sleeping and comfortable . Pain well managed .      Vital Signs Last 24 Hrs  T(C): 36.6 (16 Nov 2019 21:40), Max: 36.6 (16 Nov 2019 21:40)  T(F): 97.9 (16 Nov 2019 21:40), Max: 97.9 (16 Nov 2019 21:40)  HR: 130 (16 Nov 2019 21:40) (117 - 130)  BP: 125/78 (16 Nov 2019 21:40) (122/80 - 125/78)  BP(mean): --  RR: 18 (16 Nov 2019 21:40) (18 - 20)  SpO2: 94% (16 Nov 2019 20:00) (94% - 94%)    PHYSICAL EXAM  General: sleeping nad     MEDICATIONS  (STANDING):  albuterol/ipratropium for Nebulization 3 milliLiter(s) Nebulizer every 6 hours  buPROPion XL . 150 milliGRAM(s) Oral at bedtime  cefepime   IVPB      cefepime   IVPB 2000 milliGRAM(s) IV Intermittent every 8 hours  chlorhexidine 4% Liquid 1 Application(s) Topical <User Schedule>  dexAMETHasone     Tablet 4 milliGRAM(s) Oral four times a day  docusate sodium 100 milliGRAM(s) Oral three times a day  enoxaparin Injectable 40 milliGRAM(s) SubCutaneous at bedtime  escitalopram 20 milliGRAM(s) Oral at bedtime  fentaNYL   Patch 100 MICROgram(s)/Hr 2 Patch Transdermal every 72 hours  folic acid 1 milliGRAM(s) Oral daily  haloperidol    Concentrate 0.25 milliGRAM(s) Oral every 12 hours  influenza   Vaccine 0.5 milliLiter(s) IntraMuscular once  melatonin 5 milliGRAM(s) Oral at bedtime  pantoprazole    Tablet 40 milliGRAM(s) Oral every 12 hours  polyethylene glycol 3350 17 Gram(s) Oral at bedtime  senna 2 Tablet(s) Oral every 12 hours  senna 8.6 milliGRAM(s) Oral Tablet - Peds 1 Tablet(s) Oral at bedtime    MEDICATIONS  (PRN):  ALBUTerol    0.083% 2.5 milliGRAM(s) Nebulizer every 6 hours PRN Bronchospasm  benzonatate 100 milliGRAM(s) Oral three times a day PRN Cough  clonazePAM  Tablet 0.5 milliGRAM(s) Oral three times a day PRN anxiety  HYDROmorphone  Injectable 2 milliGRAM(s) IV Push every 1 hour PRN Severe Pain (7 - 10)  saline laxative (FLEET) Rectal Enema 1 Enema Rectal daily PRN Constipation      LABS:                11-15    139  |  96<L>  |  8<L>  ----------------------------<  98  3.8   |  26  |  0.5<L>    Ca    8.4<L>      15 Nov 2019 11:55            Vitamin B12, Serum: >2000 pg/mL (11-12 @ 07:16)  Folate, Serum: 9.4 ng/mL (11-12 @ 07:16)  Ferritin, Serum: 1062 ng/mL (11-11 @ 08:16)                          BLOOD SMEAR INTERPRETATION:       RADIOLOGY & ADDITIONAL STUDIES:

## 2019-11-17 NOTE — PROGRESS NOTE ADULT - SUBJECTIVE AND OBJECTIVE BOX
Patient Information:  REMEDIOS DENTON / 44y / Female / MRN#:[id number]    Hospital Day: 7d    HPI:  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 She recently returned from her trip to [place] and during her trip, her SOB had been progressively getting worse. Upon returning from her trip, she decided to present to ED for further workup. Pt also states she has been having productive cough w/ brownish sputum. There is no relieving or excaerbating factor for her SOB and cough. Also admits to decreased appetite. Denies fever or chill but does state her  recently recovered from a cold. Pt also denies any melena or hematchezia and denies epigastric pain. Her last chemotherapy (alimata and avasta) was done on 10/9/2019.     Vital Signs Last 24 Hrs  T(C): 36.2 (10 Nov 2019 15:50), Max: 36.8 (10 Nov 2019 11:04)  T(F): 97.1 (10 Nov 2019 15:50), Max: 98.3 (10 Nov 2019 11:04)  HR: 99 (10 Nov 2019 15:50) (99 - 104)  BP: 112/60 (10 Nov 2019 15:50) (112/60 - 118/63)  BP(mean): --  RR: 20 (10 Nov 2019 11:04) (20 - 20)  SpO2: 94% (10 Nov 2019 11:04) (94% - 94%)    In ED, pt was found to have Hgb of 5.9, K of 2.9 and elevated Cr. In ED, pt was given 2U of pRBC. Pt is admitted for symptomatic anemia.    Interval History:  Patient seen and examined at bedside. No acute events overnight.    Past Medical History:  Lung cancer  Anxiety and depression    Past Surgical History:  History of cholecystectomy    Allergies:  dust (Sneezing; Rhinorrhea)  Erythromycin Base (Other)    Medications:  PRN:  ALBUTerol    0.083% 2.5 milliGRAM(s) Nebulizer every 6 hours PRN Bronchospasm  benzonatate 100 milliGRAM(s) Oral three times a day PRN Cough  clonazePAM  Tablet 0.5 milliGRAM(s) Oral three times a day PRN anxiety  HYDROmorphone  Injectable 4 milliGRAM(s) IV Push every 3 hours PRN Severe Pain (7 - 10)  saline laxative (FLEET) Rectal Enema 1 Enema Rectal daily PRN Constipation    Standing:  albuterol/ipratropium for Nebulization 3 milliLiter(s) Nebulizer every 6 hours  buPROPion XL . 150 milliGRAM(s) Oral at bedtime  cefepime   IVPB      cefepime   IVPB 2000 milliGRAM(s) IV Intermittent every 8 hours  chlorhexidine 4% Liquid 1 Application(s) Topical <User Schedule>  dexAMETHasone     Tablet 4 milliGRAM(s) Oral four times a day  docusate sodium 100 milliGRAM(s) Oral three times a day  enoxaparin Injectable 40 milliGRAM(s) SubCutaneous at bedtime  escitalopram 20 milliGRAM(s) Oral at bedtime  fentaNYL   Patch 100 MICROgram(s)/Hr 2 Patch Transdermal every 72 hours  folic acid 1 milliGRAM(s) Oral daily  haloperidol    Concentrate 0.25 milliGRAM(s) Oral every 12 hours  influenza   Vaccine 0.5 milliLiter(s) IntraMuscular once  melatonin 5 milliGRAM(s) Oral at bedtime  pantoprazole    Tablet 40 milliGRAM(s) Oral every 12 hours  polyethylene glycol 3350 17 Gram(s) Oral at bedtime  senna 2 Tablet(s) Oral every 12 hours  senna 8.6 milliGRAM(s) Oral Tablet - Peds 1 Tablet(s) Oral at bedtime    Home:  Advil Liquigel 200 mg oral capsule: 1 cap(s) orally every 4 hours, As Needed - for mild pain  Allegra 180 mg oral tablet: 1 tab(s) orally once a day  benzonatate 100 mg oral capsule: 1 cap(s) orally 3 times a day  Colace 100 mg oral capsule: 1 cap(s) orally 2 times a day  escitalopram 20 mg oral tablet: 1 tab(s) orally once a day (at bedtime)  KlonoPIN 0.5 mg oral tablet: 1 tab(s) orally 3 times a day, As Needed  oxyCODONE 20 mg oral tablet: 1 tab(s) orally every 4 hours -Severe Pain (7 - 10) as needed MDD:4 tablets  OxyCONTIN 80 mg oral tablet, extended release: 120 milligram(s) orally 2 times a day  prochlorperazine 10 mg oral tablet: 1 tab(s) orally every 6 hours, As Needed  Protonix 40 mg oral delayed release tablet: 1 tab(s) orally 2 times a day  Senna 8.6 mg oral tablet: 1 tab(s) orally once a day (at bedtime)    Vitals:  T(C): 36.2, Max: 36.6 (11-16-19 @ 21:40)  T(F): 97.2, Max: 97.9 (11-16-19 @ 21:40)  HR: 114 (114 - 130)  BP: 147/79 (125/78 - 147/79)  RR: 18 (18 - 18)  SpO2: --    Physical Exam:  General: Awake, Alert. Not in acute distress.  Heart: Regular rate and rhythm; S1, S2; No murmurs.  Lungs: Clear to auscultation bilaterally.  Abdomen: Soft, nontender, nondistended.  Extremities: No edema in upper or lower extremities.  Neuro: AAOx3, No focal deficits.    Labs:    Chem (11-17 @ 12:27)  Na: 137  |     Cl: 93     |  BUN: 9   -----------------------------------------< Gluc: 101    K: 3.4   |    HCO3: 24  |  Cr: <0.5    Ca 8.7 (11-17 @ 12:27)    LFTs (11-17 @ 12:27)  TPro 5.6  /  Alb 2.8  TBili 0.7  /  DBili     AST 30  /  ALT 20  /  AlkPhos 146          Microbiology:  Culture - Blood (collected 11-11 @ 17:54)  Source: .Blood None  Final Report (11-16 @ 23:00):    No growth at 5 days.    Culture - Blood (collected 11-11 @ 17:54)  Source: .Blood None  Final Report (11-16 @ 23:00):    No growth at 5 days.        Radiology:

## 2019-11-17 NOTE — PROGRESS NOTE ADULT - ASSESSMENT
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 Patient now DNR/DNI due to grave prognosis from worsening metastatic adenocarcinoma of the lung. No more labs.    #Hx of Metastatic adenocarcinoma of the lung; Leptomeningeal disease s/p spinal tap (w/o malignant cells)  - Received Alimta (10/9/2019)\  - MRI brain shows worsening mets, with edema, started on Dexamethasone  - Palliative care and hospice consults ordered  - Palliative: Hospice planning, Fentanyl 200mcg Patch Q 72 HRS, Dilaudid 2mg IV Q 4 HRS x 12 hrs, dc Oxycontin and oxycodone, Haldol 0.25mg SL Q 12 HRS, Enema SD Q 24 HRS PRN, Senna and Colace  - Dilaudid increased to 4mg q3 hours PRN for severe pain    #Dyspnea/Fatigue   - due to Symptomatic anemia vs. post-obstructive pneumonia  - CXR: Left upper lobe mass with possible opacity  - Continue Nebs and monitor saturation level  - C/w cefepime  - Blood cx- no growth  - LE duplex shows no DVT  - Was 84% on RA, 94% on O2 this morning> Will need O2 with hospice      #Acute-on-chronic normocytic anemia; secondary to chronic disease vs. BM suppression (Chemo/Rad)  - s/p 2units pRBC ; Goal Hgb > 7  - Iron studies: Ferritin > 1000, B12 > 2000, folate normal  - T+S active    #Hypokalemia  - 3.4 today  - Supplementing    #Elevated INR  - Unclear etiology, could be related to Vit K deficiency from poor appetite  - INR fluctuating up and down  - ALP elevated too but trending down    Diet: Regular diet  DVT ppx: Lovenox 40 mg qD  Activity: Increase as tolerated  Code status: DNR/DNI- MOLST form signed

## 2019-11-17 NOTE — PROGRESS NOTE ADULT - ASSESSMENT
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 brain s/p chemo and rad therapy, anxiety, depression and significant smoking history presents to the hospital with complaining of SOB, cough and fatigue    # PNA on Abx, / worsening cough could be from underlying infection related to underlying lung disease  - CXR- Lt UL mass with superimposed consolidation   blood cx- no growth  LE duplex- b/l- NO DVT   c/w- duoneb q6h and albuterol PRN  c/w cefepime for now    # Acute on chronic normocytic anemia likely secondary to Acute on chronic disease vs BM suppression 2/2 to chemotherapy and radiation therapy   # JAZMIN likely prerenal secondary to decreased PO intake- improved with ivf , resolved now  # Cancer related pain- pain meds adjusted by palliative.   #Metastatic adenocarcinoma of the lung, PDL1 5%, EGFT, Alk, BRAF, KRAS, ROS1 negative with brain mets and MRI L-spine- leptomeningeal disease s/p spinal tap- CSF- no malignant cells  Received Alimta- 10/9/19  MRI brain shows worsening mets with edema  c/w decadron  Family met with hospice. Her disposition plan is complicated, as they want to take her to [place] as her family is there.   Hospice and social work team working on her dispo       Very poor prognosis   Diet: comfort food   Code status: DNR/DNI  Comfort measures

## 2019-11-18 LAB
ALBUMIN SERPL ELPH-MCNC: 2.9 G/DL — LOW (ref 3.5–5.2)
ALP SERPL-CCNC: 163 U/L — HIGH (ref 30–115)
ALT FLD-CCNC: 20 U/L — SIGNIFICANT CHANGE UP (ref 0–41)
ANION GAP SERPL CALC-SCNC: 18 MMOL/L — HIGH (ref 7–14)
AST SERPL-CCNC: 34 U/L — SIGNIFICANT CHANGE UP (ref 0–41)
BASOPHILS # BLD AUTO: 0.01 K/UL — SIGNIFICANT CHANGE UP (ref 0–0.2)
BASOPHILS NFR BLD AUTO: 0.1 % — SIGNIFICANT CHANGE UP (ref 0–1)
BILIRUB SERPL-MCNC: 0.7 MG/DL — SIGNIFICANT CHANGE UP (ref 0.2–1.2)
BUN SERPL-MCNC: 13 MG/DL — SIGNIFICANT CHANGE UP (ref 10–20)
CALCIUM SERPL-MCNC: 9.2 MG/DL — SIGNIFICANT CHANGE UP (ref 8.5–10.1)
CHLORIDE SERPL-SCNC: 94 MMOL/L — LOW (ref 98–110)
CO2 SERPL-SCNC: 25 MMOL/L — SIGNIFICANT CHANGE UP (ref 17–32)
CREAT SERPL-MCNC: <0.5 MG/DL — LOW (ref 0.7–1.5)
EOSINOPHIL # BLD AUTO: 0 K/UL — SIGNIFICANT CHANGE UP (ref 0–0.7)
EOSINOPHIL NFR BLD AUTO: 0 % — SIGNIFICANT CHANGE UP (ref 0–8)
GLUCOSE SERPL-MCNC: 100 MG/DL — HIGH (ref 70–99)
HCT VFR BLD CALC: 24.5 % — LOW (ref 37–47)
HGB BLD-MCNC: 7.3 G/DL — LOW (ref 12–16)
IMM GRANULOCYTES NFR BLD AUTO: 1.9 % — HIGH (ref 0.1–0.3)
LYMPHOCYTES # BLD AUTO: 0.64 K/UL — LOW (ref 1.2–3.4)
LYMPHOCYTES # BLD AUTO: 3.7 % — LOW (ref 20.5–51.1)
MCHC RBC-ENTMCNC: 27.4 PG — SIGNIFICANT CHANGE UP (ref 27–31)
MCHC RBC-ENTMCNC: 29.8 G/DL — LOW (ref 32–37)
MCV RBC AUTO: 92.1 FL — SIGNIFICANT CHANGE UP (ref 81–99)
MONOCYTES # BLD AUTO: 1.16 K/UL — HIGH (ref 0.1–0.6)
MONOCYTES NFR BLD AUTO: 6.7 % — SIGNIFICANT CHANGE UP (ref 1.7–9.3)
NEUTROPHILS # BLD AUTO: 15.15 K/UL — HIGH (ref 1.4–6.5)
NEUTROPHILS NFR BLD AUTO: 87.6 % — HIGH (ref 42.2–75.2)
NRBC # BLD: 0 /100 WBCS — SIGNIFICANT CHANGE UP (ref 0–0)
PLATELET # BLD AUTO: 114 K/UL — LOW (ref 130–400)
POTASSIUM SERPL-MCNC: 3.9 MMOL/L — SIGNIFICANT CHANGE UP (ref 3.5–5)
POTASSIUM SERPL-SCNC: 3.9 MMOL/L — SIGNIFICANT CHANGE UP (ref 3.5–5)
PROT SERPL-MCNC: 5.8 G/DL — LOW (ref 6–8)
RBC # BLD: 2.66 M/UL — LOW (ref 4.2–5.4)
RBC # FLD: 20.9 % — HIGH (ref 11.5–14.5)
SODIUM SERPL-SCNC: 137 MMOL/L — SIGNIFICANT CHANGE UP (ref 135–146)
WBC # BLD: 17.29 K/UL — HIGH (ref 4.8–10.8)
WBC # FLD AUTO: 17.29 K/UL — HIGH (ref 4.8–10.8)

## 2019-11-18 PROCEDURE — 99231 SBSQ HOSP IP/OBS SF/LOW 25: CPT

## 2019-11-18 RX ORDER — HYDROMORPHONE HYDROCHLORIDE 2 MG/ML
4 INJECTION INTRAMUSCULAR; INTRAVENOUS; SUBCUTANEOUS EVERY 4 HOURS
Refills: 0 | Status: DISCONTINUED | OUTPATIENT
Start: 2019-11-18 | End: 2019-11-19

## 2019-11-18 RX ORDER — HYDROMORPHONE HYDROCHLORIDE 2 MG/ML
4 INJECTION INTRAMUSCULAR; INTRAVENOUS; SUBCUTANEOUS
Refills: 0 | Status: DISCONTINUED | OUTPATIENT
Start: 2019-11-18 | End: 2019-11-25

## 2019-11-18 RX ORDER — FENTANYL CITRATE 50 UG/ML
3 INJECTION INTRAVENOUS
Refills: 0 | Status: DISCONTINUED | OUTPATIENT
Start: 2019-11-18 | End: 2019-11-22

## 2019-11-18 RX ADMIN — HYDROMORPHONE HYDROCHLORIDE 4 MILLIGRAM(S): 2 INJECTION INTRAMUSCULAR; INTRAVENOUS; SUBCUTANEOUS at 18:06

## 2019-11-18 RX ADMIN — HYDROMORPHONE HYDROCHLORIDE 4 MILLIGRAM(S): 2 INJECTION INTRAMUSCULAR; INTRAVENOUS; SUBCUTANEOUS at 11:55

## 2019-11-18 RX ADMIN — FENTANYL CITRATE 3 PATCH: 50 INJECTION INTRAVENOUS at 19:29

## 2019-11-18 RX ADMIN — FENTANYL CITRATE 3 PATCH: 50 INJECTION INTRAVENOUS at 16:21

## 2019-11-18 RX ADMIN — PANTOPRAZOLE SODIUM 40 MILLIGRAM(S): 20 TABLET, DELAYED RELEASE ORAL at 18:05

## 2019-11-18 RX ADMIN — BUPROPION HYDROCHLORIDE 150 MILLIGRAM(S): 150 TABLET, EXTENDED RELEASE ORAL at 21:30

## 2019-11-18 RX ADMIN — CHLORHEXIDINE GLUCONATE 1 APPLICATION(S): 213 SOLUTION TOPICAL at 05:58

## 2019-11-18 RX ADMIN — HYDROMORPHONE HYDROCHLORIDE 4 MILLIGRAM(S): 2 INJECTION INTRAMUSCULAR; INTRAVENOUS; SUBCUTANEOUS at 12:30

## 2019-11-18 RX ADMIN — FENTANYL CITRATE 2 PATCH: 50 INJECTION INTRAVENOUS at 07:51

## 2019-11-18 RX ADMIN — HYDROMORPHONE HYDROCHLORIDE 4 MILLIGRAM(S): 2 INJECTION INTRAMUSCULAR; INTRAVENOUS; SUBCUTANEOUS at 08:04

## 2019-11-18 RX ADMIN — ENOXAPARIN SODIUM 40 MILLIGRAM(S): 100 INJECTION SUBCUTANEOUS at 21:30

## 2019-11-18 RX ADMIN — HYDROMORPHONE HYDROCHLORIDE 4 MILLIGRAM(S): 2 INJECTION INTRAMUSCULAR; INTRAVENOUS; SUBCUTANEOUS at 01:22

## 2019-11-18 RX ADMIN — SENNA PLUS 2 TABLET(S): 8.6 TABLET ORAL at 06:04

## 2019-11-18 RX ADMIN — Medication 40 MILLIEQUIVALENT(S): at 08:03

## 2019-11-18 RX ADMIN — Medication 4 MILLIGRAM(S): at 06:04

## 2019-11-18 RX ADMIN — HALOPERIDOL DECANOATE 0.25 MILLIGRAM(S): 100 INJECTION INTRAMUSCULAR at 18:04

## 2019-11-18 RX ADMIN — HYDROMORPHONE HYDROCHLORIDE 4 MILLIGRAM(S): 2 INJECTION INTRAMUSCULAR; INTRAVENOUS; SUBCUTANEOUS at 19:28

## 2019-11-18 RX ADMIN — CEFEPIME 100 MILLIGRAM(S): 1 INJECTION, POWDER, FOR SOLUTION INTRAMUSCULAR; INTRAVENOUS at 14:24

## 2019-11-18 RX ADMIN — HYDROMORPHONE HYDROCHLORIDE 4 MILLIGRAM(S): 2 INJECTION INTRAMUSCULAR; INTRAVENOUS; SUBCUTANEOUS at 06:05

## 2019-11-18 RX ADMIN — Medication 5 MILLIGRAM(S): at 21:30

## 2019-11-18 RX ADMIN — PANTOPRAZOLE SODIUM 40 MILLIGRAM(S): 20 TABLET, DELAYED RELEASE ORAL at 06:04

## 2019-11-18 RX ADMIN — HYDROMORPHONE HYDROCHLORIDE 4 MILLIGRAM(S): 2 INJECTION INTRAMUSCULAR; INTRAVENOUS; SUBCUTANEOUS at 06:03

## 2019-11-18 RX ADMIN — CEFEPIME 100 MILLIGRAM(S): 1 INJECTION, POWDER, FOR SOLUTION INTRAMUSCULAR; INTRAVENOUS at 06:04

## 2019-11-18 RX ADMIN — Medication 3 MILLILITER(S): at 12:17

## 2019-11-18 RX ADMIN — CEFEPIME 100 MILLIGRAM(S): 1 INJECTION, POWDER, FOR SOLUTION INTRAMUSCULAR; INTRAVENOUS at 21:28

## 2019-11-18 RX ADMIN — Medication 4 MILLIGRAM(S): at 11:57

## 2019-11-18 RX ADMIN — Medication 100 MILLIGRAM(S): at 06:04

## 2019-11-18 RX ADMIN — HYDROMORPHONE HYDROCHLORIDE 4 MILLIGRAM(S): 2 INJECTION INTRAMUSCULAR; INTRAVENOUS; SUBCUTANEOUS at 03:03

## 2019-11-18 RX ADMIN — Medication 3 MILLILITER(S): at 20:27

## 2019-11-18 RX ADMIN — HYDROMORPHONE HYDROCHLORIDE 4 MILLIGRAM(S): 2 INJECTION INTRAMUSCULAR; INTRAVENOUS; SUBCUTANEOUS at 21:28

## 2019-11-18 RX ADMIN — HYDROMORPHONE HYDROCHLORIDE 4 MILLIGRAM(S): 2 INJECTION INTRAMUSCULAR; INTRAVENOUS; SUBCUTANEOUS at 15:00

## 2019-11-18 RX ADMIN — HYDROMORPHONE HYDROCHLORIDE 4 MILLIGRAM(S): 2 INJECTION INTRAMUSCULAR; INTRAVENOUS; SUBCUTANEOUS at 08:40

## 2019-11-18 RX ADMIN — Medication 1 MILLIGRAM(S): at 11:57

## 2019-11-18 RX ADMIN — Medication 4 MILLIGRAM(S): at 18:04

## 2019-11-18 RX ADMIN — SENNA PLUS 2 TABLET(S): 8.6 TABLET ORAL at 18:06

## 2019-11-18 RX ADMIN — HYDROMORPHONE HYDROCHLORIDE 4 MILLIGRAM(S): 2 INJECTION INTRAMUSCULAR; INTRAVENOUS; SUBCUTANEOUS at 14:24

## 2019-11-18 RX ADMIN — ESCITALOPRAM OXALATE 20 MILLIGRAM(S): 10 TABLET, FILM COATED ORAL at 21:30

## 2019-11-18 RX ADMIN — HALOPERIDOL DECANOATE 0.25 MILLIGRAM(S): 100 INJECTION INTRAMUSCULAR at 06:04

## 2019-11-18 NOTE — PROGRESS NOTE ADULT - SUBJECTIVE AND OBJECTIVE BOX
44yFemale with diagnosis: HYPOKALEMIA LUNG CANCER ANEMIA PNEUMONIA JAZMIN      Patient seen, pain improving with current regimen.       PHYSICAL EXAM   unchanged    T(C): , Max: 36.9 (21:22)  T(F): 98  HR: 108 (108 - 130)  BP: 149/82 (149/82 - 151/94)  RR: 17 (17 - 18)  SpO2: 92% (92% - 92%)              LABS:                                                                                       11-17    137  |  93<L>  |  9<L>  ----------------------------<  101<H>  3.4<L>   |  24  |  <0.5<L>    Ca    8.7      17 Nov 2019 12:27    TPro  5.6<L>  /  Alb  2.8<L>  /  TBili  0.7  /  DBili  x   /  AST  30  /  ALT  20  /  AlkPhos  146<H>  11-17                                                      MEDICATIONS  (STANDING):  albuterol/ipratropium for Nebulization 3 milliLiter(s) Nebulizer every 6 hours  buPROPion XL . 150 milliGRAM(s) Oral at bedtime  cefepime   IVPB      cefepime   IVPB 2000 milliGRAM(s) IV Intermittent every 8 hours  chlorhexidine 4% Liquid 1 Application(s) Topical <User Schedule>  dexAMETHasone     Tablet 4 milliGRAM(s) Oral four times a day  enoxaparin Injectable 40 milliGRAM(s) SubCutaneous at bedtime  escitalopram 20 milliGRAM(s) Oral at bedtime  fentaNYL   Patch 100 MICROgram(s)/Hr 3 Patch Transdermal every 72 hours  folic acid 1 milliGRAM(s) Oral daily  haloperidol    Concentrate 0.25 milliGRAM(s) Oral every 12 hours  HYDROmorphone  Injectable 4 milliGRAM(s) IV Push every 4 hours  influenza   Vaccine 0.5 milliLiter(s) IntraMuscular once  melatonin 5 milliGRAM(s) Oral at bedtime  pantoprazole    Tablet 40 milliGRAM(s) Oral every 12 hours  polyethylene glycol 3350 17 Gram(s) Oral at bedtime  senna 2 Tablet(s) Oral every 12 hours    MEDICATIONS  (PRN):  ALBUTerol    0.083% 2.5 milliGRAM(s) Nebulizer every 6 hours PRN Bronchospasm  benzonatate 100 milliGRAM(s) Oral three times a day PRN Cough  clonazePAM  Tablet 0.5 milliGRAM(s) Oral three times a day PRN anxiety  HYDROmorphone  Injectable 4 milliGRAM(s) IV Push every 3 hours PRN Severe Pain (7 - 10)  saline laxative (FLEET) Rectal Enema 1 Enema Rectal daily PRN Constipation

## 2019-11-18 NOTE — PROGRESS NOTE ADULT - ASSESSMENT
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 Patient now DNR/DNI due to grave prognosis from worsening metastatic adenocarcinoma of the lung. No more labs.    #Hx of Metastatic adenocarcinoma of the lung; Leptomeningeal disease s/p spinal tap (w/o malignant cells)  - Received Alimta (10/9/2019)\  - MRI brain shows worsening mets, with edema, started on Dexamethasone  - Palliative care and hospice consults ordered  - Palliative: Hospice planning, Fentanyl 200mcg Patch Q 72 HRS, Dilaudid 2mg IV Q 4 HRS x 12 hrs, dc Oxycontin and oxycodone, Haldol 0.25mg SL Q 12 HRS, Enema DE Q 24 HRS PRN, Senna and Colace  - Dilaudid increased to 4mg q3 hours PRN for severe pain    #Dyspnea/Fatigue   - due to Symptomatic anemia vs. post-obstructive pneumonia  - CXR: Left upper lobe mass with possible opacity  - Continue Nebs and monitor saturation level  - C/w cefepime  - Blood cx- no growth  - LE duplex shows no DVT  - Was 84% on RA, 94% on O2 this morning> Will need O2 with hospice      #Acute-on-chronic normocytic anemia; secondary to chronic disease vs. BM suppression (Chemo/Rad)  - s/p 2units pRBC ; Goal Hgb > 7  - Iron studies: Ferritin > 1000, B12 > 2000, folate normal  - T+S active    #Hypokalemia  - 3.4 today  - Supplementing    #Elevated INR  - Unclear etiology, could be related to Vit K deficiency from poor appetite  - INR fluctuating up and down  - ALP elevated too but trending down    Diet: Regular diet  DVT ppx: Lovenox 40 mg qD  Activity: Increase as tolerated  Code status: DNR/DNI- MOLST form signed 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 Patient now DNR/DNI due to grave prognosis from worsening metastatic adenocarcinoma of the lung. No more labs.    #Hx of Metastatic adenocarcinoma of the lung; Leptomeningeal disease s/p spinal tap (w/o malignant cells)  - Received Alimta (10/9/2019)\  - MRI brain shows worsening mets, with edema, started on Dexamethasone  - Palliative care and hospice consults ordered  - Palliative: Hospice planning, Fentanyl 200mcg Patch Q 72 HRS, Dilaudid 2mg IV Q 4 HRS x 12 hrs, dc Oxycontin and oxycodone, Haldol 0.25mg SL Q 12 HRS, Enema MI Q 24 HRS PRN, Senna and Colace  - Dilaudid increased to 4mg q3 hours PRN for severe pain    #Dyspnea/Fatigue   - due to Symptomatic anemia vs. post-obstructive pneumonia  - CXR: Left upper lobe mass with possible opacity  - Continue Nebs and monitor saturation level  - C/w cefepime, day 7 today, d/c from tomorrow  - Blood Cx: no growth  - LE duplex shows no DVT  - Was 84% on RA, 94% on O2 this morning> Will need O2 with hospice  - Patient takes off NC on her own and desaturates, counselled to stop      #Acute-on-chronic normocytic anemia; secondary to chronic disease vs. BM suppression (Chemo/Rad)  - s/p 2units pRBC ; Goal Hgb > 7  - Iron studies: Ferritin > 1000, B12 > 2000, folate normal  - T+S active  - F/u 4 pm CBC     #Hypokalemia  - 3.4 previously  - Supplemented  - F/u 4 pm CMP    #Elevated INR  - Unclear etiology, could be related to Vit K deficiency from poor appetite  - INR fluctuating up and down  - ALP elevated too but trending down    Diet: Regular diet  DVT ppx: Lovenox 40 mg qD  Activity: Increase as tolerated  Code status: DNR/DNI  Disposition: Complicated disposition as patient wants to go to [place] for hospice to be closer to family. F/u with .

## 2019-11-18 NOTE — PROGRESS NOTE ADULT - ASSESSMENT
44yFemale being evaluated for pain managment/ comfort care.    Pt with high opioid requirements, utilized 640mg additional morphine equivalents with fentanyl 200mcg stated Friday.  Restlessness improved w RTC Haldol.    MEDD: 400 + 640     Recommendations:  Increase fentanyl to 300mcg q72hrs today  cont dilaudid 4mg IV q12h ATC - plan to discontinue tomorrow once fentanyl more efficacious   cont dilaudid 4mg IV q1h PRN breakthrough pain  dc dilaudid 2mg IV orders   will titrate IV to po meds once pain better controlled  daily bowel regimen  remaining comfort meds as written   Hospice f/u for dispo planning     Supportive care  We will follow  [phone]

## 2019-11-18 NOTE — CHART NOTE - NSCHARTNOTEFT_GEN_A_CORE
Registered Dietitian Limited Follow Up  -PNA on abx, last day of cefepime today and will d/c once complete. Metastatic adenocarcinoma of the lung with worsening brain mets. No further cancer directed treatment being offered by heme/onc at this time, however. pt with complicated dispo plan. Pt appropriate for hospice, s/p hospice c/s. However, pt requesting to go to [place] for hospice as family lives there but if pt unable to travel, will pursue hospice services in [place]. Disposition plan remains pending. Palliative following for pain management regimen. At this time, pt PO intake continues to remain poor (<50%, 25% intake of meal at best) and next step for beneficial nutrition intervention would be nutrition support, but, MOLST form in pt chart indicates pt wishes include "no artifical nutrition intervention", therefore no further aggressive nutrition interventions indicated. Mechanical Soft diet order remains pending, which was again d/w LIP (Buck) to optimize tolerance to intake. Ensure can continue to be provided as pt likes supplement (chocolate only). RD will continue to provide limited GOC follow up in 3 days to determine GOC/POC.

## 2019-11-18 NOTE — PROGRESS NOTE ADULT - ASSESSMENT
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 Will DC abx after today     # Acute on chronic normocytic anemia likely secondary to Acute on chronic disease vs BM suppression 2/2 to chemotherapy and radiation therapy   # JAZMIN likely prerenal secondary to decreased PO intake- improved with ivf , resolved now  # Cancer related pain- pain meds adjusted by palliative.     #Metastatic adenocarcinoma of the lung, PDL1 5%, EGFT, Alk, BRAF, KRAS, ROS1 negative with brain mets and MRI L-spine- leptomeningeal disease s/p spinal tap- CSF- no malignant cells  Received Alimta- 10/9/19  MRI brain shows worsening mets with edema  c/w decadron  Family met with hospice. Her disposition plan is complicated, as they want to take her to [place] as her family is there. If thats not possible then hospice here at [place]     Hospice and social work team working on her dispo       Very poor prognosis   Diet: comfort food   Code status: DNR/DNI  Comfort measures

## 2019-11-18 NOTE — PROGRESS NOTE ADULT - SUBJECTIVE AND OBJECTIVE BOX
Patient is a 44y old  Female who presents with a chief complaint of Metastatic Lung cancer (17 Nov 2019 08:37)      Subjective: She  looked comfortable this am.  She states her new pain meds are helping better       Vital Signs Last 24 Hrs  T(C): 36.7 (18 Nov 2019 05:41), Max: 36.9 (17 Nov 2019 21:22)  T(F): 98 (18 Nov 2019 05:41), Max: 98.4 (17 Nov 2019 21:22)  HR: 108 (18 Nov 2019 07:35) (108 - 130)  BP: 149/82 (18 Nov 2019 05:41) (147/79 - 151/94)  BP(mean): --  RR: 17 (18 Nov 2019 05:41) (17 - 18)  SpO2: 92% (18 Nov 2019 07:35) (92% - 92%)    PHYSICAL EXAM  General: adult in NAD  Neck: supple  CV: normal S1/S2   Lungs: positive air movement b/l ant lungs  Abdomen: soft non-tender   Neuro: Awake, oriented to place and self. Confused, Slow in answering questions     MEDICATIONS  (STANDING):  albuterol/ipratropium for Nebulization 3 milliLiter(s) Nebulizer every 6 hours  buPROPion XL . 150 milliGRAM(s) Oral at bedtime  cefepime   IVPB      cefepime   IVPB 2000 milliGRAM(s) IV Intermittent every 8 hours  chlorhexidine 4% Liquid 1 Application(s) Topical <User Schedule>  dexAMETHasone     Tablet 4 milliGRAM(s) Oral four times a day  enoxaparin Injectable 40 milliGRAM(s) SubCutaneous at bedtime  escitalopram 20 milliGRAM(s) Oral at bedtime  fentaNYL   Patch 100 MICROgram(s)/Hr 2 Patch Transdermal every 72 hours  folic acid 1 milliGRAM(s) Oral daily  haloperidol    Concentrate 0.25 milliGRAM(s) Oral every 12 hours  influenza   Vaccine 0.5 milliLiter(s) IntraMuscular once  melatonin 5 milliGRAM(s) Oral at bedtime  pantoprazole    Tablet 40 milliGRAM(s) Oral every 12 hours  polyethylene glycol 3350 17 Gram(s) Oral at bedtime  senna 2 Tablet(s) Oral every 12 hours    MEDICATIONS  (PRN):  ALBUTerol    0.083% 2.5 milliGRAM(s) Nebulizer every 6 hours PRN Bronchospasm  benzonatate 100 milliGRAM(s) Oral three times a day PRN Cough  clonazePAM  Tablet 0.5 milliGRAM(s) Oral three times a day PRN anxiety  HYDROmorphone  Injectable 4 milliGRAM(s) IV Push every 3 hours PRN Severe Pain (7 - 10)  saline laxative (FLEET) Rectal Enema 1 Enema Rectal daily PRN Constipation      LABS:                11-17    137  |  93<L>  |  9<L>  ----------------------------<  101<H>  3.4<L>   |  24  |  <0.5<L>    Ca    8.7      17 Nov 2019 12:27    TPro  5.6<L>  /  Alb  2.8<L>  /  TBili  0.7  /  DBili  x   /  AST  30  /  ALT  20  /  AlkPhos  146<H>  11-17          Vitamin B12, Serum: >2000 pg/mL (11-12 @ 07:16)  Folate, Serum: 9.4 ng/mL (11-12 @ 07:16)                          BLOOD SMEAR INTERPRETATION:       RADIOLOGY & ADDITIONAL STUDIES:

## 2019-11-18 NOTE — PROGRESS NOTE ADULT - SUBJECTIVE AND OBJECTIVE BOX
Patient Information:  REMEDIOS DENTON / 44y / Female / MRN#:[id number]    Hospital Day: 8d    HPI:  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 She recently returned from her trip to [place] and during her trip, her SOB had been progressively getting worse. Upon returning from her trip, she decided to present to ED for further workup. Pt also states she has been having productive cough w/ brownish sputum. There is no relieving or excaerbating factor for her SOB and cough. Also admits to decreased appetite. Denies fever or chill but does state her  recently recovered from a cold. Pt also denies any melena or hematchezia and denies epigastric pain. Her last chemotherapy (alimata and avasta) was done on 10/9/2019.     Vital Signs Last 24 Hrs  T(C): 36.2 (10 Nov 2019 15:50), Max: 36.8 (10 Nov 2019 11:04)  T(F): 97.1 (10 Nov 2019 15:50), Max: 98.3 (10 Nov 2019 11:04)  HR: 99 (10 Nov 2019 15:50) (99 - 104)  BP: 112/60 (10 Nov 2019 15:50) (112/60 - 118/63)  BP(mean): --  RR: 20 (10 Nov 2019 11:04) (20 - 20)  SpO2: 94% (10 Nov 2019 11:04) (94% - 94%)    In ED, pt was found to have Hgb of 5.9, K of 2.9 and elevated Cr. In ED, pt was given 2U of pRBC. Pt is admitted for symptomatic anemia.    Interval History:  Patient seen and examined at bedside. No acute events overnight.    Past Medical History:  Lung cancer  Anxiety and depression    Past Surgical History:  History of cholecystectomy    Allergies:  dust (Sneezing; Rhinorrhea)  Erythromycin Base (Other)    Medications:  PRN:  ALBUTerol    0.083% 2.5 milliGRAM(s) Nebulizer every 6 hours PRN Bronchospasm  benzonatate 100 milliGRAM(s) Oral three times a day PRN Cough  clonazePAM  Tablet 0.5 milliGRAM(s) Oral three times a day PRN anxiety  HYDROmorphone  Injectable 4 milliGRAM(s) IV Push every 3 hours PRN Severe Pain (7 - 10)  saline laxative (FLEET) Rectal Enema 1 Enema Rectal daily PRN Constipation    Standing:  albuterol/ipratropium for Nebulization 3 milliLiter(s) Nebulizer every 6 hours  buPROPion XL . 150 milliGRAM(s) Oral at bedtime  cefepime   IVPB      cefepime   IVPB 2000 milliGRAM(s) IV Intermittent every 8 hours  chlorhexidine 4% Liquid 1 Application(s) Topical <User Schedule>  dexAMETHasone     Tablet 4 milliGRAM(s) Oral four times a day  docusate sodium 100 milliGRAM(s) Oral three times a day  enoxaparin Injectable 40 milliGRAM(s) SubCutaneous at bedtime  escitalopram 20 milliGRAM(s) Oral at bedtime  fentaNYL   Patch 100 MICROgram(s)/Hr 2 Patch Transdermal every 72 hours  folic acid 1 milliGRAM(s) Oral daily  haloperidol    Concentrate 0.25 milliGRAM(s) Oral every 12 hours  influenza   Vaccine 0.5 milliLiter(s) IntraMuscular once  melatonin 5 milliGRAM(s) Oral at bedtime  pantoprazole    Tablet 40 milliGRAM(s) Oral every 12 hours  polyethylene glycol 3350 17 Gram(s) Oral at bedtime  senna 2 Tablet(s) Oral every 12 hours  senna 8.6 milliGRAM(s) Oral Tablet - Peds 1 Tablet(s) Oral at bedtime    Home:  Advil Liquigel 200 mg oral capsule: 1 cap(s) orally every 4 hours, As Needed - for mild pain  Allegra 180 mg oral tablet: 1 tab(s) orally once a day  benzonatate 100 mg oral capsule: 1 cap(s) orally 3 times a day  Colace 100 mg oral capsule: 1 cap(s) orally 2 times a day  escitalopram 20 mg oral tablet: 1 tab(s) orally once a day (at bedtime)  KlonoPIN 0.5 mg oral tablet: 1 tab(s) orally 3 times a day, As Needed  oxyCODONE 20 mg oral tablet: 1 tab(s) orally every 4 hours -Severe Pain (7 - 10) as needed MDD:4 tablets  OxyCONTIN 80 mg oral tablet, extended release: 120 milligram(s) orally 2 times a day  prochlorperazine 10 mg oral tablet: 1 tab(s) orally every 6 hours, As Needed  Protonix 40 mg oral delayed release tablet: 1 tab(s) orally 2 times a day  Senna 8.6 mg oral tablet: 1 tab(s) orally once a day (at bedtime)    Vitals:  T(C): 36.7, Max: 36.9 (11-17-19 @ 21:22)  T(F): 98, Max: 98.4 (11-17-19 @ 21:22)  HR: 130 (114 - 130)  BP: 149/82 (147/79 - 151/94)  RR: 17 (17 - 18)  SpO2: --    Physical Exam:  General: Awake, Alert. Not in acute distress.  Heart: Regular rate and rhythm; S1, S2; No murmurs.  Lungs: Clear to auscultation bilaterally.  Abdomen: Soft, nontender, nondistended.  Extremities: No edema in upper or lower extremities.  Neuro: AAOx3, No focal deficits.    Labs:    Chem (11-17 @ 12:27)  Na: 137  |     Cl: 93     |  BUN: 9   -----------------------------------------< Gluc: 101    K: 3.4   |    HCO3: 24  |  Cr: <0.5    Ca 8.7 (11-17 @ 12:27)    LFTs (11-17 @ 12:27)  TPro 5.6  /  Alb 2.8  TBili 0.7  /  DBili     AST 30  /  ALT 20  /  AlkPhos 146          Microbiology:  Culture - Blood (collected 11-11 @ 17:54)  Source: .Blood None  Final Report (11-16 @ 23:00):    No growth at 5 days.    Culture - Blood (collected 11-11 @ 17:54)  Source: .Blood None  Final Report (11-16 @ 23:00):    No growth at 5 days.        Radiology: Patient Information:  REMEDIOS DENTON / 44y / Female / MRN#:[id number]    Hospital Day: 8d    HPI:  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 She recently returned from her trip to [place] and during her trip, her SOB had been progressively getting worse. Upon returning from her trip, she decided to present to ED for further workup. Pt also states she has been having productive cough w/ brownish sputum. There is no relieving or excaerbating factor for her SOB and cough. Also admits to decreased appetite. Denies fever or chill but does state her  recently recovered from a cold. Pt also denies any melena or hematchezia and denies epigastric pain. Her last chemotherapy (alimata and avasta) was done on 10/9/2019.     Vital Signs Last 24 Hrs  T(C): 36.2 (10 Nov 2019 15:50), Max: 36.8 (10 Nov 2019 11:04)  T(F): 97.1 (10 Nov 2019 15:50), Max: 98.3 (10 Nov 2019 11:04)  HR: 99 (10 Nov 2019 15:50) (99 - 104)  BP: 112/60 (10 Nov 2019 15:50) (112/60 - 118/63)  BP(mean): --  RR: 20 (10 Nov 2019 11:04) (20 - 20)  SpO2: 94% (10 Nov 2019 11:04) (94% - 94%)    In ED, pt was found to have Hgb of 5.9, K of 2.9 and elevated Cr. In ED, pt was given 2U of pRBC. Pt is admitted for symptomatic anemia.    Interval History:  Patient seen and examined at bedside. No acute events overnight.    Past Medical History:  Lung cancer  Anxiety and depression    Past Surgical History:  History of cholecystectomy    Allergies:  dust (Sneezing; Rhinorrhea)  Erythromycin Base (Other)    Medications:  PRN:  ALBUTerol    0.083% 2.5 milliGRAM(s) Nebulizer every 6 hours PRN Bronchospasm  benzonatate 100 milliGRAM(s) Oral three times a day PRN Cough  clonazePAM  Tablet 0.5 milliGRAM(s) Oral three times a day PRN anxiety  HYDROmorphone  Injectable 4 milliGRAM(s) IV Push every 3 hours PRN Severe Pain (7 - 10)  saline laxative (FLEET) Rectal Enema 1 Enema Rectal daily PRN Constipation    Standing:  albuterol/ipratropium for Nebulization 3 milliLiter(s) Nebulizer every 6 hours  buPROPion XL . 150 milliGRAM(s) Oral at bedtime  cefepime   IVPB      cefepime   IVPB 2000 milliGRAM(s) IV Intermittent every 8 hours  chlorhexidine 4% Liquid 1 Application(s) Topical <User Schedule>  dexAMETHasone     Tablet 4 milliGRAM(s) Oral four times a day  docusate sodium 100 milliGRAM(s) Oral three times a day  enoxaparin Injectable 40 milliGRAM(s) SubCutaneous at bedtime  escitalopram 20 milliGRAM(s) Oral at bedtime  fentaNYL   Patch 100 MICROgram(s)/Hr 2 Patch Transdermal every 72 hours  folic acid 1 milliGRAM(s) Oral daily  haloperidol    Concentrate 0.25 milliGRAM(s) Oral every 12 hours  influenza   Vaccine 0.5 milliLiter(s) IntraMuscular once  melatonin 5 milliGRAM(s) Oral at bedtime  pantoprazole    Tablet 40 milliGRAM(s) Oral every 12 hours  polyethylene glycol 3350 17 Gram(s) Oral at bedtime  senna 2 Tablet(s) Oral every 12 hours  senna 8.6 milliGRAM(s) Oral Tablet - Peds 1 Tablet(s) Oral at bedtime    Home:  Advil Liquigel 200 mg oral capsule: 1 cap(s) orally every 4 hours, As Needed - for mild pain  Allegra 180 mg oral tablet: 1 tab(s) orally once a day  benzonatate 100 mg oral capsule: 1 cap(s) orally 3 times a day  Colace 100 mg oral capsule: 1 cap(s) orally 2 times a day  escitalopram 20 mg oral tablet: 1 tab(s) orally once a day (at bedtime)  KlonoPIN 0.5 mg oral tablet: 1 tab(s) orally 3 times a day, As Needed  oxyCODONE 20 mg oral tablet: 1 tab(s) orally every 4 hours -Severe Pain (7 - 10) as needed MDD:4 tablets  OxyCONTIN 80 mg oral tablet, extended release: 120 milligram(s) orally 2 times a day  prochlorperazine 10 mg oral tablet: 1 tab(s) orally every 6 hours, As Needed  Protonix 40 mg oral delayed release tablet: 1 tab(s) orally 2 times a day  Senna 8.6 mg oral tablet: 1 tab(s) orally once a day (at bedtime)    Vitals:  T(C): 36.7, Max: 36.9 (11-17-19 @ 21:22)  T(F): 98, Max: 98.4 (11-17-19 @ 21:22)  HR: 130 (114 - 130)  BP: 149/82 (147/79 - 151/94)  RR: 17 (17 - 18)  SpO2: --    Physical Exam:  General: Awake, Alert. Not in acute distress.  Heart: Regular rate and rhythm; S1, S2; No murmurs.  Lungs: Clear to auscultation bilaterally.  Abdomen: Soft, nontender, nondistended.  Extremities: No edema in upper or lower extremities.  Neuro: AAOx3, No focal deficits.    Labs:    Chem (11-17 @ 12:27)  Na: 137  |     Cl: 93     |  BUN: 9   -----------------------------------------< Gluc: 101    K: 3.4   |    HCO3: 24  |  Cr: <0.5    Ca 8.7 (11-17 @ 12:27)    LFTs (11-17 @ 12:27)  TPro 5.6  /  Alb 2.8  TBili 0.7  /  DBili     AST 30  /  ALT 20  /  AlkPhos 146          Microbiology:  Culture - Blood (collected 11-11 @ 17:54)  Source: .Blood None  Final Report (11-16 @ 23:00):    No growth at 5 days.    Culture - Blood (collected 11-11 @ 17:54)  Source: .Blood None  Final Report (11-16 @ 23:00):    No growth at 5 days.        Radiology:    < from: MR Head w/ IV Cont (11.12.19 @ 10:20) >  IMPRESSION:    Since MRI brain 8/31/2019:    1. Exam is limited by motion artifact particularly the postcontrast   images which makes comparison of the enhancing lesions to the prior study   difficult.    2. Multiple scattered enhancing subcentimeter lesions again identified. A   few of the lesions are slightly increased in size as described above.   Several of the lesions demonstrate a more rim-enhancing pattern and   central hypointensity since the prior study. Small amount of edema   adjacent to several of the lesions which is slightly increased.    3. Left frontal lobe lesion now demonstrates internal blood products and   is slightly decreased in size.    < end of copied text >

## 2019-11-19 PROCEDURE — 99231 SBSQ HOSP IP/OBS SF/LOW 25: CPT

## 2019-11-19 RX ADMIN — Medication 0.5 MILLIGRAM(S): at 12:32

## 2019-11-19 RX ADMIN — Medication 0.5 MILLIGRAM(S): at 16:05

## 2019-11-19 RX ADMIN — SENNA PLUS 2 TABLET(S): 8.6 TABLET ORAL at 06:06

## 2019-11-19 RX ADMIN — HALOPERIDOL DECANOATE 0.25 MILLIGRAM(S): 100 INJECTION INTRAMUSCULAR at 17:42

## 2019-11-19 RX ADMIN — HYDROMORPHONE HYDROCHLORIDE 4 MILLIGRAM(S): 2 INJECTION INTRAMUSCULAR; INTRAVENOUS; SUBCUTANEOUS at 18:00

## 2019-11-19 RX ADMIN — BUPROPION HYDROCHLORIDE 150 MILLIGRAM(S): 150 TABLET, EXTENDED RELEASE ORAL at 22:35

## 2019-11-19 RX ADMIN — HYDROMORPHONE HYDROCHLORIDE 4 MILLIGRAM(S): 2 INJECTION INTRAMUSCULAR; INTRAVENOUS; SUBCUTANEOUS at 21:40

## 2019-11-19 RX ADMIN — Medication 4 MILLIGRAM(S): at 23:05

## 2019-11-19 RX ADMIN — HYDROMORPHONE HYDROCHLORIDE 4 MILLIGRAM(S): 2 INJECTION INTRAMUSCULAR; INTRAVENOUS; SUBCUTANEOUS at 12:30

## 2019-11-19 RX ADMIN — HYDROMORPHONE HYDROCHLORIDE 4 MILLIGRAM(S): 2 INJECTION INTRAMUSCULAR; INTRAVENOUS; SUBCUTANEOUS at 08:16

## 2019-11-19 RX ADMIN — Medication 3 MILLILITER(S): at 20:06

## 2019-11-19 RX ADMIN — Medication 5 MILLIGRAM(S): at 22:35

## 2019-11-19 RX ADMIN — HYDROMORPHONE HYDROCHLORIDE 4 MILLIGRAM(S): 2 INJECTION INTRAMUSCULAR; INTRAVENOUS; SUBCUTANEOUS at 06:09

## 2019-11-19 RX ADMIN — HYDROMORPHONE HYDROCHLORIDE 4 MILLIGRAM(S): 2 INJECTION INTRAMUSCULAR; INTRAVENOUS; SUBCUTANEOUS at 01:17

## 2019-11-19 RX ADMIN — HYDROMORPHONE HYDROCHLORIDE 4 MILLIGRAM(S): 2 INJECTION INTRAMUSCULAR; INTRAVENOUS; SUBCUTANEOUS at 21:14

## 2019-11-19 RX ADMIN — PANTOPRAZOLE SODIUM 40 MILLIGRAM(S): 20 TABLET, DELAYED RELEASE ORAL at 06:06

## 2019-11-19 RX ADMIN — HYDROMORPHONE HYDROCHLORIDE 4 MILLIGRAM(S): 2 INJECTION INTRAMUSCULAR; INTRAVENOUS; SUBCUTANEOUS at 17:45

## 2019-11-19 RX ADMIN — Medication 3 MILLILITER(S): at 09:10

## 2019-11-19 RX ADMIN — ESCITALOPRAM OXALATE 20 MILLIGRAM(S): 10 TABLET, FILM COATED ORAL at 22:35

## 2019-11-19 RX ADMIN — HYDROMORPHONE HYDROCHLORIDE 4 MILLIGRAM(S): 2 INJECTION INTRAMUSCULAR; INTRAVENOUS; SUBCUTANEOUS at 13:30

## 2019-11-19 RX ADMIN — FENTANYL CITRATE 3 PATCH: 50 INJECTION INTRAVENOUS at 08:28

## 2019-11-19 RX ADMIN — Medication 4 MILLIGRAM(S): at 17:42

## 2019-11-19 RX ADMIN — POLYETHYLENE GLYCOL 3350 17 GRAM(S): 17 POWDER, FOR SOLUTION ORAL at 01:16

## 2019-11-19 RX ADMIN — Medication 4 MILLIGRAM(S): at 06:06

## 2019-11-19 RX ADMIN — HYDROMORPHONE HYDROCHLORIDE 4 MILLIGRAM(S): 2 INJECTION INTRAMUSCULAR; INTRAVENOUS; SUBCUTANEOUS at 09:00

## 2019-11-19 RX ADMIN — Medication 4 MILLIGRAM(S): at 01:19

## 2019-11-19 RX ADMIN — HYDROMORPHONE HYDROCHLORIDE 4 MILLIGRAM(S): 2 INJECTION INTRAMUSCULAR; INTRAVENOUS; SUBCUTANEOUS at 23:05

## 2019-11-19 RX ADMIN — HALOPERIDOL DECANOATE 0.25 MILLIGRAM(S): 100 INJECTION INTRAMUSCULAR at 06:07

## 2019-11-19 RX ADMIN — HYDROMORPHONE HYDROCHLORIDE 4 MILLIGRAM(S): 2 INJECTION INTRAMUSCULAR; INTRAVENOUS; SUBCUTANEOUS at 15:01

## 2019-11-19 RX ADMIN — Medication 4 MILLIGRAM(S): at 12:32

## 2019-11-19 RX ADMIN — ENOXAPARIN SODIUM 40 MILLIGRAM(S): 100 INJECTION SUBCUTANEOUS at 22:34

## 2019-11-19 RX ADMIN — Medication 1 MILLIGRAM(S): at 12:32

## 2019-11-19 NOTE — PROGRESS NOTE ADULT - SUBJECTIVE AND OBJECTIVE BOX
44yFemale with diagnosis: HYPOKALEMIA LUNG CANCER ANEMIA PNEUMONIA JAZMIN      Patient seen for follow up. Pt in less pain but anxious.      PHYSICAL EXAM  Pt appears mildly dyspneic   Alert and conversant, but tangential  Needs assist to reposition    T(C): , Max: 37.3 (13:37)  T(F): 99.2  HR: 133 (115 - 133)  BP: 115/78 (115/78 - 140/85)  RR: 24 (18 - 24)  SpO2: 97% (93% - 97%)              LABS:                          7.3    17.29 )-----------( 114      ( 18 Nov 2019 19:13 )             24.5                                                                                      11-18    137  |  94<L>  |  13  ----------------------------<  100<H>  3.9   |  25  |  <0.5<L>    Ca    9.2      18 Nov 2019 19:13    TPro  5.8<L>  /  Alb  2.9<L>  /  TBili  0.7  /  DBili  x   /  AST  34  /  ALT  20  /  AlkPhos  163<H>  11-18                                                      MEDICATIONS  (STANDING):  albuterol/ipratropium for Nebulization 3 milliLiter(s) Nebulizer every 6 hours  buPROPion XL . 150 milliGRAM(s) Oral at bedtime  chlorhexidine 4% Liquid 1 Application(s) Topical <User Schedule>  dexAMETHasone     Tablet 4 milliGRAM(s) Oral four times a day  enoxaparin Injectable 40 milliGRAM(s) SubCutaneous at bedtime  escitalopram 20 milliGRAM(s) Oral at bedtime  fentaNYL   Patch 100 MICROgram(s)/Hr 3 Patch Transdermal every 72 hours  folic acid 1 milliGRAM(s) Oral daily  haloperidol    Concentrate 0.25 milliGRAM(s) Oral every 12 hours  influenza   Vaccine 0.5 milliLiter(s) IntraMuscular once  melatonin 5 milliGRAM(s) Oral at bedtime  pantoprazole    Tablet 40 milliGRAM(s) Oral every 12 hours  polyethylene glycol 3350 17 Gram(s) Oral at bedtime  senna 2 Tablet(s) Oral every 12 hours    MEDICATIONS  (PRN):  ALBUTerol    0.083% 2.5 milliGRAM(s) Nebulizer every 6 hours PRN Bronchospasm  benzonatate 100 milliGRAM(s) Oral three times a day PRN Cough  clonazePAM  Tablet 0.5 milliGRAM(s) Oral three times a day PRN anxiety  guaiFENesin    Syrup 200 milliGRAM(s) Oral every 6 hours PRN Cough  HYDROmorphone  Injectable 4 milliGRAM(s) IV Push every 1 hour PRN Severe Pain (7 - 10)  saline laxative (FLEET) Rectal Enema 1 Enema Rectal daily PRN Constipation

## 2019-11-19 NOTE — GOALS OF CARE CONVERSATION - ADVANCED CARE PLANNING - CONVERSATION DETAILS
Pt is DNR/DNI. Discussed comfort measures only at length. Pt's  is making decisions as pt is confused and she defers to him. Chris is aware of diagnosis and has hospice but pt is currently requiring close monitoring for symptom management. Discussed end of life care and not doing labs or tests or escalating oxygen. He agrees and wants symptom management all based on comfort.  Discussed w heme/onc and medical team

## 2019-11-19 NOTE — PROGRESS NOTE ADULT - SUBJECTIVE AND OBJECTIVE BOX
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 She recently returned from her trip to [place] and during her trip, her SOB had been progressively getting worse. Upon returning from her trip, she decided to present to ED for further workup. Pt also states she has been having productive cough w/ brownish sputum. There is no relieving or excaerbating factor for her SOB and cough. Also admits to decreased appetite. Denies fever or chill but does state her  recently recovered from a cold. Pt also denies any melena or hematchezia and denies epigastric pain. Her last chemotherapy (alimata and avasta) was done on 10/9/2019.     Vital Signs Last 24 Hrs  T(C): 36.2 (10 Nov 2019 15:50), Max: 36.8 (10 Nov 2019 11:04)  T(F): 97.1 (10 Nov 2019 15:50), Max: 98.3 (10 Nov 2019 11:04)  HR: 99 (10 Nov 2019 15:50) (99 - 104)  BP: 112/60 (10 Nov 2019 15:50) (112/60 - 118/63)  BP(mean): --  RR: 20 (10 Nov 2019 11:04) (20 - 20)  SpO2: 94% (10 Nov 2019 11:04) (94% - 94%)    In ED, pt was found to have Hgb of 5.9, K of 2.9 and elevated Cr. In ED, pt was given 2U of pRBC. Pt is admitted for symptomatic anemia.    Interval History:  Patient seen and examined at bedside. No acute events overnight.    Past Medical History:  Lung cancer  Anxiety and depression    Past Surgical History:  History of cholecystectomy    Allergies:  dust (Sneezing; Rhinorrhea)  Erythromycin Base (Other)    Medications:  PRN:  ALBUTerol    0.083% 2.5 milliGRAM(s) Nebulizer every 6 hours PRN Bronchospasm  benzonatate 100 milliGRAM(s) Oral three times a day PRN Cough  clonazePAM  Tablet 0.5 milliGRAM(s) Oral three times a day PRN anxiety  guaiFENesin    Syrup 200 milliGRAM(s) Oral every 6 hours PRN Cough  HYDROmorphone  Injectable 4 milliGRAM(s) IV Push every 1 hour PRN Severe Pain (7 - 10)  saline laxative (FLEET) Rectal Enema 1 Enema Rectal daily PRN Constipation    Standing:  albuterol/ipratropium for Nebulization 3 milliLiter(s) Nebulizer every 6 hours  buPROPion XL . 150 milliGRAM(s) Oral at bedtime  chlorhexidine 4% Liquid 1 Application(s) Topical <User Schedule>  dexAMETHasone     Tablet 4 milliGRAM(s) Oral four times a day  enoxaparin Injectable 40 milliGRAM(s) SubCutaneous at bedtime  escitalopram 20 milliGRAM(s) Oral at bedtime  fentaNYL   Patch 100 MICROgram(s)/Hr 3 Patch Transdermal every 72 hours  folic acid 1 milliGRAM(s) Oral daily  haloperidol    Concentrate 0.25 milliGRAM(s) Oral every 12 hours  influenza   Vaccine 0.5 milliLiter(s) IntraMuscular once  melatonin 5 milliGRAM(s) Oral at bedtime  pantoprazole    Tablet 40 milliGRAM(s) Oral every 12 hours  polyethylene glycol 3350 17 Gram(s) Oral at bedtime  senna 2 Tablet(s) Oral every 12 hours    Home:  Advil Liquigel 200 mg oral capsule: 1 cap(s) orally every 4 hours, As Needed - for mild pain  Allegra 180 mg oral tablet: 1 tab(s) orally once a day  benzonatate 100 mg oral capsule: 1 cap(s) orally 3 times a day  Colace 100 mg oral capsule: 1 cap(s) orally 2 times a day  escitalopram 20 mg oral tablet: 1 tab(s) orally once a day (at bedtime)  KlonoPIN 0.5 mg oral tablet: 1 tab(s) orally 3 times a day, As Needed  oxyCODONE 20 mg oral tablet: 1 tab(s) orally every 4 hours -Severe Pain (7 - 10) as needed MDD:4 tablets  OxyCONTIN 80 mg oral tablet, extended release: 120 milligram(s) orally 2 times a day  prochlorperazine 10 mg oral tablet: 1 tab(s) orally every 6 hours, As Needed  Protonix 40 mg oral delayed release tablet: 1 tab(s) orally 2 times a day  Senna 8.6 mg oral tablet: 1 tab(s) orally once a day (at bedtime)    Vitals:  T(C): 37.3, Max: 37.3 (11-19-19 @ 13:37)  T(F): 99.2, Max: 99.2 (11-19-19 @ 13:37)  HR: 133 (115 - 133)  BP: 115/78 (115/78 - 140/85)  RR: 24 (18 - 24)  SpO2: 97% (93% - 97%)    Physical Exam:  General: Awake, Alert. Not in acute distress.  Heart: Regular rate and rhythm; S1, S2; No murmurs.  Lungs: Clear to auscultation bilaterally.  Abdomen: Soft, nontender, nondistended.  Extremities: No edema in upper or lower extremities.  Neuro: No focal deficits.    Labs:  CBC (11-18 @ 19:13)                        Hgb: 7.3    WBC: 17.29 )-----------------( Plts: 114                              Hct: 24.5     Chem (11-18 @ 19:13)  Na: 137  |     Cl: 94     |  BUN: 13  -----------------------------------------< Gluc: 100    K: 3.9   |    HCO3: 25  |  Cr: <0.5    Ca 9.2 (11-18 @ 19:13)    LFTs (11-18 @ 19:13)  TPro 5.8  /  Alb 2.9  TBili 0.7  /  DBili     AST 34  /  ALT 20  /  AlkPhos 163          Microbiology:    Radiology:

## 2019-11-19 NOTE — PROGRESS NOTE ADULT - ASSESSMENT
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 Patient now DNR/DNI due to grave prognosis from worsening metastatic adenocarcinoma of the lung. No more labs.    #Hx of Metastatic adenocarcinoma of the lung; Leptomeningeal disease s/p spinal tap (w/o malignant cells)  - Received Alimta (10/9/2019)  - MRI brain shows worsening mets, with edema, started on Dexamethasone  - Palliative care and hospice consults ordered  - Palliative: Hospice planning, Fentanyl increased to 300mcg Patch q72 , Dilaudid 4mg IV q1 PRN, Haldol 0.25mg SL q12 PRN, Fleet Enema q24 PRN, Senna and Colace  - Dilaudid increased to 4mg q3 hours PRN for severe pain    #Dyspnea/Fatigue   - due to Symptomatic anemia vs. post-obstructive pneumonia  - CXR: Left upper lobe mass with possible opacity  - Continue Nebs and monitor saturation level  - C/w cefepime, day 7 today, d/c from tomorrow  - Blood Cx: no growth  - LE duplex shows no DVT  - Was 84% on RA, 94% on O2> Will need O2 with hospice  - Patient takes off NC on her own and desaturates, counselled to stop      #Acute-on-chronic normocytic anemia; secondary to chronic disease vs. 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 Patient now DNR/DNI due to grave prognosis from worsening metastatic adenocarcinoma of the lung. No more labs.    #Hx of Metastatic adenocarcinoma of the lung; Leptomeningeal disease s/p spinal tap (w/o malignant cells)  - Received Alimta (10/9/2019)  - MRI brain shows worsening mets, with edema, started on Dexamethasone  - Palliative care and hospice consults ordered  - Palliative: Hospice planning, Fentanyl increased to 300mcg Patch q72 , Dilaudid 4mg IV q1 PRN, Haldol 0.25mg SL q12 PRN, Fleet Enema q24 PRN, Senna and Colace  - Needs transition to oral opioids for d/c home with hospice    #Dyspnea/Fatigue   - due to Symptomatic anemia vs. post-obstructive pneumonia  - CXR: Left upper lobe mass with possible opacity  - Continue Nebs and monitor saturation level  - Cefepime d/c today  - Blood Cx: no growth  - LE duplex shows no DVT  - Was 84% on RA, 94% on O2> Will need O2 with hospice  - Patient takes off NC on her own and desaturates, counselled to stop      #Acute-on-chronic normocytic anemia; secondary to chronic disease vs. BM suppression (Chemo/Rad)  - Goal Hgb > 7  - Iron studies: Ferritin > 1000, B12 > 2000, folate normal  - T+S active    #Hypokalemia  - Supplemented    #Elevated INR  - Unclear etiology, could be related to Vit K deficiency from poor appetite    Diet: Soft diet  DVT ppx: Lovenox 40 mg qD  Activity: Increase as tolerated  Code status: DNR/DNI  Disposition: Home with hospice

## 2019-11-19 NOTE — CHART NOTE - NSCHARTNOTEFT_GEN_A_CORE
Palliative Care Spiritual Assessment  I was an empathetic pastoral presence for Pt and family. Pt appeared to be comfortable. Pt's daughter and her spouse were at the bedside. Pt is Anabaptist, daughter had no problem talking to a L[employer] . . Daughter was very teary as she shared with me her mother's life before the fall.  I help daughter identify tools she can use as coping mechanism. I left a blessing and plan to follow up tomorrow.

## 2019-11-19 NOTE — GOALS OF CARE CONVERSATION - ADVANCED CARE PLANNING - TREATMENT GUIDELINES
No blood draws/Intubation trial/No IV fluids/DNR Order/Comfort measures only/No artificial nutrition/No antibiotics

## 2019-11-19 NOTE — PROGRESS NOTE ADULT - SUBJECTIVE AND OBJECTIVE BOX
Patient is a 44y old  Female who presents with a chief complaint of Metastatic Lung cancer (17 Nov 2019 08:37)      Subjective: She was drowsy this am       Vital Signs Last 24 Hrs  T(C): 37.2 (19 Nov 2019 04:31), Max: 37.2 (19 Nov 2019 04:31)  T(F): 98.9 (19 Nov 2019 04:31), Max: 98.9 (19 Nov 2019 04:31)  HR: 115 (19 Nov 2019 07:45) (115 - 127)  BP: 140/85 (19 Nov 2019 04:31) (137/82 - 140/85)  BP(mean): --  RR: 18 (19 Nov 2019 04:31) (18 - 18)  SpO2: 97% (19 Nov 2019 07:45) (93% - 97%)    PHYSICAL EXAM  General: Not in distress, Looks comfortable   CV: normal S1/S2  Lungs: positive air movement b/l ant lungs  Neuro: drowsy and somnolent     MEDICATIONS  (STANDING):  albuterol/ipratropium for Nebulization 3 milliLiter(s) Nebulizer every 6 hours  buPROPion XL . 150 milliGRAM(s) Oral at bedtime  chlorhexidine 4% Liquid 1 Application(s) Topical <User Schedule>  dexAMETHasone     Tablet 4 milliGRAM(s) Oral four times a day  enoxaparin Injectable 40 milliGRAM(s) SubCutaneous at bedtime  escitalopram 20 milliGRAM(s) Oral at bedtime  fentaNYL   Patch 100 MICROgram(s)/Hr 3 Patch Transdermal every 72 hours  folic acid 1 milliGRAM(s) Oral daily  haloperidol    Concentrate 0.25 milliGRAM(s) Oral every 12 hours  influenza   Vaccine 0.5 milliLiter(s) IntraMuscular once  melatonin 5 milliGRAM(s) Oral at bedtime  pantoprazole    Tablet 40 milliGRAM(s) Oral every 12 hours  polyethylene glycol 3350 17 Gram(s) Oral at bedtime  senna 2 Tablet(s) Oral every 12 hours    MEDICATIONS  (PRN):  ALBUTerol    0.083% 2.5 milliGRAM(s) Nebulizer every 6 hours PRN Bronchospasm  benzonatate 100 milliGRAM(s) Oral three times a day PRN Cough  clonazePAM  Tablet 0.5 milliGRAM(s) Oral three times a day PRN anxiety  HYDROmorphone  Injectable 4 milliGRAM(s) IV Push every 1 hour PRN Severe Pain (7 - 10)  saline laxative (FLEET) Rectal Enema 1 Enema Rectal daily PRN Constipation      LABS:                          7.3    17.29 )-----------( 114      ( 18 Nov 2019 19:13 )             24.5         Mean Cell Volume : 92.1 fL  Mean Cell Hemoglobin : 27.4 pg  Mean Cell Hemoglobin Concentration : 29.8 g/dL  Auto Neutrophil # : 15.15 K/uL  Auto Lymphocyte # : 0.64 K/uL  Auto Monocyte # : 1.16 K/uL  Auto Eosinophil # : 0.00 K/uL  Auto Basophil # : 0.01 K/uL  Auto Neutrophil % : 87.6 %  Auto Lymphocyte % : 3.7 %  Auto Monocyte % : 6.7 %  Auto Eosinophil % : 0.0 %  Auto Basophil % : 0.1 %      Serial CBC's  11-18 @ 19:13  Hct-24.5 / Hgb-7.3 / Plat-114 / RBC-2.66 / WBC-17.29      11-18    137  |  94<L>  |  13  ----------------------------<  100<H>  3.9   |  25  |  <0.5<L>    Ca    9.2      18 Nov 2019 19:13    TPro  5.8<L>  /  Alb  2.9<L>  /  TBili  0.7  /  DBili  x   /  AST  34  /  ALT  20  /  AlkPhos  163<H>  11-18                                  BLOOD SMEAR INTERPRETATION:       RADIOLOGY & ADDITIONAL STUDIES:

## 2019-11-19 NOTE — PROGRESS NOTE ADULT - ASSESSMENT
44yFemale being evaluated for goals of care and symptom management. Pt seen to adjust pain meds and symptom management. Seen in am  not at bedside. Pt very anxious and used non phramcological techniques to calm her. Pastoral care also saw patient. Pt did get Klonopin and dilaudid PRN. Pt seen again in afternoon. Palliative NP and MD met with pt's  and patient. She comfortable, but her breathing is changing.     Discussed with  at length dying process. He verbalized understanding but is struggling emotionally. Support provided. His fear is how symptomatic she is. At this time pt too symptomatic to be d/c. Palliative care will reassess daily. Pt may need a drip for respiratory symptoms. Spouse voiced relief because he doesn't want to be home with her and unable to take care of her.          Recommendations:  DNR/DNI/CMO  Fentanyl 300mcg Patch Q 72 HRS  Dilaudid 4mg IV Q 1 HRS PRN for pain or dyspnea  Klonopin 0.5mg TID PRN for anxiety  palliative team following call for any issues x [phone]

## 2019-11-19 NOTE — PROGRESS NOTE ADULT - ASSESSMENT
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     #Metastatic adenocarcinoma of the lung, PDL1 5%, EGFT, Alk, BRAF, KRAS, ROS1 negative with brain mets and MRI L-spine- leptomeningeal disease s/p spinal tap- CSF- no malignant cells  Received Alimta- 10/9/19  MRI brain shows worsening mets with edema  c/w decadron  Palliative care f/u appreciated.   I spoke to Tiffanie ( yesterday)- Plan is to set up with hospice in [place]     No more blood work   Very poor prognosis   Diet: comfort food   Code status: DNR/DNI  Comfort measures  Dispo- home hospice

## 2019-11-20 PROCEDURE — 99231 SBSQ HOSP IP/OBS SF/LOW 25: CPT

## 2019-11-20 RX ORDER — CLONAZEPAM 1 MG
0.5 TABLET ORAL THREE TIMES A DAY
Refills: 0 | Status: DISCONTINUED | OUTPATIENT
Start: 2019-11-20 | End: 2019-11-24

## 2019-11-20 RX ORDER — DEXAMETHASONE 0.5 MG/5ML
4 ELIXIR ORAL
Refills: 0 | Status: DISCONTINUED | OUTPATIENT
Start: 2019-11-20 | End: 2019-11-25

## 2019-11-20 RX ADMIN — PANTOPRAZOLE SODIUM 40 MILLIGRAM(S): 20 TABLET, DELAYED RELEASE ORAL at 17:07

## 2019-11-20 RX ADMIN — SENNA PLUS 2 TABLET(S): 8.6 TABLET ORAL at 17:08

## 2019-11-20 RX ADMIN — Medication 3 MILLILITER(S): at 19:39

## 2019-11-20 RX ADMIN — HYDROMORPHONE HYDROCHLORIDE 4 MILLIGRAM(S): 2 INJECTION INTRAMUSCULAR; INTRAVENOUS; SUBCUTANEOUS at 17:08

## 2019-11-20 RX ADMIN — Medication 3 MILLILITER(S): at 08:37

## 2019-11-20 RX ADMIN — HYDROMORPHONE HYDROCHLORIDE 4 MILLIGRAM(S): 2 INJECTION INTRAMUSCULAR; INTRAVENOUS; SUBCUTANEOUS at 06:58

## 2019-11-20 RX ADMIN — HYDROMORPHONE HYDROCHLORIDE 4 MILLIGRAM(S): 2 INJECTION INTRAMUSCULAR; INTRAVENOUS; SUBCUTANEOUS at 19:30

## 2019-11-20 RX ADMIN — Medication 5 MILLIGRAM(S): at 22:01

## 2019-11-20 RX ADMIN — Medication 4 MILLIGRAM(S): at 06:12

## 2019-11-20 RX ADMIN — HYDROMORPHONE HYDROCHLORIDE 4 MILLIGRAM(S): 2 INJECTION INTRAMUSCULAR; INTRAVENOUS; SUBCUTANEOUS at 22:02

## 2019-11-20 RX ADMIN — HYDROMORPHONE HYDROCHLORIDE 4 MILLIGRAM(S): 2 INJECTION INTRAMUSCULAR; INTRAVENOUS; SUBCUTANEOUS at 15:08

## 2019-11-20 RX ADMIN — HYDROMORPHONE HYDROCHLORIDE 4 MILLIGRAM(S): 2 INJECTION INTRAMUSCULAR; INTRAVENOUS; SUBCUTANEOUS at 09:02

## 2019-11-20 RX ADMIN — PANTOPRAZOLE SODIUM 40 MILLIGRAM(S): 20 TABLET, DELAYED RELEASE ORAL at 06:12

## 2019-11-20 RX ADMIN — ALBUTEROL 2.5 MILLIGRAM(S): 90 AEROSOL, METERED ORAL at 13:49

## 2019-11-20 RX ADMIN — HALOPERIDOL DECANOATE 0.25 MILLIGRAM(S): 100 INJECTION INTRAMUSCULAR at 06:22

## 2019-11-20 RX ADMIN — ESCITALOPRAM OXALATE 20 MILLIGRAM(S): 10 TABLET, FILM COATED ORAL at 22:01

## 2019-11-20 RX ADMIN — HYDROMORPHONE HYDROCHLORIDE 4 MILLIGRAM(S): 2 INJECTION INTRAMUSCULAR; INTRAVENOUS; SUBCUTANEOUS at 03:09

## 2019-11-20 RX ADMIN — POLYETHYLENE GLYCOL 3350 17 GRAM(S): 17 POWDER, FOR SOLUTION ORAL at 22:01

## 2019-11-20 RX ADMIN — HYDROMORPHONE HYDROCHLORIDE 4 MILLIGRAM(S): 2 INJECTION INTRAMUSCULAR; INTRAVENOUS; SUBCUTANEOUS at 04:09

## 2019-11-20 RX ADMIN — HYDROMORPHONE HYDROCHLORIDE 4 MILLIGRAM(S): 2 INJECTION INTRAMUSCULAR; INTRAVENOUS; SUBCUTANEOUS at 06:11

## 2019-11-20 RX ADMIN — Medication 4 MILLIGRAM(S): at 17:07

## 2019-11-20 RX ADMIN — HYDROMORPHONE HYDROCHLORIDE 4 MILLIGRAM(S): 2 INJECTION INTRAMUSCULAR; INTRAVENOUS; SUBCUTANEOUS at 13:52

## 2019-11-20 RX ADMIN — HALOPERIDOL DECANOATE 0.25 MILLIGRAM(S): 100 INJECTION INTRAMUSCULAR at 17:08

## 2019-11-20 RX ADMIN — HYDROMORPHONE HYDROCHLORIDE 4 MILLIGRAM(S): 2 INJECTION INTRAMUSCULAR; INTRAVENOUS; SUBCUTANEOUS at 22:08

## 2019-11-20 RX ADMIN — FENTANYL CITRATE 2 PATCH: 50 INJECTION INTRAVENOUS at 06:59

## 2019-11-20 RX ADMIN — HYDROMORPHONE HYDROCHLORIDE 4 MILLIGRAM(S): 2 INJECTION INTRAMUSCULAR; INTRAVENOUS; SUBCUTANEOUS at 21:58

## 2019-11-20 RX ADMIN — BUPROPION HYDROCHLORIDE 150 MILLIGRAM(S): 150 TABLET, EXTENDED RELEASE ORAL at 22:01

## 2019-11-20 RX ADMIN — HYDROMORPHONE HYDROCHLORIDE 4 MILLIGRAM(S): 2 INJECTION INTRAMUSCULAR; INTRAVENOUS; SUBCUTANEOUS at 17:35

## 2019-11-20 RX ADMIN — SENNA PLUS 2 TABLET(S): 8.6 TABLET ORAL at 06:12

## 2019-11-20 RX ADMIN — FENTANYL CITRATE 3 PATCH: 50 INJECTION INTRAVENOUS at 06:43

## 2019-11-20 NOTE — PROGRESS NOTE ADULT - SUBJECTIVE AND OBJECTIVE BOX
Patient Information:  REMEDIOS DENTON / 44y / Female / MRN#:[id number]    Hospital Day: 10d    HPI:  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 She recently returned from her trip to [place] and during her trip, her SOB had been progressively getting worse. Upon returning from her trip, she decided to present to ED for further workup. Pt also states she has been having productive cough w/ brownish sputum. There is no relieving or excaerbating factor for her SOB and cough. Also admits to decreased appetite. Denies fever or chill but does state her  recently recovered from a cold. Pt also denies any melena or hematchezia and denies epigastric pain. Her last chemotherapy (alimata and avasta) was done on 10/9/2019.     Vital Signs Last 24 Hrs  T(C): 36.2 (10 Nov 2019 15:50), Max: 36.8 (10 Nov 2019 11:04)  T(F): 97.1 (10 Nov 2019 15:50), Max: 98.3 (10 Nov 2019 11:04)  HR: 99 (10 Nov 2019 15:50) (99 - 104)  BP: 112/60 (10 Nov 2019 15:50) (112/60 - 118/63)  BP(mean): --  RR: 20 (10 Nov 2019 11:04) (20 - 20)  SpO2: 94% (10 Nov 2019 11:04) (94% - 94%)    In ED, pt was found to have Hgb of 5.9, K of 2.9 and elevated Cr. In ED, pt was given 2U of pRBC. Pt is admitted for symptomatic anemia.    Interval History:  Patient seen and examined at bedside. No acute events overnight.    Past Medical History:  Lung cancer  Anxiety and depression    Past Surgical History:  History of cholecystectomy    Allergies:  dust (Sneezing; Rhinorrhea)  Erythromycin Base (Other)    Medications:  PRN:  ALBUTerol    0.083% 2.5 milliGRAM(s) Nebulizer every 6 hours PRN Bronchospasm  benzonatate 100 milliGRAM(s) Oral three times a day PRN Cough  guaiFENesin    Syrup 200 milliGRAM(s) Oral every 6 hours PRN Cough  HYDROmorphone  Injectable 4 milliGRAM(s) IV Push every 1 hour PRN Severe Pain (7 - 10)  saline laxative (FLEET) Rectal Enema 1 Enema Rectal daily PRN Constipation    Standing:  albuterol/ipratropium for Nebulization 3 milliLiter(s) Nebulizer every 6 hours  buPROPion XL . 150 milliGRAM(s) Oral at bedtime  chlorhexidine 4% Liquid 1 Application(s) Topical <User Schedule>  dexAMETHasone     Tablet 4 milliGRAM(s) Oral two times a day  enoxaparin Injectable 40 milliGRAM(s) SubCutaneous at bedtime  escitalopram 20 milliGRAM(s) Oral at bedtime  fentaNYL   Patch 100 MICROgram(s)/Hr 3 Patch Transdermal every 72 hours  folic acid 1 milliGRAM(s) Oral daily  haloperidol    Concentrate 0.25 milliGRAM(s) Oral every 12 hours  influenza   Vaccine 0.5 milliLiter(s) IntraMuscular once  melatonin 5 milliGRAM(s) Oral at bedtime  pantoprazole    Tablet 40 milliGRAM(s) Oral every 12 hours  polyethylene glycol 3350 17 Gram(s) Oral at bedtime  senna 2 Tablet(s) Oral every 12 hours    Home:  Advil Liquigel 200 mg oral capsule: 1 cap(s) orally every 4 hours, As Needed - for mild pain  Allegra 180 mg oral tablet: 1 tab(s) orally once a day  benzonatate 100 mg oral capsule: 1 cap(s) orally 3 times a day  Colace 100 mg oral capsule: 1 cap(s) orally 2 times a day  escitalopram 20 mg oral tablet: 1 tab(s) orally once a day (at bedtime)  KlonoPIN 0.5 mg oral tablet: 1 tab(s) orally 3 times a day, As Needed  oxyCODONE 20 mg oral tablet: 1 tab(s) orally every 4 hours -Severe Pain (7 - 10) as needed MDD:4 tablets  OxyCONTIN 80 mg oral tablet, extended release: 120 milligram(s) orally 2 times a day  prochlorperazine 10 mg oral tablet: 1 tab(s) orally every 6 hours, As Needed  Protonix 40 mg oral delayed release tablet: 1 tab(s) orally 2 times a day  Senna 8.6 mg oral tablet: 1 tab(s) orally once a day (at bedtime)    Vitals:  T(C): 36.1, Max: 37.3 (11-19-19 @ 13:37)  T(F): 96.9, Max: 99.2 (11-19-19 @ 13:37)  HR: 119 (119 - 133)  BP: 133/72 (115/78 - 152/88)  RR: 21 (20 - 24)  SpO2: 98% (98% - 98%)    Physical Exam:  General: Awake, Alert. Not in acute distress.  Heart: Regular rate and rhythm; S1, S2; No murmurs.  Lungs: Clear to auscultation bilaterally.  Abdomen: Soft, nontender, nondistended.  Extremities: No edema in upper or lower extremities.  Neuro: AAOx3, No focal deficits.    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 She recently returned from her trip to [place] and during her trip, her SOB had been progressively getting worse. Upon returning from her trip, she decided to present to ED for further workup. Pt also states she has been having productive cough w/ brownish sputum. There is no relieving or excaerbating factor for her SOB and cough. Also admits to decreased appetite. Denies fever or chill but does state her  recently recovered from a cold. Pt also denies any melena or hematchezia and denies epigastric pain. Her last chemotherapy (alimata and avasta) was done on 10/9/2019.     Vital Signs Last 24 Hrs  T(C): 36.2 (10 Nov 2019 15:50), Max: 36.8 (10 Nov 2019 11:04)  T(F): 97.1 (10 Nov 2019 15:50), Max: 98.3 (10 Nov 2019 11:04)  HR: 99 (10 Nov 2019 15:50) (99 - 104)  BP: 112/60 (10 Nov 2019 15:50) (112/60 - 118/63)  BP(mean): --  RR: 20 (10 Nov 2019 11:04) (20 - 20)  SpO2: 94% (10 Nov 2019 11:04) (94% - 94%)    In ED, pt was found to have Hgb of 5.9, K of 2.9 and elevated Cr. In ED, pt was given 2U of pRBC. Pt is admitted for symptomatic anemia.    Interval History:  Patient seen and examined at bedside. No acute events overnight.    Past Medical History:  Lung cancer  Anxiety and depression    Past Surgical History:  History of cholecystectomy    Allergies:  dust (Sneezing; Rhinorrhea)  Erythromycin Base (Other)    Medications:  PRN:  ALBUTerol    0.083% 2.5 milliGRAM(s) Nebulizer every 6 hours PRN Bronchospasm  benzonatate 100 milliGRAM(s) Oral three times a day PRN Cough  guaiFENesin    Syrup 200 milliGRAM(s) Oral every 6 hours PRN Cough  HYDROmorphone  Injectable 4 milliGRAM(s) IV Push every 1 hour PRN Severe Pain (7 - 10)  saline laxative (FLEET) Rectal Enema 1 Enema Rectal daily PRN Constipation    Standing:  albuterol/ipratropium for Nebulization 3 milliLiter(s) Nebulizer every 6 hours  buPROPion XL . 150 milliGRAM(s) Oral at bedtime  chlorhexidine 4% Liquid 1 Application(s) Topical <User Schedule>  dexAMETHasone     Tablet 4 milliGRAM(s) Oral two times a day  enoxaparin Injectable 40 milliGRAM(s) SubCutaneous at bedtime  escitalopram 20 milliGRAM(s) Oral at bedtime  fentaNYL   Patch 100 MICROgram(s)/Hr 3 Patch Transdermal every 72 hours  folic acid 1 milliGRAM(s) Oral daily  haloperidol    Concentrate 0.25 milliGRAM(s) Oral every 12 hours  influenza   Vaccine 0.5 milliLiter(s) IntraMuscular once  melatonin 5 milliGRAM(s) Oral at bedtime  pantoprazole    Tablet 40 milliGRAM(s) Oral every 12 hours  polyethylene glycol 3350 17 Gram(s) Oral at bedtime  senna 2 Tablet(s) Oral every 12 hours    Home:  Advil Liquigel 200 mg oral capsule: 1 cap(s) orally every 4 hours, As Needed - for mild pain  Allegra 180 mg oral tablet: 1 tab(s) orally once a day  benzonatate 100 mg oral capsule: 1 cap(s) orally 3 times a day  Colace 100 mg oral capsule: 1 cap(s) orally 2 times a day  escitalopram 20 mg oral tablet: 1 tab(s) orally once a day (at bedtime)  KlonoPIN 0.5 mg oral tablet: 1 tab(s) orally 3 times a day, As Needed  oxyCODONE 20 mg oral tablet: 1 tab(s) orally every 4 hours -Severe Pain (7 - 10) as needed MDD:4 tablets  OxyCONTIN 80 mg oral tablet, extended release: 120 milligram(s) orally 2 times a day  prochlorperazine 10 mg oral tablet: 1 tab(s) orally every 6 hours, As Needed  Protonix 40 mg oral delayed release tablet: 1 tab(s) orally 2 times a day  Senna 8.6 mg oral tablet: 1 tab(s) orally once a day (at bedtime)    Vitals:  T(C): 36.1, Max: 37.3 (11-19-19 @ 13:37)  T(F): 96.9, Max: 99.2 (11-19-19 @ 13:37)  HR: 119 (119 - 133)  BP: 133/72 (115/78 - 152/88)  RR: 21 (20 - 24)  SpO2: 98% (98% - 98%)    Physical Exam:  General: Comfortable in bed  Heart: Regular rate and rhythm; S1, S2; No murmurs.  Lungs: Clear to auscultation bilaterally.  Abdomen: Soft, nontender, nondistended.  Extremities: No edema in upper or lower extremities.  Neuro: No focal deficits.    Labs:  CBC (11-18 @ 19:13)                        Hgb: 7.3    WBC: 17.29 )-----------------( Plts: 114                              Hct: 24.5     Chem (11-18 @ 19:13)  Na: 137  |     Cl: 94     |  BUN: 13  -----------------------------------------< Gluc: 100    K: 3.9   |    HCO3: 25  |  Cr: <0.5    Ca 9.2 (11-18 @ 19:13)    LFTs (11-18 @ 19:13)  TPro 5.8  /  Alb 2.9  TBili 0.7  /  DBili     AST 34  /  ALT 20  /  AlkPhos 163          Microbiology:    Radiology:

## 2019-11-20 NOTE — PROGRESS NOTE ADULT - ASSESSMENT
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     #Metastatic adenocarcinoma of the lung, PDL1 5%, EGFT, Alk, BRAF, KRAS, ROS1 negative with brain mets and MRI L-spine- leptomeningeal disease s/p spinal tap- CSF- no malignant cells  Received Alimta- 10/9/19  MRI brain shows worsening mets with edema  c/w decadron taper   Palliative care f/u appreciated.     MOLST form updated- Comfort measures only   No more blood work   Very poor prognosis   Diet: comfort food   Code status: DNR/DNI  Comfort measures  Hospice follow up    Family decided to keep her here in [facility]

## 2019-11-20 NOTE — PROGRESS NOTE ADULT - SUBJECTIVE AND OBJECTIVE BOX
Patient is a 44y old  Female who presents with a chief complaint of SOB/pain (19 Nov 2019 15:43)      Subjective: Slept well last night  No issues  Eats minimally      Vital Signs Last 24 Hrs  T(C): 36.1 (20 Nov 2019 04:46), Max: 37.3 (19 Nov 2019 13:37)  T(F): 96.9 (20 Nov 2019 04:46), Max: 99.2 (19 Nov 2019 13:37)  HR: 119 (20 Nov 2019 04:46) (119 - 133)  BP: 133/72 (20 Nov 2019 04:46) (115/78 - 152/88)  BP(mean): --  RR: 21 (20 Nov 2019 04:46) (20 - 24)  SpO2: 98% (20 Nov 2019 00:07) (98% - 98%)    PHYSICAL EXAM  General: adult in NAD  CV: normal S1/S2   Abdomen: soft non-tender   Ext: no edema  Neuro: Somnolent     MEDICATIONS  (STANDING):  albuterol/ipratropium for Nebulization 3 milliLiter(s) Nebulizer every 6 hours  buPROPion XL . 150 milliGRAM(s) Oral at bedtime  chlorhexidine 4% Liquid 1 Application(s) Topical <User Schedule>  dexAMETHasone     Tablet 4 milliGRAM(s) Oral two times a day  enoxaparin Injectable 40 milliGRAM(s) SubCutaneous at bedtime  escitalopram 20 milliGRAM(s) Oral at bedtime  fentaNYL   Patch 100 MICROgram(s)/Hr 3 Patch Transdermal every 72 hours  folic acid 1 milliGRAM(s) Oral daily  haloperidol    Concentrate 0.25 milliGRAM(s) Oral every 12 hours  influenza   Vaccine 0.5 milliLiter(s) IntraMuscular once  melatonin 5 milliGRAM(s) Oral at bedtime  pantoprazole    Tablet 40 milliGRAM(s) Oral every 12 hours  polyethylene glycol 3350 17 Gram(s) Oral at bedtime  senna 2 Tablet(s) Oral every 12 hours    MEDICATIONS  (PRN):  ALBUTerol    0.083% 2.5 milliGRAM(s) Nebulizer every 6 hours PRN Bronchospasm  benzonatate 100 milliGRAM(s) Oral three times a day PRN Cough  guaiFENesin    Syrup 200 milliGRAM(s) Oral every 6 hours PRN Cough  HYDROmorphone  Injectable 4 milliGRAM(s) IV Push every 1 hour PRN Severe Pain (7 - 10)  saline laxative (FLEET) Rectal Enema 1 Enema Rectal daily PRN Constipation      LABS:                          7.3    17.29 )-----------( 114      ( 18 Nov 2019 19:13 )             24.5         Mean Cell Volume : 92.1 fL  Mean Cell Hemoglobin : 27.4 pg  Mean Cell Hemoglobin Concentration : 29.8 g/dL  Auto Neutrophil # : 15.15 K/uL  Auto Lymphocyte # : 0.64 K/uL  Auto Monocyte # : 1.16 K/uL  Auto Eosinophil # : 0.00 K/uL  Auto Basophil # : 0.01 K/uL  Auto Neutrophil % : 87.6 %  Auto Lymphocyte % : 3.7 %  Auto Monocyte % : 6.7 %  Auto Eosinophil % : 0.0 %  Auto Basophil % : 0.1 %      Serial CBC's  11-18 @ 19:13  Hct-24.5 / Hgb-7.3 / Plat-114 / RBC-2.66 / WBC-17.29      11-18    137  |  94<L>  |  13  ----------------------------<  100<H>  3.9   |  25  |  <0.5<L>    Ca    9.2      18 Nov 2019 19:13    TPro  5.8<L>  /  Alb  2.9<L>  /  TBili  0.7  /  DBili  x   /  AST  34  /  ALT  20  /  AlkPhos  163<H>  11-18                                  BLOOD SMEAR INTERPRETATION:       RADIOLOGY & ADDITIONAL STUDIES:

## 2019-11-20 NOTE — PROGRESS NOTE ADULT - ASSESSMENT
44yFemale being evaluated for goals of care and symptom management. Pt on Fentanyl 300mcg Patch plus used 36mg IV Dilaudid in 24 hrs. Pt has anxiety and respiratory distress r/t Lung Ca. Pt seen for follow up. Pt appears comfortable s/p getting her Dilaudid IV. Pt's  at bedside appears very sad. Anticipatory grieving noted. Palliative care still adjusting medications for symptom management.       Recommendations:  DNR/DNI/CMO  Fentanyl patch 300mcg/hr Q 72 hrs  Dilaudid 4mg IVP Q 1 HR prn  d/c lovenox   d/c Flu vaccine - n/a for comfort care end of life  restart Klonopin 0.5mg TID PRN  hospice to re-eval for inpatient hospice

## 2019-11-20 NOTE — PROGRESS NOTE ADULT - ASSESSMENT
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 Patient now DNR/DNI due to grave prognosis from worsening metastatic adenocarcinoma of the lung. Patient is now comfort measures only, MOLST updated.    #Hx of Metastatic adenocarcinoma of the lung; Leptomeningeal disease s/p spinal tap (w/o malignant cells)  - Received Alimta (10/9/2019)  - MRI brain showed worsening mets, with edema  - C/w dexamethasone  - Palliative care and hospice consults on board  - Palliative: Fentanyl increased to 300mcg Patch q72 , Dilaudid 4mg IV q1 PRN, Haldol 0.25mg SL q12 PRN, Fleet Enema q24 PRN, Senna and Colace  - Needs transition to oral opioids for d/c home with hospice    #Dyspnea/Fatigue   - due to Symptomatic anemia vs. post-obstructive pneumonia  - CXR: Left upper lobe mass with possible opacity, No bacteremia, Received last dose of Cefepime on 11/18  - Continue Nebs and monitor saturation level  - LE duplex shows no DVT  - Was 84% on RA, 94% on O2> Will need O2 with hospice  - Patient takes off NC on her own and desaturates, counselled to stop      #Acute-on-chronic normocytic anemia; secondary to chronic disease vs. BM suppression (Chemo/Rad)  - Last Hb 7.3 from 11/18/19  - No more blood work    #Hypokalemia  - Supplemented  - No more blood work    #Elevated INR  - Unclear etiology, could be related to Vit K deficiency from poor appetite  - No more blood work    Diet: Soft diet  DVT ppx: Lovenox 40 mg qD  Activity: Increase as tolerated  Code status: DNR/DNI, Comfort Care  Disposition: Home with hospice on [place]

## 2019-11-20 NOTE — PROGRESS NOTE ADULT - SUBJECTIVE AND OBJECTIVE BOX
44yFemale with diagnosis: HYPOKALEMIA LUNG CANCER ANEMIA PNEUMONIA JAZMIN      Patient seen for follow up of symptom management      PHYSICAL EXAM  Pt appears comfortable  Drowsy but arousable       T(C): , Max: 36.6 (20:22)  T(F): 96.9  HR: 119 (119 - 119)  BP: 133/72 (133/72 - 152/88)  RR: 21 (20 - 21)  SpO2: 98% (98% - 98%)                                7.3    17.29 )-----------( 114      ( 18 Nov 2019 19:13 )             24.5                                                                                      11-18    137  |  94<L>  |  13  ----------------------------<  100<H>  3.9   |  25  |  <0.5<L>    Ca    9.2      18 Nov 2019 19:13    TPro  5.8<L>  /  Alb  2.9<L>  /  TBili  0.7  /  DBili  x   /  AST  34  /  ALT  20  /  AlkPhos  163<H>  11-18                                                      MEDICATIONS  (STANDING):  albuterol/ipratropium for Nebulization 3 milliLiter(s) Nebulizer every 6 hours  buPROPion XL . 150 milliGRAM(s) Oral at bedtime  chlorhexidine 4% Liquid 1 Application(s) Topical <User Schedule>  dexAMETHasone     Tablet 4 milliGRAM(s) Oral two times a day  enoxaparin Injectable 40 milliGRAM(s) SubCutaneous at bedtime  escitalopram 20 milliGRAM(s) Oral at bedtime  fentaNYL   Patch 100 MICROgram(s)/Hr 3 Patch Transdermal every 72 hours  folic acid 1 milliGRAM(s) Oral daily  haloperidol    Concentrate 0.25 milliGRAM(s) Oral every 12 hours  influenza   Vaccine 0.5 milliLiter(s) IntraMuscular once  melatonin 5 milliGRAM(s) Oral at bedtime  pantoprazole    Tablet 40 milliGRAM(s) Oral every 12 hours  polyethylene glycol 3350 17 Gram(s) Oral at bedtime  senna 2 Tablet(s) Oral every 12 hours    MEDICATIONS  (PRN):  ALBUTerol    0.083% 2.5 milliGRAM(s) Nebulizer every 6 hours PRN Bronchospasm  benzonatate 100 milliGRAM(s) Oral three times a day PRN Cough  guaiFENesin    Syrup 200 milliGRAM(s) Oral every 6 hours PRN Cough  HYDROmorphone  Injectable 4 milliGRAM(s) IV Push every 1 hour PRN Severe Pain (7 - 10)  saline laxative (FLEET) Rectal Enema 1 Enema Rectal daily PRN Constipation

## 2019-11-21 PROCEDURE — 99231 SBSQ HOSP IP/OBS SF/LOW 25: CPT

## 2019-11-21 RX ORDER — HYDROMORPHONE HYDROCHLORIDE 2 MG/ML
1 INJECTION INTRAMUSCULAR; INTRAVENOUS; SUBCUTANEOUS
Qty: 100 | Refills: 0 | Status: DISCONTINUED | OUTPATIENT
Start: 2019-11-21 | End: 2019-11-22

## 2019-11-21 RX ADMIN — Medication 4 MILLIGRAM(S): at 05:06

## 2019-11-21 RX ADMIN — SENNA PLUS 2 TABLET(S): 8.6 TABLET ORAL at 05:07

## 2019-11-21 RX ADMIN — HYDROMORPHONE HYDROCHLORIDE 4 MILLIGRAM(S): 2 INJECTION INTRAMUSCULAR; INTRAVENOUS; SUBCUTANEOUS at 04:28

## 2019-11-21 RX ADMIN — Medication 0.5 MILLIGRAM(S): at 13:20

## 2019-11-21 RX ADMIN — HYDROMORPHONE HYDROCHLORIDE 4 MILLIGRAM(S): 2 INJECTION INTRAMUSCULAR; INTRAVENOUS; SUBCUTANEOUS at 20:33

## 2019-11-21 RX ADMIN — HYDROMORPHONE HYDROCHLORIDE 4 MILLIGRAM(S): 2 INJECTION INTRAMUSCULAR; INTRAVENOUS; SUBCUTANEOUS at 01:04

## 2019-11-21 RX ADMIN — HYDROMORPHONE HYDROCHLORIDE 1 MG/HR: 2 INJECTION INTRAMUSCULAR; INTRAVENOUS; SUBCUTANEOUS at 14:11

## 2019-11-21 RX ADMIN — PANTOPRAZOLE SODIUM 40 MILLIGRAM(S): 20 TABLET, DELAYED RELEASE ORAL at 18:17

## 2019-11-21 RX ADMIN — HYDROMORPHONE HYDROCHLORIDE 4 MILLIGRAM(S): 2 INJECTION INTRAMUSCULAR; INTRAVENOUS; SUBCUTANEOUS at 06:43

## 2019-11-21 RX ADMIN — SENNA PLUS 2 TABLET(S): 8.6 TABLET ORAL at 18:16

## 2019-11-21 RX ADMIN — CHLORHEXIDINE GLUCONATE 1 APPLICATION(S): 213 SOLUTION TOPICAL at 06:42

## 2019-11-21 RX ADMIN — HYDROMORPHONE HYDROCHLORIDE 4 MILLIGRAM(S): 2 INJECTION INTRAMUSCULAR; INTRAVENOUS; SUBCUTANEOUS at 11:48

## 2019-11-21 RX ADMIN — FENTANYL CITRATE 3 PATCH: 50 INJECTION INTRAVENOUS at 16:23

## 2019-11-21 RX ADMIN — HYDROMORPHONE HYDROCHLORIDE 4 MILLIGRAM(S): 2 INJECTION INTRAMUSCULAR; INTRAVENOUS; SUBCUTANEOUS at 07:34

## 2019-11-21 RX ADMIN — HYDROMORPHONE HYDROCHLORIDE 4 MILLIGRAM(S): 2 INJECTION INTRAMUSCULAR; INTRAVENOUS; SUBCUTANEOUS at 20:45

## 2019-11-21 RX ADMIN — HYDROMORPHONE HYDROCHLORIDE 4 MILLIGRAM(S): 2 INJECTION INTRAMUSCULAR; INTRAVENOUS; SUBCUTANEOUS at 05:20

## 2019-11-21 RX ADMIN — HYDROMORPHONE HYDROCHLORIDE 4 MILLIGRAM(S): 2 INJECTION INTRAMUSCULAR; INTRAVENOUS; SUBCUTANEOUS at 02:04

## 2019-11-21 RX ADMIN — Medication 0.5 MILLIGRAM(S): at 09:22

## 2019-11-21 RX ADMIN — HALOPERIDOL DECANOATE 0.25 MILLIGRAM(S): 100 INJECTION INTRAMUSCULAR at 05:06

## 2019-11-21 RX ADMIN — ESCITALOPRAM OXALATE 20 MILLIGRAM(S): 10 TABLET, FILM COATED ORAL at 22:33

## 2019-11-21 RX ADMIN — FENTANYL CITRATE 3 PATCH: 50 INJECTION INTRAVENOUS at 06:42

## 2019-11-21 RX ADMIN — HALOPERIDOL DECANOATE 0.25 MILLIGRAM(S): 100 INJECTION INTRAMUSCULAR at 18:30

## 2019-11-21 RX ADMIN — Medication 5 MILLIGRAM(S): at 22:33

## 2019-11-21 RX ADMIN — BUPROPION HYDROCHLORIDE 150 MILLIGRAM(S): 150 TABLET, EXTENDED RELEASE ORAL at 22:33

## 2019-11-21 RX ADMIN — HYDROMORPHONE HYDROCHLORIDE 4 MILLIGRAM(S): 2 INJECTION INTRAMUSCULAR; INTRAVENOUS; SUBCUTANEOUS at 08:00

## 2019-11-21 RX ADMIN — HYDROMORPHONE HYDROCHLORIDE 4 MILLIGRAM(S): 2 INJECTION INTRAMUSCULAR; INTRAVENOUS; SUBCUTANEOUS at 03:28

## 2019-11-21 RX ADMIN — HYDROMORPHONE HYDROCHLORIDE 4 MILLIGRAM(S): 2 INJECTION INTRAMUSCULAR; INTRAVENOUS; SUBCUTANEOUS at 12:00

## 2019-11-21 RX ADMIN — PANTOPRAZOLE SODIUM 40 MILLIGRAM(S): 20 TABLET, DELAYED RELEASE ORAL at 05:07

## 2019-11-21 RX ADMIN — Medication 4 MILLIGRAM(S): at 18:16

## 2019-11-21 RX ADMIN — FENTANYL CITRATE 3 PATCH: 50 INJECTION INTRAVENOUS at 20:00

## 2019-11-21 RX ADMIN — POLYETHYLENE GLYCOL 3350 17 GRAM(S): 17 POWDER, FOR SOLUTION ORAL at 22:33

## 2019-11-21 RX ADMIN — Medication 3 MILLILITER(S): at 19:42

## 2019-11-21 NOTE — PROGRESS NOTE ADULT - ASSESSMENT
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     #Metastatic adenocarcinoma of the lung, PDL1 5%, EGFT, Alk, BRAF, KRAS, ROS1 negative with brain mets and MRI L-spine- leptomeningeal disease s/p spinal tap- CSF- no malignant cells  Received Alimta- 10/9/19  MRI brain shows worsening mets with edema  c/w decadron taper       Plan   Palliative care f/u appreciated.   Comfort measures only   No more blood work   Very poor prognosis   Diet: comfort food   Code status: DNR/DNI  Comfort measures  Hospice re- eval for inpatient hospice

## 2019-11-21 NOTE — PROGRESS NOTE ADULT - ASSESSMENT
44yFemale being evaluated for symptom management for metastatic lung ca (leptomeningeal) Pt has been comfort measures only and declining. Increased respiratory effort and restlessness and pain. Pt taking Dilaudid 4mg IVPs x 8 in 24 hrs. After discussion with family and hospice nurse palliative recommended Dilaudid gtt.     Palliative care NP spoke to  of patient who is proxy and hospice RN. Pt will start inpatient hospice tomorrow. Reassured  that care will remain the same as pt is comfort measures only already. She will likely die in the hospital - family in acceptance of this because the pt needs so much symptom management. Support provided.           Recommendations:  DNR/DNI/CMO  Dilaudid 1mg gtt  Dilaudid 4mg IVP Q 1 HR PRN  Fentanyl 300mcg/hr patch q 72 hrs  Klonopin 0.5mg PO TID  if pt becomes unable to swallow  may start Atiavn 0.5mg IV Q 4 HRS PRN for anxiety and restlessness

## 2019-11-21 NOTE — PROGRESS NOTE ADULT - SUBJECTIVE AND OBJECTIVE BOX
Patient is a 44y old  Female who presents with a chief complaint of Goals of care and symptom management (20 Nov 2019 15:11)      Subjective: Looks comfortable, Somnolent      Vital Signs Last 24 Hrs  T(C): 37.2 (21 Nov 2019 04:59), Max: 37.2 (21 Nov 2019 04:59)  T(F): 98.9 (21 Nov 2019 04:59), Max: 98.9 (21 Nov 2019 04:59)  HR: 124 (21 Nov 2019 04:59) (124 - 130)  BP: 150/82 (21 Nov 2019 04:59) (150/82 - 161/96)  BP(mean): --  RR: 20 (21 Nov 2019 04:59) (20 - 20)  SpO2: 97% (20 Nov 2019 23:53) (97% - 97%)    PHYSICAL EXAM  General: Appears comfortable   Drowsy but arousable       MEDICATIONS  (STANDING):  albuterol/ipratropium for Nebulization 3 milliLiter(s) Nebulizer every 6 hours  buPROPion XL . 150 milliGRAM(s) Oral at bedtime  chlorhexidine 4% Liquid 1 Application(s) Topical <User Schedule>  dexAMETHasone     Tablet 4 milliGRAM(s) Oral two times a day  escitalopram 20 milliGRAM(s) Oral at bedtime  fentaNYL   Patch 100 MICROgram(s)/Hr 3 Patch Transdermal every 72 hours  folic acid 1 milliGRAM(s) Oral daily  haloperidol    Concentrate 0.25 milliGRAM(s) Oral every 12 hours  influenza   Vaccine 0.5 milliLiter(s) IntraMuscular once  melatonin 5 milliGRAM(s) Oral at bedtime  pantoprazole    Tablet 40 milliGRAM(s) Oral every 12 hours  polyethylene glycol 3350 17 Gram(s) Oral at bedtime  senna 2 Tablet(s) Oral every 12 hours    MEDICATIONS  (PRN):  ALBUTerol    0.083% 2.5 milliGRAM(s) Nebulizer every 6 hours PRN Bronchospasm  benzonatate 100 milliGRAM(s) Oral three times a day PRN Cough  clonazePAM  Tablet 0.5 milliGRAM(s) Oral three times a day PRN anxiety  guaiFENesin    Syrup 200 milliGRAM(s) Oral every 6 hours PRN Cough  HYDROmorphone  Injectable 4 milliGRAM(s) IV Push every 1 hour PRN Severe Pain (7 - 10)  saline laxative (FLEET) Rectal Enema 1 Enema Rectal daily PRN Constipation      LABS:            Serial CBC's  11-18 @ 19:13  Hct-24.5 / Hgb-7.3 / Plat-114 / RBC-2.66 / WBC-17.29                                          BLOOD SMEAR INTERPRETATION:       RADIOLOGY & ADDITIONAL STUDIES:

## 2019-11-21 NOTE — PROGRESS NOTE ADULT - ASSESSMENT
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 Patient DNR/DNI due to grave prognosis from worsening metastatic adenocarcinoma of the lung. Patient is now comfort measures only, MOLST updated.    #Hx of Metastatic adenocarcinoma of the lung; Leptomeningeal disease s/p spinal tap (w/o malignant cells)  - Received Alimta (10/9/2019)  - MRI brain showed worsening mets, with edema  - C/w dexamethasone, tapering  - Palliative care and hospice consults on board  - Palliative: Fentanyl increased to 300mcg Patch q72 , Dilaudid 4mg IV q1 PRN, Haldol 0.25mg SL q12 PRN, Fleet Enema q24 PRN, Senna and Colace  - Needs transition to oral opioids for d/c home with hospice  - Evaluate for inpatient hospice as per palliative care due to high IV opioid need    #Dyspnea/Fatigue   - due to Symptomatic anemia on admission vs. post-obstructive pneumonia  - CXR: Left upper lobe mass with possible opacity, No bacteremia, Received last dose of Cefepime on 11/18  - Continue Nebs and monitor saturation level  - LE duplex shows no DVT  - Was 84% on RA, 94% on O2> Will need O2 with hospice  - Patient takes off NC on her own and desaturates, counselled to stop      #Acute-on-chronic normocytic anemia; secondary to chronic disease vs. 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 Patient DNR/DNI due to grave prognosis from worsening metastatic adenocarcinoma of the lung. Patient is now comfort measures only, MOLST updated.    #Hx of Metastatic adenocarcinoma of the lung; Leptomeningeal disease s/p spinal tap (w/o malignant cells)  - Received Alimta (10/9/2019)  - MRI brain showed worsening mets, with edema  - C/w dexamethasone, tapering  - Palliative care and hospice consults on board  - Palliative: Fentanyl increased to 300mcg Patch q72 , Dilaudid 4mg IV q1 PRN, Haldol 0.25mg SL q12 PRN, Fleet Enema q24 PRN, Senna and Colace  - Evaluated for inpatient hospice as per palliative care due to high IV opioid need > Accepted for inpatient hospice, tomorrow  - Dilaudid infusion started    #Dyspnea/Fatigue   - due to Symptomatic anemia on admission vs. post-obstructive pneumonia  - CXR: Left upper lobe mass with possible opacity, No bacteremia, Received last dose of Cefepime on 11/18  - LE duplex shows no DVT  - Was 84% on RA, 94% on O2> Will need O2 with hospice  - Patient takes off NC on her own and desaturates, counselled to stop  - Monitor saturation level      #Acute-on-chronic normocytic anemia; secondary to chronic disease vs. BM suppression (Chemo/Rad)  - Last Hb 7.3 from 11/18/19  - No more blood work    #Hypokalemia  - Supplemented  - No more blood work    #Elevated INR  - Unclear etiology, could be related to Vit K deficiency from poor appetite  - No more blood work    Diet: Soft diet  DVT ppx: Lovenox 40 mg qD  Activity: Increase as tolerated  Code status: DNR/DNI, Comfort Care  Disposition: Home with hospice on [place] vs Inpatient hospice

## 2019-11-21 NOTE — PROGRESS NOTE ADULT - SUBJECTIVE AND OBJECTIVE BOX
44yFemale with diagnosis: HYPOKALEMIA LUNG CANCER ANEMIA PNEUMONIA JAZMIN      Patient seen for follow up. Pt being managed for symptom management.       PHYSICAL EXAM  Pt in bed drowsy but arousable  Pt moans while she has her eyes closed at times  When awake she becomes very anxious    T(C): , Max: 37.2 (04:59)  T(F): 98.9  HR: 124 (124 - 130)  BP: 150/82 (150/82 - 161/96)  RR: 20 (20 - 20)  SpO2: 97% (97% - 97%)                                                    MEDICATIONS  (STANDING):  albuterol/ipratropium for Nebulization 3 milliLiter(s) Nebulizer every 6 hours  buPROPion XL . 150 milliGRAM(s) Oral at bedtime  chlorhexidine 4% Liquid 1 Application(s) Topical <User Schedule>  dexAMETHasone     Tablet 4 milliGRAM(s) Oral two times a day  escitalopram 20 milliGRAM(s) Oral at bedtime  fentaNYL   Patch 100 MICROgram(s)/Hr 3 Patch Transdermal every 72 hours  haloperidol    Concentrate 0.25 milliGRAM(s) Oral every 12 hours  HYDROmorphone Infusion 1 mG/Hr (1 mL/Hr) IV Continuous <Continuous>  melatonin 5 milliGRAM(s) Oral at bedtime  pantoprazole    Tablet 40 milliGRAM(s) Oral every 12 hours  polyethylene glycol 3350 17 Gram(s) Oral at bedtime  senna 2 Tablet(s) Oral every 12 hours    MEDICATIONS  (PRN):  ALBUTerol    0.083% 2.5 milliGRAM(s) Nebulizer every 6 hours PRN Bronchospasm  benzonatate 100 milliGRAM(s) Oral three times a day PRN Cough  clonazePAM  Tablet 0.5 milliGRAM(s) Oral three times a day PRN anxiety  guaiFENesin    Syrup 200 milliGRAM(s) Oral every 6 hours PRN Cough  HYDROmorphone  Injectable 4 milliGRAM(s) IV Push every 1 hour PRN Severe Pain (7 - 10)  saline laxative (FLEET) Rectal Enema 1 Enema Rectal daily PRN Constipation

## 2019-11-21 NOTE — CHART NOTE - NSCHARTNOTEFT_GEN_A_CORE
Registered Dietitian Limited Follow Up:  -Upon RD reassessment, pt and family have decided on comfort care with symptom management at this time. Per [facility] conversation: 11/19, decided on DNR Order; Comfort measures only; No blood draws; No artificial nutrition; No antibiotics; Intubation trial; No IV fluids. Current diet order of Mechanical soft with Ensure for comfort and pt tolerating well. Very limited PO intake which is expected but no trouble swallowing. Pt likes Ensure so can continue for comfort, not with intent for improvement of nutritional status at this time. Pt has decided to remain on [place] so awaiting hospice re-eval for inpt services. No further nutrition related interventions. RD sign off.

## 2019-11-21 NOTE — PROGRESS NOTE ADULT - SUBJECTIVE AND OBJECTIVE BOX
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 She recently returned from her trip to [place] and during her trip, her SOB had been progressively getting worse. Upon returning from her trip, she decided to present to ED for further workup. Pt also states she has been having productive cough w/ brownish sputum. There is no relieving or excaerbating factor for her SOB and cough. Also admits to decreased appetite. Denies fever or chill but does state her  recently recovered from a cold. Pt also denies any melena or hematchezia and denies epigastric pain. Her last chemotherapy (alimata and avasta) was done on 10/9/2019.     Vital Signs Last 24 Hrs  T(C): 36.2 (10 Nov 2019 15:50), Max: 36.8 (10 Nov 2019 11:04)  T(F): 97.1 (10 Nov 2019 15:50), Max: 98.3 (10 Nov 2019 11:04)  HR: 99 (10 Nov 2019 15:50) (99 - 104)  BP: 112/60 (10 Nov 2019 15:50) (112/60 - 118/63)  BP(mean): --  RR: 20 (10 Nov 2019 11:04) (20 - 20)  SpO2: 94% (10 Nov 2019 11:04) (94% - 94%)    In ED, pt was found to have Hgb of 5.9, K of 2.9 and elevated Cr. In ED, pt was given 2U of pRBC. Pt is admitted for symptomatic anemia.    Interval History:  Patient seen and examined at bedside. No acute events overnight.    Past Medical History:  Lung cancer  Anxiety and depression    Past Surgical History:  History of cholecystectomy    Allergies:  dust (Sneezing; Rhinorrhea)  Erythromycin Base (Other)    Medications:  PRN:  ALBUTerol    0.083% 2.5 milliGRAM(s) Nebulizer every 6 hours PRN Bronchospasm  benzonatate 100 milliGRAM(s) Oral three times a day PRN Cough  clonazePAM  Tablet 0.5 milliGRAM(s) Oral three times a day PRN anxiety  guaiFENesin    Syrup 200 milliGRAM(s) Oral every 6 hours PRN Cough  HYDROmorphone  Injectable 4 milliGRAM(s) IV Push every 1 hour PRN Severe Pain (7 - 10)  saline laxative (FLEET) Rectal Enema 1 Enema Rectal daily PRN Constipation    Standing:  albuterol/ipratropium for Nebulization 3 milliLiter(s) Nebulizer every 6 hours  buPROPion XL . 150 milliGRAM(s) Oral at bedtime  chlorhexidine 4% Liquid 1 Application(s) Topical <User Schedule>  dexAMETHasone     Tablet 4 milliGRAM(s) Oral two times a day  escitalopram 20 milliGRAM(s) Oral at bedtime  fentaNYL   Patch 100 MICROgram(s)/Hr 3 Patch Transdermal every 72 hours  haloperidol    Concentrate 0.25 milliGRAM(s) Oral every 12 hours  melatonin 5 milliGRAM(s) Oral at bedtime  pantoprazole    Tablet 40 milliGRAM(s) Oral every 12 hours  polyethylene glycol 3350 17 Gram(s) Oral at bedtime  senna 2 Tablet(s) Oral every 12 hours    Home:  Advil Liquigel 200 mg oral capsule: 1 cap(s) orally every 4 hours, As Needed - for mild pain  Allegra 180 mg oral tablet: 1 tab(s) orally once a day  benzonatate 100 mg oral capsule: 1 cap(s) orally 3 times a day  Colace 100 mg oral capsule: 1 cap(s) orally 2 times a day  escitalopram 20 mg oral tablet: 1 tab(s) orally once a day (at bedtime)  KlonoPIN 0.5 mg oral tablet: 1 tab(s) orally 3 times a day, As Needed  oxyCODONE 20 mg oral tablet: 1 tab(s) orally every 4 hours -Severe Pain (7 - 10) as needed MDD:4 tablets  OxyCONTIN 80 mg oral tablet, extended release: 120 milligram(s) orally 2 times a day  prochlorperazine 10 mg oral tablet: 1 tab(s) orally every 6 hours, As Needed  Protonix 40 mg oral delayed release tablet: 1 tab(s) orally 2 times a day  Senna 8.6 mg oral tablet: 1 tab(s) orally once a day (at bedtime)    Vitals:  T(C): 37.2, Max: 37.2 (11-21-19 @ 04:59)  T(F): 98.9, Max: 98.9 (11-21-19 @ 04:59)  HR: 124 (124 - 130)  BP: 150/82 (150/82 - 161/96)  RR: 20 (20 - 20)  SpO2: 97% (97% - 97%)    Physical Exam:  General: Awake, Alert. Not in acute distress.  Heart: Regular rate and rhythm; S1, S2; No murmurs.  Lungs: Clear to auscultation bilaterally.  Abdomen: Soft, nontender, nondistended.  Extremities: No edema in upper or lower extremities.  Neuro: AAOx3, No focal deficits.    Labs:                Microbiology:    Radiology: Patient Information:  REMEDIOS DENTON / 44y / Female / MRN#:[id number]    Hospital Day: 11d    HPI:  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 She recently returned from her trip to [place] and during her trip, her SOB had been progressively getting worse. Upon returning from her trip, she decided to present to ED for further workup. Pt also states she has been having productive cough w/ brownish sputum. There is no relieving or excaerbating factor for her SOB and cough. Also admits to decreased appetite. Denies fever or chill but does state her  recently recovered from a cold. Pt also denies any melena or hematchezia and denies epigastric pain. Her last chemotherapy (alimata and avasta) was done on 10/9/2019.     Vital Signs Last 24 Hrs  T(C): 36.2 (10 Nov 2019 15:50), Max: 36.8 (10 Nov 2019 11:04)  T(F): 97.1 (10 Nov 2019 15:50), Max: 98.3 (10 Nov 2019 11:04)  HR: 99 (10 Nov 2019 15:50) (99 - 104)  BP: 112/60 (10 Nov 2019 15:50) (112/60 - 118/63)  BP(mean): --  RR: 20 (10 Nov 2019 11:04) (20 - 20)  SpO2: 94% (10 Nov 2019 11:04) (94% - 94%)    In ED, pt was found to have Hgb of 5.9, K of 2.9 and elevated Cr. In ED, pt was given 2U of pRBC. Pt is admitted for symptomatic anemia.    Interval History:  Patient seen and examined at bedside. No acute events overnight.    Past Medical History:  Lung cancer  Anxiety and depression    Past Surgical History:  History of cholecystectomy    Allergies:  dust (Sneezing; Rhinorrhea)  Erythromycin Base (Other)    Medications:  PRN:  ALBUTerol    0.083% 2.5 milliGRAM(s) Nebulizer every 6 hours PRN Bronchospasm  benzonatate 100 milliGRAM(s) Oral three times a day PRN Cough  clonazePAM  Tablet 0.5 milliGRAM(s) Oral three times a day PRN anxiety  guaiFENesin    Syrup 200 milliGRAM(s) Oral every 6 hours PRN Cough  HYDROmorphone  Injectable 4 milliGRAM(s) IV Push every 1 hour PRN Severe Pain (7 - 10)  saline laxative (FLEET) Rectal Enema 1 Enema Rectal daily PRN Constipation    Standing:  albuterol/ipratropium for Nebulization 3 milliLiter(s) Nebulizer every 6 hours  buPROPion XL . 150 milliGRAM(s) Oral at bedtime  chlorhexidine 4% Liquid 1 Application(s) Topical <User Schedule>  dexAMETHasone     Tablet 4 milliGRAM(s) Oral two times a day  escitalopram 20 milliGRAM(s) Oral at bedtime  fentaNYL   Patch 100 MICROgram(s)/Hr 3 Patch Transdermal every 72 hours  haloperidol    Concentrate 0.25 milliGRAM(s) Oral every 12 hours  melatonin 5 milliGRAM(s) Oral at bedtime  pantoprazole    Tablet 40 milliGRAM(s) Oral every 12 hours  polyethylene glycol 3350 17 Gram(s) Oral at bedtime  senna 2 Tablet(s) Oral every 12 hours    Home:  Advil Liquigel 200 mg oral capsule: 1 cap(s) orally every 4 hours, As Needed - for mild pain  Allegra 180 mg oral tablet: 1 tab(s) orally once a day  benzonatate 100 mg oral capsule: 1 cap(s) orally 3 times a day  Colace 100 mg oral capsule: 1 cap(s) orally 2 times a day  escitalopram 20 mg oral tablet: 1 tab(s) orally once a day (at bedtime)  KlonoPIN 0.5 mg oral tablet: 1 tab(s) orally 3 times a day, As Needed  oxyCODONE 20 mg oral tablet: 1 tab(s) orally every 4 hours -Severe Pain (7 - 10) as needed MDD:4 tablets  OxyCONTIN 80 mg oral tablet, extended release: 120 milligram(s) orally 2 times a day  prochlorperazine 10 mg oral tablet: 1 tab(s) orally every 6 hours, As Needed  Protonix 40 mg oral delayed release tablet: 1 tab(s) orally 2 times a day  Senna 8.6 mg oral tablet: 1 tab(s) orally once a day (at bedtime)    Vitals:  T(C): 37.2, Max: 37.2 (11-21-19 @ 04:59)  T(F): 98.9, Max: 98.9 (11-21-19 @ 04:59)  HR: 124 (124 - 130)  BP: 150/82 (150/82 - 161/96)  RR: 20 (20 - 20)  SpO2: 97% (97% - 97%)    Physical Exam:  General: Not in acute distress.  Heart: Regular rate and rhythm; S1, S2; No murmurs.  Lungs: Clear to auscultation bilaterally.  Abdomen: Soft, nontender, nondistended.  Extremities: No edema in upper or lower extremities.  Neuro: No focal deficits.    Labs:                Microbiology:    Radiology:

## 2019-11-22 PROCEDURE — 99231 SBSQ HOSP IP/OBS SF/LOW 25: CPT

## 2019-11-22 RX ORDER — HYDROMORPHONE HYDROCHLORIDE 2 MG/ML
1.5 INJECTION INTRAMUSCULAR; INTRAVENOUS; SUBCUTANEOUS
Qty: 100 | Refills: 0 | Status: DISCONTINUED | OUTPATIENT
Start: 2019-11-22 | End: 2019-11-24

## 2019-11-22 RX ORDER — FENTANYL CITRATE 50 UG/ML
3 INJECTION INTRAVENOUS
Refills: 0 | Status: DISCONTINUED | OUTPATIENT
Start: 2019-11-22 | End: 2019-11-23

## 2019-11-22 RX ADMIN — Medication 4 MILLIGRAM(S): at 06:19

## 2019-11-22 RX ADMIN — HYDROMORPHONE HYDROCHLORIDE 4 MILLIGRAM(S): 2 INJECTION INTRAMUSCULAR; INTRAVENOUS; SUBCUTANEOUS at 13:37

## 2019-11-22 RX ADMIN — Medication 3 MILLILITER(S): at 20:31

## 2019-11-22 RX ADMIN — HYDROMORPHONE HYDROCHLORIDE 4 MILLIGRAM(S): 2 INJECTION INTRAMUSCULAR; INTRAVENOUS; SUBCUTANEOUS at 22:00

## 2019-11-22 RX ADMIN — HYDROMORPHONE HYDROCHLORIDE 4 MILLIGRAM(S): 2 INJECTION INTRAMUSCULAR; INTRAVENOUS; SUBCUTANEOUS at 23:44

## 2019-11-22 RX ADMIN — FENTANYL CITRATE 3 PATCH: 50 INJECTION INTRAVENOUS at 07:37

## 2019-11-22 RX ADMIN — HYDROMORPHONE HYDROCHLORIDE 4 MILLIGRAM(S): 2 INJECTION INTRAMUSCULAR; INTRAVENOUS; SUBCUTANEOUS at 08:49

## 2019-11-22 RX ADMIN — HYDROMORPHONE HYDROCHLORIDE 4 MILLIGRAM(S): 2 INJECTION INTRAMUSCULAR; INTRAVENOUS; SUBCUTANEOUS at 11:17

## 2019-11-22 RX ADMIN — ESCITALOPRAM OXALATE 20 MILLIGRAM(S): 10 TABLET, FILM COATED ORAL at 21:37

## 2019-11-22 RX ADMIN — Medication 5 MILLIGRAM(S): at 21:37

## 2019-11-22 RX ADMIN — HYDROMORPHONE HYDROCHLORIDE 4 MILLIGRAM(S): 2 INJECTION INTRAMUSCULAR; INTRAVENOUS; SUBCUTANEOUS at 21:37

## 2019-11-22 RX ADMIN — HYDROMORPHONE HYDROCHLORIDE 4 MILLIGRAM(S): 2 INJECTION INTRAMUSCULAR; INTRAVENOUS; SUBCUTANEOUS at 12:00

## 2019-11-22 RX ADMIN — HYDROMORPHONE HYDROCHLORIDE 4 MILLIGRAM(S): 2 INJECTION INTRAMUSCULAR; INTRAVENOUS; SUBCUTANEOUS at 04:41

## 2019-11-22 RX ADMIN — Medication 3 MILLILITER(S): at 07:47

## 2019-11-22 RX ADMIN — HALOPERIDOL DECANOATE 0.25 MILLIGRAM(S): 100 INJECTION INTRAMUSCULAR at 06:18

## 2019-11-22 RX ADMIN — BUPROPION HYDROCHLORIDE 150 MILLIGRAM(S): 150 TABLET, EXTENDED RELEASE ORAL at 21:37

## 2019-11-22 RX ADMIN — CHLORHEXIDINE GLUCONATE 1 APPLICATION(S): 213 SOLUTION TOPICAL at 04:30

## 2019-11-22 RX ADMIN — SENNA PLUS 2 TABLET(S): 8.6 TABLET ORAL at 06:19

## 2019-11-22 RX ADMIN — Medication 4 MILLIGRAM(S): at 19:00

## 2019-11-22 RX ADMIN — PANTOPRAZOLE SODIUM 40 MILLIGRAM(S): 20 TABLET, DELAYED RELEASE ORAL at 06:19

## 2019-11-22 NOTE — PROGRESS NOTE ADULT - ASSESSMENT
44yFemale being evaluated for comfort measures only. Pt was supposed to start inpatient hospice today. Hospice nurse said  missed appointment. Went to bedside pt's spouse and proxy said hospice told him afternoon and he thought he was on time, and apologized. He is overwrought and crying. Pt changing and no longer very alert some cyanosis around her mouth noted. Pt also moans and whines at times. Cannot verbally communicate pain scale.    Discussed w Palliative Attending, pt still received 5 doses of Dilaudid 4mg IV in last 24hrs ( 20mg total) . Will increase Dilaudid drip. Support given to patients .       Recommendations:  DNR/DNI/CMO  inpatient hospice admit monday according to hospice nurse  Dilaudid 1.5mg gtt (increased from 1mg)  Dilaudid 4mg IVP Q 1 HR PRN  Klonopin 0.5mg po TID PRN  if unable to swallow but still anxious or restless  Ativan 1mg IV Q 4 HRS PRN  call x [phone] for any issues

## 2019-11-22 NOTE — PROGRESS NOTE ADULT - SUBJECTIVE AND OBJECTIVE BOX
Patient Information:  REMEDIOS DENTON / 44y / Female / MRN#:[id number]    Hospital Day: 12d    HPI:  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 She recently returned from her trip to [place] and during her trip, her SOB had been progressively getting worse. Upon returning from her trip, she decided to present to ED for further workup. Pt also states she has been having productive cough w/ brownish sputum. There is no relieving or excaerbating factor for her SOB and cough. Also admits to decreased appetite. Denies fever or chill but does state her  recently recovered from a cold. Pt also denies any melena or hematchezia and denies epigastric pain. Her last chemotherapy (alimata and avasta) was done on 10/9/2019.     Vital Signs Last 24 Hrs  T(C): 36.2 (10 Nov 2019 15:50), Max: 36.8 (10 Nov 2019 11:04)  T(F): 97.1 (10 Nov 2019 15:50), Max: 98.3 (10 Nov 2019 11:04)  HR: 99 (10 Nov 2019 15:50) (99 - 104)  BP: 112/60 (10 Nov 2019 15:50) (112/60 - 118/63)  BP(mean): --  RR: 20 (10 Nov 2019 11:04) (20 - 20)  SpO2: 94% (10 Nov 2019 11:04) (94% - 94%)    In ED, pt was found to have Hgb of 5.9, K of 2.9 and elevated Cr. In ED, pt was given 2U of pRBC. Pt is admitted for symptomatic anemia.    Interval History:  Patient seen and examined at bedside. No acute events overnight.    Past Medical History:  Lung cancer  Anxiety and depression    Past Surgical History:  History of cholecystectomy    Allergies:  dust (Sneezing; Rhinorrhea)  Erythromycin Base (Other)    Medications:  PRN:  ALBUTerol    0.083% 2.5 milliGRAM(s) Nebulizer every 6 hours PRN Bronchospasm  benzonatate 100 milliGRAM(s) Oral three times a day PRN Cough  clonazePAM  Tablet 0.5 milliGRAM(s) Oral three times a day PRN anxiety  guaiFENesin    Syrup 200 milliGRAM(s) Oral every 6 hours PRN Cough  HYDROmorphone  Injectable 4 milliGRAM(s) IV Push every 1 hour PRN Severe Pain (7 - 10)  saline laxative (FLEET) Rectal Enema 1 Enema Rectal daily PRN Constipation    Standing:  albuterol/ipratropium for Nebulization 3 milliLiter(s) Nebulizer every 6 hours  buPROPion XL . 150 milliGRAM(s) Oral at bedtime  chlorhexidine 4% Liquid 1 Application(s) Topical <User Schedule>  dexAMETHasone     Tablet 4 milliGRAM(s) Oral two times a day  escitalopram 20 milliGRAM(s) Oral at bedtime  fentaNYL   Patch 100 MICROgram(s)/Hr 3 Patch Transdermal every 72 hours  haloperidol    Concentrate 0.25 milliGRAM(s) Oral every 12 hours  HYDROmorphone Infusion 1 mG/Hr (1 mL/Hr) IV Continuous <Continuous>  melatonin 5 milliGRAM(s) Oral at bedtime  pantoprazole    Tablet 40 milliGRAM(s) Oral every 12 hours  polyethylene glycol 3350 17 Gram(s) Oral at bedtime  senna 2 Tablet(s) Oral every 12 hours    Home:  Advil Liquigel 200 mg oral capsule: 1 cap(s) orally every 4 hours, As Needed - for mild pain  Allegra 180 mg oral tablet: 1 tab(s) orally once a day  benzonatate 100 mg oral capsule: 1 cap(s) orally 3 times a day  Colace 100 mg oral capsule: 1 cap(s) orally 2 times a day  escitalopram 20 mg oral tablet: 1 tab(s) orally once a day (at bedtime)  KlonoPIN 0.5 mg oral tablet: 1 tab(s) orally 3 times a day, As Needed  oxyCODONE 20 mg oral tablet: 1 tab(s) orally every 4 hours -Severe Pain (7 - 10) as needed MDD:4 tablets  OxyCONTIN 80 mg oral tablet, extended release: 120 milligram(s) orally 2 times a day  prochlorperazine 10 mg oral tablet: 1 tab(s) orally every 6 hours, As Needed  Protonix 40 mg oral delayed release tablet: 1 tab(s) orally 2 times a day  Senna 8.6 mg oral tablet: 1 tab(s) orally once a day (at bedtime)    Vitals:  T(C): 36.8, Max: 37.3 (11-21-19 @ 20:54)  T(F): 98.2, Max: 99.2 (11-21-19 @ 20:54)  HR: 132 (126 - 132)  BP: 138/88 (138/88 - 145/84)  RR: 20 (20 - 20)  SpO2: 96% (96% - 96%)    Physical Exam:  General: Drowsy.  Heart: Regular rate and rhythm; S1, S2; No murmurs.  Lungs: Clear to auscultation bilaterally.  Abdomen: Soft, nontender, nondistended.  Extremities: No edema in upper or lower extremities.  Neuro: No focal deficits.    Labs:                Microbiology:    Radiology:

## 2019-11-22 NOTE — PROGRESS NOTE ADULT - SUBJECTIVE AND OBJECTIVE BOX
Patient is a 44y old  Female who presents with a chief complaint of Goals of care and symptom management (20 Nov 2019 15:11)      Subjective: Looks comfortable, Somnolent        PHYSICAL EXAM  General: Appears comfortable   Drowsy but arousable       MEDICATIONS  (STANDING):  albuterol/ipratropium for Nebulization 3 milliLiter(s) Nebulizer every 6 hours  buPROPion XL . 150 milliGRAM(s) Oral at bedtime  chlorhexidine 4% Liquid 1 Application(s) Topical <User Schedule>  dexAMETHasone     Tablet 4 milliGRAM(s) Oral two times a day  escitalopram 20 milliGRAM(s) Oral at bedtime  fentaNYL   Patch 100 MICROgram(s)/Hr 3 Patch Transdermal every 72 hours  folic acid 1 milliGRAM(s) Oral daily  haloperidol    Concentrate 0.25 milliGRAM(s) Oral every 12 hours  influenza   Vaccine 0.5 milliLiter(s) IntraMuscular once  melatonin 5 milliGRAM(s) Oral at bedtime  pantoprazole    Tablet 40 milliGRAM(s) Oral every 12 hours  polyethylene glycol 3350 17 Gram(s) Oral at bedtime  senna 2 Tablet(s) Oral every 12 hours    MEDICATIONS  (PRN):  ALBUTerol    0.083% 2.5 milliGRAM(s) Nebulizer every 6 hours PRN Bronchospasm  benzonatate 100 milliGRAM(s) Oral three times a day PRN Cough  clonazePAM  Tablet 0.5 milliGRAM(s) Oral three times a day PRN anxiety  guaiFENesin    Syrup 200 milliGRAM(s) Oral every 6 hours PRN Cough  HYDROmorphone  Injectable 4 milliGRAM(s) IV Push every 1 hour PRN Severe Pain (7 - 10)  saline laxative (FLEET) Rectal Enema 1 Enema Rectal daily PRN Constipation      LABS:            Serial CBC's  11-18 @ 19:13  Hct-24.5 / Hgb-7.3 / Plat-114 / RBC-2.66 / WBC-17.29

## 2019-11-22 NOTE — PROGRESS NOTE ADULT - SUBJECTIVE AND OBJECTIVE BOX
44yFemale with diagnosis: HYPOKALEMIA LUNG CANCER ANEMIA PNEUMONIA JAZMIN      Patient seen for follow up. Pt not admitted to inpatient hospice today. Now scheduled for monday      PHYSICAL EXAM  Pt drowsy but arousable  Pt's  at bedside crying  Pt occasionally tears up and whines  Has jarrett catheter for comfort    T(C): , Max: 37.3 (20:54)  T(F): 98.8  HR: 131 (126 - 132)  BP: 163/85 (138/88 - 163/85)  RR: 20 (20 - 20)  SpO2: 96% (96% - 96%)              LABS:                                                                                                                                               MEDICATIONS  (STANDING):  albuterol/ipratropium for Nebulization 3 milliLiter(s) Nebulizer every 6 hours  buPROPion XL . 150 milliGRAM(s) Oral at bedtime  chlorhexidine 4% Liquid 1 Application(s) Topical <User Schedule>  dexAMETHasone     Tablet 4 milliGRAM(s) Oral two times a day  escitalopram 20 milliGRAM(s) Oral at bedtime  fentaNYL   Patch 100 MICROgram(s)/Hr 3 Patch Transdermal every 72 hours  haloperidol    Concentrate 0.25 milliGRAM(s) Oral every 12 hours  HYDROmorphone Infusion 1.5 mG/Hr (1.5 mL/Hr) IV Continuous <Continuous>  melatonin 5 milliGRAM(s) Oral at bedtime  pantoprazole    Tablet 40 milliGRAM(s) Oral every 12 hours  polyethylene glycol 3350 17 Gram(s) Oral at bedtime  senna 2 Tablet(s) Oral every 12 hours    MEDICATIONS  (PRN):  ALBUTerol    0.083% 2.5 milliGRAM(s) Nebulizer every 6 hours PRN Bronchospasm  benzonatate 100 milliGRAM(s) Oral three times a day PRN Cough  clonazePAM  Tablet 0.5 milliGRAM(s) Oral three times a day PRN anxiety  guaiFENesin    Syrup 200 milliGRAM(s) Oral every 6 hours PRN Cough  HYDROmorphone  Injectable 4 milliGRAM(s) IV Push every 1 hour PRN Severe Pain (7 - 10)  LORazepam   Injectable 1 milliGRAM(s) IV Push every 4 hours PRN anxiety and restlessness  saline laxative (FLEET) Rectal Enema 1 Enema Rectal daily PRN Constipation

## 2019-11-22 NOTE — PROGRESS NOTE ADULT - ATTENDING COMMENTS
Appears comfortable, on Dilaudid gtt.   Increase gtt to comfort.   DNR/DNI, full comfort care.
As per family comfortable.   Continue with full comfort measures.   DNR and DNI.
I had a lengthy conversation with Анна and family bedside. They have met with Hospice today and are agreeable for Hospice with goal to go home with Hospice. However given better family support their wish is to try to relocate to .   There are several issues with plan of relocation, including transportation, we will look into this with social work.   Анна appear comfortable.   DNR and DNI.  Case was discussed with primary team.
Mental status deteriorating, continue with full comfort care.   DNR/DNI.
Plan for inpatient hospice. No more labwork or antibiotics. Wean steroids off over the next few days.  Dilaudid or morphine gtt for pain if required per palliative care.   DNR and DNI, case was discussed with patients  Chris bedside.
Plan for outpatient hospice. Continue with steroids.  DNR and DNI.
The patient was seen. Agree with above.  Supportive care.
Анна is much more lucid today, continues with steroids.   She appears uncomfortable and reports she had a bad night, consult for palliative care for pain management was placed.   Disposition is going to be Hospice care, but location still not clear, family wishes to take her up to [place].   DNR and DNI.
Patient resting at the time of my exam. MS improved slightly yesterday   She has been started on steroids, MRI of the brain was reviewed and discussed with Rad Onc. Repeat whole brain XRT is not a feasible option at this time.   Hospice at this time id appropriate.   Her  may need to arrange for transferring patient to [place], where he has better support system.   Please obtain Palliative care and Hospice consults.   DNR and DNI.   case was at length discussed with patients .
Pt seen with above NP. Pt suffered panic episode today. Now resting comfortably after receipt of klonopin and dilaudid.   Pain improving with current regimen.    Hospice to f/u   If pt with further clinical decline, we may consider to initiate continuous opioid infusion for comfort  cont meds as above  we will follow
The patient was seen. Agree with above.  Status post 2 units PRBC. Hb went up to 8.4. Worsening of anemia is likely due to chemo, JAZMIN and  po intake.  CXR showed LUIS mass with superimposed consolidation. Afebrile.    -- Continue ABx.  -- Supportive care  -- IVF hydration.  -- Monitor CBC and BMP.
Анна was noted to have cognitive decline over the past few days. I suspect this is 2/2 CNS progression. MRI is done pending read.   She has progressed though multiple lines of therapy, unfortunately she is now appropriated for palliative care.   She is unlikely to benefit from further cancer directed therapy, I have tried to arrange an evaluation for clinical trial at [facility], but given recent MS change is no longer appears to be a trial candidate.   DNR and DNI. Will discuss case with patients .

## 2019-11-22 NOTE — PROGRESS NOTE ADULT - ASSESSMENT
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 s/p chemo and rad therapy, anxiety, depression and significant smoking history presents to the hospital with complaining of SOB, cough and fatigue    # PNA on Abx- completed Abx course  # Acute on chronic normocytic anemia likely secondary to Acute on chronic disease vs BM suppression 2/2 to chemotherapy and radiation therapy   # JAZMIN likely prerenal secondary to decreased PO intake- improved with ivf , resolved now   #Metastatic adenocarcinoma of the lung, PDL1 5%, EGFT, Alk, BRAF, KRAS, ROS1 negative with brain mets and MRI L-spine- leptomeningeal disease s/p spinal tap- CSF- no malignant cells  Received Alimta- 10/9/19  MRI brain shows worsening mets with edema  c/w decadron taper       Plan : Hospice to take over to their service today   On Dilaudid drip  Comfort measures only   DNR/DNI

## 2019-11-22 NOTE — PROGRESS NOTE ADULT - ASSESSMENT
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 Patient DNR/DNI due to grave prognosis from worsening metastatic adenocarcinoma of the lung. Patient is now comfort measures only, MOLST updated. Under hospice care from today.    #Hx of Metastatic adenocarcinoma of the lung; Leptomeningeal disease s/p spinal tap (w/o malignant cells)  - Received Alimta (10/9/2019)  - MRI brain showed worsening mets, with edema  - C/w dexamethasone, tapering  - Patient now inpatient hospice, Dilaudid infusion started today, consider increasing dose as patient still asking for PRN meds  - Palliative: Fentanyl increased to 300mcg Patch q72 , Dilaudid 4mg IV q1 PRN, Haldol 0.25mg SL q12 PRN, Fleet Enema q24 PRN, Senna and Colace    #Dyspnea/Fatigue   - due to Symptomatic anemia on admission vs. post-obstructive pneumonia  - CXR: Left upper lobe mass with possible opacity, No bacteremia, Received last dose of Cefepime on 11/18  - LE duplex showed no DVT  - Patient takes off NC on her own and desaturates, counselled to stop  - Monitor saturation level and keep patient comfortable    #Acute-on-chronic normocytic anemia; secondary to chronic disease vs. BM suppression (Chemo/Rad)  - Last Hb 7.3 from 11/18/19  - No more blood work    #Hypokalemia  - Supplemented  - No more blood work    #Elevated INR  - Unclear etiology, could be related to Vit K deficiency from poor appetite  - No more blood work    Diet: Soft diet  DVT ppx: Lovenox 40 mg qD  Activity: Increase as tolerated  Code status: DNR/DNI, Comfort Care  Disposition: Inpatient hospice

## 2019-11-22 NOTE — CHART NOTE - NSCHARTNOTEFT_GEN_A_CORE
Palliative Care Spiritual Assessment  I was an empathetic presence for Pt. and family. Pt seems to be a bit confused. (Pt was saying things that doesn't make sense at times) PT's cousin was at bedside.   Pt is realistic about her disease and the fact that the outcome will not be good. Her  is having a difficult time processing the thought of loosing his wife. I was a supportive listener   to his non-verbal speech.. I was a comforting presence for spouse. I prayed for Pt. and Spouse and anointed Pt. I plan to follow up on Monday.

## 2019-11-23 VITALS
RESPIRATION RATE: 19 BRPM | DIASTOLIC BLOOD PRESSURE: 93 MMHG | HEART RATE: 144 BPM | TEMPERATURE: 101 F | SYSTOLIC BLOOD PRESSURE: 158 MMHG

## 2019-11-23 PROCEDURE — 99231 SBSQ HOSP IP/OBS SF/LOW 25: CPT

## 2019-11-23 RX ORDER — METOPROLOL TARTRATE 50 MG
25 TABLET ORAL ONCE
Refills: 0 | Status: COMPLETED | OUTPATIENT
Start: 2019-11-23 | End: 2019-11-23

## 2019-11-23 RX ORDER — SODIUM CHLORIDE 9 MG/ML
500 INJECTION INTRAMUSCULAR; INTRAVENOUS; SUBCUTANEOUS ONCE
Refills: 0 | Status: COMPLETED | OUTPATIENT
Start: 2019-11-23 | End: 2019-11-23

## 2019-11-23 RX ORDER — FENTANYL CITRATE 50 UG/ML
3 INJECTION INTRAVENOUS
Refills: 0 | Status: DISCONTINUED | OUTPATIENT
Start: 2019-11-23 | End: 2019-11-25

## 2019-11-23 RX ORDER — ACETAMINOPHEN 500 MG
650 TABLET ORAL ONCE
Refills: 0 | Status: COMPLETED | OUTPATIENT
Start: 2019-11-23 | End: 2019-11-23

## 2019-11-23 RX ADMIN — HYDROMORPHONE HYDROCHLORIDE 4 MILLIGRAM(S): 2 INJECTION INTRAMUSCULAR; INTRAVENOUS; SUBCUTANEOUS at 15:08

## 2019-11-23 RX ADMIN — HYDROMORPHONE HYDROCHLORIDE 4 MILLIGRAM(S): 2 INJECTION INTRAMUSCULAR; INTRAVENOUS; SUBCUTANEOUS at 08:35

## 2019-11-23 RX ADMIN — HYDROMORPHONE HYDROCHLORIDE 4 MILLIGRAM(S): 2 INJECTION INTRAMUSCULAR; INTRAVENOUS; SUBCUTANEOUS at 12:03

## 2019-11-23 RX ADMIN — Medication 3 MILLILITER(S): at 13:23

## 2019-11-23 RX ADMIN — HYDROMORPHONE HYDROCHLORIDE 4 MILLIGRAM(S): 2 INJECTION INTRAMUSCULAR; INTRAVENOUS; SUBCUTANEOUS at 11:35

## 2019-11-23 RX ADMIN — SENNA PLUS 2 TABLET(S): 8.6 TABLET ORAL at 06:34

## 2019-11-23 RX ADMIN — HALOPERIDOL DECANOATE 0.25 MILLIGRAM(S): 100 INJECTION INTRAMUSCULAR at 08:33

## 2019-11-23 RX ADMIN — HYDROMORPHONE HYDROCHLORIDE 4 MILLIGRAM(S): 2 INJECTION INTRAMUSCULAR; INTRAVENOUS; SUBCUTANEOUS at 17:20

## 2019-11-23 RX ADMIN — FENTANYL CITRATE 3 PATCH: 50 INJECTION INTRAVENOUS at 19:59

## 2019-11-23 RX ADMIN — Medication 0.5 MILLIGRAM(S): at 09:21

## 2019-11-23 RX ADMIN — Medication 1 MILLIGRAM(S): at 15:08

## 2019-11-23 RX ADMIN — Medication 3 MILLILITER(S): at 20:34

## 2019-11-23 RX ADMIN — Medication 25 MILLIGRAM(S): at 10:36

## 2019-11-23 RX ADMIN — Medication 650 MILLIGRAM(S): at 14:17

## 2019-11-23 RX ADMIN — Medication 4 MILLIGRAM(S): at 06:34

## 2019-11-23 RX ADMIN — HYDROMORPHONE HYDROCHLORIDE 4 MILLIGRAM(S): 2 INJECTION INTRAMUSCULAR; INTRAVENOUS; SUBCUTANEOUS at 09:38

## 2019-11-23 RX ADMIN — Medication 650 MILLIGRAM(S): at 15:00

## 2019-11-23 RX ADMIN — FENTANYL CITRATE 3 PATCH: 50 INJECTION INTRAVENOUS at 11:41

## 2019-11-23 RX ADMIN — PANTOPRAZOLE SODIUM 40 MILLIGRAM(S): 20 TABLET, DELAYED RELEASE ORAL at 06:34

## 2019-11-23 RX ADMIN — SODIUM CHLORIDE 500 MILLILITER(S): 9 INJECTION INTRAMUSCULAR; INTRAVENOUS; SUBCUTANEOUS at 06:34

## 2019-11-23 RX ADMIN — HYDROMORPHONE HYDROCHLORIDE 4 MILLIGRAM(S): 2 INJECTION INTRAMUSCULAR; INTRAVENOUS; SUBCUTANEOUS at 04:42

## 2019-11-23 NOTE — PROGRESS NOTE ADULT - ASSESSMENT
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 s/p chemo and rad therapy, anxiety, depression and significant smoking history presents to the hospital with complaining of SOB, cough and fatigue    # PNA on Abx- completed Abx course  # Acute on chronic normocytic anemia likely secondary to Acute on chronic disease vs BM suppression 2/2 to chemotherapy and radiation therapy   # JAZMIN likely prerenal secondary to decreased PO intake- improved with ivf , resolved now   #Metastatic adenocarcinoma of the lung, PDL1 5%, EGFT, Alk, BRAF, KRAS, ROS1 negative with brain mets and MRI L-spine- leptomeningeal disease s/p spinal tap- CSF- no malignant cells  Received Alimta- 10/9/19  MRI brain shows worsening mets with edema  c/w decadron taper       Plan:  On Dilaudid drip  Comfort measures only   DNR/DNI 44 yof w/ PMH of Stage 4 adenocarcinoma of the lungs with metastasis to brain s/p chemotherapy and radiation therapy, anxiety, depression and significant smoking history presents to the hospital with complaining of SOB, cough and fatigue    Patient is now DNR/DNI and is on comfort measures only.  Increase dilaudid drip as needed.  Palliative care team is following.  Inpatient hospice team to assume care on Monday.

## 2019-11-23 NOTE — PROGRESS NOTE ADULT - SUBJECTIVE AND OBJECTIVE BOX
Patient is a 44y old  Female who presents with a chief complaint of Goals of care and symptom management (22 Nov 2019 15:26)      Subjective:      Vital Signs Last 24 Hrs  T(C): 36.3 (23 Nov 2019 05:12), Max: 37.1 (22 Nov 2019 13:12)  T(F): 97.4 (23 Nov 2019 05:12), Max: 98.8 (22 Nov 2019 13:12)  HR: 152 (23 Nov 2019 05:12) (131 - 152)  BP: 162/67 (23 Nov 2019 05:12) (138/88 - 163/85)  BP(mean): --  RR: 16 (23 Nov 2019 05:12) (16 - 20)  SpO2: 96% (22 Nov 2019 22:00) (96% - 96%)    PHYSICAL EXAM  General: adult in NAD  HEENT: clear oropharynx, anicteric sclera, pink conjunctiva  Neck: supple  CV: normal S1/S2 with no murmur rubs or gallops  Lungs: positive air movement b/l ant lungs,clear to auscultation, no wheezes, no rales  Abdomen: soft non-tender non-distended, no hepatosplenomegaly  Ext: no clubbing cyanosis or edema  Skin: no rashes and no petechiae  Neuro: alert and oriented X 4, no focal deficits    MEDICATIONS  (STANDING):  albuterol/ipratropium for Nebulization 3 milliLiter(s) Nebulizer every 6 hours  buPROPion XL . 150 milliGRAM(s) Oral at bedtime  chlorhexidine 4% Liquid 1 Application(s) Topical <User Schedule>  dexAMETHasone     Tablet 4 milliGRAM(s) Oral two times a day  escitalopram 20 milliGRAM(s) Oral at bedtime  fentaNYL   Patch 100 MICROgram(s)/Hr 3 Patch Transdermal every 72 hours  haloperidol    Concentrate 0.25 milliGRAM(s) Oral every 12 hours  HYDROmorphone Infusion 1.5 mG/Hr (1.5 mL/Hr) IV Continuous <Continuous>  melatonin 5 milliGRAM(s) Oral at bedtime  pantoprazole    Tablet 40 milliGRAM(s) Oral every 12 hours  polyethylene glycol 3350 17 Gram(s) Oral at bedtime  senna 2 Tablet(s) Oral every 12 hours  sodium chloride 0.9% Bolus 500 milliLiter(s) IV Bolus once    MEDICATIONS  (PRN):  ALBUTerol    0.083% 2.5 milliGRAM(s) Nebulizer every 6 hours PRN Bronchospasm  benzonatate 100 milliGRAM(s) Oral three times a day PRN Cough  clonazePAM  Tablet 0.5 milliGRAM(s) Oral three times a day PRN anxiety  guaiFENesin    Syrup 200 milliGRAM(s) Oral every 6 hours PRN Cough  HYDROmorphone  Injectable 4 milliGRAM(s) IV Push every 1 hour PRN Severe Pain (7 - 10)  LORazepam   Injectable 1 milliGRAM(s) IV Push every 4 hours PRN anxiety and restlessness  saline laxative (FLEET) Rectal Enema 1 Enema Rectal daily PRN Constipation      LABS:    None new.                                              BLOOD SMEAR INTERPRETATION:       RADIOLOGY & ADDITIONAL STUDIES: Patient is a 44y old  Female who presents with a chief complaint of Goals of care and symptom management (22 Nov 2019 15:26)      Subjective: Patient appears comfortable.      Vital Signs Last 24 Hrs  T(C): 36.3 (23 Nov 2019 05:12), Max: 37.1 (22 Nov 2019 13:12)  T(F): 97.4 (23 Nov 2019 05:12), Max: 98.8 (22 Nov 2019 13:12)  HR: 152 (23 Nov 2019 05:12) (131 - 152)  BP: 162/67 (23 Nov 2019 05:12) (138/88 - 163/85)  BP(mean): --  RR: 16 (23 Nov 2019 05:12) (16 - 20)  SpO2: 96% (22 Nov 2019 22:00) (96% - 96%)    PHYSICAL EXAM  General: chronically-ill appearing.  Appears comfortable.  Dilaudid drip infusing.  HEENT: + alopecia  Lungs: positive air movement b/l ant lungs.  Not in respiratory distress.  : jarrett catheter draining clear yellow urine  Skin: no rashes and no petechiae  Neuro: lethargic    MEDICATIONS  (STANDING):  albuterol/ipratropium for Nebulization 3 milliLiter(s) Nebulizer every 6 hours  buPROPion XL . 150 milliGRAM(s) Oral at bedtime  chlorhexidine 4% Liquid 1 Application(s) Topical <User Schedule>  dexAMETHasone     Tablet 4 milliGRAM(s) Oral two times a day  escitalopram 20 milliGRAM(s) Oral at bedtime  fentaNYL   Patch 100 MICROgram(s)/Hr 3 Patch Transdermal every 72 hours  haloperidol    Concentrate 0.25 milliGRAM(s) Oral every 12 hours  HYDROmorphone Infusion 1.5 mG/Hr (1.5 mL/Hr) IV Continuous <Continuous>  melatonin 5 milliGRAM(s) Oral at bedtime  pantoprazole    Tablet 40 milliGRAM(s) Oral every 12 hours  polyethylene glycol 3350 17 Gram(s) Oral at bedtime  senna 2 Tablet(s) Oral every 12 hours  sodium chloride 0.9% Bolus 500 milliLiter(s) IV Bolus once    MEDICATIONS  (PRN):  ALBUTerol    0.083% 2.5 milliGRAM(s) Nebulizer every 6 hours PRN Bronchospasm  benzonatate 100 milliGRAM(s) Oral three times a day PRN Cough  clonazePAM  Tablet 0.5 milliGRAM(s) Oral three times a day PRN anxiety  guaiFENesin    Syrup 200 milliGRAM(s) Oral every 6 hours PRN Cough  HYDROmorphone  Injectable 4 milliGRAM(s) IV Push every 1 hour PRN Severe Pain (7 - 10)  LORazepam   Injectable 1 milliGRAM(s) IV Push every 4 hours PRN anxiety and restlessness  saline laxative (FLEET) Rectal Enema 1 Enema Rectal daily PRN Constipation      LABS:    None new.                                              BLOOD SMEAR INTERPRETATION:       RADIOLOGY & ADDITIONAL STUDIES: Patient is a 44y old  Female who presents with a chief complaint of Goals of care and symptom management (22 Nov 2019 15:26)      Subjective: Patient appears comfortable.      Vital Signs Last 24 Hrs  T(C): 36.3 (23 Nov 2019 05:12), Max: 37.1 (22 Nov 2019 13:12)  T(F): 97.4 (23 Nov 2019 05:12), Max: 98.8 (22 Nov 2019 13:12)  HR: 152 (23 Nov 2019 05:12) (131 - 152)  BP: 162/67 (23 Nov 2019 05:12) (138/88 - 163/85)  BP(mean): --  RR: 16 (23 Nov 2019 05:12) (16 - 20)  SpO2: 96% (22 Nov 2019 22:00) (96% - 96%)    PHYSICAL EXAM  General: chronically-ill appearing.  Appears comfortable.  Dilaudid drip infusing.  HEENT: + alopecia  Lungs: positive air movement b/l ant lungs.  Not in respiratory distress.  : jarrett catheter draining clear yellow urine  Skin: no rashes and no petechiae  Neuro: lethargic    MEDICATIONS  (STANDING):  albuterol/ipratropium for Nebulization 3 milliLiter(s) Nebulizer every 6 hours  buPROPion XL . 150 milliGRAM(s) Oral at bedtime  chlorhexidine 4% Liquid 1 Application(s) Topical <User Schedule>  dexAMETHasone     Tablet 4 milliGRAM(s) Oral two times a day  escitalopram 20 milliGRAM(s) Oral at bedtime  fentaNYL   Patch 100 MICROgram(s)/Hr 3 Patch Transdermal every 72 hours  haloperidol    Concentrate 0.25 milliGRAM(s) Oral every 12 hours  HYDROmorphone Infusion 1.5 mG/Hr (1.5 mL/Hr) IV Continuous <Continuous>  melatonin 5 milliGRAM(s) Oral at bedtime  pantoprazole    Tablet 40 milliGRAM(s) Oral every 12 hours  polyethylene glycol 3350 17 Gram(s) Oral at bedtime  senna 2 Tablet(s) Oral every 12 hours  sodium chloride 0.9% Bolus 500 milliLiter(s) IV Bolus once    MEDICATIONS  (PRN):  ALBUTerol    0.083% 2.5 milliGRAM(s) Nebulizer every 6 hours PRN Bronchospasm  benzonatate 100 milliGRAM(s) Oral three times a day PRN Cough  clonazePAM  Tablet 0.5 milliGRAM(s) Oral three times a day PRN anxiety  guaiFENesin    Syrup 200 milliGRAM(s) Oral every 6 hours PRN Cough  HYDROmorphone  Injectable 4 milliGRAM(s) IV Push every 1 hour PRN Severe Pain (7 - 10)  LORazepam   Injectable 1 milliGRAM(s) IV Push every 4 hours PRN anxiety and restlessness  saline laxative (FLEET) Rectal Enema 1 Enema Rectal daily PRN Constipation      LABS:    None new.

## 2019-11-24 PROCEDURE — 99231 SBSQ HOSP IP/OBS SF/LOW 25: CPT

## 2019-11-24 RX ORDER — HYDROMORPHONE HYDROCHLORIDE 2 MG/ML
2 INJECTION INTRAMUSCULAR; INTRAVENOUS; SUBCUTANEOUS
Qty: 100 | Refills: 0 | Status: DISCONTINUED | OUTPATIENT
Start: 2019-11-24 | End: 2019-11-25

## 2019-11-24 RX ORDER — CLONAZEPAM 1 MG
0.5 TABLET ORAL
Refills: 0 | Status: DISCONTINUED | OUTPATIENT
Start: 2019-11-24 | End: 2019-11-25

## 2019-11-24 RX ADMIN — Medication 1 MILLIGRAM(S): at 15:51

## 2019-11-24 RX ADMIN — FENTANYL CITRATE 3 PATCH: 50 INJECTION INTRAVENOUS at 19:55

## 2019-11-24 RX ADMIN — Medication 3 MILLILITER(S): at 07:15

## 2019-11-24 RX ADMIN — HYDROMORPHONE HYDROCHLORIDE 4 MILLIGRAM(S): 2 INJECTION INTRAMUSCULAR; INTRAVENOUS; SUBCUTANEOUS at 12:47

## 2019-11-24 RX ADMIN — HYDROMORPHONE HYDROCHLORIDE 2 MG/HR: 2 INJECTION INTRAMUSCULAR; INTRAVENOUS; SUBCUTANEOUS at 15:44

## 2019-11-24 RX ADMIN — HYDROMORPHONE HYDROCHLORIDE 4 MILLIGRAM(S): 2 INJECTION INTRAMUSCULAR; INTRAVENOUS; SUBCUTANEOUS at 21:52

## 2019-11-24 RX ADMIN — HYDROMORPHONE HYDROCHLORIDE 4 MILLIGRAM(S): 2 INJECTION INTRAMUSCULAR; INTRAVENOUS; SUBCUTANEOUS at 15:43

## 2019-11-24 RX ADMIN — HYDROMORPHONE HYDROCHLORIDE 4 MILLIGRAM(S): 2 INJECTION INTRAMUSCULAR; INTRAVENOUS; SUBCUTANEOUS at 19:23

## 2019-11-24 RX ADMIN — HYDROMORPHONE HYDROCHLORIDE 4 MILLIGRAM(S): 2 INJECTION INTRAMUSCULAR; INTRAVENOUS; SUBCUTANEOUS at 13:55

## 2019-11-24 RX ADMIN — HYDROMORPHONE HYDROCHLORIDE 1.5 MG/HR: 2 INJECTION INTRAMUSCULAR; INTRAVENOUS; SUBCUTANEOUS at 08:03

## 2019-11-24 RX ADMIN — HYDROMORPHONE HYDROCHLORIDE 4 MILLIGRAM(S): 2 INJECTION INTRAMUSCULAR; INTRAVENOUS; SUBCUTANEOUS at 23:00

## 2019-11-24 RX ADMIN — FENTANYL CITRATE 3 PATCH: 50 INJECTION INTRAVENOUS at 08:37

## 2019-11-24 RX ADMIN — FENTANYL CITRATE 3 PATCH: 50 INJECTION INTRAVENOUS at 06:58

## 2019-11-24 RX ADMIN — HYDROMORPHONE HYDROCHLORIDE 4 MILLIGRAM(S): 2 INJECTION INTRAMUSCULAR; INTRAVENOUS; SUBCUTANEOUS at 17:38

## 2019-11-24 RX ADMIN — Medication 3 MILLILITER(S): at 13:45

## 2019-11-24 RX ADMIN — HYDROMORPHONE HYDROCHLORIDE 4 MILLIGRAM(S): 2 INJECTION INTRAMUSCULAR; INTRAVENOUS; SUBCUTANEOUS at 17:41

## 2019-11-24 RX ADMIN — Medication 1 MILLIGRAM(S): at 20:04

## 2019-11-24 RX ADMIN — HYDROMORPHONE HYDROCHLORIDE 4 MILLIGRAM(S): 2 INJECTION INTRAMUSCULAR; INTRAVENOUS; SUBCUTANEOUS at 11:37

## 2019-11-24 RX ADMIN — HYDROMORPHONE HYDROCHLORIDE 4 MILLIGRAM(S): 2 INJECTION INTRAMUSCULAR; INTRAVENOUS; SUBCUTANEOUS at 14:50

## 2019-11-24 RX ADMIN — Medication 1 MILLIGRAM(S): at 06:20

## 2019-11-24 RX ADMIN — HYDROMORPHONE HYDROCHLORIDE 4 MILLIGRAM(S): 2 INJECTION INTRAMUSCULAR; INTRAVENOUS; SUBCUTANEOUS at 13:50

## 2019-11-24 RX ADMIN — HALOPERIDOL DECANOATE 0.25 MILLIGRAM(S): 100 INJECTION INTRAMUSCULAR at 06:58

## 2019-11-24 RX ADMIN — Medication 1 MILLIGRAM(S): at 11:44

## 2019-11-24 RX ADMIN — HYDROMORPHONE HYDROCHLORIDE 4 MILLIGRAM(S): 2 INJECTION INTRAMUSCULAR; INTRAVENOUS; SUBCUTANEOUS at 10:45

## 2019-11-24 RX ADMIN — FENTANYL CITRATE 3 PATCH: 50 INJECTION INTRAVENOUS at 08:03

## 2019-11-24 RX ADMIN — HYDROMORPHONE HYDROCHLORIDE 2 MG/HR: 2 INJECTION INTRAMUSCULAR; INTRAVENOUS; SUBCUTANEOUS at 14:50

## 2019-11-24 RX ADMIN — HYDROMORPHONE HYDROCHLORIDE 1.5 MG/HR: 2 INJECTION INTRAMUSCULAR; INTRAVENOUS; SUBCUTANEOUS at 08:02

## 2019-11-24 RX ADMIN — HYDROMORPHONE HYDROCHLORIDE 4 MILLIGRAM(S): 2 INJECTION INTRAMUSCULAR; INTRAVENOUS; SUBCUTANEOUS at 17:06

## 2019-11-24 NOTE — PROGRESS NOTE ADULT - ASSESSMENT
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 Patient DNR/DNI due to grave prognosis from worsening metastatic adenocarcinoma of the lung. Patient is now comfort measures only, MOLST updated. Under hospice care from today.    #Hx of Metastatic adenocarcinoma of the lung; Leptomeningeal disease s/p spinal tap (w/o malignant cells)  - Received [facility] (10/9/2019)  - MRI brain showed worsening mets, with edema  - C/w dexamethasone, tapering  - Patient now inpatient hospice, Dilaudid infusion started today, consider increasing dose as patient still asking for PRN meds  - Palliative: Fentanyl increased to 300mcg Patch q72 , Dilaudid 4mg IV q1 PRN, Haldol 0.25mg SL q12 PRN, Fleet Enema q24 PRN, Senna and Colace    #Dyspnea/Fatigue   - due to Symptomatic anemia on admission vs. post-obstructive pneumonia  - CXR: Left upper lobe mass with possible opacity, No bacteremia, Received last dose of Cefepime on 11/18  - LE duplex showed no DVT  - Patient takes off NC on her own and desaturates, counselled to stop  - Monitor saturation level and keep patient comfortable    #Acute-on-chronic normocytic anemia; secondary to chronic disease vs. 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 Patient DNR/DNI due to grave prognosis from worsening metastatic adenocarcinoma of the lung. Patient is now comfort measures only, MOLST updated. Under hospice care from Monday.    #Hx of Metastatic adenocarcinoma of the lung; Leptomeningeal disease s/p spinal tap (w/o malignant cells)  - Received [facility] (10/9/2019)  - MRI brain showed worsening mets, with edema  - C/w dexamethasone, tapering  - Patient now inpatient hospice, Dilaudid infusion started, increase dose to 1.5/hr as patient still asking for PRN meds  - Palliative: Fentanyl increased to 300mcg Patch q72 , Dilaudid 4mg IV q1 PRN, Haldol 0.25mg SL q12 PRN, Fleet Enema q24 PRN, Senna and Colace  - Under inpatient hospice care form Monday    #Dyspnea/Fatigue   - due to Symptomatic anemia on admission vs. post-obstructive pneumonia  - CXR: Left upper lobe mass with possible opacity, No bacteremia, Received last dose of Cefepime on 11/18  - LE duplex showed no DVT  - Patient takes off NC on her own and desaturates, counselled to stop  - Monitor saturation level and keep patient comfortable    #Acute-on-chronic normocytic anemia; secondary to chronic disease vs. 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 Patient DNR/DNI due to grave prognosis from worsening metastatic adenocarcinoma of the lung. Patient is now comfort measures only, MOLST updated. Under hospice care from Monday.    #Hx of Metastatic adenocarcinoma of the lung; Leptomeningeal disease s/p spinal tap (w/o malignant cells)  - Received Al[facility] (10/9/2019)  - MRI brain showed worsening mets, with edema  - C/w dexamethasone, tapering  - Patient now inpatient hospice, Dilaudid infusion started, increase dose to 1.5mg/hr as patient still asking for PRN meds  - Palliative: Fentanyl increased to 300mcg Patch q72 , Dilaudid 4mg IV q1 PRN, Haldol 0.25mg SL q12 PRN, Fleet Enema q24 PRN, Senna and Colace  - Under inpatient hospice care form Monday    #Dyspnea/Fatigue   - due to Symptomatic anemia on admission vs. post-obstructive pneumonia  - CXR: Left upper lobe mass with possible opacity, No bacteremia, Received last dose of Cefepime on 11/18  - LE duplex showed no DVT  - Patient takes off NC on her own and desaturates, counselled to stop  - Monitor saturation level and keep patient comfortable    #Acute-on-chronic normocytic anemia; secondary to chronic disease vs. 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 Patient DNR/DNI due to grave prognosis from worsening metastatic adenocarcinoma of the lung. Patient is now comfort measures only, MOLST updated. Under hospice care from Monday.    #Hx of Metastatic adenocarcinoma of the lung; Leptomeningeal disease s/p spinal tap (w/o malignant cells)  - Received [facility] (10/9/2019)  - MRI brain showed worsening mets, with edema  - C/w dexamethasone, tapering  - Patient now inpatient hospice, Dilaudid infusion started, increased dose to 2mg/hr as patient still needing PRN doses  - Palliative: Fentanyl increased to 300mcg Patch q72 , Dilaudid 4mg IV q1 PRN, Haldol 0.25mg SL q12 PRN, Fleet Enema q24 PRN, Senna and Colace  - Under inpatient hospice care form Monday    #Dyspnea/Fatigue   - due to Symptomatic anemia on admission vs. post-obstructive pneumonia  - CXR: Left upper lobe mass with possible opacity, No bacteremia, Received last dose of Cefepime on 11/18  - LE duplex showed no DVT  - Patient takes off NC on her own and desaturates, counselled to stop  - Monitor saturation level and keep patient comfortable    #Acute-on-chronic normocytic anemia; secondary to chronic disease vs. BM suppression (Chemo/Rad)  - Last Hb 7.3 from 11/18/19  - No more blood work    #Hypokalemia  - Supplemented  - No more blood work    #Elevated INR  - Unclear etiology, could be related to Vit K deficiency from poor appetite  - No more blood work    Diet: Soft diet  DVT ppx: Lovenox 40 mg qD  Activity: Increase as tolerated  Code status: DNR/DNI, Comfort Care  Disposition: Inpatient hospice

## 2019-11-24 NOTE — PROGRESS NOTE ADULT - ASSESSMENT
44 yof w/ PMH of Stage 4 adenocarcinoma of the lungs with metastasis to brain s/p chemotherapy and radiation therapy, anxiety, depression and significant smoking history presents to the hospital with complaining of SOB, cough and fatigue    Patient is now DNR/DNI and is on comfort measures only.  Increase dilaudid drip as needed.  Palliative care team is following.  Inpatient hospice team to assume care on Monday. 44 yof w/ PMH of Stage 4 adenocarcinoma of the lungs with metastasis to brain s/p chemotherapy and radiation therapy, anxiety, depression and significant smoking history presents to the hospital with complaining of SOB, cough and fatigue.    Patient is now DNR/DNI and is on comfort measures only.  Increase dilaudid drip as needed.  Palliative care team is following.  Inpatient hospice team to assume care on Monday.

## 2019-11-24 NOTE — PROGRESS NOTE ADULT - SUBJECTIVE AND OBJECTIVE BOX
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 She recently returned from her trip to [place] and during her trip, her SOB had been progressively getting worse. Upon returning from her trip, she decided to present to ED for further workup. Pt also states she has been having productive cough w/ brownish sputum. There is no relieving or excaerbating factor for her SOB and cough. Also admits to decreased appetite. Denies fever or chill but does state her  recently recovered from a cold. Pt also denies any melena or hematchezia and denies epigastric pain. Her last chemotherapy (alimata and avasta) was done on 10/9/2019.     Vital Signs Last 24 Hrs  T(C): 36.2 (10 Nov 2019 15:50), Max: 36.8 (10 Nov 2019 11:04)  T(F): 97.1 (10 Nov 2019 15:50), Max: 98.3 (10 Nov 2019 11:04)  HR: 99 (10 Nov 2019 15:50) (99 - 104)  BP: 112/60 (10 Nov 2019 15:50) (112/60 - 118/63)  BP(mean): --  RR: 20 (10 Nov 2019 11:04) (20 - 20)  SpO2: 94% (10 Nov 2019 11:04) (94% - 94%)    In ED, pt was found to have Hgb of 5.9, K of 2.9 and elevated Cr. In ED, pt was given 2U of pRBC. Pt is admitted for symptomatic anemia.    Interval History:  Patient seen and examined at bedside. No acute events overnight.    Past Medical History:  Lung cancer  Anxiety and depression    Past Surgical History:  History of cholecystectomy    Allergies:  dust (Sneezing; Rhinorrhea)  Erythromycin Base (Other)    Medications:  PRN:  ALBUTerol    0.083% 2.5 milliGRAM(s) Nebulizer every 6 hours PRN Bronchospasm  benzonatate 100 milliGRAM(s) Oral three times a day PRN Cough  clonazePAM  Tablet 0.5 milliGRAM(s) Oral three times a day PRN anxiety  guaiFENesin    Syrup 200 milliGRAM(s) Oral every 6 hours PRN Cough  HYDROmorphone  Injectable 4 milliGRAM(s) IV Push every 1 hour PRN Severe Pain (7 - 10)  LORazepam   Injectable 1 milliGRAM(s) IV Push every 4 hours PRN anxiety and restlessness  saline laxative (FLEET) Rectal Enema 1 Enema Rectal daily PRN Constipation    Standing:  albuterol/ipratropium for Nebulization 3 milliLiter(s) Nebulizer every 6 hours  buPROPion XL . 150 milliGRAM(s) Oral at bedtime  chlorhexidine 4% Liquid 1 Application(s) Topical <User Schedule>  dexAMETHasone     Tablet 4 milliGRAM(s) Oral two times a day  escitalopram 20 milliGRAM(s) Oral at bedtime  fentaNYL   Patch 100 MICROgram(s)/Hr 3 Patch Transdermal every 72 hours  haloperidol    Concentrate 0.25 milliGRAM(s) Oral every 12 hours  HYDROmorphone Infusion 1.5 mG/Hr (1.5 mL/Hr) IV Continuous <Continuous>  melatonin 5 milliGRAM(s) Oral at bedtime  pantoprazole    Tablet 40 milliGRAM(s) Oral every 12 hours  polyethylene glycol 3350 17 Gram(s) Oral at bedtime  senna 2 Tablet(s) Oral every 12 hours    Home:  Advil Liquigel 200 mg oral capsule: 1 cap(s) orally every 4 hours, As Needed - for mild pain  Allegra 180 mg oral tablet: 1 tab(s) orally once a day  benzonatate 100 mg oral capsule: 1 cap(s) orally 3 times a day  Colace 100 mg oral capsule: 1 cap(s) orally 2 times a day  escitalopram 20 mg oral tablet: 1 tab(s) orally once a day (at bedtime)  KlonoPIN 0.5 mg oral tablet: 1 tab(s) orally 3 times a day, As Needed  oxyCODONE 20 mg oral tablet: 1 tab(s) orally every 4 hours -Severe Pain (7 - 10) as needed MDD:4 tablets  OxyCONTIN 80 mg oral tablet, extended release: 120 milligram(s) orally 2 times a day  prochlorperazine 10 mg oral tablet: 1 tab(s) orally every 6 hours, As Needed  Protonix 40 mg oral delayed release tablet: 1 tab(s) orally 2 times a day  Senna 8.6 mg oral tablet: 1 tab(s) orally once a day (at bedtime)    Vitals:  T(C): 38.3, Max: 38.3 (11-23-19 @ 13:42)  T(F): 101, Max: 101 (11-23-19 @ 13:42)  HR: 144 (144 - 149)  BP: 158/93 (158/93 - 158/93)  RR: 19 (19 - 24)  SpO2: --    Physical Exam:  General: Awake, Alert. Not in acute distress.  Heart: Regular rate and rhythm; S1, S2; No murmurs.  Lungs: Clear to auscultation bilaterally.  Abdomen: Soft, nontender, nondistended.  Extremities: No edema in upper or lower extremities.  Neuro: AAOx3, No focal deficits.    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 She recently returned from her trip to [place] and during her trip, her SOB had been progressively getting worse. Upon returning from her trip, she decided to present to ED for further workup. Pt also states she has been having productive cough w/ brownish sputum. There is no relieving or excaerbating factor for her SOB and cough. Also admits to decreased appetite. Denies fever or chill but does state her  recently recovered from a cold. Pt also denies any melena or hematchezia and denies epigastric pain. Her last chemotherapy (alimata and avasta) was done on 10/9/2019.     Vital Signs Last 24 Hrs  T(C): 36.2 (10 Nov 2019 15:50), Max: 36.8 (10 Nov 2019 11:04)  T(F): 97.1 (10 Nov 2019 15:50), Max: 98.3 (10 Nov 2019 11:04)  HR: 99 (10 Nov 2019 15:50) (99 - 104)  BP: 112/60 (10 Nov 2019 15:50) (112/60 - 118/63)  BP(mean): --  RR: 20 (10 Nov 2019 11:04) (20 - 20)  SpO2: 94% (10 Nov 2019 11:04) (94% - 94%)    In ED, pt was found to have Hgb of 5.9, K of 2.9 and elevated Cr. In ED, pt was given 2U of pRBC. Pt is admitted for symptomatic anemia.    Interval History:  Patient seen and examined at bedside. No acute events overnight.    Past Medical History:  Lung cancer  Anxiety and depression    Past Surgical History:  History of cholecystectomy    Allergies:  dust (Sneezing; Rhinorrhea)  Erythromycin Base (Other)    Medications:  PRN:  ALBUTerol    0.083% 2.5 milliGRAM(s) Nebulizer every 6 hours PRN Bronchospasm  benzonatate 100 milliGRAM(s) Oral three times a day PRN Cough  clonazePAM  Tablet 0.5 milliGRAM(s) Oral three times a day PRN anxiety  guaiFENesin    Syrup 200 milliGRAM(s) Oral every 6 hours PRN Cough  HYDROmorphone  Injectable 4 milliGRAM(s) IV Push every 1 hour PRN Severe Pain (7 - 10)  LORazepam   Injectable 1 milliGRAM(s) IV Push every 4 hours PRN anxiety and restlessness  saline laxative (FLEET) Rectal Enema 1 Enema Rectal daily PRN Constipation    Standing:  albuterol/ipratropium for Nebulization 3 milliLiter(s) Nebulizer every 6 hours  buPROPion XL . 150 milliGRAM(s) Oral at bedtime  chlorhexidine 4% Liquid 1 Application(s) Topical <User Schedule>  dexAMETHasone     Tablet 4 milliGRAM(s) Oral two times a day  escitalopram 20 milliGRAM(s) Oral at bedtime  fentaNYL   Patch 100 MICROgram(s)/Hr 3 Patch Transdermal every 72 hours  haloperidol    Concentrate 0.25 milliGRAM(s) Oral every 12 hours  HYDROmorphone Infusion 1.5 mG/Hr (1.5 mL/Hr) IV Continuous <Continuous>  melatonin 5 milliGRAM(s) Oral at bedtime  pantoprazole    Tablet 40 milliGRAM(s) Oral every 12 hours  polyethylene glycol 3350 17 Gram(s) Oral at bedtime  senna 2 Tablet(s) Oral every 12 hours    Home:  Advil Liquigel 200 mg oral capsule: 1 cap(s) orally every 4 hours, As Needed - for mild pain  Allegra 180 mg oral tablet: 1 tab(s) orally once a day  benzonatate 100 mg oral capsule: 1 cap(s) orally 3 times a day  Colace 100 mg oral capsule: 1 cap(s) orally 2 times a day  escitalopram 20 mg oral tablet: 1 tab(s) orally once a day (at bedtime)  KlonoPIN 0.5 mg oral tablet: 1 tab(s) orally 3 times a day, As Needed  oxyCODONE 20 mg oral tablet: 1 tab(s) orally every 4 hours -Severe Pain (7 - 10) as needed MDD:4 tablets  OxyCONTIN 80 mg oral tablet, extended release: 120 milligram(s) orally 2 times a day  prochlorperazine 10 mg oral tablet: 1 tab(s) orally every 6 hours, As Needed  Protonix 40 mg oral delayed release tablet: 1 tab(s) orally 2 times a day  Senna 8.6 mg oral tablet: 1 tab(s) orally once a day (at bedtime)    Vitals:  T(C): 38.3, Max: 38.3 (11-23-19 @ 13:42)  T(F): 101, Max: 101 (11-23-19 @ 13:42)  HR: 144 (144 - 149)  BP: 158/93 (158/93 - 158/93)  RR: 19 (19 - 24)  SpO2: --    Physical Exam:  General: Comfortable    Labs:                Microbiology:    Radiology:

## 2019-11-24 NOTE — PROGRESS NOTE ADULT - SUBJECTIVE AND OBJECTIVE BOX
Patient is a 44y old  Female who presents with a chief complaint of Goals of care and symptom management (22 Nov 2019 15:26)      Subjective:      Vital Signs Last 24 Hrs  T(C): 38.3 (23 Nov 2019 13:42), Max: 38.3 (23 Nov 2019 13:42)  T(F): 101 (23 Nov 2019 13:42), Max: 101 (23 Nov 2019 13:42)  HR: 144 (23 Nov 2019 13:42) (144 - 149)  BP: 158/93 (23 Nov 2019 13:42) (158/93 - 158/93)  BP(mean): --  RR: 19 (23 Nov 2019 13:42) (19 - 24)  SpO2: 93% (23 Nov 2019 08:30) (93% - 93%)    PHYSICAL EXAM  General: adult in NAD  HEENT: clear oropharynx, anicteric sclera, pink conjunctiva  Neck: supple  CV: normal S1/S2 with no murmur rubs or gallops  Lungs: positive air movement b/l ant lungs,clear to auscultation, no wheezes, no rales  Abdomen: soft non-tender non-distended, no hepatosplenomegaly  Ext: no clubbing cyanosis or edema  Skin: no rashes and no petechiae  Neuro: alert and oriented X 4, no focal deficits    MEDICATIONS  (STANDING):  albuterol/ipratropium for Nebulization 3 milliLiter(s) Nebulizer every 6 hours  buPROPion XL . 150 milliGRAM(s) Oral at bedtime  chlorhexidine 4% Liquid 1 Application(s) Topical <User Schedule>  dexAMETHasone     Tablet 4 milliGRAM(s) Oral two times a day  escitalopram 20 milliGRAM(s) Oral at bedtime  fentaNYL   Patch 100 MICROgram(s)/Hr 3 Patch Transdermal every 72 hours  haloperidol    Concentrate 0.25 milliGRAM(s) Oral every 12 hours  HYDROmorphone Infusion 1.5 mG/Hr (1.5 mL/Hr) IV Continuous <Continuous>  melatonin 5 milliGRAM(s) Oral at bedtime  pantoprazole    Tablet 40 milliGRAM(s) Oral every 12 hours  polyethylene glycol 3350 17 Gram(s) Oral at bedtime  senna 2 Tablet(s) Oral every 12 hours    MEDICATIONS  (PRN):  ALBUTerol    0.083% 2.5 milliGRAM(s) Nebulizer every 6 hours PRN Bronchospasm  benzonatate 100 milliGRAM(s) Oral three times a day PRN Cough  clonazePAM  Tablet 0.5 milliGRAM(s) Oral three times a day PRN anxiety  guaiFENesin    Syrup 200 milliGRAM(s) Oral every 6 hours PRN Cough  HYDROmorphone  Injectable 4 milliGRAM(s) IV Push every 1 hour PRN Severe Pain (7 - 10)  LORazepam   Injectable 1 milliGRAM(s) IV Push every 4 hours PRN anxiety and restlessness  saline laxative (FLEET) Rectal Enema 1 Enema Rectal daily PRN Constipation      LABS:    None new. Patient is a 44y old  Female who presents with a chief complaint of Goals of care and symptom management (22 Nov 2019 15:26)      Subjective: Patient appears comfortable.      Vital Signs Last 24 Hrs  T(C): 38.3 (23 Nov 2019 13:42), Max: 38.3 (23 Nov 2019 13:42)  T(F): 101 (23 Nov 2019 13:42), Max: 101 (23 Nov 2019 13:42)  HR: 144 (23 Nov 2019 13:42) (144 - 149)  BP: 158/93 (23 Nov 2019 13:42) (158/93 - 158/93)  BP(mean): --  RR: 19 (23 Nov 2019 13:42) (19 - 24)  SpO2: 93% (23 Nov 2019 08:30) (93% - 93%)    PHYSICAL EXAM  General: adult in NAD, chronically-ill appearing  HEENT: + alopecia  Lungs: positive air movement b/l ant lungs  Ext: RUE PICC line in place  : jarrett catheter draining clear yellow urine  Neuro: lethargic    MEDICATIONS  (STANDING):  albuterol/ipratropium for Nebulization 3 milliLiter(s) Nebulizer every 6 hours  buPROPion XL . 150 milliGRAM(s) Oral at bedtime  chlorhexidine 4% Liquid 1 Application(s) Topical <User Schedule>  dexAMETHasone     Tablet 4 milliGRAM(s) Oral two times a day  escitalopram 20 milliGRAM(s) Oral at bedtime  fentaNYL   Patch 100 MICROgram(s)/Hr 3 Patch Transdermal every 72 hours  haloperidol    Concentrate 0.25 milliGRAM(s) Oral every 12 hours  HYDROmorphone Infusion 1.5 mG/Hr (1.5 mL/Hr) IV Continuous <Continuous>  melatonin 5 milliGRAM(s) Oral at bedtime  pantoprazole    Tablet 40 milliGRAM(s) Oral every 12 hours  polyethylene glycol 3350 17 Gram(s) Oral at bedtime  senna 2 Tablet(s) Oral every 12 hours    MEDICATIONS  (PRN):  ALBUTerol    0.083% 2.5 milliGRAM(s) Nebulizer every 6 hours PRN Bronchospasm  benzonatate 100 milliGRAM(s) Oral three times a day PRN Cough  clonazePAM  Tablet 0.5 milliGRAM(s) Oral three times a day PRN anxiety  guaiFENesin    Syrup 200 milliGRAM(s) Oral every 6 hours PRN Cough  HYDROmorphone  Injectable 4 milliGRAM(s) IV Push every 1 hour PRN Severe Pain (7 - 10)  LORazepam   Injectable 1 milliGRAM(s) IV Push every 4 hours PRN anxiety and restlessness  saline laxative (FLEET) Rectal Enema 1 Enema Rectal daily PRN Constipation      LABS:    None new.

## 2019-11-25 NOTE — DISCHARGE NOTE FOR THE EXPIRED PATIENT - HOSPITAL COURSE
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 She recently returned from her trip to [place] and during her trip, her SOB had been progressively getting worse. Upon returning from her trip, she decided to present to ED for further workup. Pt also states she has been having productive cough w/ brownish sputum. There is no relieving or excaerbating factor for her SOB and cough. Also admits to decreased appetite. Denies fever or chill but does state her  recently recovered from a cold. Pt also denies any melena or hematchezia and denies epigastric pain. Her last chemotherapy (alimata and avasta) was done on 10/9/2019.   initial work up below   # SOB   - DDx: sympatomatic anemia, progression of Lung Ca, pneumonia and less likely COPD exacerbation   - elevated WBC and tachycardia r/o pneumonia  - given leukocytosis and tachycardia and opacity on CXR, will give abx with ceftriaxone   - transfuse 2U pRBC  - duoneb q6h and albuterol PRN    # Acute on chronic normocytic anemia  - likely secondary to ACD vs myelosuppression secondary to chemotherapy and radiation therapy   - transfuse 2U pRBC  - iron studies, if ferritin low, consider iron infusion  - keep active type and screen  - transfuse PRN to maintain Hgb > 7    # JAZMIN likely prerenal secondary to decreased PO intake and anemia   - baseline creatinine ~0.7   - today creatinine, Serum: 1.9 mg/dL (11.10.19 @ 12:30)  - c/w IVF at 75 cc/hr   - trend BMP     # Hypokalemia   - repleted  - trend BMP and replete lytes PRN     # Metastatic lung Ca to brain s/p chemo and radiation therapy  # Cancer pain  - hemo/onc consult  - OP heme/onc follow-up  - c/w home pain regimen w/ oxycodone 80 mg q12h and 20 mg q4h   by end of hospital stay as below  Patient admitted with stage 4 lung cancer and worsening respiratory status patient was made comfort care and  DNR/DNI .  dilaudid drip as needed.  patient  while on comfort care.

## 2019-12-02 DIAGNOSIS — G93.6 CEREBRAL EDEMA: ICD-10-CM

## 2019-12-02 DIAGNOSIS — E87.6 HYPOKALEMIA: ICD-10-CM

## 2019-12-02 DIAGNOSIS — J15.9 UNSPECIFIED BACTERIAL PNEUMONIA: ICD-10-CM

## 2019-12-02 DIAGNOSIS — D63.0 ANEMIA IN NEOPLASTIC DISEASE: ICD-10-CM

## 2019-12-02 DIAGNOSIS — E53.8 DEFICIENCY OF OTHER SPECIFIED B GROUP VITAMINS: ICD-10-CM

## 2019-12-02 DIAGNOSIS — F41.9 ANXIETY DISORDER, UNSPECIFIED: ICD-10-CM

## 2019-12-02 DIAGNOSIS — R06.02 SHORTNESS OF BREATH: ICD-10-CM

## 2019-12-02 DIAGNOSIS — Z87.891 PERSONAL HISTORY OF NICOTINE DEPENDENCE: ICD-10-CM

## 2019-12-02 DIAGNOSIS — Z91.048 OTHER NONMEDICINAL SUBSTANCE ALLERGY STATUS: ICD-10-CM

## 2019-12-02 DIAGNOSIS — Z66 DO NOT RESUSCITATE: ICD-10-CM

## 2019-12-02 DIAGNOSIS — E43 UNSPECIFIED SEVERE PROTEIN-CALORIE MALNUTRITION: ICD-10-CM

## 2019-12-02 DIAGNOSIS — E56.1 DEFICIENCY OF VITAMIN K: ICD-10-CM

## 2019-12-02 DIAGNOSIS — C34.90 MALIGNANT NEOPLASM OF UNSPECIFIED PART OF UNSPECIFIED BRONCHUS OR LUNG: ICD-10-CM

## 2019-12-02 DIAGNOSIS — F32.9 MAJOR DEPRESSIVE DISORDER, SINGLE EPISODE, UNSPECIFIED: ICD-10-CM

## 2019-12-02 DIAGNOSIS — Z88.8 ALLERGY STATUS TO OTHER DRUGS, MEDICAMENTS AND BIOLOGICAL SUBSTANCES STATUS: ICD-10-CM

## 2019-12-02 DIAGNOSIS — C79.31 SECONDARY MALIGNANT NEOPLASM OF BRAIN: ICD-10-CM

## 2019-12-02 DIAGNOSIS — N17.9 ACUTE KIDNEY FAILURE, UNSPECIFIED: ICD-10-CM

## 2019-12-02 DIAGNOSIS — Z51.5 ENCOUNTER FOR PALLIATIVE CARE: ICD-10-CM

## 2020-01-20 NOTE — ED ADULT NURSE NOTE - CHIEF COMPLAINT QUOTE
CASE MANAGEMENT. .... Met with Mrs Sumi Lisa to discuss her hospital stay and discharge needs. She has a good understanding of her reason for admission and is on board with the plan of care. Mrs Sumi Lisa was being followed by her pcp for low hgb's. She started to become symptomatic and had c/o fatigue, confusion and lightheadedness. She was found to have severe anemia and was instructed to the er. She was evaluated and treated in the er, received 2 units of PRBC and was admitted to tele where she continues on ivf's and iv protonix qd. Mrs Sumi Lisa had surgery in Sept of 2019 for a left wrist fracture. Taking NSAIDS prn. She is for colonoscopy today. Her discharge plan is to return home with her , Radha Fleming. They live in a 2 story home with the bedroom/bathroom on main floor. Mrs Sumi Lisa is independent of her ADL's. Has no h/o GIUSEPPE/DME/HHC. She follows with Dr Fish Barber and uses [facility] in [place]. At this time no needs are identified for discharge. Will cont to follow along and assist with needs accordingly. SOB x few days with cough. Pt has hx of stage 4 lung cancer, on chemo.

## 2021-07-18 NOTE — PHYSICAL THERAPY INITIAL EVALUATION ADULT - LEVEL OF INDEPENDENCE: GAIT, REHAB EVAL
[address] [place] [address] [facility]. Pulmonary, Critical Care, and Sleep Medicine    Initial Patient Consult    Name: Fred Robles MRN: [id number]   : 1947 Hospital: [address] [place] [address] Dr   Date: 2021          This patient has been seen and evaluated at the request of Dr. Channing Cornell for dyspnea. IMPRESSION:   · Acute on chronic hypoxic respiratory failure, improving  · COPD exacerbation in the setting of very severe COPD  · Pulm infiltrates:  Suspect atypical infection vs inspissated secretions, scattered, mild. Negative procalcitonin and no consolidation to suggest CAP. Most of findings in CT scan go with severe COPD with extensive emphysematous changes. Rapid Covid test negative, patient is immunized with Moderna vaccine  · COPD cachexia: Body mass index is 18.98 kg/m².   · Nicotine dependence, cigarettes:  Pt continues to smoke a few cigarettes daily  · History of colon cancer status post right hemicolectomy with reversal of ileostomy in 2019     Patient Active Problem List   Diagnosis Code    COPD, severe ([facility] J44.9    Nicotine addiction F17.200    Hemoptysis R04.2    Lumbar spinal stenosis M48.061    Facet arthritis of lumbar region M47.816    Neuritis of lower extremity G57.90    Emphysema of lung ([address] J43.9    COPD with acute exacerbation (HCC) J44.1    Muscle spasm of back M62.830    Sacroiliac joint pain M53.3    Current chronic use of systemic steroids Z79.52    Baker's cyst of knee M71.20    Acute exacerbation of chronic obstructive pulmonary disease (COPD) ([address] J44.1    Degenerative disc disease, lumbar M51.36    Left-sided low back pain with sciatica M54.42    COPD with exacerbation (HCC) J44.1    COPD (chronic obstructive pulmonary disease) (HCC) J44.9    Pneumonia J18.9    Abdominal pain R10.9    Abdominal mass R19.00    Colon cancer without distant metastasis (HCC) C18.9    Colon polyps K63.5    Post-op pain G89.18    Obstruction of bowel ([address] K56.609  Ileus following gastrointestinal surgery ([facility]) K91.89, K56.7    Hypotension I95.9    COPD exacerbation ([facility]) J44.1    Dizziness R42    Non-rheumatic mitral regurgitation I34.0    Pulmonary HTN ([facility]) I27.20    Chest pain R07.9    Chronic diastolic congestive heart failure ([facility]) I50.32    ERRONEOUS ENCOUNTER--DISREGARD     History of colon cancer Z85.038    Ileostomy present ([facility]) Z93.2    Incisional hernia K43.2    Community acquired pneumonia J18.9    Acute bronchitis with chronic obstructive pulmonary disease (COPD) ([facility]) J44.0, J20.9    Respiratory failure ([facility]) J96.90    Severe protein-calorie malnutrition ([facility]) E43      RECOMMENDATIONS:   Continue PO steroids, likely 7-14 day course following discharge  ABx per primary service, recommend 5-7 day course. Due to severe bronchospasm, hold off on mucolytics -- mucus plugging on CT is mild  Will maintained schedule bronchodilators: duonebs q4h, pulmicort nebs 1mg BID for 1 more day prior to switching to PRN  Please hold home bronchodilators (Advair and Spiriva). May resume on discharge  Supplemental oxygen to maintain SpO2 88-93%  Please assess for home oxygen need prior to discharge (on 2LPM at home)  Aggressive pulmonary toileting/bronchial hygiene. Continue with flutter valve 4-5 times per day  Frequent incentive spirometry  Aspiration precautions including elevating HOB >30deg  PT/OT, OOB, ambulate with assistance as tolerated  DVT ppx per primary service  Will follow. Overall improving, possible discharge tomorrow. Plan of care updated with patient     Subjective:     Patient is a 76 y.o. female with a past medical history of very severe COPD, oxygen dependence, nicotine dependence, colon cancer status post resection presented to DR. LEGGETTS \[facility]\"" with acute onset of shortness of breath. History was obtained from the patient and multiple family members in the room including sister, brother, daughter, son.   Patient reports that she had been short of breath for the last couple of weeks. She reports she was unable to [place] [facility] a cigarette\" for the last week. Patient reported ongoing chest tightness, worsening dyspnea with cough, intermittently productive. Patient reported associated symptom of sensation of mucus caught in chest.  Patient reports that dyspnea is worsened with excess heat given the weather. Patient also reports that she feels her apartment has excessive mold and drainage which results and worsening dyspnea. Patient reported no improvement with as needed albuterol. Patient reports she usually smokes a couple of cigarettes every few days. Patient reports that she was feeling febrile for the last day. Patient presented the ER, very dyspneic, required BiPAP throughout the night, discontinued this morning. Patient also reports that she improved with bronchodilators and steroids. She reports that most significant improvement was with BiPAP. Patient denies angina, nausea, vomiting, diarrhea, night sweats. 25 Hr Events/Subjective:  Sister, son and patient in room today. She reports feeling improved today, able to move more mucous and improving cough. Dyspnea continues, but also improved from admission. Increased energy. Still not able to do many ADLS without getting winded. Sister helped with shower last evening which per patient took a while to recover from. Using flutter valve and suction with good efficacy. All questions answer today.        Past Medical History:   Diagnosis Date    Anxiety     Arthritis     Asbestosis ([facility]     Asthma     Back problem     Baker's cyst     Balance problems     Chronic lung disease     Cigarette smoker     Clotting disorder (HCC)     COPD [facility]chronic obstructive pulmonary disease) ([facility]     oxygen 2/liters asbestosis    Depression     History of DVT (deep vein thrombosis)     Leg pain     Right    Lung disease     Nausea & vomiting     Osteoporosis     Restless leg syndrome     Spinal stenosis     Thromboembolus ([facility]     Vitamin D deficiency       Past Surgical History:   Procedure Laterality Date    COLONOSCOPY N/A 9/22/2018    COLONOSCOPY w bx w polypectonies performed by Harjeet Torres MD at [address] COLONOSCOPY N/A 11/6/2018    COLONOSCOPY performed by Nadine Seaman MD at [address] [place] [address] COLONOSCOPY N/A 8/2/2019    COLONOSCOPY with polypectomies and biopsies performed by Marion Mendoza MD at [address] HX APPENDECTOMY      HX BACK SURGERY      x4    HX BREAST BIOPSY      left    HX GI      exp lap and ileostomy    HX HEENT      cataract right    HX HYSTERECTOMY      HX LUMBAR LAMINECTOMY      HX MENISCUS REPAIR      HX ORTHOPAEDIC      Knee bakers cyst    HX POLYPECTOMY      right colectomy    HX TONSILLECTOMY      HX VEIN STRIPPING      HI LAP,SURG,COLECTOMY, PARTIAL, W/ANAST N/A 10/23/2018    Dr. Gilford Kell N/A 11/06/2018    Dr. Brody Primmer      vein stripping      Prior to Admission medications    Medication Sig Start Date End Date Taking? Authorizing Provider   Ventolin HFA 90 mcg/actuation inhaler inhale 2 puffs by mouth and INTO THE LUNGS every 4 hours if needed for wheezing shortness of breath 4/27/21   Severa Salk, MD   predniSONE (DELTASONE) 10 mg tablet Take 10 mg by mouth daily (with breakfast). 3/19/21   Severa Salk, MD   tiotropium bromide (Spiriva Respimat) 2.5 mcg/actuation inhaler Take 2 Puffs by inhalation daily. 7/29/20   Severa Salk, MD   albuterol (PROVENTIL VENTOLIN) 2.5 mg /3 mL (0.083 %) nebulizer solution 3 mL by Nebulization route every four (4) hours as needed for Wheezing or Shortness of Breath. ICD: J44.9, R09.02 file under Medicare Part B 4/16/19   Aundria Massing A, NP   fluticasone propion-salmeterol (ADVAIR DISKUS) 250-50 mcg/dose diskus inhaler Take 1 Puff by inhalation two (2) times a day.  BRAND NAME ONLY 4/11/19   Rukhsana Keating MD   simethicone (GAS-X) 80 mg chewable tablet Take 80 mg by mouth every six (6) hours as needed for Flatulence. Provider, Historical   therapeutic multivitamin (THERAGRAN) tablet Take 1 Tab by mouth daily. 9/24/18   Pavel Frye MD   acetaminophen (TYLENOL EXTRA STRENGTH) 500 mg tablet Take 500 mg by mouth every six (6) hours as needed for Pain. Provider, Historical   LORazepam (ATIVAN) 1 mg tablet Take  by mouth two (2) times daily as needed for Anxiety. Provider, Historical   rOPINIRole (REQUIP) 1 mg tablet Take 1 mg by mouth nightly. Provider, Historical   OXYGEN-AIR DELIVERY SYSTEMS 2 L by Does Not Apply route. O2 via nasal cannula with bedtime and activity.  O2 Company: [employer]    Provider, Historical     Allergies   Allergen Reactions    Anoro Ellipta [Umeclidinium-Vilanterol] Rash and Other (comments)     Muscle cramps in arms    Aspirin Other (comments)     GI upset and bleeding    Augmentin [Amoxicillin-Pot Clavulanate] Not Reported This Time     Possible cramping    Doxycycline Nausea and Vomiting    Morphine Other (comments)     Neurologic symptoms - severe agitation      Shellfish Derived Itching     Pt able to eat small amounts per report     Sulfa (Sulfonamide Antibiotics) Rash     Patient relates no longer allergic    Jasson José [Fluticasone Propion-Salmeterol] Other (comments)     Mouth Sores      Social History     Tobacco Use    Smoking status: Current Some Day Smoker     Packs/day: 1.00     Years: 50.00     Pack years: 50.00     Types: Cigarettes     Start date: 10/21/1964    Smokeless tobacco: Never Used   Substance Use Topics    Alcohol use: Never      Family History   Problem Relation Age of Onset    Cancer Father     Heart Disease Mother     Hypertension Mother     Cancer Brother     Cancer Sister     Diabetes Other     Hypertension Other     Stroke Other     Heart Disease Sister     Hypertension Sister     Heart Disease Brother         Current Facility-Administered Medications   Medication Dose Route Frequency    famotidine (PEPCID) tablet 20 mg  20 mg Oral BID    midodrine (PROAMATINE) tablet 5 mg  5 mg Oral BID WITH MEALS    predniSONE (DELTASONE) tablet 60 mg  60 mg Oral DAILY WITH BREAKFAST    LORazepam (ATIVAN) tablet 1 mg  1 mg Oral QHS    rOPINIRole (REQUIP) tablet 1 mg  1 mg Oral QHS    therapeutic multivitamin (THERAGRAN) tablet 1 Tablet  1 Tablet Oral DAILY    sodium chloride (NS) flush 5-40 mL  5-40 mL IntraVENous Q8H    enoxaparin (LOVENOX) injection 40 mg  40 mg SubCUTAneous DAILY    insulin lispro (HUMALOG) injection   SubCUTAneous AC&HS    budesonide (PULMICORT) 1,000 mcg/2 mL nebulizer susp  1,000 mcg Nebulization BID RT    albuterol-ipratropium (DUO-NEB) 2.5 MG-0.5 MG/3 ML  3 mL Nebulization Q4H RT    cefTRIAXone (ROCEPHIN) 2 g in sterile water (preservative free) 20 mL IV syringe  2 g IntraVENous Q24H    azithromycin (ZITHROMAX) 500 mg in 0.9% sodium chloride 250 mL (VIAL-MATE)  500 mg IntraVENous Q24H       Review of Systems:  A comprehensive ROS was obtained as stated in HPI, all others are negative      Objective:   Vital Signs:    Visit Vitals  /67 (BP 1 Location: Right upper arm, BP Patient Position: At rest)   Pulse 95   Temp 98 °F (36.7 °C)   Resp 24   Ht 5' 7\" (1.702 m)   Wt 55 kg (121 lb 3.2 oz)   SpO2 96%   BMI 18.98 kg/m²       O2 Device: Nasal cannula   O2 Flow Rate (L/min): 2 l/min   Temp (24hrs), Av.6 °F (36.4 °C), Min:97.3 °F (36.3 °C), Max:98 °F (36.7 °C)       Intake/Output:   Last shift:      701 - 1900  In: 270 [I.V.:270]  Out: -   Last 3 shifts: 1901 -  0700  In: 660 [P.O.:120;  I.V.:540]  Out: -     Intake/Output Summary (Last 24 hours) at 2021 1038  Last data filed at 2021 0748  Gross per 24 hour   Intake 390 ml   Output --   Net 390 ml      Physical Exam:   General:  Alert, cooperative, no distress, appears stated age, thin, frail, mild conversational dyspnea. Laying in bed comfortably   Head:  Normocephalic, without obvious abnormality, atraumatic. Eyes:  Conjunctivae/corneas clear. ANicteric, no ocular drainage   Nose: Nares normal. Mucosa normal. No drainage or sinus tenderness. Wearing nasal cannula   Throat: Lips, mucosa dry. NO thrush; poor dentition, no oral lesions   Neck: Supple, symmetrical, trachea midline, No crepitus   Back:   Symmetric, no curvature, no spine tenderness or flank pain   Lungs:    Fair air entry bilaterally improving from admission, coarse breath sounds bilaterally with scattered expiratory rhonchi and wheezes throughout all lung fields   Chest wall:  No tenderness or deformity. NO CREPITUS   Heart:  Regular rate and rhythm, S1, S2 normal, no murmur, click, rub or gallop. Abdomen:   Soft, non-tender. Bowel sounds normal. No masses,  No organomegaly. No paradoxical motion   Extremities: normal, atraumatic, no cyanosis or edema.   No clubbing       Skin: Skin color, texture, turgor normal. No rashes or lesions       Neurologic: Grossly nonfocal, strength, sensation, coordination grossly intact throughout all extremities grossly          Data review:   Labs:  Recent Results (from the past 24 hour(s))   GLUCOSE, POC    Collection Time: 07/17/21 11:45 AM   Result Value Ref Range    Glucose (POC) 124 (H) 70 - 110 mg/dL   GLUCOSE, POC    Collection Time: 07/17/21  4:36 PM   Result Value Ref Range    Glucose (POC) 138 (H) 70 - 110 mg/dL   GLUCOSE, POC    Collection Time: 07/17/21 10:23 PM   Result Value Ref Range    Glucose (POC) 147 (H) 70 - 699 mg/dL   METABOLIC PANEL, BASIC    Collection Time: 07/18/21  5:58 AM   Result Value Ref Range    Sodium 137 136 - 145 mmol/L    Potassium 4.5 3.5 - 5.5 mmol/L    Chloride 102 100 - 111 mmol/L    CO2 29 21 - 32 mmol/L    Anion gap 6 3.0 - 18 mmol/L    Glucose 187 (H) 74 - 99 mg/dL    BUN 34 (H) 7.0 - 18 MG/DL    Creatinine 0.88 0.6 - 1.3 MG/DL    BUN/Creatinine ratio 39 (H) 12 - 20 GFR est AA >60 >60 ml/min/1.73m2    GFR est non-AA >60 >60 ml/min/1.73m2    Calcium 8.6 8.5 - 10.1 MG/DL   CBC WITH AUTOMATED DIFF    Collection Time: 07/18/21  5:58 AM   Result Value Ref Range    WBC 10.7 4.6 - 13.2 K/uL    RBC 3.60 (L) 4.20 - 5.30 M/uL    HGB 11.3 (L) 12.0 - 16.0 g/dL    HCT 34.7 (L) 35.0 - 45.0 %    MCV 96.4 74.0 - 97.0 FL    MCH 31.4 24.0 - 34.0 PG    MCHC 32.6 31.0 - 37.0 g/dL    RDW 13.3 11.6 - 14.5 %    PLATELET 671 638 - 087 K/uL    MPV 10.1 9.2 - 11.8 FL    NEUTROPHILS 92 (H) 40 - 73 %    LYMPHOCYTES 5 (L) 21 - 52 %    MONOCYTES 3 3 - 10 %    EOSINOPHILS 0 0 - 5 %    BASOPHILS 0 0 - 2 %    ABS. NEUTROPHILS 9.8 (H) 1.8 - 8.0 K/UL    ABS. LYMPHOCYTES 0.5 (L) 0.9 - 3.6 K/UL    ABS. MONOCYTES 0.3 0.05 - 1.2 K/UL    ABS. EOSINOPHILS 0.0 0.0 - 0.4 K/UL    ABS. BASOPHILS 0.0 0.0 - 0.1 K/UL    DF AUTOMATED     GLUCOSE, POC    Collection Time: 07/18/21  7:21 AM   Result Value Ref Range    Glucose (POC) 144 (H) 70 - 110 mg/dL     ABG:    No results found for: PH, PHI, PCO2, PCO2I, PO2, PO2I, HCO3, HCO3I, FIO2, FIO2I      PFT Results  (Last 48 hours)    None        Echo Results  (Last 48 hours)    None        Imaging:  I have personally reviewed the patients radiographs and have reviewed the reports:  Chest x-ray from 7/15/2021 shows hyperinflated lungs with some mild streaky opacities without consolidation, infiltrates, effusions. CTA chest with and without contrast from 7/16/2021 shows very severe bilateral emphysematous changes with scattered tree-in-bud opacities in left lingula and anterior segment of left lower lobe as well as scattered in right lower lobe, posteriorly. Although at base of right lung, there is a small consolidative defect very dependently posteriorly, which was present on prior CT scan. No masses or other consolidations or effusions seen.   Official report per radiology  CXR Results  (Last 48 hours)    None        CT Results  (Last 48 hours)    None            High complexity decision making was performed during the evaluation of this patient at high risk for decompensation with multiple organ involvement     Signed By: Sachin Ryder MD     July 18, 2021      [facility] close supervision/supervision

## 2021-10-13 NOTE — HISTORY OF PRESENT ILLNESS
Dear Marielos,    Can you please let Son Collado III know that result is ok and I will see patient as scheduled?    Thank you,  Eze.  
[FreeTextEntry1] : 9/4/19 Nursing:Patient c/o pain left rib 2/10 after pain medication administered. Skin care reviewed, patient verbalizes understanding.

## 2021-10-28 NOTE — DISCHARGE NOTE PROVIDER - NSDCHOSPICE_GEN_A_CORE
Pt presented 8/5/2021 with six months of post prandial nausea, anorexia, bowel urgency and loose stools, 30lb weight loss. Admitted to 30 years of heavy ETOH use. Labs with AFP 4; Albumin 3.3; ALk 268; ; ALT 52; Bili 4.2  Hgb 11; ; . Viral hep panel negative. Pt felt to likely have decompensated ETOH cirrhosis and possible underlying malignancy. CT scan of the abd/pelvis ordered and showed cirrhotic liver with no lesions and only trace ascites. Pt now presents for a follow up. Pt states labs at PCP last week with Hgb to 8. Admits to intermittent dark formed stools. Was taking ASA daily and stopped. States no ETOH in a few months and LFT's were better on labs last week. Denies confusion or abdominal swelling; no JULIET. Does have fatigue. No

## 2022-09-14 NOTE — ED PROVIDER NOTE - CADM POA PRESS ULCER
What Is The Reason For Today's Visit?: Full Body Skin Examination What Is The Reason For Today's Visit? (Being Monitored For X): the development of new lesions No

## 2022-10-06 NOTE — PHYSICAL THERAPY INITIAL EVALUATION ADULT - PATIENT/FAMILY AGREES WITH PLAN
"Chief Complaint  Chest Pain    Subjective          Wen Vilchis presents to [facility]  Heartburn  She complains of chest pain. The problem has been waxing and waning. The symptoms are aggravated by certain foods. She has tried a PPI for the symptoms. The treatment provided significant relief.   Insomnia  This is a chronic problem. The current episode started more than 1 year ago. Associated symptoms include chest pain. Treatments tried: trazadone. The treatment provided moderate relief.   Chest Pain   This is a new problem. The current episode started in the past 7 days. The pain is mild. The pain radiates to the epigastrium.       Review of Systems   Cardiovascular: Positive for chest pain.   Psychiatric/Behavioral: The patient has insomnia.          Objective   Vital Signs:   /70 (BP Location: Right arm, Patient Position: Sitting, Cuff Size: Large Adult)   Pulse 84   Temp 98 °F (36.7 °C)   Ht 72 cm (28.35\")   Wt 117 kg (258 lb 3.2 oz)   SpO2 98%   .93 kg/m²     Physical Exam  Constitutional:       General: She is not in acute distress.     Appearance: Normal appearance. She is well-developed and well-groomed. She is not ill-appearing, toxic-appearing or diaphoretic.   HENT:      Head: Normocephalic.      Nose: Nose normal. No congestion or rhinorrhea.      Mouth/Throat:      Mouth: Mucous membranes are moist.      Pharynx: Oropharynx is clear. No oropharyngeal exudate or posterior oropharyngeal erythema.   Eyes:      General: Lids are normal.         Right eye: No discharge.         Left eye: No discharge.      Extraocular Movements: Extraocular movements intact.      Pupils: Pupils are equal, round, and reactive to light.   Neck:      Vascular: No carotid bruit.   Cardiovascular:      Rate and Rhythm: Normal rate and regular rhythm.      Pulses: Normal pulses.      Heart sounds: Normal heart sounds. No murmur heard.    No friction rub. No gallop. "   Pulmonary:      Effort: Pulmonary effort is normal. No respiratory distress.      Breath sounds: Normal breath sounds. No stridor. No wheezing, rhonchi or rales.   Chest:      Chest wall: No tenderness.   Abdominal:      General: Bowel sounds are normal. There is no distension.      Palpations: Abdomen is soft. There is no mass.      Tenderness: There is no abdominal tenderness. There is no right CVA tenderness, left CVA tenderness, guarding or rebound.      Hernia: No hernia is present.   Musculoskeletal:         General: No swelling or tenderness. Normal range of motion.      Cervical back: Normal range of motion and neck supple. No rigidity or tenderness.      Right lower leg: No edema.      Left lower leg: No edema.   Lymphadenopathy:      Cervical: No cervical adenopathy.   Skin:     General: Skin is warm.      Capillary Refill: Capillary refill takes less than 2 seconds.      Coloration: Skin is not jaundiced.      Findings: No bruising, erythema or rash.   Neurological:      General: No focal deficit present.      Mental Status: She is alert and oriented to person, place, and time.      Motor: Motor function is intact. No weakness.      Coordination: Coordination is intact.      Gait: Gait is intact. Gait normal.   Psychiatric:         Attention and Perception: Attention normal.         Mood and Affect: Mood normal.         Speech: Speech normal.         Behavior: Behavior normal.         Cognition and Memory: Cognition normal.         Judgment: Judgment normal.        Result Review :            ECG 12 Lead    Date/Time: 10/6/2022 3:09 PM  Performed by: Kim Murry APRN  Authorized by: Kim Murry APRN   Comparison: compared with previous ECG   Rhythm: sinus rhythm  Rate: normal  Conduction: conduction normal  Other: no other findings    Clinical impression: normal ECG              Assessment and Plan    Diagnoses and all orders for this visit:    1. Dermatitis (Primary)  -     clobetasol prop  emollient base (TEMOVATE) 0.05 % emollient cream; Apply 1 application topically to the appropriate area as directed 2 (Two) Times a Day.  Dispense: 30 g; Refill: 0    2. Other insomnia  -     traZODone (DESYREL) 50 MG tablet; Take 3 tablets by mouth Every Night.  Dispense: 90 tablet; Refill: 2    3. Gastroesophageal reflux disease without esophagitis  -     pantoprazole (Protonix) 20 MG EC tablet; Take 1 tablet by mouth Daily.  Dispense: 30 tablet; Refill: 2    4. Lower abdominal pain  -     Urinalysis With Culture If Indicated -; Future  -     Urinalysis With Culture If Indicated - Urine, Clean Catch  -     Urinalysis, Microscopic Only - Urine, Clean Catch  -     Urine Culture - Urine, Urine, Clean Catch    Other orders  -     ECG 12 Lead      Patient's Body mass index is 225.93 kg/m². indicating that she is obese (BMI >30). Obesity-related health conditions include the following: GERD. Obesity is unchanged. BMI is is above average; BMI management plan is completed. We discussed low calorie, low carb based diet program, portion control and increasing exercise..    Follow Up   Return in about 4 weeks (around 11/3/2022), or if symptoms worsen or fail to improve.  Patient was given instructions and counseling regarding her condition or for health maintenance advice. Please see specific information pulled into the AVS if appropriate.     This document has been electronically signed by OZIEL Frost  October 11, 2022 15:14 EDT        yes

## 2022-12-13 NOTE — ED PROVIDER NOTE - OBJECTIVE STATEMENT
43 yo f with pmh stage 4 cancer sent in by her oncologist to get a dvt study and cta.  pt has been having worsening sob and kim.  no abd pain, no back pain, no chest pain, no dizziness, no headache, no dizziness.  no abd pain.  most recent chemo was 3 weeks ago. GOAL: Pt will follow 100% of multistep commands in 4 weeks.

## 2023-02-13 NOTE — PATIENT PROFILE ADULT. - NS PRO MODE OF ARRIVAL
stretcher
Follow up with Dr. Estes in 2 - 4 weeks. Our office will contact you in 3-5 days to schedule this appointment. Please call [phone] with questions or concerns.

## 2024-06-19 NOTE — DIETITIAN INITIAL EVALUATION ADULT. - NS FNS CHANGE IN WEIGHT
Incentive Spirometry education and demonstration completed by Respiratory Therapy Yes      Response to education: Excellent     Teaching Time: 5 minutes    Minimum Predicted Vital Capacity - 524 mL.  Patient's Actual Vital Capacity - 1250 mL. Turning over to Nursing for routine follow-up Yes.      Electronically signed by Liza El RCP on 6/19/2024 at 5:20 PM   Loss

## 2025-01-13 NOTE — DISCHARGE NOTE ADULT - MEDICATION SUMMARY - MEDICATIONS TO TAKE
Apply Vanicream to your feet daily, especially immediately after showering/bathing.    Message me after you get up to the 0.5 mg dose of Ozempic and let me know if you are ready to increase to 1 mg or if you want to hold the dose at 0.5 mg for longer.    Benefits of GLP-1 Agonists:  -Lowers blood sugar, typically without causing hypoglycemia  -Promotes weight loss  -May lower risk for cardiovascular events    Risks of GLP-1 Agonists:  -May increase risk of medullary thyroid cancer (a rare thyroid cancer).  Advise: Notify clinic if you have a personal or family history of thyroid cancer.  Notify clinic if you develop a mass in your neck or trouble swallowing.  -May cause kidney injury if you become dehydrated.  Advise: Increase your fluid intake and stay well-hydrated.    -Increases risk of gallbladder disease and pancreatitis.  Advise: Stop medication and seek urgent medical care for any severe upper abdominal pain.    Side Effects of GLP-1 Agonists:  -May cause nausea, vomiting, diarrhea   -May increase your resting heart rate    I will START or STAY ON the medications listed below when I get home from the hospital:    dexamethasone 4 mg oral tablet  -- 1 tab(s) by mouth 2 times a day  -- Indication: For LUNG MASS COUGH    naproxen 500 mg oral tablet  -- 1 tab(s) by mouth 2 times a day  -- Indication: For LUNG MASS COUGH    oxyCODONE 30 mg oral tablet, extended release  -- 1 tab(s) by mouth every 12 hours  -- Indication: For LUNG MASS COUGH    oxyCODONE 5 mg oral tablet  -- 1 tab(s) by mouth every 3 hours, As needed, Severe Pain (7 - 10)  -- Indication: For LUNG MASS COUGH    Lexapro  -- 40 milligram(s) by mouth once a day  -- Indication: For Anxiety and depression    zolpidem 5 mg oral tablet  -- 1 tab(s) by mouth once a day (at bedtime), As needed, Insomnia  -- Indication: For Anxiety and depression    Wellbutrin  mg/24 hours oral tablet, extended release  -- 1 tab(s) by mouth once a day  -- Indication: For Anxiety and depression    folic acid 1 mg oral tablet  -- 1 tab(s) by mouth once a day  -- Indication: For LUNG MASS COUGH I will START or STAY ON the medications listed below when I get home from the hospital:    dexamethasone 4 mg oral tablet  -- 1 tab(s) by mouth 2 times a day  -- Indication: For LUNG MASS COUGH    naproxen 500 mg oral tablet  -- 1 tab(s) by mouth 2 times a day  -- Indication: For LUNG MASS COUGH    oxyCODONE 30 mg oral tablet, extended release  -- 1 tab(s) by mouth every 12 hours MDD:2 tablets  -- Indication: For LUNG MASS COUGH    oxyCODONE 5 mg oral tablet  -- 1 tab(s) by mouth every 6 hours, As Needed -Severe Pain (7 - 10) as needed MDD:4 tablets  -- Indication: For LUNG MASS COUGH    escitalopram 20 mg oral tablet  -- 2 tab(s) by mouth once a day (at bedtime)  -- Indication: For LUNG MASS COUGH    buPROPion 300 mg/24 hours (XL) oral tablet, extended release  -- 1 tab(s) by mouth once a day (at bedtime)  -- Indication: For LUNG MASS COUGH    folic acid 1 mg oral tablet  -- 1 tab(s) by mouth once a day  -- Indication: For LUNG MASS COUGH I will START or STAY ON the medications listed below when I get home from the hospital:    dexamethasone 4 mg oral tablet  -- 1 tab(s) by mouth 2 times a day  -- Indication: For LUNG MASS COUGH    naproxen 500 mg oral tablet  -- 1 tab(s) by mouth 2 times a day  -- Indication: For LUNG MASS COUGH    oxyCODONE 30 mg oral tablet, extended release  -- 1 tab(s) by mouth every 12 hours MDD:2 tablets  -- Indication: For LUNG MASS COUGH    oxyCODONE 5 mg oral tablet  -- 1 tab(s) by mouth every 6 hours, As Needed -Severe Pain (7 - 10) as needed MDD:4 tablets  -- Indication: For LUNG MASS COUGH    escitalopram 20 mg oral tablet  -- 2 tab(s) by mouth once a day (at bedtime)  -- Indication: For LUNG MASS COUGH    prochlorperazine 10 mg oral tablet  -- 1 tab(s) by mouth every 6 hours  -- Indication: For nausea    ondansetron 8 mg oral tablet  -- 1 tab(s) by mouth 3 times a day  -- Indication: For nausea    buPROPion 300 mg/24 hours (XL) oral tablet, extended release  -- 1 tab(s) by mouth once a day (at bedtime)  -- Indication: For LUNG MASS COUGH    folic acid 1 mg oral tablet  -- 1 tab(s) by mouth once a day  -- Indication: For LUNG MASS COUGH

## 2025-03-30 NOTE — PROGRESS NOTE ADULT - ASSESSMENT
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 Patient now DNR/DNI due to grave prognosis from worsening metastatic adenocarcinoma of the lung.    #Dyspnea/Fatigue   - due to Symptomatic anemia vs. post-obstructive pneumonia  - CXR: Left upper lobe mass with possible opacity  - Continue Nebs and monitor saturation level  - C/w cefepime for now  - Blood cx- no growth  - LE duplex shows no DVT    #Acute-on-chronic normocytic anemia; secondary to chronic disease vs. 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)  - Received [facility] (10/9/2019)  - Pain control w/ home regimen; Oxycodone ER 120mg Q12H and Oxycodone IR 20mg Q4H PRN  - MRI brain shows worsening mets, with edema, started on Dexamethasone  - Palliative care and hospice consults ordered    #Elevated INR  - Unclear etiology, could be related to Vit K deficiency from poor appetite  - INR fluctuating up and down  - ALP elevated too but trending down    Diet: Regular diet  DVT ppx: Lovenox 40 mg qD  Activity: Increase as tolerated  Code status: DNR/DNI- MOLST form signed 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 Patient now DNR/DNI due to grave prognosis from worsening metastatic adenocarcinoma of the lung. No more labs.    #Hx of Metastatic adenocarcinoma of the lung; Leptomeningeal disease s/p spinal tap (w/o malignant cells)  - Received [facility] (10/9/2019)  - Pain control w/ home regimen; Oxycodone ER 120mg Q12H and Oxycodone IR 20mg Q4H PRN  - MRI brain shows worsening mets, with edema, started on Dexamethasone  - Palliative care and hospice consults ordered  - Palliative: Hospice planning, Fentanyl 200mcg Patch Q 72 HRS, Dilaudid 2mg IV Q 4 HRS x 12 hrs, dc Oxycontin and oxycodone, Haldol 0.25mg SL Q 12 HRS, Enema KS Q 24 HRS PRN, Senna and Colace    #Dyspnea/Fatigue   - due to Symptomatic anemia vs. post-obstructive pneumonia  - CXR: Left upper lobe mass with possible opacity  - Continue Nebs and monitor saturation level  - C/w cefepime for now  - Blood cx- no growth  - LE duplex shows no DVT  - Was 84% on RA, 94% on O2 this morning> Will need O2 with hospice      #Acute-on-chronic normocytic anemia; secondary to chronic disease vs. BM suppression (Chemo/Rad)  - s/p 2units pRBC ; Goal Hgb > 7  - Iron studies: Ferritin > 1000, B12 > 2000, folate normal  - T+S active    #Hypokalemia  - Resolved    #Elevated INR  - Unclear etiology, could be related to Vit K deficiency from poor appetite  - INR fluctuating up and down  - ALP elevated too but trending down    Diet: Regular diet  DVT ppx: Lovenox 40 mg qD  Activity: Increase as tolerated  Code status: DNR/DNI- MOLST form signed ambulatory

## 2025-05-29 NOTE — PATIENT PROFILE ADULT - NSPRESCRALCFREQ_GEN_A_NUR
Called patient in regards to records for NP appointment with [facility]. To review most recent lab results, ekg, any cardiac testing or ,if patient has been treated by a cardiologist. No answer, left voicemail to call back.  
Never